# Patient Record
Sex: FEMALE | Race: WHITE | NOT HISPANIC OR LATINO | Employment: OTHER | URBAN - METROPOLITAN AREA
[De-identification: names, ages, dates, MRNs, and addresses within clinical notes are randomized per-mention and may not be internally consistent; named-entity substitution may affect disease eponyms.]

---

## 2017-01-04 ENCOUNTER — ALLSCRIPTS OFFICE VISIT (OUTPATIENT)
Dept: OTHER | Facility: OTHER | Age: 72
End: 2017-01-04

## 2017-01-11 DIAGNOSIS — I48.91 ATRIAL FIBRILLATION (HCC): ICD-10-CM

## 2017-01-25 ENCOUNTER — GENERIC CONVERSION - ENCOUNTER (OUTPATIENT)
Dept: OTHER | Facility: OTHER | Age: 72
End: 2017-01-25

## 2017-01-25 ENCOUNTER — APPOINTMENT (OUTPATIENT)
Dept: LAB | Facility: CLINIC | Age: 72
End: 2017-01-25
Payer: COMMERCIAL

## 2017-01-25 DIAGNOSIS — I48.91 ATRIAL FIBRILLATION (HCC): ICD-10-CM

## 2017-01-25 LAB
INR PPP: 2.77 (ref 0.86–1.16)
PROTHROMBIN TIME: 28.8 SECONDS (ref 12–14.3)

## 2017-01-25 PROCEDURE — 85610 PROTHROMBIN TIME: CPT

## 2017-01-25 PROCEDURE — 36415 COLL VENOUS BLD VENIPUNCTURE: CPT

## 2017-01-26 ENCOUNTER — GENERIC CONVERSION - ENCOUNTER (OUTPATIENT)
Dept: OTHER | Facility: OTHER | Age: 72
End: 2017-01-26

## 2017-02-03 ENCOUNTER — GENERIC CONVERSION - ENCOUNTER (OUTPATIENT)
Dept: OTHER | Facility: OTHER | Age: 72
End: 2017-02-03

## 2017-02-22 ENCOUNTER — APPOINTMENT (OUTPATIENT)
Dept: LAB | Facility: CLINIC | Age: 72
End: 2017-02-22
Payer: COMMERCIAL

## 2017-02-22 ENCOUNTER — GENERIC CONVERSION - ENCOUNTER (OUTPATIENT)
Dept: OTHER | Facility: OTHER | Age: 72
End: 2017-02-22

## 2017-02-22 DIAGNOSIS — I48.91 ATRIAL FIBRILLATION (HCC): ICD-10-CM

## 2017-02-22 DIAGNOSIS — Z12.31 ENCOUNTER FOR SCREENING MAMMOGRAM FOR MALIGNANT NEOPLASM OF BREAST: ICD-10-CM

## 2017-02-22 LAB
INR PPP: 2.31 (ref 0.86–1.16)
PROTHROMBIN TIME: 25.1 SECONDS (ref 12–14.3)

## 2017-02-22 PROCEDURE — 36415 COLL VENOUS BLD VENIPUNCTURE: CPT

## 2017-02-22 PROCEDURE — 85610 PROTHROMBIN TIME: CPT

## 2017-03-21 ENCOUNTER — GENERIC CONVERSION - ENCOUNTER (OUTPATIENT)
Dept: OTHER | Facility: OTHER | Age: 72
End: 2017-03-21

## 2017-03-21 ENCOUNTER — APPOINTMENT (OUTPATIENT)
Dept: LAB | Facility: CLINIC | Age: 72
End: 2017-03-21
Payer: COMMERCIAL

## 2017-03-21 DIAGNOSIS — I48.91 ATRIAL FIBRILLATION (HCC): ICD-10-CM

## 2017-03-21 LAB
INR PPP: 2.82 (ref 0.86–1.16)
PROTHROMBIN TIME: 29.2 SECONDS (ref 12–14.3)

## 2017-03-21 PROCEDURE — 85610 PROTHROMBIN TIME: CPT

## 2017-03-21 PROCEDURE — 36415 COLL VENOUS BLD VENIPUNCTURE: CPT

## 2017-04-05 DIAGNOSIS — I48.91 ATRIAL FIBRILLATION (HCC): ICD-10-CM

## 2017-04-11 ENCOUNTER — GENERIC CONVERSION - ENCOUNTER (OUTPATIENT)
Dept: OTHER | Facility: OTHER | Age: 72
End: 2017-04-11

## 2017-04-11 ENCOUNTER — TRANSCRIBE ORDERS (OUTPATIENT)
Dept: LAB | Facility: CLINIC | Age: 72
End: 2017-04-11

## 2017-04-11 ENCOUNTER — APPOINTMENT (OUTPATIENT)
Dept: LAB | Facility: CLINIC | Age: 72
End: 2017-04-11
Payer: COMMERCIAL

## 2017-04-11 DIAGNOSIS — I48.91 ATRIAL FIBRILLATION (HCC): ICD-10-CM

## 2017-04-11 LAB
INR PPP: 2.9 (ref 0.86–1.16)
PROTHROMBIN TIME: 29.8 SECONDS (ref 12–14.3)

## 2017-04-11 PROCEDURE — 85610 PROTHROMBIN TIME: CPT

## 2017-04-11 PROCEDURE — 36415 COLL VENOUS BLD VENIPUNCTURE: CPT

## 2017-05-09 ENCOUNTER — APPOINTMENT (OUTPATIENT)
Dept: LAB | Facility: CLINIC | Age: 72
End: 2017-05-09
Payer: COMMERCIAL

## 2017-05-09 ENCOUNTER — GENERIC CONVERSION - ENCOUNTER (OUTPATIENT)
Dept: OTHER | Facility: OTHER | Age: 72
End: 2017-05-09

## 2017-05-09 DIAGNOSIS — I48.91 ATRIAL FIBRILLATION (HCC): ICD-10-CM

## 2017-05-09 LAB
INR PPP: 1.89 (ref 0.86–1.16)
PROTHROMBIN TIME: 21.9 SECONDS (ref 12.1–14.4)

## 2017-05-09 PROCEDURE — 85610 PROTHROMBIN TIME: CPT

## 2017-05-09 PROCEDURE — 36415 COLL VENOUS BLD VENIPUNCTURE: CPT

## 2017-05-10 ENCOUNTER — GENERIC CONVERSION - ENCOUNTER (OUTPATIENT)
Dept: OTHER | Facility: OTHER | Age: 72
End: 2017-05-10

## 2017-05-16 ENCOUNTER — GENERIC CONVERSION - ENCOUNTER (OUTPATIENT)
Dept: OTHER | Facility: OTHER | Age: 72
End: 2017-05-16

## 2017-05-16 ENCOUNTER — APPOINTMENT (OUTPATIENT)
Dept: LAB | Facility: CLINIC | Age: 72
End: 2017-05-16
Payer: COMMERCIAL

## 2017-05-16 ENCOUNTER — TRANSCRIBE ORDERS (OUTPATIENT)
Dept: LAB | Facility: CLINIC | Age: 72
End: 2017-05-16

## 2017-05-16 DIAGNOSIS — I48.91 ATRIAL FIBRILLATION, UNSPECIFIED TYPE (HCC): Primary | ICD-10-CM

## 2017-05-16 DIAGNOSIS — I48.91 ATRIAL FIBRILLATION, UNSPECIFIED TYPE (HCC): ICD-10-CM

## 2017-05-16 LAB
INR PPP: 2.8 (ref 0.86–1.16)
PROTHROMBIN TIME: 29.9 SECONDS (ref 12.1–14.4)

## 2017-05-16 PROCEDURE — 85610 PROTHROMBIN TIME: CPT

## 2017-05-16 PROCEDURE — 36415 COLL VENOUS BLD VENIPUNCTURE: CPT

## 2017-05-17 ENCOUNTER — GENERIC CONVERSION - ENCOUNTER (OUTPATIENT)
Dept: OTHER | Facility: OTHER | Age: 72
End: 2017-05-17

## 2017-06-12 ENCOUNTER — APPOINTMENT (OUTPATIENT)
Dept: LAB | Facility: CLINIC | Age: 72
End: 2017-06-12
Payer: COMMERCIAL

## 2017-06-12 ENCOUNTER — GENERIC CONVERSION - ENCOUNTER (OUTPATIENT)
Dept: OTHER | Facility: OTHER | Age: 72
End: 2017-06-12

## 2017-06-12 DIAGNOSIS — I10 ESSENTIAL (PRIMARY) HYPERTENSION: ICD-10-CM

## 2017-06-12 DIAGNOSIS — I73.00 RAYNAUD'S SYNDROME WITHOUT GANGRENE: ICD-10-CM

## 2017-06-12 DIAGNOSIS — E78.5 HYPERLIPIDEMIA: ICD-10-CM

## 2017-06-12 DIAGNOSIS — Z11.59 ENCOUNTER FOR SCREENING FOR OTHER VIRAL DISEASES: ICD-10-CM

## 2017-06-12 DIAGNOSIS — I48.91 ATRIAL FIBRILLATION (HCC): ICD-10-CM

## 2017-06-12 LAB
ALBUMIN SERPL BCP-MCNC: 3.8 G/DL (ref 3.5–5)
ALP SERPL-CCNC: 59 U/L (ref 46–116)
ALT SERPL W P-5'-P-CCNC: 27 U/L (ref 12–78)
ANION GAP SERPL CALCULATED.3IONS-SCNC: 5 MMOL/L (ref 4–13)
AST SERPL W P-5'-P-CCNC: 24 U/L (ref 5–45)
BASOPHILS # BLD AUTO: 0.04 THOUSANDS/ΜL (ref 0–0.1)
BASOPHILS NFR BLD AUTO: 1 % (ref 0–1)
BILIRUB SERPL-MCNC: 0.6 MG/DL (ref 0.2–1)
BUN SERPL-MCNC: 21 MG/DL (ref 5–25)
CALCIUM SERPL-MCNC: 8.6 MG/DL (ref 8.3–10.1)
CHLORIDE SERPL-SCNC: 107 MMOL/L (ref 100–108)
CHOLEST SERPL-MCNC: 147 MG/DL (ref 50–200)
CO2 SERPL-SCNC: 30 MMOL/L (ref 21–32)
CREAT SERPL-MCNC: 0.84 MG/DL (ref 0.6–1.3)
EOSINOPHIL # BLD AUTO: 0.34 THOUSAND/ΜL (ref 0–0.61)
EOSINOPHIL NFR BLD AUTO: 6 % (ref 0–6)
ERYTHROCYTE [DISTWIDTH] IN BLOOD BY AUTOMATED COUNT: 12.8 % (ref 11.6–15.1)
GFR SERPL CREATININE-BSD FRML MDRD: >60 ML/MIN/1.73SQ M
GLUCOSE P FAST SERPL-MCNC: 89 MG/DL (ref 65–99)
HCT VFR BLD AUTO: 39.4 % (ref 34.8–46.1)
HDLC SERPL-MCNC: 61 MG/DL (ref 40–60)
HGB BLD-MCNC: 12.8 G/DL (ref 11.5–15.4)
INR PPP: 2.83 (ref 0.86–1.16)
LDLC SERPL CALC-MCNC: 66 MG/DL (ref 0–100)
LYMPHOCYTES # BLD AUTO: 1.45 THOUSANDS/ΜL (ref 0.6–4.47)
LYMPHOCYTES NFR BLD AUTO: 25 % (ref 14–44)
MCH RBC QN AUTO: 31.4 PG (ref 26.8–34.3)
MCHC RBC AUTO-ENTMCNC: 32.5 G/DL (ref 31.4–37.4)
MCV RBC AUTO: 97 FL (ref 82–98)
MONOCYTES # BLD AUTO: 0.66 THOUSAND/ΜL (ref 0.17–1.22)
MONOCYTES NFR BLD AUTO: 11 % (ref 4–12)
NEUTROPHILS # BLD AUTO: 3.41 THOUSANDS/ΜL (ref 1.85–7.62)
NEUTS SEG NFR BLD AUTO: 57 % (ref 43–75)
NRBC BLD AUTO-RTO: 0 /100 WBCS
PLATELET # BLD AUTO: 219 THOUSANDS/UL (ref 149–390)
PMV BLD AUTO: 9.8 FL (ref 8.9–12.7)
POTASSIUM SERPL-SCNC: 4.2 MMOL/L (ref 3.5–5.3)
PROT SERPL-MCNC: 7.4 G/DL (ref 6.4–8.2)
PROTHROMBIN TIME: 30.1 SECONDS (ref 12.1–14.4)
RBC # BLD AUTO: 4.08 MILLION/UL (ref 3.81–5.12)
SODIUM SERPL-SCNC: 142 MMOL/L (ref 136–145)
TRIGL SERPL-MCNC: 100 MG/DL
TSH SERPL DL<=0.05 MIU/L-ACNC: 3.44 UIU/ML (ref 0.36–3.74)
WBC # BLD AUTO: 5.91 THOUSAND/UL (ref 4.31–10.16)

## 2017-06-12 PROCEDURE — 36415 COLL VENOUS BLD VENIPUNCTURE: CPT

## 2017-06-12 PROCEDURE — 85610 PROTHROMBIN TIME: CPT

## 2017-06-12 PROCEDURE — 84443 ASSAY THYROID STIM HORMONE: CPT

## 2017-06-12 PROCEDURE — 80061 LIPID PANEL: CPT

## 2017-06-12 PROCEDURE — 85025 COMPLETE CBC W/AUTO DIFF WBC: CPT

## 2017-06-12 PROCEDURE — 80053 COMPREHEN METABOLIC PANEL: CPT

## 2017-06-12 PROCEDURE — 86803 HEPATITIS C AB TEST: CPT

## 2017-06-13 ENCOUNTER — GENERIC CONVERSION - ENCOUNTER (OUTPATIENT)
Dept: OTHER | Facility: OTHER | Age: 72
End: 2017-06-13

## 2017-06-13 LAB — HCV AB SER QL: NORMAL

## 2017-06-23 ENCOUNTER — TRANSCRIBE ORDERS (OUTPATIENT)
Dept: ADMINISTRATIVE | Facility: HOSPITAL | Age: 72
End: 2017-06-23

## 2017-06-23 ENCOUNTER — ALLSCRIPTS OFFICE VISIT (OUTPATIENT)
Dept: OTHER | Facility: OTHER | Age: 72
End: 2017-06-23

## 2017-06-23 DIAGNOSIS — R09.89 OTHER SYMPTOMS INVOLVING CARDIOVASCULAR SYSTEM: Primary | ICD-10-CM

## 2017-06-27 ENCOUNTER — HOSPITAL ENCOUNTER (OUTPATIENT)
Dept: ULTRASOUND IMAGING | Facility: CLINIC | Age: 72
Discharge: HOME | End: 2017-06-27
Payer: COMMERCIAL

## 2017-06-27 ENCOUNTER — GENERIC CONVERSION - ENCOUNTER (OUTPATIENT)
Dept: OTHER | Facility: OTHER | Age: 72
End: 2017-06-27

## 2017-06-27 DIAGNOSIS — R09.89 OTHER SYMPTOMS INVOLVING CARDIOVASCULAR SYSTEM: ICD-10-CM

## 2017-06-27 PROCEDURE — 93880 EXTRACRANIAL BILAT STUDY: CPT

## 2017-07-10 ENCOUNTER — GENERIC CONVERSION - ENCOUNTER (OUTPATIENT)
Dept: OTHER | Facility: OTHER | Age: 72
End: 2017-07-10

## 2017-07-10 ENCOUNTER — APPOINTMENT (OUTPATIENT)
Dept: LAB | Facility: CLINIC | Age: 72
End: 2017-07-10
Payer: COMMERCIAL

## 2017-07-10 DIAGNOSIS — R09.89 OTHER SYMPTOMS INVOLVING CARDIOVASCULAR SYSTEM: ICD-10-CM

## 2017-07-10 LAB
INR PPP: 2.34 (ref 0.86–1.16)
PROTHROMBIN TIME: 25.9 SECONDS (ref 12.1–14.4)

## 2017-07-10 PROCEDURE — 85610 PROTHROMBIN TIME: CPT

## 2017-07-10 PROCEDURE — 36415 COLL VENOUS BLD VENIPUNCTURE: CPT

## 2017-08-03 ENCOUNTER — ALLSCRIPTS OFFICE VISIT (OUTPATIENT)
Dept: OTHER | Facility: OTHER | Age: 72
End: 2017-08-03

## 2017-08-03 ENCOUNTER — GENERIC CONVERSION - ENCOUNTER (OUTPATIENT)
Dept: OTHER | Facility: OTHER | Age: 72
End: 2017-08-03

## 2017-08-03 ENCOUNTER — APPOINTMENT (OUTPATIENT)
Dept: LAB | Facility: CLINIC | Age: 72
End: 2017-08-03
Payer: COMMERCIAL

## 2017-08-03 ENCOUNTER — HOSPITAL ENCOUNTER (OUTPATIENT)
Dept: CT IMAGING | Facility: HOSPITAL | Age: 72
Discharge: HOME | End: 2017-08-03
Payer: COMMERCIAL

## 2017-08-03 ENCOUNTER — TRANSCRIBE ORDERS (OUTPATIENT)
Dept: ADMINISTRATIVE | Facility: HOSPITAL | Age: 72
End: 2017-08-03

## 2017-08-03 DIAGNOSIS — G45.3 AMAUROSIS FUGAX: ICD-10-CM

## 2017-08-03 DIAGNOSIS — I48.91 ATRIAL FIBRILLATION (HCC): ICD-10-CM

## 2017-08-03 DIAGNOSIS — R42 DIZZINESS: ICD-10-CM

## 2017-08-03 DIAGNOSIS — H53.129: ICD-10-CM

## 2017-08-03 DIAGNOSIS — I48.91 ATRIAL FIBRILLATION, UNSPECIFIED TYPE (HCC): ICD-10-CM

## 2017-08-03 DIAGNOSIS — I05.9 RHEUMATIC MITRAL VALVE DISEASE: ICD-10-CM

## 2017-08-03 DIAGNOSIS — H53.129: Primary | ICD-10-CM

## 2017-08-03 LAB
INR PPP: 2.1 (ref 0.86–1.16)
PROTHROMBIN TIME: 23.8 SECONDS (ref 12.1–14.4)

## 2017-08-03 PROCEDURE — 70498 CT ANGIOGRAPHY NECK: CPT

## 2017-08-03 PROCEDURE — 70496 CT ANGIOGRAPHY HEAD: CPT

## 2017-08-03 PROCEDURE — 36415 COLL VENOUS BLD VENIPUNCTURE: CPT

## 2017-08-03 PROCEDURE — 85610 PROTHROMBIN TIME: CPT

## 2017-08-03 RX ADMIN — IOHEXOL 85 ML: 350 INJECTION, SOLUTION INTRAVENOUS at 11:17

## 2017-08-07 ENCOUNTER — ALLSCRIPTS OFFICE VISIT (OUTPATIENT)
Dept: OTHER | Facility: OTHER | Age: 72
End: 2017-08-07

## 2017-08-08 ENCOUNTER — HOSPITAL ENCOUNTER (OUTPATIENT)
Dept: NON INVASIVE DIAGNOSTICS | Facility: CLINIC | Age: 72
Discharge: HOME | End: 2017-08-08
Payer: COMMERCIAL

## 2017-08-08 DIAGNOSIS — I48.91 ATRIAL FIBRILLATION (HCC): ICD-10-CM

## 2017-08-08 PROCEDURE — 93226 XTRNL ECG REC<48 HR SCAN A/R: CPT

## 2017-08-08 PROCEDURE — 93225 XTRNL ECG REC<48 HRS REC: CPT

## 2017-08-10 ENCOUNTER — TRANSCRIBE ORDERS (OUTPATIENT)
Dept: LAB | Facility: CLINIC | Age: 72
End: 2017-08-10

## 2017-08-10 ENCOUNTER — APPOINTMENT (OUTPATIENT)
Dept: LAB | Facility: CLINIC | Age: 72
End: 2017-08-10
Payer: COMMERCIAL

## 2017-08-10 ENCOUNTER — GENERIC CONVERSION - ENCOUNTER (OUTPATIENT)
Dept: OTHER | Facility: OTHER | Age: 72
End: 2017-08-10

## 2017-08-10 DIAGNOSIS — I48.91 ATRIAL FIBRILLATION, UNSPECIFIED TYPE (HCC): Primary | ICD-10-CM

## 2017-08-10 DIAGNOSIS — I48.91 ATRIAL FIBRILLATION, UNSPECIFIED TYPE (HCC): ICD-10-CM

## 2017-08-10 LAB
INR PPP: 4.91 (ref 0.86–1.16)
PROTHROMBIN TIME: 46.7 SECONDS (ref 12.1–14.4)

## 2017-08-10 PROCEDURE — 36415 COLL VENOUS BLD VENIPUNCTURE: CPT

## 2017-08-10 PROCEDURE — 85610 PROTHROMBIN TIME: CPT

## 2017-08-11 ENCOUNTER — HOSPITAL ENCOUNTER (OUTPATIENT)
Dept: MRI IMAGING | Facility: HOSPITAL | Age: 72
Discharge: HOME | End: 2017-08-11
Payer: COMMERCIAL

## 2017-08-11 ENCOUNTER — GENERIC CONVERSION - ENCOUNTER (OUTPATIENT)
Dept: OTHER | Facility: OTHER | Age: 72
End: 2017-08-11

## 2017-08-11 DIAGNOSIS — G45.3 AMAUROSIS FUGAX: ICD-10-CM

## 2017-08-11 PROCEDURE — 70551 MRI BRAIN STEM W/O DYE: CPT

## 2017-08-13 ENCOUNTER — GENERIC CONVERSION - ENCOUNTER (OUTPATIENT)
Dept: OTHER | Facility: OTHER | Age: 72
End: 2017-08-13

## 2017-08-17 ENCOUNTER — APPOINTMENT (OUTPATIENT)
Dept: LAB | Facility: CLINIC | Age: 72
End: 2017-08-17
Payer: COMMERCIAL

## 2017-08-17 ENCOUNTER — GENERIC CONVERSION - ENCOUNTER (OUTPATIENT)
Dept: OTHER | Facility: OTHER | Age: 72
End: 2017-08-17

## 2017-08-17 ENCOUNTER — TRANSCRIBE ORDERS (OUTPATIENT)
Dept: LAB | Facility: CLINIC | Age: 72
End: 2017-08-17

## 2017-08-17 DIAGNOSIS — I48.91 ATRIAL FIBRILLATION, UNSPECIFIED TYPE (HCC): Primary | ICD-10-CM

## 2017-08-17 DIAGNOSIS — I48.91 ATRIAL FIBRILLATION, UNSPECIFIED TYPE (HCC): ICD-10-CM

## 2017-08-17 LAB
INR PPP: 2 (ref 0.86–1.16)
PROTHROMBIN TIME: 22.9 SECONDS (ref 12.1–14.4)

## 2017-08-17 PROCEDURE — 36415 COLL VENOUS BLD VENIPUNCTURE: CPT

## 2017-08-17 PROCEDURE — 85610 PROTHROMBIN TIME: CPT

## 2017-09-07 ENCOUNTER — GENERIC CONVERSION - ENCOUNTER (OUTPATIENT)
Dept: OTHER | Facility: OTHER | Age: 72
End: 2017-09-07

## 2017-09-07 ENCOUNTER — APPOINTMENT (OUTPATIENT)
Dept: LAB | Facility: CLINIC | Age: 72
End: 2017-09-07
Payer: COMMERCIAL

## 2017-09-07 DIAGNOSIS — I48.91 ATRIAL FIBRILLATION (HCC): ICD-10-CM

## 2017-09-07 DIAGNOSIS — R35.0 FREQUENCY OF MICTURITION: ICD-10-CM

## 2017-09-07 DIAGNOSIS — I48.91 ATRIAL FIBRILLATION, UNSPECIFIED TYPE (HCC): ICD-10-CM

## 2017-09-07 LAB
INR PPP: 3.39 (ref 0.86–1.16)
PROTHROMBIN TIME: 34.8 SECONDS (ref 12.1–14.4)

## 2017-09-07 PROCEDURE — 36415 COLL VENOUS BLD VENIPUNCTURE: CPT

## 2017-09-07 PROCEDURE — 85610 PROTHROMBIN TIME: CPT

## 2017-09-08 ENCOUNTER — GENERIC CONVERSION - ENCOUNTER (OUTPATIENT)
Dept: OTHER | Facility: OTHER | Age: 72
End: 2017-09-08

## 2017-09-21 ENCOUNTER — ALLSCRIPTS OFFICE VISIT (OUTPATIENT)
Dept: OTHER | Facility: OTHER | Age: 72
End: 2017-09-21

## 2017-10-02 ENCOUNTER — GENERIC CONVERSION - ENCOUNTER (OUTPATIENT)
Dept: OTHER | Facility: OTHER | Age: 72
End: 2017-10-02

## 2017-10-02 ENCOUNTER — APPOINTMENT (OUTPATIENT)
Dept: LAB | Facility: CLINIC | Age: 72
End: 2017-10-02
Payer: COMMERCIAL

## 2017-10-02 DIAGNOSIS — R35.0 FREQUENCY OF MICTURITION: ICD-10-CM

## 2017-10-02 DIAGNOSIS — I48.91 ATRIAL FIBRILLATION (HCC): ICD-10-CM

## 2017-10-02 LAB
BACTERIA UR QL AUTO: ABNORMAL /HPF
BILIRUB UR QL STRIP: NEGATIVE
CLARITY UR: CLEAR
COLOR UR: YELLOW
GLUCOSE UR STRIP-MCNC: NEGATIVE MG/DL
HGB UR QL STRIP.AUTO: ABNORMAL
HYALINE CASTS #/AREA URNS LPF: ABNORMAL /LPF
INR PPP: 2.34 (ref 0.86–1.16)
KETONES UR STRIP-MCNC: NEGATIVE MG/DL
LEUKOCYTE ESTERASE UR QL STRIP: NEGATIVE
NITRITE UR QL STRIP: NEGATIVE
NON-SQ EPI CELLS URNS QL MICRO: ABNORMAL /HPF
PH UR STRIP.AUTO: 6.5 [PH] (ref 4.5–8)
PROT UR STRIP-MCNC: ABNORMAL MG/DL
PROTHROMBIN TIME: 25.9 SECONDS (ref 12.1–14.4)
RBC #/AREA URNS AUTO: ABNORMAL /HPF
SP GR UR STRIP.AUTO: 1.01 (ref 1–1.03)
UROBILINOGEN UR QL STRIP.AUTO: 0.2 E.U./DL
WBC #/AREA URNS AUTO: ABNORMAL /HPF

## 2017-10-02 PROCEDURE — 81001 URINALYSIS AUTO W/SCOPE: CPT

## 2017-10-02 PROCEDURE — 36415 COLL VENOUS BLD VENIPUNCTURE: CPT

## 2017-10-02 PROCEDURE — 85610 PROTHROMBIN TIME: CPT

## 2017-10-02 PROCEDURE — 87086 URINE CULTURE/COLONY COUNT: CPT

## 2017-10-03 LAB — BACTERIA UR CULT: NORMAL

## 2017-10-09 ENCOUNTER — GENERIC CONVERSION - ENCOUNTER (OUTPATIENT)
Dept: OTHER | Facility: OTHER | Age: 72
End: 2017-10-09

## 2017-10-09 DIAGNOSIS — I48.91 ATRIAL FIBRILLATION (HCC): ICD-10-CM

## 2017-10-09 DIAGNOSIS — Z12.31 ENCOUNTER FOR SCREENING MAMMOGRAM FOR MALIGNANT NEOPLASM OF BREAST: ICD-10-CM

## 2017-10-09 DIAGNOSIS — R92.2 INCONCLUSIVE MAMMOGRAM: ICD-10-CM

## 2017-10-09 DIAGNOSIS — N64.4 MASTODYNIA: ICD-10-CM

## 2017-10-09 PROCEDURE — G0145 SCR C/V CYTO,THINLAYER,RESCR: HCPCS | Performed by: NURSE PRACTITIONER

## 2017-10-10 ENCOUNTER — LAB REQUISITION (OUTPATIENT)
Dept: LAB | Facility: HOSPITAL | Age: 72
End: 2017-10-10
Payer: COMMERCIAL

## 2017-10-10 DIAGNOSIS — Z01.419 ENCOUNTER FOR GYNECOLOGICAL EXAMINATION WITHOUT ABNORMAL FINDING: ICD-10-CM

## 2017-10-11 ENCOUNTER — GENERIC CONVERSION - ENCOUNTER (OUTPATIENT)
Dept: OTHER | Facility: OTHER | Age: 72
End: 2017-10-11

## 2017-10-17 ENCOUNTER — GENERIC CONVERSION - ENCOUNTER (OUTPATIENT)
Dept: OTHER | Facility: OTHER | Age: 72
End: 2017-10-17

## 2017-10-18 ENCOUNTER — TRANSCRIBE ORDERS (OUTPATIENT)
Dept: LAB | Facility: CLINIC | Age: 72
End: 2017-10-18

## 2017-10-18 ENCOUNTER — GENERIC CONVERSION - ENCOUNTER (OUTPATIENT)
Dept: OTHER | Facility: OTHER | Age: 72
End: 2017-10-18

## 2017-10-18 ENCOUNTER — APPOINTMENT (OUTPATIENT)
Dept: LAB | Facility: CLINIC | Age: 72
End: 2017-10-18
Payer: COMMERCIAL

## 2017-10-18 DIAGNOSIS — I48.91 ATRIAL FIBRILLATION (HCC): ICD-10-CM

## 2017-10-18 LAB
INR PPP: 2.98 (ref 0.86–1.16)
PROTHROMBIN TIME: 31.4 SECONDS (ref 12.1–14.4)

## 2017-10-18 PROCEDURE — 36415 COLL VENOUS BLD VENIPUNCTURE: CPT

## 2017-10-18 PROCEDURE — 85610 PROTHROMBIN TIME: CPT

## 2017-10-23 ENCOUNTER — GENERIC CONVERSION - ENCOUNTER (OUTPATIENT)
Dept: OTHER | Facility: OTHER | Age: 72
End: 2017-10-23

## 2017-10-26 LAB
LAB AP GYN PRIMARY INTERPRETATION: NORMAL
Lab: NORMAL

## 2017-10-27 ENCOUNTER — GENERIC CONVERSION - ENCOUNTER (OUTPATIENT)
Dept: OTHER | Facility: OTHER | Age: 72
End: 2017-10-27

## 2017-10-31 ENCOUNTER — GENERIC CONVERSION - ENCOUNTER (OUTPATIENT)
Dept: OTHER | Facility: OTHER | Age: 72
End: 2017-10-31

## 2017-11-10 ENCOUNTER — GENERIC CONVERSION - ENCOUNTER (OUTPATIENT)
Dept: INTERNAL MEDICINE CLINIC | Facility: CLINIC | Age: 72
End: 2017-11-10

## 2017-11-15 ENCOUNTER — GENERIC CONVERSION - ENCOUNTER (OUTPATIENT)
Dept: OTHER | Facility: OTHER | Age: 72
End: 2017-11-15

## 2017-11-15 ENCOUNTER — APPOINTMENT (OUTPATIENT)
Dept: LAB | Facility: CLINIC | Age: 72
End: 2017-11-15
Payer: COMMERCIAL

## 2017-11-15 DIAGNOSIS — I48.91 ATRIAL FIBRILLATION (HCC): ICD-10-CM

## 2017-11-15 LAB
INR PPP: 3 (ref 0.86–1.16)
PROTHROMBIN TIME: 31.6 SECONDS (ref 12.1–14.4)

## 2017-11-15 PROCEDURE — 85610 PROTHROMBIN TIME: CPT

## 2017-11-15 PROCEDURE — 36415 COLL VENOUS BLD VENIPUNCTURE: CPT

## 2017-11-16 DIAGNOSIS — I48.91 ATRIAL FIBRILLATION (HCC): ICD-10-CM

## 2017-11-29 ENCOUNTER — GENERIC CONVERSION - ENCOUNTER (OUTPATIENT)
Dept: OTHER | Facility: OTHER | Age: 72
End: 2017-11-29

## 2017-11-29 ENCOUNTER — LAB (OUTPATIENT)
Dept: LAB | Facility: CLINIC | Age: 72
End: 2017-11-29
Payer: COMMERCIAL

## 2017-11-29 DIAGNOSIS — I48.91 ATRIAL FIBRILLATION (HCC): ICD-10-CM

## 2017-11-29 LAB
INR PPP: 3.08 (ref 0.86–1.16)
PROTHROMBIN TIME: 32.2 SECONDS (ref 12.1–14.4)

## 2017-11-29 PROCEDURE — 85610 PROTHROMBIN TIME: CPT

## 2017-11-29 PROCEDURE — 36415 COLL VENOUS BLD VENIPUNCTURE: CPT

## 2017-12-04 ENCOUNTER — GENERIC CONVERSION - ENCOUNTER (OUTPATIENT)
Dept: INTERNAL MEDICINE CLINIC | Facility: CLINIC | Age: 72
End: 2017-12-04

## 2017-12-08 ENCOUNTER — TRANSCRIBE ORDERS (OUTPATIENT)
Dept: ADMINISTRATIVE | Facility: HOSPITAL | Age: 72
End: 2017-12-08

## 2017-12-08 DIAGNOSIS — M25.511 RIGHT SHOULDER PAIN, UNSPECIFIED CHRONICITY: Primary | ICD-10-CM

## 2017-12-16 ENCOUNTER — GENERIC CONVERSION - ENCOUNTER (OUTPATIENT)
Dept: OTHER | Facility: OTHER | Age: 72
End: 2017-12-16

## 2017-12-16 ENCOUNTER — HOSPITAL ENCOUNTER (OUTPATIENT)
Dept: MRI IMAGING | Facility: HOSPITAL | Age: 72
Discharge: HOME | End: 2017-12-16
Attending: SPECIALIST
Payer: COMMERCIAL

## 2017-12-16 DIAGNOSIS — M25.511 RIGHT SHOULDER PAIN, UNSPECIFIED CHRONICITY: ICD-10-CM

## 2017-12-16 PROCEDURE — 73221 MRI JOINT UPR EXTREM W/O DYE: CPT

## 2017-12-22 ENCOUNTER — GENERIC CONVERSION - ENCOUNTER (OUTPATIENT)
Dept: CARDIOLOGY CLINIC | Facility: CLINIC | Age: 72
End: 2017-12-22

## 2017-12-22 ENCOUNTER — GENERIC CONVERSION - ENCOUNTER (OUTPATIENT)
Dept: INTERNAL MEDICINE CLINIC | Facility: CLINIC | Age: 72
End: 2017-12-22

## 2017-12-27 ENCOUNTER — APPOINTMENT (OUTPATIENT)
Dept: LAB | Facility: CLINIC | Age: 72
End: 2017-12-27
Payer: COMMERCIAL

## 2017-12-27 DIAGNOSIS — I48.91 ATRIAL FIBRILLATION (HCC): ICD-10-CM

## 2017-12-27 LAB
INR PPP: 2.87 (ref 0.86–1.16)
PROTHROMBIN TIME: 30.5 SECONDS (ref 12.1–14.4)

## 2017-12-27 PROCEDURE — 85610 PROTHROMBIN TIME: CPT

## 2017-12-27 PROCEDURE — 36415 COLL VENOUS BLD VENIPUNCTURE: CPT

## 2017-12-28 DIAGNOSIS — I48.91 ATRIAL FIBRILLATION (HCC): ICD-10-CM

## 2018-01-02 DIAGNOSIS — I48.91 ATRIAL FIBRILLATION (HCC): ICD-10-CM

## 2018-01-02 DIAGNOSIS — I10 ESSENTIAL (PRIMARY) HYPERTENSION: ICD-10-CM

## 2018-01-02 DIAGNOSIS — E78.5 HYPERLIPIDEMIA: ICD-10-CM

## 2018-01-09 NOTE — RESULT NOTES
Verified Results  (1) PT WITH INR [id number] 08:21AM Thor Mills Order Number: [id number]     Test Name Result Flag Reference   INR 2 36 H 0 86-1 16   PT 25 5 seconds H 12 0-14 3

## 2018-01-09 NOTE — RESULT NOTES
Verified Results  (1) PT WITH INR [id number] 10:00AM Minda Montiel     Test Name Result Flag Reference   INR 2 00 H 0 86-1 16   PT 22 9 seconds H 12 1-14 4

## 2018-01-09 NOTE — PROGRESS NOTES
REPORT NAME: Progress Notes Report  VISIT DATE: 6/13/2017  VISIT TIME: 8:49 AM EDT  PATIENT NAME: Manisha Sweeney   MEDICAL RECORD NUMBER: [id number]  YOB: 1945  AGE: 73  REFERRING PHYSICIAN: Minda Montiel  SUPERVISING CLINICIAN: Minda Montiel  HEALTH CARE PROVIDER: Iron Montanez  PATIENT HOME ADDRESS: [address] PHONE: [phone]  SOCIAL SECURITY NUMBER:   DIAGNOSIS 1: Mitral Valve Replacement / 424 0  DIAGNOSIS 2: Long term (current) use of anticoagulants / Z79 01  INR RANGE: 2 5 - 3 5  INR GOAL: 3  TREATMENT START DATE:   TREATMENT END DATE:   NEXT VISIT:     VISIT RESULTS  ENCOUNTER NUMBER:   TEST LOCATION: [facility] [place]  TEST TYPE: Outside Lab (Venipuncture)  VISIT TYPE:   CURRENT INR: 2 83 PROTIME:   SPECIMEN COL AND RPT DATE: 6/13/2017 8:49 AM  EDT  VITAL SIGNS  PULSE:  BP: / WEIGHT:  HEIGHT:  TEMP:   CURRENT ANTICOAGULANT DOSING SCHEDULE  DOSE SIZE: 5mg    ANTICOAGULANT TYPE: COUMADIN  DOSING REGIMEN  Sun       Mon       Tues      Wed       Thurs     Fri       Sat  Total/Wk  4         2         2         4         2         2         4         20  PATIENT MEDICATION INSTRUCTION: Yes  PATIENT NUTRITIONAL COUNSELING: No  PATIENT BRUISING INSTRUCTION: No  LAST EDUCATION DATE:   PREVIOUS VISIT INFORMATION  VISITDATE  INRGoal INR   Sun    Mon    Tues   Wed    Thurs  Fri    Sat  Total/wk  6/13/2017   3       2 83  4      2      2      4      2      2      4  20  5/17/2017   3       2 8   4      2      2      4      2      2      4  20  5/10/2017   3       1 89  4      2      2      4      2      2      4  20  4/11/2017   3       2 9   2      2      4      2      2      4      2  18  ADDITIONAL PREVIOUS VISIT INFORMATION  VISITDATE   PRIMARY RX               DOSE      CrCl  6/13/2017   COUMADIN                 5mg  5/17/2017   COUMADIN                 5mg  5/10/2017   COUMADIN                 5mg  4/11/2017   COUMADIN 5mg  OTHER CURRENT MEDICATIONS:  COUMADIN  PROGRESS NOTES: Same dose rechk 2 weeks  PATIENT INSTRUCTIONS: LMOM  TEST LOCATION: [facility] , ,   INBOUND LAB DATA:  Lab       Lab Value Col Date                 Rpt Date                 Lab  Reference Range  Electronically signed by: Gavino Ortiz on 6/13/2017 8:49 AM EDT

## 2018-01-09 NOTE — RESULT NOTES
Verified Results  (1) THIN PREP PAP WITH IMAGING [id number] 08:45AM Pamella Donald     Test Name Result Flag Reference   LAB AP CASE REPORT (Report)     Gynecologic Cytology Report            Case: [id number]                  Authorizing Provider: HANNY Cintron    Collected:      10/09/2017           First Screen:     DASHA Manley Received:      10/10/2017 1429        Rescreen:       DASHA Mccall                              Specimen:  LIQUID-BASED PAP, SCREENING, Cervix   LAB AP GYN PRIMARY INTERPRETATION      Negative for intraepithelial lesion or malignancy  Electronically signed by DASHA Mccall on 10/26/2017 at 8:45 AM   LAB AP GYN SPECIMEN ADEQUACY      Satisfactory for evaluation  (See note)   LAB AP GYN NOTE      No endocervical cells identified  It is difficult to differentiate between   squamous metaplastic cells and parabasal cells in atrophic specimens due   to numerous causes including menopause, postpartum changes and   progestational agents  LAB AP GYN ADDITIONAL INFORMATION (Report)     [employer]'s FDA approved ,  and ThinPrep Imaging System are   utilized with strict adherence to the 's instruction manual to   prepare gynecologic and non-gynecologic cytology specimens for the   production of ThinPrep slides as well as for gynecologic ThinPrep imaging  These processes have been validated by our laboratory and/or by the     The Pap test is not a diagnostic procedure and should not be used as the   sole means to detect cervical cancer  It is only a screening procedure to   aid in the detection of cervical cancer and its precursors  Both   false-negative and false-positive results have been experienced  Your   patient's test result should be interpreted in this context together with   the history and clinical findings

## 2018-01-09 NOTE — RESULT NOTES
Verified Results  (1) CBC/ PLT (NO DIFF) [id number] 07:50AM Javan Martinez Order Number: [id number]     Test Name Result Flag Reference   HEMATOCRIT 37 3 %  34 8-46 1   HEMOGLOBIN 12 1 g/dL  11 5-15 4   MCHC 32 4 g/dL  31 4-37 4   MCH 30 6 pg  26 8-34 3   MCV 94 fL  82-98   PLATELET COUNT 273 Thousands/uL  149-390   RBC COUNT 3 96 Million/uL  3 81-5 12   RDW 13 7 %  11 6-15 1   WBC COUNT 4 60 Thousand/uL  4 31-10 16   MPV 10 1 fL  8 9-12 7     (1) COMPREHENSIVE METABOLIC PANEL [id number] 15:65QK Javan Martinez Order Number: [id number]     Test Name Result Flag Reference   GLUCOSE,RANDM 93 mg/dL     If the patient is fasting, the ADA then defines impaired fasting glucose as > 100 mg/dL and diabetes as > or equal to 123 mg/dL  SODIUM 143 mmol/L  136-145   POTASSIUM 4 1 mmol/L  3 5-5 3   CHLORIDE 107 mmol/L  100-108   CARBON DIOXIDE 29 mmol/L  21-32   ANION GAP (CALC) 7 mmol/L  4-13   BLOOD UREA NITROGEN 13 mg/dL  5-25   CREATININE 0 81 mg/dL  0 60-1 30   Standardized to IDMS reference method   CALCIUM 8 9 mg/dL  8 3-10 1   BILI, TOTAL 0 48 mg/dL  0 20-1 00   ALK PHOSPHATAS 61 U/L     ALT (SGPT) 28 U/L  12-78   AST(SGOT) 24 U/L  5-45   ALBUMIN 3 6 g/dL  3 5-5 0   TOTAL PROTEIN 7 3 g/dL  6 4-8 2   eGFR Non-African American      >60 0 ml/min/1 73sq m   [facility] Education Program recommendations are as follows:  GFR calculation is accurate only with a steady state creatinine  Chronic Kidney disease less than 60 ml/min/1 73 sq  meters  Kidney failure less than 15 ml/min/1 73 sq  meters  (1) LIPID PANEL, FASTING [id number] 07:50AM Javan Cansecoedison Order Number: [id number]     Test Name Result Flag Reference   CHOLESTEROL 142 mg/dL     HDL,DIRECT 51 mg/dL  40-60   Specimen collection should occur prior to Metamizole administration due to the potential for falsely depressed results     LDL CHOLESTEROL CALCULATED 62 mg/dL  0-100   Triglyceride: Normal              <150 mg/dl       Borderline High    150-199 mg/dl       High               200-499 mg/dl       Very High          >499 mg/dl  Cholesterol:         Desirable        <200 mg/dl      Borderline High  200-239 mg/dl      High             >239 mg/dl  HDL Cholesterol:        High    >59 mg/dL      Low     <41 mg/dL  LDL CALCULATED:    This screening LDL is a calculated result  It does not have the accuracy of the Direct Measured LDL in the monitoring of patients with hyperlipidemia and/or statin therapy  Direct Measure LDL (ABU936) must be ordered separately in these patients  TRIGLYCERIDES 144 mg/dL  <=150   Specimen collection should occur prior to N-Acetylcysteine or Metamizole administration due to the potential for falsely depressed results  (1) TSH [id number] 07:50AM Rachel Gomez    Order Number: [id number]     Test Name Result Flag Reference   TSH 0 429 uIU/mL  0 358-3 740   Patients undergoing fluorescein dye angiography may retain small amounts of fluorescein in the body for 48-72 hours post procedure  Samples containing fluorescein can produce falsely depressed TSH values  If the patient had this procedure,a specimen should be resubmitted post fluorescein clearance            The recommended reference ranges for TSH during pregnancy are as follows:  First trimester 0 1 to 2 5 uIU/mL  Second trimester  0 2 to 3 0 uIU/mL  Third trimester 0 3 to 3 0 uIU/m     (1) PT WITH INR [id number] 07:50AM Ela Montiel Order Number: [id number]     Test Name Result Flag Reference   INR 2 61 H 0 86-1 16   PT 27 5 seconds H 12 0-14 3

## 2018-01-10 NOTE — PROGRESS NOTES
REPORT NAME: Progress Notes Report  VISIT DATE: 10/2/2017  VISIT TIME: 4:29 PM EDT  PATIENT NAME: Daniel Garcia   MEDICAL RECORD NUMBER: [id number]  YOB: 1945  AGE: 73  REFERRING  PHYSICIAN: Minda Montiel  SUPERVISING CLINICIAN: Minda Montiel  HEALTH CARE PROVIDER: Surendra Epperson  PATIENT HOME ADDRESS: [address], [address] [place] [address] PHONE: [phone]  SOCIAL SECURITY NUMBER:    DIAGNOSIS 1: Mitral Valve Replacement / 424 0  DIAGNOSIS 2: Long term (current) use of anticoagulants / Z79 01  INR RANGE: 2 5 - 3 5  INR GOAL: 3  TREATMENT START DATE:   TREATMENT END DATE:   NEXT VISIT:     VISIT  RESULTS  ENCOUNTER NUMBER:   TEST LOCATION: [facility]  TEST TYPE: Outside Lab (Venipuncture)  VISIT TYPE:   CURRENT INR: 2 34 PROTIME:   SPECIMEN COL AND RPT DATE: 10/2/2017 4:29 PM  EDT  VITAL SIGNS  PULSE:  BP: /  WEIGHT:  HEIGHT:  TEMP:   CURRENT ANTICOAGULANT DOSING SCHEDULE  DOSE SIZE: 5mg    ANTICOAGULANT TYPE: COUMADIN  DOSING REGIMEN  Sun       Mon       Tues      Wed       Thurs     Fri       Sat  Total/Wk  4         2         2          4         2         2         4         20  PATIENT MEDICATION INSTRUCTION: Yes  PATIENT NUTRITIONAL COUNSELING: No  PATIENT BRUISING INSTRUCTION: No  LAST EDUCATION DATE:   PREVIOUS VISIT INFORMATION  VISITDATE  INRGoal INR   Sun     Mon    Tues   Wed    Thurs  Fri    Sat  Total/wk  10/2/2017   3       2 34  4      2      2      4      2      2      4  20  9/8/2017    3       3 39  4      2      2      4      2      2      4  20  8/17/2017   3       2     4       2      2      4      2      2      4  20  8/11/2017   3       4 91  4      2      2      4      2      2      4  20  ADDITIONAL PREVIOUS VISIT INFORMATION  VISITDATE   PRIMARY RX               DOSE      CrCl  10/2/2017   COUMADIN                  5mg  9/8/2017    COUMADIN                 5mg  8/17/2017   COUMADIN                 5mg  8/11/2017 COUMADIN                 5mg  OTHER CURRENT MEDICATIONS:  COUMADIN  PROGRESS NOTES: Same dose rechk 2 weeks  PATIENT  INSTRUCTIONS: Spoke with pt  TEST LOCATION: [facility] [place], , ,   INBOUND LAB DATA:  Lab       Lab Value Col Date                 Rpt Date                 Lab  Reference Range  Electronically signed by: Gulshan Thomas on 10/2/2017 4:29  PM EDT              Electronically signed by:Minda INIGUEZ    Oct  2 2017  8:18PM EST

## 2018-01-10 NOTE — RESULT NOTES
Verified Results  (1) PT WITH INR [id number] 08:15AM Nicki Reynolds Order Number: [id number]     Test Name Result Flag Reference   INR 2 83 H 0 86-1 16   PT 30 1 seconds H 12 1-14 4

## 2018-01-10 NOTE — PROGRESS NOTES
REPORT NAME: Progress Notes Report  VISIT DATE: 10/18/2017  VISIT TIME: 4:20 PM EDT  PATIENT NAME: Anca Greco   MEDICAL RECORD NUMBER: [id number]  YOB: 1945  AGE: 73  REFERRING PHYSICIAN: Minda Montiel  SUPERVISING CLINICIAN: Meghana Katz CARE PROVIDER: Gloria Hilario  PATIENT HOME ADDRESS: [address] PHONE: [phone]  SOCIAL SECURITY NUMBER:   DIAGNOSIS 1: Mitral Valve Replacement / 424 0  DIAGNOSIS 2: Long term (current) use of anticoagulants / Z79 01  INR RANGE: 2 5 - 3 5  INR GOAL: 3  TREATMENT START DATE:   TREATMENT END DATE:   NEXT VISIT:     VISIT RESULTS  ENCOUNTER NUMBER:   TEST LOCATION: [address] [place] [address]  TEST TYPE: Outside Lab (Venipuncture)  VISIT TYPE:   CURRENT INR: 2 98 PROTIME:   SPECIMEN COL AND RPT DATE: 10/18/2017 4:20 PM  EDT  VITAL SIGNS  PULSE:  BP: / WEIGHT:  HEIGHT:  TEMP:   CURRENT ANTICOAGULANT DOSING SCHEDULE  DOSE SIZE: 5mg    ANTICOAGULANT TYPE: COUMADIN  DOSING REGIMEN  Sun       Mon       Tues      Wed       Thurs     Fri       Sat  Total/Wk  4         2         2         4         2         2         4         20  PATIENT MEDICATION INSTRUCTION: Yes  PATIENT NUTRITIONAL COUNSELING: No  PATIENT BRUISING INSTRUCTION: No  LAST EDUCATION DATE:   PREVIOUS VISIT INFORMATION  VISITDATE  INRGoal INR   Sun    Mon    Tues   Wed    Thurs  Fri    Sat  Total/wk  10/18/2017  3       2 98  4      2      2      4      2      2      4  20  10/2/2017   3       2 34  4      2      2      4      2      2      4  20  9/8/2017    3       3 39  4      2      2      4      2      2      4  20  8/17/2017   3       2     4      2      2      4      2      2      4  20  ADDITIONAL PREVIOUS VISIT INFORMATION  VISITDATE   PRIMARY RX               DOSE      CrCl  10/18/2017  COUMADIN                 5mg  10/2/2017   COUMADIN                 5mg  9/8/2017    COUMADIN                 5mg  8/17/2017   COUMADIN 5mg  OTHER CURRENT MEDICATIONS:  COUMADIN  PROGRESS NOTES: Same dose rechk 2 weeks  PATIENT INSTRUCTIONS: Spoke with pt  TEST LOCATION: [facility] , ,   INBOUND LAB DATA:  Lab       Lab Value Col Date                 Rpt Date                 Lab  Reference Range  Electronically signed by: Stephanie Levine on 10/18/2017 4:20 PM EDT

## 2018-01-10 NOTE — RESULT NOTES
Verified Results  (1) PT WITH INR [id number] 09:50AM Guy Boswell Order Number: [id number]     Test Name Result Flag Reference   INR 2 77 H 0 86-1 16   PT 28 8 seconds H 12 0-14 3

## 2018-01-10 NOTE — RESULT NOTES
Plan  Atrial fibrillation    · (1) PT WITH INR; Status: In Progress - Order Generated;  Requested for:Recurring  Schedule: 9/7/2017; 9/21/2017; 10/5/2017; 10/19/2017; 11/2/2017; 11/16/2017;  11/30/2017; 1 ;

## 2018-01-10 NOTE — PROGRESS NOTES
REPORT NAME: Progress Notes Report  VISIT DATE: 7/10/2017  VISIT TIME: 4:14 PM EDT  PATIENT NAME: Sue Boyer   MEDICAL RECORD NUMBER: [id number]  YOB: 1945  AGE: 73  REFERRING  PHYSICIAN: Minda Montiel  SUPERVISING CLINICIAN: Minda Montiel  HEALTH CARE PROVIDER: Kolton Martinez  PATIENT HOME ADDRESS: [address] PHONE: [phone]  SOCIAL SECURITY NUMBER:    DIAGNOSIS 1: Mitral Valve Replacement / 424 0  DIAGNOSIS 2: Long term (current) use of anticoagulants / Z79 01  INR RANGE: 2 5 - 3 5  INR GOAL: 3  TREATMENT START DATE:   TREATMENT END DATE:   NEXT VISIT:     VISIT  RESULTS  ENCOUNTER NUMBER:   TEST LOCATION: [facility]  TEST TYPE: Outside Lab (Venipuncture)  VISIT TYPE:   CURRENT INR: 2 34 PROTIME:   SPECIMEN COL AND RPT DATE: 7/10/2017 4:14 PM  EDT  VITAL SIGNS  PULSE:  BP: /  WEIGHT:  HEIGHT:  TEMP:   CURRENT ANTICOAGULANT DOSING SCHEDULE  DOSE SIZE: 5mg    ANTICOAGULANT TYPE: COUMADIN  DOSING REGIMEN  Sun       Mon       Tues      Wed       Thurs     Fri       Sat  Total/Wk  4         2         2          4         2         2         4         20  PATIENT MEDICATION INSTRUCTION: Yes  PATIENT NUTRITIONAL COUNSELING: No  PATIENT BRUISING INSTRUCTION: No  LAST EDUCATION DATE:   PREVIOUS VISIT INFORMATION  VISITDATE  INRGoal INR   Sun     Mon    Tues   Wed    Thurs  Fri    Sat  Total/wk  7/10/2017   3       2 34  4      2      2      4      2      2      4  20  6/13/2017   3       2 83  4      2      2      4      2      2      4  20  5/17/2017   3       2 8   4       2      2      4      2      2      4  20  5/10/2017   3       1 89  4      2      2      4      2      2      4  20  ADDITIONAL PREVIOUS VISIT INFORMATION  VISITDATE   PRIMARY RX               DOSE      CrCl  7/10/2017   COUMADIN                  5mg  6/13/2017   COUMADIN                 5mg  5/17/2017   COUMADIN                 5mg  5/10/2017 COUMADIN                 5mg  OTHER CURRENT MEDICATIONS:  COUMADIN  PROGRESS NOTES: Same dose rechk 3 weeks  PATIENT  INSTRUCTIONS: Spoke with pt  TEST LOCATION: [facility] , ,   INBOUND LAB DATA:  Lab       Lab Value Col Date                 Rpt Date                 Lab  Reference Range  Electronically signed by: Gavino Ortiz on 7/10/2017 4:14  PM EDT              Electronically signed by:Minda INIGUEZ    Jul 10 2017  8:05PM EST

## 2018-01-10 NOTE — RESULT NOTES
Verified Results  (1) PT WITH INR [id number] 08:04AM Nicki Apa Order Number: [id number]     Test Name Result Flag Reference   INR 2 10 H 0 86-1 16   PT 23 8 seconds H 12 1-14 4

## 2018-01-10 NOTE — PROGRESS NOTES
REPORT NAME: Progress Notes Report  VISIT DATE: 5/10/2017  VISIT TIME: 9:30 AM EDT  PATIENT NAME: Maurice Phelps   MEDICAL RECORD NUMBER: [id number]  YOB: 1945  AGE: 73  REFERRING  PHYSICIAN: Minda Montiel  SUPERVISING CLINICIAN: Minda Montiel  HEALTH CARE PROVIDER: Bridget Blair  PATIENT HOME ADDRESS: [address] PHONE: [phone]  SOCIAL SECURITY NUMBER:    DIAGNOSIS 1: Mitral Valve Replacement / 424 0  DIAGNOSIS 2: Long term (current) use of anticoagulants / Z79 01  INR RANGE: 2 5 - 3 5  INR GOAL: 3  TREATMENT START DATE:   TREATMENT END DATE:   NEXT VISIT:     VISIT  RESULTS  ENCOUNTER NUMBER:   TEST LOCATION: [facility]  TEST TYPE: Outside Lab (Venipuncture)  VISIT TYPE:   CURRENT INR: 1 89 PROTIME:   SPECIMEN COL AND RPT DATE: 5/10/2017 9:30 AM  EDT  VITAL SIGNS  PULSE:  BP: /  WEIGHT:  HEIGHT:  TEMP:   CURRENT ANTICOAGULANT DOSING SCHEDULE  DOSE SIZE: 5mg    ANTICOAGULANT TYPE: COUMADIN  DOSING REGIMEN  Sun       Mon       Tues      Wed       Thurs     Fri       Sat  Total/Wk  4         2         2          4         2         2         4         20  PATIENT MEDICATION INSTRUCTION: Yes  PATIENT NUTRITIONAL COUNSELING: No  PATIENT BRUISING INSTRUCTION: No  LAST EDUCATION DATE:   PREVIOUS VISIT INFORMATION  VISITDATE  INRGoal INR   Sun     Mon Tues Wed Thurs Fri    Sat  Total/wk  5/10/2017   3       1 89  4      2      2      4      2      2      4  20  4/11/2017   3       2 9   2      2      4      2      2      4      2  18  3/21/2017   3       2 82  2       2      4      2      2      4      2  18  2/22/2017   3       2 31  2      2      4      2      2      4      2  18  ADDITIONAL PREVIOUS VISIT INFORMATION  VISITDATE   PRIMARY RX               DOSE      CrCl  5/10/2017   COUMADIN                  5mg  4/11/2017   COUMADIN                 5mg  3/21/2017   COUMADIN                 5mg  2/22/2017 COUMADIN                 5mg  OTHER CURRENT MEDICATIONS:  COUMADIN  PROGRESS NOTES: Take 6 mg today then take 4/2/2 rechk  1 week  PATIENT INSTRUCTIONS: Spoke with pt  TEST LOCATION: [facility] ,   INBOUND LAB DATA:  Lab       Lab Value Col Date                 Rpt Date                 Lab  Reference Range  Electronically signed by: Gloria Hilario  on 5/10/2017 9:30 AM EDT              Electronically signed by:Minda INIGUEZ    May 10 2017 11:44AM EST

## 2018-01-11 NOTE — RESULT NOTES
Verified Results  (1) PT WITH INR [id number] 11:31AM Yari Car Order Number: [id number]     Test Name Result Flag Reference   INR 2 60 H 0 86-1 16   PT 27 4 seconds H 12 0-14 3

## 2018-01-11 NOTE — RESULT NOTES
Verified Results  (1) PT WITH INR [id number] 09:11AM Reginold Cart Order Number: [id number]     Test Name Result Flag Reference   INR 1 89 H 0 86-1 16   PT 21 9 seconds H 12 1-14 4   INR 1 89 H 0 86-1 16   PT 21 9 seconds H 12 1-14 4

## 2018-01-11 NOTE — PROGRESS NOTES
REPORT NAME: Patient Visit Summary Report   VISIT DATE: 1/26/2017  VISIT TIME: 4:22 PM EST  PATIENT NAME: Tanmay Tijerina   MEDICAL RECORD NUMBER: [id number]  SOCIAL SECURITY NUMBER:   YOB: 1945  AGE: 73  REFERRING PHYSICIAN: Minda Montiel  SUPERVISING CLINICIAN: Minda Montiel  HEALTH CARE PROFESSIONAL: Shruti Moody  PATIENT HOME ADDRESS: [place] [address]  PATIENT HOME PHONE: [phone]  DIAGNOSIS 1: Mitral Valve Replacement / 424 0  DIAGNOSIS 2: Long term (current) use of anticoagulants / Z79 01  DIAGNOSIS 3:   DIAGNOSIS 4:   INR RANGE: 2 5 - 3 5  INR GOAL: 3  TREATMENT START DATE:   TREATMENT END DATE:   NEXT VISIT:       VISIT RESULTS   ENCOUNTER NUMBER:   TEST LOCATION: [facility]  TEST TYPE: Outside Lab (Venipuncture)  VISIT TYPE:   CURRENT INR: 2 77 PROTIME:   SPECIMEN COL AND RPT DATE: 1/26/2017 4:22 PM  EST    VITAL SIGNS  PULSE:  B/P:  WEIGHT:  HEIGHT:  TEMP:     CURRENT ANTICOAGULANT DOSING SCHEDULE  DOSE SIZE: 5mg    ANTICOAGULANT TYPE: COUMADIN  DOSING REGIMEN  Sun       Mon Tues Wed Thurs Fri       Sat  Total/Wk  2         2         4         2         2         4         2         18    PATIENT MEDICATION INSTRUCTION: Yes  PATIENT NUTRITIONAL COUNSELING: No  PATIENT BRUISING INSTRUCTION: No      LAST EDUCATION DATE:       PREVIOUS VISIT INFORMATION  VISITDATE   INR Goal  INR   Sun     Mon Tues Wed Thurs   Fri  Sat     Total/wk  1/26/2017   3         2 77  2       2       4       2       2       4  2       18  12/28/2016  3         2 36  2       2       4       2       2       4  2       18  11/29/2016  3         2 61  2       2       4       2       2       4  2       18  10/25/2016  3         2 6   2       2       4       2       2       4  2       18    ADDITIONAL PREVIOUS VISIT INFORMATION  VISITDATE   PRIMARY RX               DOSE      CrCl  1/26/2017   COUMADIN                 5mg 12/28/2016  COUMADIN                 5mg                 11/29/2016  COUMADIN                 5mg                 10/25/2016  COUMADIN                 5mg                     OTHER CURRENT MEDICATIONS: COUMADIN      PROGRESS NOTES: Same dose rechk 3-4 weeeks    PATIENT INSTRUCTIONS: Spoke with pt    TEST LOCATION: [facility]    Electronically signed by: Veronika Esparza on 1/26/2017 at 4:22 PM EST

## 2018-01-11 NOTE — RESULT NOTES
Verified Results  (1) PT WITH INR 10Aug2017 09:52AM Minda Montiel     Test Name Result Flag Reference   INR 4 91 H 0 86-1 16   PT 46 7 seconds H 12 1-14 4

## 2018-01-11 NOTE — PROGRESS NOTES
REPORT NAME: Progress Notes Report  VISIT DATE: 4/11/2017  VISIT TIME: 4:33 PM EDT  PATIENT NAME: Sonali De   MEDICAL RECORD NUMBER: [id number]  YOB: 1945  AGE: 73  REFERRING PHYSICIAN: Minda Montiel  SUPERVISING CLINICIAN: Minda Montiel  HEALTH CARE PROVIDER: Jayne Morton  PATIENT HOME ADDRESS: [address] PHONE: [phone]  SOCIAL SECURITY NUMBER:   DIAGNOSIS 1: Mitral Valve Replacement / 424 0  DIAGNOSIS 2: Long term (current) use of anticoagulants / Z79 01  INR RANGE: 2 5 - 3 5  INR GOAL: 3  TREATMENT START DATE:   TREATMENT END DATE:   NEXT VISIT:     VISIT RESULTS  ENCOUNTER NUMBER:   TEST LOCATION: [facility]  TEST TYPE: Outside Lab (Venipuncture)  VISIT TYPE:   CURRENT INR: 2 9 PROTIME:   SPECIMEN COL AND RPT DATE: 4/11/2017 4:33 PM  EDT  VITAL SIGNS  PULSE:  BP: / WEIGHT:  HEIGHT:  TEMP:   CURRENT ANTICOAGULANT DOSING SCHEDULE  DOSE SIZE: 5mg    ANTICOAGULANT TYPE: COUMADIN  DOSING REGIMEN  Sun       Mon       Tues      Wed       Thurs     Fri       Sat  Total/Wk  2         2         4         2         2         4         2         18  PATIENT MEDICATION INSTRUCTION: Yes  PATIENT NUTRITIONAL COUNSELING: No  PATIENT BRUISING INSTRUCTION: No  LAST EDUCATION DATE:   PREVIOUS VISIT INFORMATION  VISITDATE  INRGoal INR   Sun    Mon Tues Wed Thurs Fri    Sat  Total/wk  4/11/2017   3       2 9   2      2      4      2      2      4      2  18  3/21/2017   3       2 82  2      2      4      2      2      4      2  18  2/22/2017   3       2 31  2      2      4      2      2      4      2  18  1/26/2017   3       2 77  2      2      4      2      2      4      2  18  ADDITIONAL PREVIOUS VISIT INFORMATION  VISITDATE   PRIMARY RX               DOSE      CrCl  4/11/2017   COUMADIN                 5mg  3/21/2017   COUMADIN                 5mg  2/22/2017   COUMADIN                 5mg  1/26/2017   COUMADIN 5mg  OTHER CURRENT MEDICATIONS:  COUMADIN  PROGRESS NOTES: Same dose rechk 3 weeks  PATIENT INSTRUCTIONS: Spoke with pt  TEST LOCATION: [facility], , ,   INBOUND LAB DATA:  Lab       Lab Value Col Date                 Rpt Date                 Lab  Reference Range  Electronically signed by: Gloria Hilario on 4/11/2017 4:33 PM EDT

## 2018-01-11 NOTE — MISCELLANEOUS
Message   Recorded as Task   Date: 10/31/2017 09:55 AM, Created By: Pau Maciel   Task Name: Call Back   Assigned To: Carolyn Cain   Regarding Patient: Madan Montemayor, Status: In Progress   Comment:    Pau Maciel - 31 Oct 2017 9:55 AM     TASK CREATED  Caller: Self; Results Inquiry; [phone] (Home)  pt called for pap results - seen melissa on 10/9  please advise  Bhavna 186 Oct 2017 11:04 AM     TASK IN PROGRESS   MainFrida ratliff - 31 Oct 2017 11:07 AM     TASK EDITED  Per HIPPA form - lm of pap results  If any questions to call back  Active Problems    1  Abdominal pain (789 00) (R10 9)   2  Adequate anticoagulation on anticoagulant therapy (V58 61) (Z79 01)   3  Amaurosis fugax, both eyes (362 34) (G45 3)   4  Asymptomatic postmenopausal status (V49 81) (Z78 0)   5  Ataxia (781 3) (R27 0)   6  Atrial fibrillation (427 31) (I48 91)   7  Benign hypertension (401 1) (I10)   8  Bone/cartilage disorder (733 90) (M89 9,M94 9)   9  Breast pain (611 71) (N64 4)   10  Bronchitis (490) (J40)   11  Carotid artery stenosis (433 10) (I65 29)   12  Carotid bruit (785 9) (R09 89)   13  Dense breasts (793 82) (R92 2)   14  Encounter for monitoring digoxin therapy (V58 83,V58 69) (Z51 81,Z79 899)   15  Encounter for screening mammogram for malignant neoplasm of breast (V76 12)    (Z12 31)   16  Headache (784 0) (R51)   17  Heart Valve Replacement   18  Hyperlipidemia (272 4) (E78 5)   19  Hypertension (401 9) (I10)   20  Labyrinthitis (386 30) (H83 09)   21  Lumbago (724 2) (M54 5)   22  Mitral valvular disorder (394 9) (I05 9)   23  Osteoporosis (733 00) (M81 0)   24  Peripheral neuropathy (356 9) (G62 9)   25  Pharyngitis (462) (J02 9)   26  Psoriasis (696 1) (L40 9)   27  Pulmonary nodule seen on imaging study (793 11) (R91 1)   28  Raynaud's phenomenon (443 0) (I73 00)   29  Sacroiliitis (720 2) (M46 1)   30  Seborrheic dermatitis (690 10) (L21 9)   31   Thyroid disorder (246 9) (E07 9)   32  Tremor (781 0) (R25 1)   33  Urinary frequency (788 41) (R35 0)   34  Visual changes (368 9) (H53 9)    Current Meds   1  Aspirin 81 MG Oral Tablet Chewable; daily; Therapy: 22Pgn7979 to (Last Rx:57Jwn5909) Ordered   2  Butalbital-APAP-Caffeine -40 MG Oral Tablet; TAKE 1 TABLET BY MOUTH EVERY   4 TO 6 HOURS AS NEEDED FOR HEADACHE; Therapy: 24FNI0396 to (Pato Guerrero)  Requested for: 28Apr2017; Last   Rx:28Apr2017 Ordered   3  Caltrate 600 TABS; Therapy: (Recorded:84Xcn5146) to Recorded   4  Clobetasol Propionate 0 05 % External Foam; APPLY SPARINGLY TO AFFECTED   AREA(S) TWICE DAILY  Requested for: 54AVU8461; Last Rx:79Vxn4185 Ordered   5  Coumadin 1 MG Oral Tablet (Warfarin Sodium); take 2 tablets daily; Therapy: 81PBG5455 to (Evaluate:30Jun2018)  Requested for: 92BSE1503; Last   Rx:32Kqv8775 Ordered   6  Coumadin 3 MG Oral Tablet (Warfarin Sodium); Take 1-2 tablets daily as directed by   physician; Therapy: 94SRB8209 to (Saida Jamison)  Requested for: 92Xev7011; Last   Rx:40Iyj2432 Ordered   7  Digoxin 125 MCG Oral Tablet; take 1 tablet every day; Therapy: 22Sdg5284 to (Evaluate:14Oct2018)  Requested for: 19Oct2017; Last   Rx:91Gao4660 Ordered   8  Flaxseed Oil 1200 MG Oral Capsule; Therapy: (Recorded:93Nhu1974) to Recorded   9  Gabapentin 100 MG Oral Capsule; TAKE ONE CAPSULE BY MOUTH EVERY DAY; Therapy: 21YEE5846 to ([phone])  Requested for: 27Apr2017; Last   Rx:27Apr2017 Ordered   10  Gabapentin 300 MG Oral Capsule; TAKE 1 TABLET BY MOUTH EVERY DAY AT    BEDTIME; Therapy: 31KAE1623 to (Jose Antonio Agudelo)  Requested for: 38Kiz1386; Last    Rx:41Ous7416 Ordered   11  Glycopyrrolate 2 MG Oral Tablet; Take 1 tablet 2 times daily as needed; Therapy: 92VXX8635 to (Last HT:34EGX7288)  Requested for: 05GUG1314 Ordered   12  LORazepam 1 MG Oral Tablet (Ativan); 1 PO BID PRN; Last Rx:61Aha8663 Ordered   13   Losartan Potassium 50 MG Oral Tablet; take 11/2 tablet by mouth every evening; Therapy: 43AFY7452 to (Evaluate:30Jun2018)  Requested for: 81CHM7182; Last    Rx:03Oct2017 Ordered   15  Propranolol HCl - 20 MG Oral Tablet; TAKE 1 TABLET TWICE DAILY; Therapy: 11Pec3785 to (Evaluate:14Dkb7651)  Requested for: 55Dhb2500; Last    Rx:40Faw6668 Ordered   15  Simvastatin 10 MG Oral Tablet; take 1 tablet every day; Therapy: 63NYL4939 to (Evaluate:03Jun2018)  Requested for: 52JUV8422; Last    Rx:08Jun2017 Ordered    Allergies    1  Bactrim TABS   2  Bentyl TABS   3  Codeine Derivatives   4  Dilaudid SUPP   5  Effexor TABS   6  fentanyl   7  Levsin TABS   8   Ultracet TABS   9  Versed    Signatures   Electronically signed by : Freddy Maciel, ; Oct 31 2017 11:07AM EST                       (Author)

## 2018-01-11 NOTE — PROGRESS NOTES
REPORT NAME: Progress Notes Report  VISIT DATE: 3/21/2017  VISIT TIME: 4:06 PM EDT  PATIENT NAME: Ling Sow   MEDICAL RECORD NUMBER: [id number]  YOB: 1945  AGE: 73  REFERRING PHYSICIAN: Minda Mnotiel  SUPERVISING CLINICIAN: Minda Montiel  HEALTH CARE PROVIDER: Bob Barajas  PATIENT HOME ADDRESS: [place] [address] PHONE: [phone]  SOCIAL SECURITY NUMBER:   DIAGNOSIS 1: Mitral Valve Replacement / 424 0  DIAGNOSIS 2: Long term (current) use of anticoagulants / Z79 01  INR RANGE: 2 5 - 3 5  INR GOAL: 3  TREATMENT START DATE:   TREATMENT END DATE:   NEXT VISIT:     VISIT RESULTS  ENCOUNTER NUMBER:   TEST LOCATION: [address] [place] [address]  TEST TYPE: Outside Lab (Venipuncture)  VISIT TYPE:   CURRENT INR: 2 82 PROTIME:   SPECIMEN COL AND RPT DATE: 3/21/2017 4:06 PM  EDT  VITAL SIGNS  PULSE:  BP: / WEIGHT:  HEIGHT:  TEMP:   CURRENT ANTICOAGULANT DOSING SCHEDULE  DOSE SIZE: 5mg    ANTICOAGULANT TYPE: COUMADIN  DOSING REGIMEN  Sun       Mon       Tues      Wed       Thurs     Fri       Sat  Total/Wk  2         2         4         2         2         4         2         18  PATIENT MEDICATION INSTRUCTION: Yes  PATIENT NUTRITIONAL COUNSELING: No  PATIENT BRUISING INSTRUCTION: No  LAST EDUCATION DATE:   PREVIOUS VISIT INFORMATION  VISITDATE  INRGoal INR   Sun    Mon    Tues   Wed    Thurs  Fri    Sat  Total/wk  3/21/2017   3       2 82  2      2      4      2      2      4      2  18  2/22/2017   3       2 31  2      2      4      2      2      4      2  18  1/26/2017   3       2 77  2      2      4      2      2      4      2  18  12/28/2016  3       2 36  2      2      4      2      2      4      2  18  ADDITIONAL PREVIOUS VISIT INFORMATION  VISITDATE   PRIMARY RX               DOSE      CrCl  3/21/2017   COUMADIN                 5mg  2/22/2017   COUMADIN                 5mg  1/26/2017   COUMADIN                 5mg  12/28/2016  COUMADIN 5mg  OTHER CURRENT MEDICATIONS:  COUMADIN  PROGRESS NOTES: Same dose rechk 2 weeks  PATIENT INSTRUCTIONS: LMOM  TEST LOCATION: [facility] ,   INBOUND LAB DATA:  Lab       Lab Value Col Date                 Rpt Date                 Lab  Reference Range  Electronically signed by: Kevon Kelly on 3/21/2017 4:05 PM EDT

## 2018-01-11 NOTE — RESULT NOTES
Verified Results  (1) PT WITH INR 92BUA1044 09:20AM Saira Gibson Order Number: [id number]     Test Name Result Flag Reference   INR 3 00 H 0 86-1 16   PT 31 6 seconds H 12 1-14 4

## 2018-01-12 VITALS
DIASTOLIC BLOOD PRESSURE: 72 MMHG | HEART RATE: 79 BPM | SYSTOLIC BLOOD PRESSURE: 124 MMHG | OXYGEN SATURATION: 98 % | WEIGHT: 160 LBS | HEIGHT: 69 IN | BODY MASS INDEX: 23.7 KG/M2

## 2018-01-13 VITALS
SYSTOLIC BLOOD PRESSURE: 150 MMHG | BODY MASS INDEX: 23.33 KG/M2 | DIASTOLIC BLOOD PRESSURE: 80 MMHG | WEIGHT: 158 LBS | RESPIRATION RATE: 16 BRPM | HEART RATE: 54 BPM

## 2018-01-13 VITALS
OXYGEN SATURATION: 95 % | WEIGHT: 158.25 LBS | BODY MASS INDEX: 23.44 KG/M2 | HEIGHT: 69 IN | HEART RATE: 47 BPM | DIASTOLIC BLOOD PRESSURE: 72 MMHG | SYSTOLIC BLOOD PRESSURE: 158 MMHG

## 2018-01-13 VITALS
SYSTOLIC BLOOD PRESSURE: 108 MMHG | DIASTOLIC BLOOD PRESSURE: 68 MMHG | HEART RATE: 45 BPM | OXYGEN SATURATION: 94 % | HEIGHT: 69 IN | WEIGHT: 160.38 LBS | BODY MASS INDEX: 23.76 KG/M2

## 2018-01-13 NOTE — RESULT NOTES
Verified Results  (1) PT WITH INR 69OBJ8862 09:25AM Sunni Montiel [facility] Order Number: [id number]     Test Name Result Flag Reference   INR 3 08 H 0 86-1 16   PT 32 2 seconds H 12 1-14 4

## 2018-01-14 VITALS
HEART RATE: 52 BPM | DIASTOLIC BLOOD PRESSURE: 82 MMHG | SYSTOLIC BLOOD PRESSURE: 122 MMHG | OXYGEN SATURATION: 96 % | HEIGHT: 69 IN | WEIGHT: 160.38 LBS | BODY MASS INDEX: 23.76 KG/M2

## 2018-01-14 NOTE — PROGRESS NOTES
REPORT NAME: Progress Notes Report  VISIT DATE: 5/17/2017  VISIT TIME: 11:21 AM EDT  PATIENT NAME: Toni Montalvo   MEDICAL RECORD NUMBER: [id number]  YOB: 1945  AGE: 73  REFERRING PHYSICIAN: Minda Montiel  SUPERVISING CLINICIAN: Minda Montiel  HEALTH CARE PROVIDER: Stephanie Levine  PATIENT HOME ADDRESS: [address] PHONE: [phone]  SOCIAL SECURITY NUMBER:   DIAGNOSIS 1: Mitral Valve Replacement / 424 0  DIAGNOSIS 2: Long term (current) use of anticoagulants / Z79 01  INR RANGE: 2 5 - 3 5  INR GOAL: 3  TREATMENT START DATE:   TREATMENT END DATE:   NEXT VISIT:     VISIT RESULTS  ENCOUNTER NUMBER:   TEST LOCATION: [address] [place] [address]  TEST TYPE: Outside Lab (Venipuncture)  VISIT TYPE:   CURRENT INR: 2 8 PROTIME:   SPECIMEN COL AND RPT DATE: 5/17/2017 11:21 AM  EDT  VITAL SIGNS  PULSE:  BP: / WEIGHT:  HEIGHT:  TEMP:   CURRENT ANTICOAGULANT DOSING SCHEDULE  DOSE SIZE: 5mg    ANTICOAGULANT TYPE: COUMADIN  DOSING REGIMEN  Sun       Mon       Tues      Wed       Thurs     Fri       Sat  Total/Wk  4         2         2         4         2         2         4         20  PATIENT MEDICATION INSTRUCTION: Yes  PATIENT NUTRITIONAL COUNSELING: No  PATIENT BRUISING INSTRUCTION: No  LAST EDUCATION DATE:   PREVIOUS VISIT INFORMATION  VISITDATE  INRGoal INR   Sun    Mon Tues Wed Thurs Fri    Sat  Total/wk  5/17/2017   3       2 8   4      2      2      4      2      2      4  20  5/10/2017   3       1 89  4      2      2      4      2      2      4  20  4/11/2017   3       2 9   2      2      4      2      2      4      2  18  3/21/2017   3       2 82  2      2      4      2      2      4      2  18  ADDITIONAL PREVIOUS VISIT INFORMATION  VISITDATE   PRIMARY RX               DOSE      CrCl  5/17/2017   COUMADIN                 5mg  5/10/2017   COUMADIN                 5mg  4/11/2017   COUMADIN                 5mg  3/21/2017   COUMADIN 5mg  OTHER CURRENT MEDICATIONS:  COUMADIN  PROGRESS NOTES: Same dose rechk 2 weeks  PATIENT INSTRUCTIONS: LMOM  TEST LOCATION: [facility] ,   INBOUND LAB DATA:  Lab       Lab Value Col Date                 Rpt Date                 Lab  Reference Range  Electronically signed by: Cara Jones on 5/17/2017 11:21 AM EDT

## 2018-01-14 NOTE — RESULT NOTES
Verified Results  (1) PT WITH INR [id number] 09:39AM [facility] Order Number: [id number]     Test Name Result Flag Reference   INR 1 94 H 0 86-1 16   PT 22 0 seconds H 12 0-14 3

## 2018-01-15 NOTE — RESULT NOTES
Verified Results  (1) PT WITH INR 02Oct2017 10:21AM Ashlee Bautista Order Number: [id number]     Test Name Result Flag Reference   INR 2 34 H 0 86-1 16   PT 25 9 seconds H 12 1-14 4

## 2018-01-15 NOTE — RESULT NOTES
Verified Results  (1) PT WITH INR [id number] 08:42AM Ashlee Bautista Order Number: [id number]     Test Name Result Flag Reference   INR 2 31 H 0 86-1 16   PT 25 1 seconds H 12 0-14 3

## 2018-01-15 NOTE — RESULT NOTES
Verified Results  * XR CHEST PA & LATERAL [id number] 01:50PM Kiana Mac Order Number: [id number]     Test Name Result Flag Reference   XR CHEST PA & LATERAL (Report)     CHEST     INDICATION: Cough, shortness of breath   COMPARISON: 3/27/2014 chest CT     VIEWS: Frontal and lateral projections; 2 images     FINDINGS:   Moderate cardiomegaly, slightly improved  Status post cardiac valve replacement surgery   The lungs are clear  No pleural effusions  Bony thorax unremarkable     Sternal wires       IMPRESSION:   Slightly improved moderate cardiomegaly     Status post cardiac valve replacement surgery     Clear lungs       Workstation performed: [id number]     Signed by:   Tanja Waters MD   11/2/16

## 2018-01-16 NOTE — PROGRESS NOTES
REPORT NAME: Patient Visit Summary Report   VISIT DATE: 12/28/2016  VISIT TIME: 2:11 PM EST  PATIENT NAME: Manisha Sweeney   MEDICAL RECORD NUMBER: [id number]  SOCIAL SECURITY NUMBER:    YOB: 1945  AGE: 73  REFERRING PHYSICIAN: Minda Montiel  SUPERVISING CLINICIAN: Minda Montiel  HEALTH CARE PROFESSIONAL: Iron Montanez  PATIENT HOME ADDRESS: [address] PHONE: [phone]  DIAGNOSIS 1: Mitral Valve Replacement / 424 0  DIAGNOSIS 2: Long term (current) use of anticoagulants / Z79 01  DIAGNOSIS 3:   DIAGNOSIS 4:   INR RANGE: 2 5 - 3 5  INR GOAL: 3  TREATMENT START DATE:    TREATMENT END DATE:   NEXT VISIT:       VISIT RESULTS   ENCOUNTER NUMBER:   TEST LOCATION: [facility]  TEST TYPE: Outside Lab (Venipuncture)  VISIT TYPE:   CURRENT INR: 2 36 PROTIME:   SPECIMEN COL AND RPT DATE: 12/28/2016  2:11 PM  EST    VITAL SIGNS  PULSE:  B/P:  WEIGHT:  HEIGHT:  TEMP:     CURRENT ANTICOAGULANT DOSING SCHEDULE  DOSE SIZE: 5mg    ANTICOAGULANT TYPE: COUMADIN  DOSING REGIMEN  Sun       Mon Tues Wed Thurs Fri        Sat  Total/Wk  2         2         4         2         2         4         2         18    PATIENT MEDICATION INSTRUCTION: Yes  PATIENT NUTRITIONAL COUNSELING: No  PATIENT BRUISING INSTRUCTION: No      LAST EDUCATION DATE:        PREVIOUS VISIT INFORMATION  VISITDATE   INR Goal  INR   Sun     Mon Tues Wed Thurs Fri  Sat     Total/wk  12/28/2016  3         2 36  2       2       4       2       2       4  2       18  11/29/2016  3         2 61  2        2       4       2       2       4  2       18  10/25/2016  3         2 6   2       2       4       2       2       4  2       18  10/5/2016   3         3 1   2       2       4       2       2       4  2       18    ADDITIONAL PREVIOUS  VISIT INFORMATION  VISITDATE   PRIMARY RX               DOSE      CrCl  12/28/2016  COUMADIN 5mg                 11/29/2016  COUMADIN                 5mg                 10/25/2016  COUMADIN                 5mg                  10/5/2016   COUMADIN                 5mg                     OTHER CURRENT MEDICATIONS: COUMADIN      PROGRESS NOTES: Same dose rechk 3 week    PATIENT INSTRUCTIONS: Spoke with pt    TEST LOCATION: [facility]     Electronically signed by: Shruti Moody on 12/28/2016 at 2:11 PM EST              Electronically signed by:Minda INIGUEZ    Jan 2 2017  1:38PM EST

## 2018-01-16 NOTE — RESULT NOTES
Message  Records review from [address] [place] [address] Dirve with me from 2012 through 2014  Paps normal [phone]  Mammograms normal 2012 through 2015

## 2018-01-16 NOTE — PROGRESS NOTES
REPORT NAME: Progress Notes Report  VISIT DATE: 8/11/2017  VISIT TIME: 12:53 PM EDT  PATIENT NAME: Maura Banks   MEDICAL RECORD NUMBER: [id number]  YOB: 1945  AGE: 73  REFERRING  PHYSICIAN: Minda Montiel  SUPERVISING CLINICIAN: Meghana Katz CARE PROVIDER: Junior Lizama  PATIENT HOME ADDRESS: [address] PHONE: [phone]  SOCIAL SECURITY NUMBER:    DIAGNOSIS 1: Mitral Valve Replacement / 424 0  DIAGNOSIS 2: Long term (current) use of anticoagulants / Z79 01  INR RANGE: 2 5 - 3 5  INR GOAL: 3  TREATMENT START DATE:   TREATMENT END DATE:   NEXT VISIT:     VISIT  RESULTS  ENCOUNTER NUMBER:   TEST LOCATION: [address] [place] [address]  TEST TYPE: Outside Lab (Venipuncture)  VISIT TYPE:   CURRENT INR: 4 91 PROTIME:   SPECIMEN COL AND RPT DATE: 8/11/2017 12:53 PM  EDT  VITAL SIGNS  PULSE:  BP: /  WEIGHT:  HEIGHT:  TEMP:   CURRENT ANTICOAGULANT DOSING SCHEDULE  DOSE SIZE: 5mg    ANTICOAGULANT TYPE: COUMADIN  DOSING REGIMEN  Sun       Mon       Tues      Wed       Thurs     Fri       Sat  Total/Wk  4         2         2          4         2         2         4         20  PATIENT MEDICATION INSTRUCTION: Yes  PATIENT NUTRITIONAL COUNSELING: No  PATIENT BRUISING INSTRUCTION: No  LAST EDUCATION DATE:   PREVIOUS VISIT INFORMATION  VISITDATE  INRGoal INR   Sun     Mon    Tues   Wed    Thurs  Fri    Sat  Total/wk  8/11/2017   3       4 91  4      2      2      4      2      2      4  20  7/10/2017   3       2 34  4      2      2      4      2      2      4  20  6/13/2017   3       2 83  4       2      2      4      2      2      4  20  5/17/2017   3       2 8   4      2      2      4      2      2      4  20  ADDITIONAL PREVIOUS VISIT INFORMATION  VISITDATE   PRIMARY RX               DOSE      CrCl  8/11/2017   COUMADIN                  5mg  7/10/2017   COUMADIN                 5mg  6/13/2017   COUMADIN                 5mg  5/17/2017 COUMADIN                 5mg  OTHER CURRENT MEDICATIONS:  COUMADIN  PROGRESS NOTES: Hold x 2 days then resume starting  with 2  rechk 1 week  PATIENT INSTRUCTIONS: LMOM  TEST LOCATION: [facility]   INBOUND LAB DATA:  Lab       Lab Value Col Date                 Rpt Date                 Lab  Reference Range  Electronically signed by: Sherry Sotelo on 8/11/2017 12:53 PM EDT              Electronically signed by:Minda INIGUEZ    Aug 12 2017  9:53AM EST

## 2018-01-16 NOTE — RESULT NOTES
Verified Results  (1) PT WITH INR [id number] 09:15AM Loren Montiel Order Number: [id number]     Test Name Result Flag Reference   INR 2 90 H 0 86-1 16   PT 29 8 seconds H 12 0-14 3

## 2018-01-16 NOTE — PROGRESS NOTES
REPORT NAME: Patient Visit Summary Report   VISIT DATE: 10/25/2016  VISIT TIME: 9:20 AM EDT  PATIENT NAME: Janusz Carpio   MEDICAL RECORD NUMBER: [id number]  SOCIAL SECURITY NUMBER:   YOB: 1945  AGE: 78  REFERRING PHYSICIAN: Minda Montiel  SUPERVISING CLINICIAN: Minda Montiel  HEALTH CARE PROFESSIONAL: Jhonny Rick  PATIENT HOME ADDRESS: [address] [place] PHONE: [phone]  DIAGNOSIS 1: Mitral Valve Replacement / 424 0  DIAGNOSIS 2: Long term (current) use of anticoagulants / Z79 01  DIAGNOSIS 3:   DIAGNOSIS 4:   INR RANGE: 2 5 - 3 5  INR GOAL: 3  TREATMENT START DATE:   TREATMENT END DATE:   NEXT VISIT:       VISIT RESULTS   ENCOUNTER NUMBER:   TEST LOCATION: [facility] [place]  TEST TYPE: Outside Lab (Venipuncture)  VISIT TYPE:   CURRENT INR: 2 6 PROTIME:   SPECIMEN COL AND RPT DATE: 10/25/2016 9:20 AM  EDT    VITAL SIGNS  PULSE:  B/P:  WEIGHT:  HEIGHT:  TEMP:     CURRENT ANTICOAGULANT DOSING SCHEDULE  DOSE SIZE: 5mg    ANTICOAGULANT TYPE: COUMADIN  DOSING REGIMEN  Sun       Mon       Tues      Wed       Thurs     Fri       Sat  Total/Wk  2         2         4         2         2         4         2         18    PATIENT MEDICATION INSTRUCTION: Yes  PATIENT NUTRITIONAL COUNSELING: No  PATIENT BRUISING INSTRUCTION: No      LAST EDUCATION DATE:       PREVIOUS VISIT INFORMATION  VISITDATE   INR Goal  INR   Sun     Mon Tues Wed Thurs Fri  Sat     Total/wk  10/25/2016  3         2 6   2       2       4       2       2       4  2       18  10/5/2016   3         3 1   2       2       4       2       2       4  2       18  9/19/2016   3         5 3   4       0       0       0       0       2  2       8  8/22/2016   3         4 3   4       4       2       4       4       2  4       24    ADDITIONAL PREVIOUS VISIT INFORMATION  VISITDATE   PRIMARY RX               DOSE      CrCl  10/25/2016  COUMADIN                 5mg 10/5/2016   COUMADIN                 5mg                 9/19/2016   COUMADIN                 5mg                 8/22/2016   COUMADIN                 5mg                     OTHER CURRENT MEDICATIONS: COUMADIN      PROGRESS NOTES: same dose rechk 2 weeks    PATIENT INSTRUCTIONS: spoke with pt    TEST LOCATION: [facility]    Electronically signed by: Daxa Dawkins on 10/25/2016 at 9:20 AM EDT

## 2018-01-16 NOTE — RESULT NOTES
Verified Results  (1) PT WITH INR [id number] 09:21AM Minda Montiel     Test Name Result Flag Reference   INR 2 80 H 0 86-1 16   PT 29 9 seconds H 12 1-14 4

## 2018-01-16 NOTE — MISCELLANEOUS
Message   Recorded as Task   Date: 10/17/2017 01:00 PM, Created By: Rosetta Almazan   Task Name: Medical Complaint Callback   Assigned To: Madison Honeycutt   Regarding Patient: Janusz Carpio, Status: Active   Comment:    Madison Honeycutt - 17 Oct 2017 1:00 PM     TASK CREATED  order placed in allscripts for lft diag lulú per [facility]        Active Problems    1  Abdominal pain (789 00) (R10 9)   2  Adequate anticoagulation on anticoagulant therapy (V58 61) (Z79 01)   3  Amaurosis fugax, both eyes (362 34) (G45 3)   4  Asymptomatic postmenopausal status (V49 81) (Z78 0)   5  Ataxia (781 3) (R27 0)   6  Atrial fibrillation (427 31) (I48 91)   7  Benign hypertension (401 1) (I10)   8  Bone/cartilage disorder (733 90) (M89 9,M94 9)   9  Breast pain (611 71) (N64 4)   10  Bronchitis (490) (J40)   11  Carotid artery stenosis (433 10) (I65 29)   12  Carotid bruit (785 9) (R09 89)   13  Dense breasts (793 82) (R92 2)   14  Encounter for monitoring digoxin therapy (V58 83,V58 69) (Z51 81,Z79 899)   15  Encounter for screening mammogram for malignant neoplasm of breast (V76 12)    (Z12 31)   16  Headache (784 0) (R51)   17  Heart Valve Replacement   18  Hyperlipidemia (272 4) (E78 5)   19  Hypertension (401 9) (I10)   20  Labyrinthitis (386 30) (H83 09)   21  Lumbago (724 2) (M54 5)   22  Mitral valvular disorder (394 9) (I05 9)   23  Osteoporosis (733 00) (M81 0)   24  Peripheral neuropathy (356 9) (G62 9)   25  Pharyngitis (462) (J02 9)   26  Psoriasis (696 1) (L40 9)   27  Pulmonary nodule seen on imaging study (793 11) (R91 1)   28  Raynaud's phenomenon (443 0) (I73 00)   29  Sacroiliitis (720 2) (M46 1)   30  Seborrheic dermatitis (690 10) (L21 9)   31  Thyroid disorder (246 9) (E07 9)   32  Tremor (781 0) (R25 1)   33  Urinary frequency (788 41) (R35 0)   34  Visual changes (368 9) (H53 9)    Current Meds   1  Aspirin 81 MG Oral Tablet Chewable; daily; Therapy: 45Kwe6189 to (Last Rx:29Swy7276) Ordered   2  Butalbital-APAP-Caffeine -40 MG Oral Tablet; TAKE 1 TABLET BY MOUTH EVERY   4 TO 6 HOURS AS NEEDED FOR HEADACHE; Therapy: 81RNQ9137 to (Mele Duenas)  Requested for: 28Apr2017; Last   Rx:66Gnu5667 Ordered   3  Caltrate 600 TABS; Therapy: (Recorded:68Lll1703) to Recorded   4  Clobetasol Propionate 0 05 % External Foam; APPLY SPARINGLY TO AFFECTED   AREA(S) TWICE DAILY  Requested for: 52WHS5832; Last Rx:46Yjr7836 Ordered   5  Coumadin 1 MG Oral Tablet (Warfarin Sodium); take 2 tablets daily; Therapy: 54LWZ1674 to (Evaluate:30Jun2018)  Requested for: 87OXQ0953; Last   Rx:17Nie8550 Ordered   6  Coumadin 3 MG Oral Tablet (Warfarin Sodium); Take 1-2 tablets daily as directed by   physician; Therapy: 17HTL1517 to (Juanita Aviles)  Requested for: 14Nvt7213; Last   Rx:83Hsl0131 Ordered   7  Digoxin 125 MCG Oral Tablet; take 1 tablet every day; Therapy: 82Gzh2638 to [phone])  Requested for: 90Qty1411; Last   Rx:70Wsm7311 Ordered   8  Flaxseed Oil 1200 MG Oral Capsule; Therapy: (Recorded:86Nor7594) to Recorded   9  Gabapentin 100 MG Oral Capsule; TAKE ONE CAPSULE BY MOUTH EVERY DAY; Therapy: 24QSB3780 to [phone])  Requested for: 83Zbr7665; Last   Rx:22Way8986 Ordered   10  Gabapentin 300 MG Oral Capsule; TAKE 1 TABLET BY MOUTH EVERY DAY AT    BEDTIME; Therapy: 36BBP0463 to (Meche Dobbs)  Requested for: 18Feo1009; Last    Rx:44Efr5007 Ordered   11  Glycopyrrolate 2 MG Oral Tablet; Take 1 tablet 2 times daily as needed; Therapy: 44QDQ1713 to (Last BR:05WMY4204)  Requested for: 09YKJ7043 Ordered   12  LORazepam 1 MG Oral Tablet (Ativan); 1 PO BID PRN; Last Rx:85Sjm9201 Ordered   13  Losartan Potassium 50 MG Oral Tablet; take 11/2 tablet by mouth every evening; Therapy: 35TSL2221 to (Evaluate:30Jun2018)  Requested for: 56NRF0757; Last    Rx:03Oct2017 Ordered   15  Propranolol HCl - 20 MG Oral Tablet; TAKE 1 TABLET TWICE DAILY;     Therapy: 21Apr2014 to (Evaluate:69Ybz2275)  Requested for: 75Blq5936; Last    Rx:17Cgj1675 Ordered   15  Simvastatin 10 MG Oral Tablet; take 1 tablet every day; Therapy: 00YIC9806 to (Evaluate:03Jun2018)  Requested for: 94QEC6081; Last    Rx:08Jun2017 Ordered    Allergies    1  Bactrim TABS   2  Bentyl TABS   3  Codeine Derivatives   4  Dilaudid SUPP   5  Effexor TABS   6  fentanyl   7  Levsin TABS   8  Ultracet TABS   9  Versed    Plan  Breast pain    · MAMMO DIAGNOSTIC LEFT W CAD; Status:Hold For - Scheduling,Retrospective By  Protocol Authorization; Requested RSA:[id number]; Health Maintenance    · * MAMMO SCREENING BILATERAL W CAD ; every 1 year;  Last 59AEM4694; Next  21TVW5089; Status:Active    Signatures   Electronically signed by : Marshia Barthel, ; Oct 17 2017  1:00PM EST                       (Author)

## 2018-01-17 NOTE — PROGRESS NOTES
REPORT NAME: Progress Notes Report  VISIT DATE: 9/8/2017  VISIT TIME: 3:20 PM EDT  PATIENT NAME: Cindy Eduardo   MEDICAL RECORD NUMBER: [id number]  YOB: 1945  AGE: 73  REFERRING PHYSICIAN: Minda Montiel  SUPERVISING CLINICIAN: Meghana Katz CARE PROVIDER: Audrey Chavez  PATIENT HOME ADDRESS: [address] PHONE: [phone]  SOCIAL SECURITY NUMBER:   DIAGNOSIS 1: Mitral Valve Replacement / 424 0  DIAGNOSIS 2: Long term (current) use of anticoagulants / Z79 01  INR RANGE: 2 5 - 3 5  INR GOAL: 3  TREATMENT START DATE:   TREATMENT END DATE:   NEXT VISIT:     VISIT RESULTS  ENCOUNTER NUMBER:   TEST LOCATION: [facility]  TEST TYPE: Outside Lab (Venipuncture)  VISIT TYPE:   CURRENT INR: 3 39 PROTIME:   SPECIMEN COL AND RPT DATE: 9/8/2017 3:20 PM  EDT  VITAL SIGNS  PULSE:  BP: / WEIGHT:  HEIGHT:  TEMP:   CURRENT ANTICOAGULANT DOSING SCHEDULE  DOSE SIZE: 5mg    ANTICOAGULANT TYPE: COUMADIN  DOSING REGIMEN  Sun       Mon       Tues      Wed       Thurs     Fri       Sat  Total/Wk  4         2         2         4         2         2         4         20  PATIENT MEDICATION INSTRUCTION: Yes  PATIENT NUTRITIONAL COUNSELING: No  PATIENT BRUISING INSTRUCTION: No  LAST EDUCATION DATE:   PREVIOUS VISIT INFORMATION  VISITDATE  INRGoal INR   Sun    Mon    Tues   Wed    Thurs  Fri    Sat  Total/wk  9/8/2017    3       3 39  4      2      2      4      2      2      4  20  8/17/2017   3       2     4      2      2      4      2      2      4  20  8/11/2017   3       4 91  4      2      2      4      2      2      4  20  7/10/2017   3       2 34  4      2      2      4      2      2      4  20  ADDITIONAL PREVIOUS VISIT INFORMATION  VISITDATE   PRIMARY RX               DOSE      CrCl  9/8/2017    COUMADIN                 5mg  8/17/2017   COUMADIN                 5mg  8/11/2017   COUMADIN                 5mg  7/10/2017   COUMADIN 5mg  OTHER CURRENT MEDICATIONS:  COUMADIN  PROGRESS NOTES: Same dose rechk 2 weeks  PATIENT INSTRUCTIONS: Spoke with pt  TEST LOCATION: [facility], , ,   INBOUND LAB DATA:  Lab       Lab Value Col Date                 Rpt Date                 Lab  Reference Range  Electronically signed by: Alcon Mccord on 9/8/2017 3:20 PM EDT

## 2018-01-17 NOTE — PROGRESS NOTES
REPORT NAME: Patient Visit Summary Report   VISIT DATE: 11/29/2016  VISIT TIME: 2:33 PM EST  PATIENT NAME: Ramonita Gerber   MEDICAL RECORD NUMBER: [id number]  SOCIAL SECURITY NUMBER:   YOB: 1945  AGE: 78  REFERRING PHYSICIAN: Minda Montiel  SUPERVISING CLINICIAN: Minda Montiel  HEALTH CARE PROFESSIONAL: Cara Jones  PATIENT HOME ADDRESS: [address] PHONE: [phone]  DIAGNOSIS 1: Mitral Valve Replacement / 424 0  DIAGNOSIS 2: Long term (current) use of anticoagulants / Z79 01  DIAGNOSIS 3:   DIAGNOSIS 4:   INR RANGE: 2 5 - 3 5  INR GOAL: 3  TREATMENT START DATE:   TREATMENT END DATE:   NEXT VISIT:       VISIT RESULTS   ENCOUNTER NUMBER:   [facility]  TEST TYPE: Outside Lab (Venipuncture)  VISIT TYPE:   CURRENT INR: 2 61 PROTIME:   SPECIMEN COL AND RPT DATE: 11/29/2016 2:33 PM  EST    VITAL SIGNS  PULSE:  B/P:  WEIGHT:  HEIGHT:  TEMP:     CURRENT ANTICOAGULANT DOSING SCHEDULE  DOSE SIZE: 5mg    ANTICOAGULANT TYPE: COUMADIN  DOSING REGIMEN  Sun       Mon       Tues      Wed       Thurs     Fri       Sat  Total/Wk  2         2         4         2         2         4         2         18    PATIENT MEDICATION INSTRUCTION: Yes  PATIENT NUTRITIONAL COUNSELING: No  PATIENT BRUISING INSTRUCTION: No      LAST EDUCATION DATE:       PREVIOUS VISIT INFORMATION  VISITDATE   INR Goal  INR   Sun     Mon Tues Wed Thurs Fri  Sat     Total/wk  11/29/2016  3         2 61  2       2       4       2       2       4  2       18  10/25/2016  3         2 6   2       2       4       2       2       4  2       18  10/5/2016   3         3 1   2       2       4       2       2       4  2       18  9/19/2016   3         5 3   4       0       0       0       0       2  2       8    ADDITIONAL PREVIOUS VISIT INFORMATION  VISITDATE   PRIMARY RX               DOSE      CrCl  11/29/2016  COUMADIN                 5mg 10/25/2016  COUMADIN                 5mg                 10/5/2016   COUMADIN                 5mg                 9/19/2016   COUMADIN                 5mg                     OTHER CURRENT MEDICATIONS: COUMADIN      PROGRESS NOTES: Same Dose rechk 3 weeks    PATIENT INSTRUCTIONS: Spoke with pt    TEST LOCATION: [facility]    Electronically signed by: Jonel Onofre on 11/29/2016 at 2:33 PM EST

## 2018-01-17 NOTE — RESULT NOTES
Verified Results  HOLTER MONITOR - 24 HOUR [id number] 09:46AM Michela Alison Order Number: [id number]    - Patient Instructions: To schedule this appointment, please contact Central Scheduling at [phone]  Test Name Result Flag Reference   HOLTER MONITOR - 24 HOUR (Report)     This Holter monitor and analysis was done for a period of approximately 24 hours  Rhythm is atrial fibrillation  Average heart rate 53 beats per minute  Minimum heart rate 32 beats per minute  Maximum heart rate 104 beats per minute  There were many PVCs ,ventricular couplets and episodes of ventricular bigeminy and trigeminy  No other ventricular events noted  There were periods of bradycardia noted mostly during night  Patient was asymptomatic  Patient did not report any cardiovascular symptoms during this recording       Johnnie Coats MD,FACC

## 2018-01-17 NOTE — PROGRESS NOTES
REPORT NAME: Patient Visit Summary Report   VISIT DATE: 2/22/2017  VISIT TIME: 2:22 PM EST  PATIENT NAME: Marleny Strickland   MEDICAL RECORD NUMBER: [id number]  SOCIAL SECURITY NUMBER:    YOB: 1945  AGE: 73  REFERRING PHYSICIAN: Minda Montiel  SUPERVISING CLINICIAN: Minda Montiel  HEALTH CARE PROFESSIONAL: Iron Prince  PATIENT HOME ADDRESS: [place] [address]  PATIENT  HOME PHONE: [phone]  DIAGNOSIS 1: Mitral Valve Replacement / 424 0  DIAGNOSIS 2: Long term (current) use of anticoagulants / Z79 01  DIAGNOSIS 3:   DIAGNOSIS 4:   INR RANGE: 2 5 - 3 5  INR GOAL: 3  TREATMENT START DATE:    TREATMENT END DATE:   NEXT VISIT:       VISIT RESULTS   ENCOUNTER NUMBER:   TEST LOCATION: [facility]  TEST TYPE: Outside Lab (Venipuncture)  VISIT TYPE:   CURRENT INR: 2 31 PROTIME:   SPECIMEN COL AND RPT DATE: 2/22/2017  2:22 PM  EST    VITAL SIGNS  PULSE:  B/P:  WEIGHT:  HEIGHT:  TEMP:     CURRENT ANTICOAGULANT DOSING SCHEDULE  DOSE SIZE: 5mg    ANTICOAGULANT TYPE: COUMADIN  DOSING REGIMEN  Sun       Mon Tues Wed Thurs Fri        Sat  Total/Wk  2         2         4         2         2         4         2         18    PATIENT MEDICATION INSTRUCTION: Yes  PATIENT NUTRITIONAL COUNSELING: No  PATIENT BRUISING INSTRUCTION: No      LAST EDUCATION DATE:        PREVIOUS VISIT INFORMATION  VISITDATE   INR Goal  INR   Sun     Mon Tues Wed Thurs   Fri  Sat     Total/wk  2/22/2017   3         2 31  2       2       4       2       2       4  2       18  1/26/2017   3         2 77  2        2       4       2       2       4  2       18  12/28/2016  3         2 36  2       2       4       2       2       4  2       18  11/29/2016  3         2 61  2       2       4       2       2       4  2       18    ADDITIONAL PREVIOUS  VISIT INFORMATION  VISITDATE   PRIMARY RX               DOSE      CrCl  2/22/2017   COUMADIN                 5mg 1/26/2017   COUMADIN                 5mg                 12/28/2016  COUMADIN                 5mg                  11/29/2016  COUMADIN                 5mg                     OTHER CURRENT MEDICATIONS: COUMADIN      PROGRESS NOTES: Same dose rechk 3 weeks    PATIENT INSTRUCTIONS: Spoke with pt    TEST LOCATION: [address] [place] [address]     Electronically signed by: Dorian Desai on 2/22/2017 at 2:22 PM EST              Electronically signed by:Minda INIGUEZ    Feb 22 2017  4:09PM EST

## 2018-01-17 NOTE — PROGRESS NOTES
REPORT NAME: Progress Notes Report  VISIT DATE: 8/17/2017  VISIT TIME: 4:32 PM EDT  PATIENT NAME: Elliott Schaeffer   MEDICAL RECORD NUMBER: [id number]  YOB: 1945  AGE: 73  REFERRING  PHYSICIAN: Minda Montiel  SUPERVISING CLINICIAN: Minda Montiel  HEALTH CARE PROVIDER: Teri Lake  PATIENT HOME ADDRESS: [address] PHONE: [phone]  SOCIAL SECURITY NUMBER:    DIAGNOSIS 1: Mitral Valve Replacement / 424 0  DIAGNOSIS 2: Long term (current) use of anticoagulants / Z79 01  INR RANGE: 2 5 - 3 5  INR GOAL: 3  TREATMENT START DATE:   TREATMENT END DATE:   NEXT VISIT:     VISIT  RESULTS  ENCOUNTER NUMBER:   TEST LOCATION: [address] [place] [address]  TEST TYPE: Outside Lab (Venipuncture)  VISIT TYPE:   CURRENT INR: 2 PROTIME:   SPECIMEN COL AND RPT DATE: 8/17/2017 4:32 PM EDT  VITAL SIGNS  PULSE:  BP: / WEIGHT:   HEIGHT:  TEMP:   CURRENT ANTICOAGULANT DOSING SCHEDULE  DOSE SIZE: 5mg    ANTICOAGULANT TYPE: COUMADIN  DOSING REGIMEN  Sun       Mon       Tues      Wed       Thurs     Fri       Sat  Total/Wk  4         2         2         4          2         2         4         20  PATIENT MEDICATION INSTRUCTION: Yes  PATIENT NUTRITIONAL COUNSELING: No  PATIENT BRUISING INSTRUCTION: No  LAST EDUCATION DATE:   PREVIOUS VISIT INFORMATION  VISITDATE  INRGoal INR   Sun    Mon     Tues   Wed    Thurs  Fri    Sat  Total/wk  8/17/2017   3       2     4      2      2      4      2      2      4  20  8/11/2017   3       4 91  4      2      2      4      2      2      4  20  7/10/2017   3       2 34  4      2       2      4      2      2      4  20  6/13/2017   3       2 83  4      2      2      4      2      2      4  20  ADDITIONAL PREVIOUS VISIT INFORMATION  VISITDATE   PRIMARY RX               DOSE      CrCl  8/17/2017   COUMADIN                  5mg  8/11/2017   COUMADIN                 5mg  7/10/2017   COUMADIN                 5mg  6/13/2017   COUMADIN 5mg  OTHER CURRENT MEDICATIONS:  COUMADIN  PROGRESS NOTES: Same dose rechk 2 weeks  PATIENT INSTRUCTIONS:  Spoke with pt  TEST LOCATION: [facility] ,   INBOUND LAB DATA:  Lab       Lab Value Col Date                 Rpt Date                 Lab  Reference Range  Electronically signed by: Drew Chew on 8/18/2017 4:32 PM EDT              Electronically signed by:Minda INIGUEZ    Aug 18 2017 10:05PM EST

## 2018-01-18 NOTE — RESULT NOTES
Verified Results  (1) PT WITH INR [id number] 10:10AM Briana Combe Order Number: [id number]     Test Name Result Flag Reference   INR 2 98 H 0 86-1 16   PT 31 4 seconds H 12 1-14 4

## 2018-01-18 NOTE — RESULT NOTES
Verified Results  (1) PT WITH INR [id number] 10:14AM Minda Montiel     Test Name Result Flag Reference   INR 2 34 H 0 86-1 16   PT 25 9 seconds H 12 1-14 4

## 2018-01-18 NOTE — RESULT NOTES
Verified Results  (1) PT WITH INR [id number] 08:53AM Amrik Montiel    Order Number: [id number]     Test Name Result Flag Reference   INR 2 82 H 0 86-1 16   PT 29 2 seconds H 12 0-14 3

## 2018-01-22 VITALS
HEIGHT: 69 IN | SYSTOLIC BLOOD PRESSURE: 130 MMHG | WEIGHT: 161 LBS | DIASTOLIC BLOOD PRESSURE: 70 MMHG | BODY MASS INDEX: 23.85 KG/M2

## 2018-01-24 ENCOUNTER — LAB (OUTPATIENT)
Dept: LAB | Facility: CLINIC | Age: 73
End: 2018-01-24
Payer: COMMERCIAL

## 2018-01-24 DIAGNOSIS — I48.91 ATRIAL FIBRILLATION (HCC): ICD-10-CM

## 2018-01-24 DIAGNOSIS — I10 ESSENTIAL (PRIMARY) HYPERTENSION: ICD-10-CM

## 2018-01-24 DIAGNOSIS — E78.5 HYPERLIPIDEMIA: ICD-10-CM

## 2018-01-24 LAB
ALBUMIN SERPL BCP-MCNC: 3.9 G/DL (ref 3.5–5)
ALP SERPL-CCNC: 60 U/L (ref 46–116)
ALT SERPL W P-5'-P-CCNC: 24 U/L (ref 12–78)
ANION GAP SERPL CALCULATED.3IONS-SCNC: 7 MMOL/L (ref 4–13)
AST SERPL W P-5'-P-CCNC: 21 U/L (ref 5–45)
BASOPHILS # BLD AUTO: 0.04 THOUSANDS/ΜL (ref 0–0.1)
BASOPHILS NFR BLD AUTO: 1 % (ref 0–1)
BILIRUB SERPL-MCNC: 0.43 MG/DL (ref 0.2–1)
BUN SERPL-MCNC: 17 MG/DL (ref 5–25)
CALCIUM SERPL-MCNC: 8.9 MG/DL (ref 8.3–10.1)
CHLORIDE SERPL-SCNC: 104 MMOL/L (ref 100–108)
CHOLEST SERPL-MCNC: 151 MG/DL (ref 50–200)
CO2 SERPL-SCNC: 30 MMOL/L (ref 21–32)
CREAT SERPL-MCNC: 0.81 MG/DL (ref 0.6–1.3)
EOSINOPHIL # BLD AUTO: 0.3 THOUSAND/ΜL (ref 0–0.61)
EOSINOPHIL NFR BLD AUTO: 5 % (ref 0–6)
ERYTHROCYTE [DISTWIDTH] IN BLOOD BY AUTOMATED COUNT: 12.9 % (ref 11.6–15.1)
GFR SERPL CREATININE-BSD FRML MDRD: 74 ML/MIN/1.73SQ M
GLUCOSE P FAST SERPL-MCNC: 84 MG/DL (ref 65–99)
HCT VFR BLD AUTO: 40.2 % (ref 34.8–46.1)
HDLC SERPL-MCNC: 64 MG/DL (ref 40–60)
HGB BLD-MCNC: 13 G/DL (ref 11.5–15.4)
INR PPP: 2.26 (ref 0.86–1.16)
LDLC SERPL CALC-MCNC: 61 MG/DL (ref 0–100)
LYMPHOCYTES # BLD AUTO: 1.44 THOUSANDS/ΜL (ref 0.6–4.47)
LYMPHOCYTES NFR BLD AUTO: 25 % (ref 14–44)
MCH RBC QN AUTO: 31.6 PG (ref 26.8–34.3)
MCHC RBC AUTO-ENTMCNC: 32.3 G/DL (ref 31.4–37.4)
MCV RBC AUTO: 98 FL (ref 82–98)
MONOCYTES # BLD AUTO: 0.56 THOUSAND/ΜL (ref 0.17–1.22)
MONOCYTES NFR BLD AUTO: 10 % (ref 4–12)
NEUTROPHILS # BLD AUTO: 3.4 THOUSANDS/ΜL (ref 1.85–7.62)
NEUTS SEG NFR BLD AUTO: 59 % (ref 43–75)
NRBC BLD AUTO-RTO: 0 /100 WBCS
PLATELET # BLD AUTO: 232 THOUSANDS/UL (ref 149–390)
PMV BLD AUTO: 9.9 FL (ref 8.9–12.7)
POTASSIUM SERPL-SCNC: 4.1 MMOL/L (ref 3.5–5.3)
PROT SERPL-MCNC: 7.7 G/DL (ref 6.4–8.2)
PROTHROMBIN TIME: 25.2 SECONDS (ref 12.1–14.4)
RBC # BLD AUTO: 4.12 MILLION/UL (ref 3.81–5.12)
SODIUM SERPL-SCNC: 141 MMOL/L (ref 136–145)
TRIGL SERPL-MCNC: 129 MG/DL
TSH SERPL DL<=0.05 MIU/L-ACNC: 3.82 UIU/ML (ref 0.36–3.74)
WBC # BLD AUTO: 5.75 THOUSAND/UL (ref 4.31–10.16)

## 2018-01-24 PROCEDURE — 85025 COMPLETE CBC W/AUTO DIFF WBC: CPT

## 2018-01-24 PROCEDURE — 85610 PROTHROMBIN TIME: CPT

## 2018-01-24 PROCEDURE — 84443 ASSAY THYROID STIM HORMONE: CPT

## 2018-01-24 PROCEDURE — 80053 COMPREHEN METABOLIC PANEL: CPT

## 2018-01-24 PROCEDURE — 80061 LIPID PANEL: CPT

## 2018-01-24 PROCEDURE — 36415 COLL VENOUS BLD VENIPUNCTURE: CPT

## 2018-01-29 ENCOUNTER — OFFICE VISIT (OUTPATIENT)
Dept: INTERNAL MEDICINE CLINIC | Facility: CLINIC | Age: 73
End: 2018-01-29
Payer: COMMERCIAL

## 2018-01-29 VITALS
WEIGHT: 159 LBS | HEART RATE: 62 BPM | SYSTOLIC BLOOD PRESSURE: 148 MMHG | HEIGHT: 70 IN | BODY MASS INDEX: 22.76 KG/M2 | DIASTOLIC BLOOD PRESSURE: 72 MMHG | RESPIRATION RATE: 14 BRPM

## 2018-01-29 DIAGNOSIS — G25.0 ESSENTIAL TREMOR: ICD-10-CM

## 2018-01-29 DIAGNOSIS — R00.1 BRADYCARDIA: ICD-10-CM

## 2018-01-29 DIAGNOSIS — I10 HYPERTENSION, UNSPECIFIED TYPE: ICD-10-CM

## 2018-01-29 DIAGNOSIS — I48.20 CHRONIC ATRIAL FIBRILLATION (HCC): Primary | ICD-10-CM

## 2018-01-29 PROCEDURE — 99214 OFFICE O/P EST MOD 30 MIN: CPT | Performed by: INTERNAL MEDICINE

## 2018-01-29 RX ORDER — CLOBETASOL PROPIONATE 0.5 MG/G
CREAM TOPICAL 2 TIMES DAILY
COMMUNITY
End: 2018-02-23 | Stop reason: SDUPTHER

## 2018-01-29 RX ORDER — PROPRANOLOL HYDROCHLORIDE 20 MG/1
1 TABLET ORAL 2 TIMES DAILY
COMMUNITY
Start: 2014-04-21 | End: 2018-02-23 | Stop reason: SDUPTHER

## 2018-01-29 RX ORDER — GABAPENTIN 300 MG/1
1 CAPSULE ORAL
COMMUNITY
Start: 2014-03-06 | End: 2018-07-09 | Stop reason: SDUPTHER

## 2018-01-29 RX ORDER — BUTALBITAL, ACETAMINOPHEN AND CAFFEINE 50; 325; 40 MG/1; MG/1; MG/1
TABLET ORAL
COMMUNITY
Start: 2014-10-09 | End: 2018-04-12 | Stop reason: SDUPTHER

## 2018-01-29 RX ORDER — LORAZEPAM 1 MG/1
TABLET ORAL 2 TIMES DAILY PRN
COMMUNITY
End: 2018-02-23

## 2018-01-29 RX ORDER — DIGOXIN 125 MCG
1 TABLET ORAL DAILY
COMMUNITY
Start: 2015-08-02 | End: 2018-01-29

## 2018-01-29 RX ORDER — WARFARIN SODIUM 1 MG/1
2 TABLET ORAL DAILY
COMMUNITY
Start: 2016-08-18 | End: 2018-12-20 | Stop reason: SDUPTHER

## 2018-01-29 RX ORDER — WARFARIN SODIUM 3 MG/1
TABLET ORAL
COMMUNITY
Start: 2017-08-05 | End: 2018-12-20 | Stop reason: SDUPTHER

## 2018-01-29 RX ORDER — LOSARTAN POTASSIUM 50 MG/1
TABLET ORAL
COMMUNITY
Start: 2014-03-10 | End: 2018-02-12 | Stop reason: SDUPTHER

## 2018-01-29 RX ORDER — AMLODIPINE BESYLATE 5 MG/1
1 TABLET ORAL
COMMUNITY
Start: 2016-05-11 | End: 2018-05-16

## 2018-01-29 RX ORDER — ASPIRIN 81 MG/1
TABLET, CHEWABLE ORAL DAILY
COMMUNITY
Start: 2017-08-03

## 2018-01-29 RX ORDER — GABAPENTIN 100 MG/1
1 CAPSULE ORAL DAILY
COMMUNITY
Start: 2015-11-03 | End: 2018-02-05 | Stop reason: SDUPTHER

## 2018-01-29 RX ORDER — SIMVASTATIN 10 MG
1 TABLET ORAL DAILY
COMMUNITY
Start: 2015-06-11 | End: 2018-06-10 | Stop reason: SDUPTHER

## 2018-01-29 NOTE — PROGRESS NOTES
Assessment/Plan:    No problem-specific Assessment & Plan notes found for this encounter  Diagnoses and all orders for this visit:    Chronic atrial fibrillation (HCC)    Hypertension, unspecified type    Bradycardia    Essential tremor    Other orders  -     amLODIPine (NORVASC) 5 mg tablet; Take 1 tablet by mouth  -     aspirin 81 mg chewable tablet; Chew daily  -     butalbital-acetaminophen-caffeine (FIORICET,ESGIC) -40 mg per tablet; Take by mouth  -     Calcium Carbonate (CALTRATE 600) 1500 (600 Ca) MG TABS; Take by mouth  -     clobetasol (TEMOVATE) 0 05 % cream; Apply topically 2 (two) times a day  -     warfarin (COUMADIN) 1 mg tablet; Take 2 tablets by mouth daily  -     warfarin (COUMADIN) 3 mg tablet; Take by mouth  -     Discontinue: digoxin (LANOXIN) 0 125 mg tablet; Take 1 tablet by mouth daily  -     Flaxseed, Linseed, (FLAXSEED OIL) 1200 MG CAPS; Take by mouth  -     gabapentin (NEURONTIN) 300 mg capsule; Take 1 tablet by mouth  -     gabapentin (NEURONTIN) 100 mg capsule; Take 1 capsule by mouth daily  -     losartan (COZAAR) 50 mg tablet; Take by mouth  -     LORazepam (ATIVAN) 1 mg tablet; Take by mouth 2 (two) times a day as needed  -     propranolol (INDERAL) 20 mg tablet; Take 1 tablet by mouth 2 (two) times a day 2 po qam, 1 po q pm   -     simvastatin (ZOCOR) 10 mg tablet; Take 1 tablet by mouth daily        Patient Instructions   Elevated blood pressure may be secondary to recent decrease in propranolol dose coupled with increase in sodium intake in the form of processed meats and canned soups  Patient advised to strictly monitor her sodium intake with a goal of less than 2000 mg per day  Case was discussed with Dr Elieser Mora  Regarding my recommendation to discontinue the digoxin in light of recent bradycardia and increase the propranolol all back to her previous dosing 40 mg in the morning and 20 mg in the evening    We can also consider changing to propranolol long-acting 60 or 80 mg based upon blood pressures  She is scheduled to follow-up with Abril Hardy next week and will monitor her home blood pressures with this regimen until that time  Subjective:      Patient ID: Kia Whitaker is a 81 y o  female  Concerned w/ high bp's over past 4 days  She wasn't taking bp's prior  No sx  No cp/sob/pnd/orthopnea/headache  No leg swelling  No change in wt, but less exercise over winter mos  No new stressors  Notes more canned soups over winter mos  Taking rx as directed, had decr propranolol over the summer d/t bradycardia  Still taking dig  [address]  in Aug             The following portions of the patient's history were reviewed and updated as appropriate: allergies, current medications, past family history, past medical history, past social history, past surgical history and problem list     Review of Systems   Constitutional: Negative for appetite change, chills, diaphoresis, fatigue, fever and unexpected weight change  HENT: Negative for congestion, hearing loss and rhinorrhea  Eyes: Negative for visual disturbance  Respiratory: Negative for cough, chest tightness, shortness of breath and wheezing  Cardiovascular: Negative for chest pain, palpitations and leg swelling  Gastrointestinal: Negative for abdominal pain and blood in stool  Endocrine: Negative for cold intolerance, heat intolerance, polydipsia and polyuria  Genitourinary: Negative for difficulty urinating, dysuria, frequency and urgency  Musculoskeletal: Negative for arthralgias and myalgias  Skin: Negative for rash  Neurological: Negative for dizziness, weakness, light-headedness and headaches  Hematological: Does not bruise/bleed easily  Psychiatric/Behavioral: Negative for dysphoric mood and sleep disturbance  Objective:     Physical Exam   Constitutional: She is oriented to person, place, and time  She appears well-developed and well-nourished     HENT:   Head: Normocephalic and atraumatic  Nose: Nose normal    Mouth/Throat: Oropharynx is clear and moist    Eyes: Conjunctivae and EOM are normal  Pupils are equal, round, and reactive to light  No scleral icterus  Neck: Normal range of motion  Neck supple  No JVD present  No tracheal deviation present  No thyromegaly present  Cardiovascular: Normal rate and intact distal pulses  Exam reveals no gallop and no friction rub  No murmur heard  Irregularly irregular w/ crisp prosthetic mitral valve   Pulmonary/Chest: Effort normal and breath sounds normal  No respiratory distress  She has no wheezes  She has no rales  Abdominal: Soft  Bowel sounds are normal    Musculoskeletal: She exhibits no edema or tenderness  Lymphadenopathy:     She has no cervical adenopathy  Neurological: She is alert and oriented to person, place, and time  No cranial nerve deficit  Skin: Skin is warm and dry  No rash noted  No erythema  Psychiatric: She has a normal mood and affect  Her behavior is normal  Judgment and thought content normal    Nursing note reviewed

## 2018-01-29 NOTE — PATIENT INSTRUCTIONS
Elevated blood pressure may be secondary to recent decrease in propranolol dose coupled with increase in sodium intake in the form of processed meats and canned soups  Patient advised to strictly monitor her sodium intake with a goal of less than 2000 mg per day  Case was discussed with Dr Leigh Barnes  Regarding my recommendation to discontinue the digoxin in light of recent bradycardia and increase the propranolol all back to her previous dosing 40 mg in the morning and 20 mg in the evening  We can also consider changing to propranolol long-acting 60 or 80 mg based upon blood pressures  She is scheduled to follow-up with Shabana Vega next week and will monitor her home blood pressures with this regimen until that time

## 2018-02-05 DIAGNOSIS — G62.9 NEUROPATHY: Primary | ICD-10-CM

## 2018-02-05 RX ORDER — GABAPENTIN 100 MG/1
100 CAPSULE ORAL DAILY
Qty: 90 CAPSULE | Refills: 0 | Status: SHIPPED | OUTPATIENT
Start: 2018-02-05 | End: 2018-07-31 | Stop reason: SDUPTHER

## 2018-02-12 ENCOUNTER — OFFICE VISIT (OUTPATIENT)
Dept: CARDIOLOGY CLINIC | Facility: CLINIC | Age: 73
End: 2018-02-12
Payer: COMMERCIAL

## 2018-02-12 ENCOUNTER — APPOINTMENT (OUTPATIENT)
Dept: LAB | Facility: CLINIC | Age: 73
End: 2018-02-12
Payer: COMMERCIAL

## 2018-02-12 VITALS
BODY MASS INDEX: 22.88 KG/M2 | WEIGHT: 159.8 LBS | OXYGEN SATURATION: 99 % | HEART RATE: 81 BPM | DIASTOLIC BLOOD PRESSURE: 70 MMHG | HEIGHT: 70 IN | SYSTOLIC BLOOD PRESSURE: 140 MMHG

## 2018-02-12 DIAGNOSIS — I05.9 MITRAL VALVE DISORDER: ICD-10-CM

## 2018-02-12 DIAGNOSIS — I48.20 CHRONIC ATRIAL FIBRILLATION (HCC): Primary | ICD-10-CM

## 2018-02-12 DIAGNOSIS — I10 ESSENTIAL HYPERTENSION: ICD-10-CM

## 2018-02-12 DIAGNOSIS — Z79.01 CHRONIC ANTICOAGULATION: ICD-10-CM

## 2018-02-12 DIAGNOSIS — E78.2 MIXED HYPERLIPIDEMIA: ICD-10-CM

## 2018-02-12 DIAGNOSIS — I48.91 ATRIAL FIBRILLATION (HCC): ICD-10-CM

## 2018-02-12 LAB
INR PPP: 1.74 (ref 0.86–1.16)
PROTHROMBIN TIME: 20.5 SECONDS (ref 12.1–14.4)

## 2018-02-12 PROCEDURE — 85610 PROTHROMBIN TIME: CPT

## 2018-02-12 PROCEDURE — 99214 OFFICE O/P EST MOD 30 MIN: CPT | Performed by: INTERNAL MEDICINE

## 2018-02-12 PROCEDURE — 36415 COLL VENOUS BLD VENIPUNCTURE: CPT

## 2018-02-12 RX ORDER — LOSARTAN POTASSIUM 100 MG/1
100 TABLET ORAL DAILY
Qty: 90 TABLET | Refills: 3 | Status: SHIPPED | OUTPATIENT
Start: 2018-02-12 | End: 2019-03-12 | Stop reason: SDUPTHER

## 2018-02-12 NOTE — PROGRESS NOTES
Cardiology Follow Up    Joel Song  1945  [id number]  [address] [place] [address]    1  Chronic atrial fibrillation ([address] )     2  Essential hypertension     3  Mixed hyperlipidemia       Chief Complaint   Patient presents with    Follow-up     BP concerns, elevated per Dr Deshpande       Interval History:  Patient presents for follow-up visit  Patient was recently evaluated by Dr Asim Pantoja  Blood pressure was elevated  Patient also had symptoms of tremors  In view of this Inderal was increased and digoxin was discontinued  Patient has heart rates in the 50 range presently  Patient denies any history of dizziness lightheadedness presyncope syncope  Patient blood pressures at time are still elevated  Patient does have an element of anxiety associated with elevated blood pressure  She states that she has been compliant with all her present medications  No bleeding issues      Patient Active Problem List   Diagnosis    Atrial fibrillation ([address] )    Hyperlipidemia    History of heart valve replacement    Hypertension    Bradycardia    Essential tremor    Chronic anticoagulation     Past Medical History:   Diagnosis Date    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    Atrial fibrillation (HCC)     Hashimoto's thyroiditis     Hyperlipidemia     Hypertension     IBS (irritable bowel syndrome)     Migraine     Mitral valve disorder     Non-toxic uninodular goiter      Social History     Social History    Marital status: Single     Spouse name: N/A    Number of children: N/A    Years of education: N/A     Occupational History    Retired      Social History Main Topics    Smoking status: Never Smoker    Smokeless tobacco: Never Used    Alcohol use Yes      Comment: social    Drug use: No    Sexual activity: Not on file     Other Topics Concern    Not on file     Social History Narrative    No narrative on file      Family History   Problem Relation Age of Onset    Hypertension Mother     Coronary artery disease Father     Migraines Father     Leukemia Brother     Migraines Brother     Hypertension Brother     Coronary artery disease Paternal Grandfather     Leukemia Maternal Aunt     Multiple myeloma Maternal Aunt     Thyroid disease Other      Past Surgical History:   Procedure Laterality Date    APPENDECTOMY      COLONOSCOPY  08/08/2011    VALVE REPLACEMENT  01/04/2017    mitral valve replacement, 6/5/14       Current Outpatient Prescriptions:     amLODIPine (NORVASC) 5 mg tablet, Take 1 tablet by mouth, Disp: , Rfl:     aspirin 81 mg chewable tablet, Chew daily, Disp: , Rfl:     butalbital-acetaminophen-caffeine (FIORICET,ESGIC) -40 mg per tablet, Take by mouth, Disp: , Rfl:     Calcium Carbonate (CALTRATE 600) 1500 (600 Ca) MG TABS, Take by mouth, Disp: , Rfl:     clobetasol (TEMOVATE) 0 05 % cream, Apply topically 2 (two) times a day, Disp: , Rfl:     Flaxseed, Linseed, (FLAXSEED OIL) 1200 MG CAPS, Take by mouth, Disp: , Rfl:     gabapentin (NEURONTIN) 100 mg capsule, Take 1 capsule (100 mg total) by mouth daily, Disp: 90 capsule, Rfl: 0    gabapentin (NEURONTIN) 300 mg capsule, Take 1 tablet by mouth, Disp: , Rfl:     LORazepam (ATIVAN) 1 mg tablet, Take by mouth 2 (two) times a day as needed, Disp: , Rfl:     losartan (COZAAR) 50 mg tablet, Take by mouth, Disp: , Rfl:     propranolol (INDERAL) 20 mg tablet, Take 1 tablet by mouth 2 (two) times a day 2 po qam, 1 po q pm , Disp: , Rfl:     simvastatin (ZOCOR) 10 mg tablet, Take 1 tablet by mouth daily, Disp: , Rfl:     warfarin (COUMADIN) 1 mg tablet, Take 2 tablets by mouth daily, Disp: , Rfl:     warfarin (COUMADIN) 3 mg tablet, Take by mouth, Disp: , Rfl:   Allergies   Allergen Reactions    Codeine Polt-Chlorphen Polt Er     Dicyclomine     Fentanyl     Hydromorphone     Hyoscyamine  Midazolam     Sulfamethoxazole-Trimethoprim     Tramadol-Acetaminophen     Venlafaxine        Labs:  Lab on 01/24/2018   Component Date Value    WBC 01/24/2018 5 75     RBC 01/24/2018 4 12     Hemoglobin 01/24/2018 13 0     Hematocrit 01/24/2018 40 2     MCV 01/24/2018 98     MCH 01/24/2018 31 6     MCHC 01/24/2018 32 3     RDW 01/24/2018 12 9     MPV 01/24/2018 9 9     Platelets 53/82/1466 232     nRBC 01/24/2018 0     Neutrophils Relative 01/24/2018 59     Lymphocytes Relative 01/24/2018 25     Monocytes Relative 01/24/2018 10     Eosinophils Relative 01/24/2018 5     Basophils Relative 01/24/2018 1     Neutrophils Absolute 01/24/2018 3 40     Lymphocytes Absolute 01/24/2018 1 44     Monocytes Absolute 01/24/2018 0 56     Eosinophils Absolute 01/24/2018 0 30     Basophils Absolute 01/24/2018 0 04     Sodium 01/24/2018 141     Potassium 01/24/2018 4 1     Chloride 01/24/2018 104     CO2 01/24/2018 30     Anion Gap 01/24/2018 7     BUN 01/24/2018 17     Creatinine 01/24/2018 0 81     Glucose, Fasting 01/24/2018 84     Calcium 01/24/2018 8 9     AST 01/24/2018 21     ALT 01/24/2018 24     Alkaline Phosphatase 01/24/2018 60     Total Protein 01/24/2018 7 7     Albumin 01/24/2018 3 9     Total Bilirubin 01/24/2018 0 43     eGFR 01/24/2018 74     Cholesterol 01/24/2018 151     Triglycerides 01/24/2018 129     HDL, Direct 01/24/2018 64*    LDL Calculated 01/24/2018 61     TSH 3RD GENERATON 01/24/2018 3 820*    Protime 01/24/2018 25 2*    INR 01/24/2018 2 26*   Appointment on 12/27/2017   Component Date Value    Protime 12/27/2017 30 5*    INR 12/27/2017 2 87*     Imaging: No results found      Review of Systems:  Review of Systems   REVIEW OF SYSTEMS:  Constitutional:  Denies fever or chills   Eyes:  Denies change in visual acuity   HENT:  Denies nasal congestion or sore throat   Respiratory:  Denies cough or shortness of breath   Cardiovascular:  Denies chest pain or edema   GI:  Denies abdominal pain, nausea, vomiting, bloody stools or diarrhea   :  Denies dysuria, frequency, difficulty in micturition and nocturia  Musculoskeletal:  Denies back pain or joint pain   Neurologic:  Denies focal weakness or sensory changes   Does have history of migraine  Endocrine:  Denies polyuria or polydipsia   Lymphatic:  Denies swollen glands   Psychiatric:   anxiety       /70   Pulse 81   Ht 5' 10" (1 778 m)   Wt 72 5 kg (159 lb 12 8 oz)     SpO2 99%   BMI 22 93 kg/m²     Physical Exam:  Physical Exam   PHYSICAL EXAM:  General:  Patient is not in acute distress   Head: Normocephalic, Atraumatic  HEENT:  Both pupils normal-size atraumatic, normocephalic, nonicteric  Neck:  JVP not raised  Trachea central  No carotid bruit  Respiratory:  normal breath sounds no crackles  no rhonchi  Cardiovascular:  Irregularly irregular heart sounds consistent with prosthetic mechanical valve  GI:  Abdomen soft nontender  No organomegaly  Lymphatic:  No cervical or inguinal lymphadenopathy  Neurologic:  Patient is awake alert, oriented   Grossly nonfocal    Discussion/Summary:  Patient with multiple medical problems who seems to be doing reasonably well from cardiac standpoint  Previous studies reviewed with patient  Medications reviewed and possible side effects discussed  concepts of cardiovascular disease , signs and symptoms of heart disease  Dietary and risk factor modification reinforced  All questions answered  Safety measures reviewed  Patient advised to report any problems prompting medical attention  Lengthy discussion with patient regarding the need to discontinue digoxin given tendency for bradycardia  If patient were to have significant issues with the symptomatic bradycardia in the future consideration for pacemaker will be discussed  Patient's blood pressures are still on the higher side  Increase losartan to 100 mg daily    Importance of salt restriction reinforced with the patient  Emotional support provided to the patient with history of anxiety  Previous cardiovascular studies reviewed  Patient will need a follow-up echocardiogram to reassess ejection fraction and prosthetic mechanical mitral valve  Patient had a lot of questions which were answered  Follow-up with primary care physician

## 2018-02-13 ENCOUNTER — ANTICOAG VISIT (OUTPATIENT)
Dept: CARDIOLOGY CLINIC | Facility: CLINIC | Age: 73
End: 2018-02-13

## 2018-02-13 DIAGNOSIS — Z95.4 PRESENCE OF OTHER HEART-VALVE REPLACEMENT: Primary | ICD-10-CM

## 2018-02-13 NOTE — PROGRESS NOTES
REPORT NAME: Progress Notes Report  VISIT DATE: 11/15/2017  VISIT TIME: 3:11 PM EST  PATIENT NAME: Lisa Almeida   MEDICAL RECORD NUMBER: [id number]  YOB: 1945  AGE: 73  REFERRING PHYSICIAN: Minda Montiel  SUPERVISING CLINICIAN: Minda Montiel  HEALTH CARE PROVIDER: Kristin Jewell  PATIENT HOME ADDRESS: [address], [facility] [address] PHONE: [phone]  SOCIAL SECURITY NUMBER:   DIAGNOSIS 1: Mitral Valve Replacement / 424 0  DIAGNOSIS 2: Long term (current) use of anticoagulants / Z79 01  INR RANGE: 2 5 - 3 5  INR GOAL: 3  TREATMENT START DATE:   TREATMENT END DATE:   NEXT VISIT:     VISIT RESULTS  ENCOUNTER NUMBER:   TEST LOCATION: [facility]  TEST TYPE: Outside Lab (Venipuncture)  VISIT TYPE:   CURRENT INR: 3 PROTIME:   SPECIMEN COL AND RPT DATE: 11/15/2017 3:11 PM EST  VITAL SIGNS  PULSE:  BP: / WEIGHT:  HEIGHT:  TEMP:   CURRENT ANTICOAGULANT DOSING SCHEDULE  DOSE SIZE: 5mg    ANTICOAGULANT TYPE: COUMADIN  DOSING REGIMEN  Sun       Mon       Tues      Wed       Thurs     Fri       Sat  Total/Wk  4         2         2         4         2         2         4         20  PATIENT MEDICATION INSTRUCTION: Yes  PATIENT NUTRITIONAL COUNSELING: No  PATIENT BRUISING INSTRUCTION: No  LAST EDUCATION DATE:   PREVIOUS VISIT INFORMATION  VISITDATE  INRGoal INR   Sun    Mon    Tues   Wed    Thurs  Fri    Sat  Total/wk  11/15/2017  3       3     4      2      2      4      2      2      4  20  10/18/2017  3       2 98  4      2      2      4      2      2      4  20  10/2/2017   3       2 34  4      2      2      4      2      2      4  20  9/8/2017    3       3 39  4      2      2      4      2      2      4  20  ADDITIONAL PREVIOUS VISIT INFORMATION  VISITDATE   PRIMARY RX               DOSE      CrCl  11/15/2017  COUMADIN                 5mg  10/18/2017  COUMADIN                 5mg  10/2/2017   COUMADIN                 5mg  9/8/2017    COUMADIN 5mg  OTHER CURRENT MEDICATIONS:  COUMADIN  PROGRESS NOTES: Same dose rechk 2 weeks  PATIENT INSTRUCTIONS: Spoke with pt  TEST LOCATION: [facility] [place], , ,   INBOUND LAB DATA:  Lab       Lab Value Col Date                 Rpt Date                 Lab  Reference Range  Electronically signed by: Diamante Gan on 11/15/2017 3:11 PM EST

## 2018-02-22 ENCOUNTER — HOSPITAL ENCOUNTER (OUTPATIENT)
Dept: NON INVASIVE DIAGNOSTICS | Facility: CLINIC | Age: 73
Discharge: HOME | End: 2018-02-22
Payer: COMMERCIAL

## 2018-02-22 DIAGNOSIS — I05.9 MITRAL VALVE DISORDER: ICD-10-CM

## 2018-02-22 PROCEDURE — 93306 TTE W/DOPPLER COMPLETE: CPT

## 2018-02-23 ENCOUNTER — OFFICE VISIT (OUTPATIENT)
Dept: INTERNAL MEDICINE CLINIC | Facility: CLINIC | Age: 73
End: 2018-02-23
Payer: COMMERCIAL

## 2018-02-23 VITALS
BODY MASS INDEX: 23.27 KG/M2 | DIASTOLIC BLOOD PRESSURE: 78 MMHG | HEART RATE: 72 BPM | WEIGHT: 162.2 LBS | SYSTOLIC BLOOD PRESSURE: 130 MMHG

## 2018-02-23 DIAGNOSIS — E03.8 SUBCLINICAL HYPOTHYROIDISM: ICD-10-CM

## 2018-02-23 DIAGNOSIS — I48.20 CHRONIC ATRIAL FIBRILLATION (HCC): Primary | ICD-10-CM

## 2018-02-23 DIAGNOSIS — Z23 NEED FOR PNEUMOCOCCAL VACCINE: ICD-10-CM

## 2018-02-23 DIAGNOSIS — I05.9 MITRAL VALVULAR DISORDER: ICD-10-CM

## 2018-02-23 DIAGNOSIS — R21 RASH: ICD-10-CM

## 2018-02-23 DIAGNOSIS — I10 BENIGN HYPERTENSION: ICD-10-CM

## 2018-02-23 DIAGNOSIS — E78.2 MIXED HYPERLIPIDEMIA: ICD-10-CM

## 2018-02-23 DIAGNOSIS — G25.0 ESSENTIAL TREMOR: ICD-10-CM

## 2018-02-23 PROBLEM — G45.3 AMAUROSIS FUGAX, BOTH EYES: Status: ACTIVE | Noted: 2017-08-03

## 2018-02-23 PROCEDURE — 99214 OFFICE O/P EST MOD 30 MIN: CPT | Performed by: NURSE PRACTITIONER

## 2018-02-23 PROCEDURE — G0009 ADMIN PNEUMOCOCCAL VACCINE: HCPCS | Performed by: NURSE PRACTITIONER

## 2018-02-23 PROCEDURE — 90670 PCV13 VACCINE IM: CPT | Performed by: NURSE PRACTITIONER

## 2018-02-23 PROCEDURE — 93306 TTE W/DOPPLER COMPLETE: CPT | Performed by: INTERNAL MEDICINE

## 2018-02-23 RX ORDER — PROPRANOLOL HYDROCHLORIDE 20 MG/1
20 TABLET ORAL 2 TIMES DAILY
Refills: 0
Start: 2018-02-23 | End: 2018-03-15 | Stop reason: SDUPTHER

## 2018-02-23 RX ORDER — CLOBETASOL PROPIONATE 0.5 MG/G
CREAM TOPICAL 2 TIMES DAILY
Qty: 30 G | Refills: 0
Start: 2018-02-23 | End: 2019-03-27 | Stop reason: ALTCHOICE

## 2018-02-23 NOTE — PROGRESS NOTES
Assessment/Plan:    Hypertension stable on current medication medication list has been updated and she will continue to monitor her sodium intake  Afib heart rate controlled on current medication and anticoagulated with Coumadin following with Cardiology     seasonal affective disorder stable     health maintenance Prevnar 13 to be given today    Hyperlipidemia- stable on current medication    Subclinical hypothyroidism currently asymptomatic continue to monitor no indication for treatment at this time       Diagnoses and all orders for this visit:    Chronic atrial fibrillation (HCC)    Benign hypertension  -     CBC and differential; Future  -     Comprehensive metabolic panel; Future    Mitral valvular disorder    Essential tremor  -     propranolol (INDERAL) 20 mg tablet; Take 1 tablet (20 mg total) by mouth 2 (two) times a day 2 po qam, 1 po q pm    Mixed hyperlipidemia  -     Lipid panel; Future    Need for pneumococcal vaccine  -     PNEUMOCOCCAL CONJUGATE VACCINE 13-VALENT GREATER THAN 6 MONTHS    Rash  -     clobetasol (TEMOVATE) 0 05 % cream; Apply topically 2 (two) times a day    Subclinical hypothyroidism  -     TSH, 3rd generation; Future    Other orders  -     Hm Pap Smear  -      Colonoscopy          Subjective:      Patient ID: Candida Proctor is a 81 y o  female  Was seen here several weeks ago for elevated blood pressure she had medication changed list was updated and her blood pressures are now running 120s over 80s  She does have a lot of stress living independently  She has not been getting any exercise she is trying to watch her sodium intake  She was seen by Neurology she can't recall what occurred at the visit I will try to obtain these results  She is taking 400 mg of Neurontin at bedtime Neurology did recommend that or question why she was taking a 100 any 300 mg capsule    I discussed with the patient that we can turn into 1 pill that she would like to try 300 mg only        The following portions of the patient's history were reviewed and updated as appropriate: allergies, current medications, past family history, past medical history, past social history, past surgical history and problem list     Review of Systems   Constitutional: Negative for appetite change, chills, diaphoresis, fatigue, fever and unexpected weight change  HENT: Negative for postnasal drip and sneezing  Eyes: Negative for visual disturbance  Respiratory: Negative for chest tightness and shortness of breath  Cardiovascular: Negative for chest pain, palpitations and leg swelling  Gastrointestinal: Negative for abdominal pain and blood in stool  Endocrine: Negative for cold intolerance, heat intolerance, polydipsia, polyphagia and polyuria  Genitourinary: Negative for difficulty urinating, dysuria, frequency and urgency  Musculoskeletal: Negative for arthralgias and myalgias  Skin: Negative for rash and wound  Neurological: Negative for dizziness, weakness, light-headedness and headaches  Hematological: Negative for adenopathy  Psychiatric/Behavioral: Negative for confusion, dysphoric mood and sleep disturbance  The patient is not nervous/anxious  Objective:      /78   Pulse 72   Wt 73 6 kg (162 lb 3 2 oz)   LMP  (LMP Unknown)   BMI 23 27 kg/m²          Physical Exam   Constitutional: She is oriented to person, place, and time  She appears well-developed and well-nourished  HENT:   Head: Normocephalic and atraumatic  Nose: Nose normal    Mouth/Throat: Oropharynx is clear and moist    Eyes: Conjunctivae and EOM are normal  Pupils are equal, round, and reactive to light  No scleral icterus  Neck: Normal range of motion  Neck supple  No JVD present  No tracheal deviation present  No thyromegaly present  Cardiovascular: Normal rate and intact distal pulses  Exam reveals no gallop and no friction rub  Murmur heard    Irregularly irregular w/ crisp prosthetic mitral valve Pulmonary/Chest: Effort normal and breath sounds normal  No respiratory distress  She has no wheezes  She has no rales  Abdominal: Soft  Bowel sounds are normal    Musculoskeletal: She exhibits no edema or tenderness  Lymphadenopathy:     She has no cervical adenopathy  Neurological: She is alert and oriented to person, place, and time  No cranial nerve deficit  Skin: Skin is warm and dry  No rash noted  No erythema  Psychiatric: She has a normal mood and affect  Her behavior is normal  Judgment and thought content normal    Nursing note reviewed

## 2018-02-23 NOTE — PATIENT INSTRUCTIONS
Hypertension stable on current medication medication list has been updated and she will continue to monitor her sodium intake       Afib heart rate controlled on current medication and anticoagulated with Coumadin following with Cardiology     seasonal affective disorder stable     health maintenance Prevnar 13 to be given today    Hyperlipidemia- stable on current medication    Subclinical hypothyroidism currently asymptomatic continue to monitor no indication for treatment at this time

## 2018-02-26 ENCOUNTER — TELEPHONE (OUTPATIENT)
Dept: CARDIOLOGY CLINIC | Facility: CLINIC | Age: 73
End: 2018-02-26

## 2018-02-26 NOTE — TELEPHONE ENCOUNTER
----- Message from Gladis Ponce MD sent at 2/24/2018  6:55 PM EST -----  Call echo shows normal LVEF  Prosthetic mechanical MV working well   Overall no major change from last year

## 2018-02-28 NOTE — RESULT NOTES
Verified Results  (1) PT WITH INR [id number] 09:43AM Pamella Hogan Order Number: [id number]     Test Name Result Flag Reference   INR 2 87 H 0 86-1 16   PT 30 5 seconds H 12 1-14 4

## 2018-03-07 NOTE — PROGRESS NOTES
History of Present Illness    Revaccination   Vaccine Information: Vaccine(s) Given (names): Clarnce Felty [id number]  Spoke with patient regarding vaccine out of temperature range  Action(s): Pt will be revaccinated  Appointment scheduled: [id number] 1053 sd  Other Information: patient has upcoming apt and will receive at that time [id number] sd  Revaccination Completed:   Active Problems    1  Adequate anticoagulation on anticoagulant therapy (V58 61) (Z79 01)   2  Ataxia (781 3) (R27 0)   3  Bone/cartilage disorder (733 90) (M89 9,M94 9)   4  Breast cyst (610 0) (N60 09)   5  Bronchitis (490) (J40)   6  Carotid artery stenosis (433 10) (I65 29)   7  Chest discomfort (786 59) (R07 89)   8  Chest pain (786 50) (R07 9)   9  Encounter for monitoring digoxin therapy (V58 83,V58 69) (Z51 81,Z79 899)   10  Flu vaccine need (V04 81) (Z23)   11  Headache (784 0) (R51)   12  Heart Valve Replacement   13  Labyrinthitis (386 30) (H83 09)   14  Lumbago (724 2) (M54 5)   15  Mitral valvular disorder (394 9) (I05 9)   16  Need for hepatitis C screening test (V73 89) (Z11 59)   17  Need for influenza vaccination (V04 81) (Z23)   18  Need for revaccination (V05 9) (Z23)   19  Need for tetanus booster (V03 7) (Z23)   20  Osteoporosis (733 00) (M81 0)   21  Peripheral neuropathy (356 9) (G62 9)   22  Pharyngitis (462) (J02 9)   23  Psoriasis (696 1) (L40 9)   24  Pulmonary nodule seen on imaging study (793 11) (R91 1)   25  Sacroiliitis (720 2) (M46 1)   26  Screening for osteoporosis (V82 81) (Z13 820)   27  Screening for skin condition (V82 0) (Z13 89)   28  Thyroid disorder (246 9) (E07 9)   29  Tremor (781 0) (R25 1)   30  Viral gastroenteritis (008 8) (A08 4)   31  Visit for screening mammogram (V76 12) (Z12 31)   32   Visual changes (368 9) (H53 9)    Immunizations  Influenza --- Cordelia Starkey: 04-Ryp-9472Plgf Keels: 55-Izp-8026Pebgkgo Glassin-Oct-2013; Trent Bing:  30-Sep-2014; Series5: 26-Oct-2015; Series6: 10-Nov-2016   PPSV --- Kajal Grow: 13-Apr-2015   Pneumo Other --- Series1: 29-Oct-2010   Tdap --- Series1: 26-Oct-2015   Zoster --- Series1: 09-Aug-2011     Current Meds   1  AmLODIPine Besylate 5 MG Oral Tablet; TAKE 1 TABLET EVERY MORNING   2  Amoxicillin 500 MG Oral Tablet; TAKE 4 TABLETS 1 HOUR BEFORE DENTAL   APPOINTMENT   3  Butalbital-APAP-Caffeine -40 MG Oral Tablet; TAKE 1 TABLET EVERY 4 TO 6   HOURS AS NEEDED FOR HEADACHE   4  Caltrate 600 TABS   5  Clobetasol Propionate 0 05 % External Foam; APPLY SPARINGLY TO AFFECTED   AREA(S) TWICE DAILY   6  Coumadin 1 MG Oral Tablet; TAKE 2 TABLETS DAILY as needed depending on INR   results   7  Digoxin 125 MCG Oral Tablet; take 1 tablet every day   8  Flaxseed Oil 1200 MG Oral Capsule   9  Gabapentin 100 MG Oral Capsule; TAKE ONE CAPSULE BY MOUTH EVERY DAY   10  Gabapentin 300 MG Oral Capsule; TAKE 1 TABLET BY MOUTH EVERY DAY AT BEDTIME   11  LORazepam 1 MG Oral Tablet; 1 PO BID PRN   12  Losartan Potassium 50 MG Oral Tablet; take 1 tablet by mouth every evening   13  Propranolol HCl - 20 MG Oral Tablet; TAKE 2 TABLET in am and 1 in pm   14  Simvastatin 10 MG Oral Tablet; take 1 tablet every day   15  Warfarin Sodium 2 MG Oral Tablet; TAKE 2 TABLETS DAILY    Allergies    1  Bentyl TABS   2  Codeine Derivatives   3  Effexor TABS   4  Levsin TABS   5  Ultracet TABS    Future Appointments    Date/Time Provider Specialty Site   01/09/2018 10:30 AM Maryana Doll, [facility]  Internal Medicine [facility]   08/07/2017 09:40 LUCINA Mccain   Cardiology  [address]     Signatures   Electronically signed by : LUCINA Mackenzie ; Jun 29 2017  7:40PM EST

## 2018-03-12 ENCOUNTER — APPOINTMENT (OUTPATIENT)
Dept: LAB | Facility: CLINIC | Age: 73
End: 2018-03-12

## 2018-03-12 DIAGNOSIS — I48.91 ATRIAL FIBRILLATION (HCC): ICD-10-CM

## 2018-03-12 LAB
INR PPP: 2.18 (ref 0.86–1.16)
PROTHROMBIN TIME: 24.5 SECONDS (ref 12.1–14.4)

## 2018-03-12 PROCEDURE — 85610 PROTHROMBIN TIME: CPT

## 2018-03-12 PROCEDURE — 36415 COLL VENOUS BLD VENIPUNCTURE: CPT

## 2018-03-13 ENCOUNTER — ANTICOAG VISIT (OUTPATIENT)
Dept: CARDIOLOGY CLINIC | Facility: CLINIC | Age: 73
End: 2018-03-13

## 2018-03-15 DIAGNOSIS — G25.0 ESSENTIAL TREMOR: ICD-10-CM

## 2018-03-15 RX ORDER — PROPRANOLOL HYDROCHLORIDE 20 MG/1
TABLET ORAL
Qty: 270 TABLET | Refills: 1 | Status: SHIPPED | OUTPATIENT
Start: 2018-03-15 | End: 2018-10-03 | Stop reason: SDUPTHER

## 2018-04-09 ENCOUNTER — TRANSCRIBE ORDERS (OUTPATIENT)
Dept: LAB | Facility: CLINIC | Age: 73
End: 2018-04-09

## 2018-04-09 ENCOUNTER — LAB (OUTPATIENT)
Dept: LAB | Facility: CLINIC | Age: 73
End: 2018-04-09
Payer: COMMERCIAL

## 2018-04-09 DIAGNOSIS — I48.91 ATRIAL FIBRILLATION (HCC): ICD-10-CM

## 2018-04-09 LAB
INR PPP: 2.45 (ref 0.86–1.16)
PROTHROMBIN TIME: 26.9 SECONDS (ref 12.1–14.4)

## 2018-04-09 PROCEDURE — 85610 PROTHROMBIN TIME: CPT

## 2018-04-09 PROCEDURE — 36415 COLL VENOUS BLD VENIPUNCTURE: CPT

## 2018-04-10 ENCOUNTER — ANTICOAG VISIT (OUTPATIENT)
Dept: CARDIOLOGY CLINIC | Facility: CLINIC | Age: 73
End: 2018-04-10

## 2018-04-12 DIAGNOSIS — R51.9 NONINTRACTABLE HEADACHE, UNSPECIFIED CHRONICITY PATTERN, UNSPECIFIED HEADACHE TYPE: Primary | ICD-10-CM

## 2018-04-13 RX ORDER — BUTALBITAL, ACETAMINOPHEN AND CAFFEINE 50; 325; 40 MG/1; MG/1; MG/1
TABLET ORAL
Qty: 30 TABLET | Refills: 2 | Status: SHIPPED | OUTPATIENT
Start: 2018-04-13 | End: 2019-01-31 | Stop reason: SDUPTHER

## 2018-04-25 ENCOUNTER — TELEPHONE (OUTPATIENT)
Dept: INTERNAL MEDICINE CLINIC | Facility: CLINIC | Age: 73
End: 2018-04-25

## 2018-04-25 NOTE — TELEPHONE ENCOUNTER
[facility] CALLED AND SAID THE BUTALBITAL-ACETAMINOPHEN-CAFFEINE -40MG NEEDS PRIOR AUTHORIZATION FROM INSURANCE COMPANY  PRIOR AUTH #  [id number]  [facility]  [phone]

## 2018-05-01 ENCOUNTER — TELEPHONE (OUTPATIENT)
Dept: CARDIOLOGY CLINIC | Facility: CLINIC | Age: 73
End: 2018-05-01

## 2018-05-01 DIAGNOSIS — I48.91 ATRIAL FIBRILLATION, UNSPECIFIED TYPE (HCC): Primary | ICD-10-CM

## 2018-05-01 RX ORDER — WARFARIN SODIUM 2 MG/1
TABLET ORAL
Qty: 90 TABLET | Refills: 3 | Status: SHIPPED | OUTPATIENT
Start: 2018-05-01 | End: 2018-05-02 | Stop reason: SDUPTHER

## 2018-05-02 ENCOUNTER — TELEPHONE (OUTPATIENT)
Dept: CARDIOLOGY CLINIC | Facility: CLINIC | Age: 73
End: 2018-05-02

## 2018-05-02 DIAGNOSIS — I48.91 ATRIAL FIBRILLATION, UNSPECIFIED TYPE (HCC): ICD-10-CM

## 2018-05-02 RX ORDER — WARFARIN SODIUM 2 MG/1
TABLET ORAL
Qty: 90 TABLET | Refills: 3 | Status: SHIPPED | OUTPATIENT
Start: 2018-05-02 | End: 2019-02-05 | Stop reason: SDUPTHER

## 2018-05-02 NOTE — TELEPHONE ENCOUNTER
PATIENT NEEDS THE SCRIPT TO SAY BRAND NECESSARY  PLEASE SEND REFILL TO [facility] ON FILE

## 2018-05-04 NOTE — TELEPHONE ENCOUNTER
Patient called and would like to know the status of her Coumadin refill  PT called pharmacy and they stated that they have not received her medication  PT also stated to make sure the medication stated brand name is neccessary  [facility] pharmacy on file is correct to send refill too

## 2018-05-04 NOTE — TELEPHONE ENCOUNTER
Pt walked in requesting only the coumadin 2mg brand name [facility]  I verbally called this into the pharm with pharmacist, this med was sent and confirmed on 5/1/18 through epic   unsure why they did not receive it-MS

## 2018-05-07 ENCOUNTER — APPOINTMENT (OUTPATIENT)
Dept: LAB | Facility: CLINIC | Age: 73
End: 2018-05-07
Payer: COMMERCIAL

## 2018-05-07 DIAGNOSIS — I48.91 ATRIAL FIBRILLATION (HCC): ICD-10-CM

## 2018-05-07 LAB
INR PPP: 3.28 (ref 0.86–1.16)
PROTHROMBIN TIME: 33.9 SECONDS (ref 12.1–14.4)

## 2018-05-07 PROCEDURE — 36415 COLL VENOUS BLD VENIPUNCTURE: CPT

## 2018-05-07 PROCEDURE — 85610 PROTHROMBIN TIME: CPT

## 2018-05-08 ENCOUNTER — ANTICOAG VISIT (OUTPATIENT)
Dept: CARDIOLOGY CLINIC | Facility: CLINIC | Age: 73
End: 2018-05-08

## 2018-05-08 NOTE — PROGRESS NOTES
Spoke with patient telling her to continue same dose and recheck INR in 2 weeks per Dr Gloria Tobias

## 2018-05-16 ENCOUNTER — OFFICE VISIT (OUTPATIENT)
Dept: CARDIOLOGY CLINIC | Facility: CLINIC | Age: 73
End: 2018-05-16
Payer: COMMERCIAL

## 2018-05-16 VITALS
OXYGEN SATURATION: 98 % | DIASTOLIC BLOOD PRESSURE: 82 MMHG | WEIGHT: 165.8 LBS | HEIGHT: 70 IN | BODY MASS INDEX: 23.74 KG/M2 | HEART RATE: 53 BPM | SYSTOLIC BLOOD PRESSURE: 134 MMHG

## 2018-05-16 DIAGNOSIS — Z79.01 CHRONIC ANTICOAGULATION: ICD-10-CM

## 2018-05-16 DIAGNOSIS — I05.9 MITRAL VALVULAR DISORDER: ICD-10-CM

## 2018-05-16 DIAGNOSIS — I10 BENIGN HYPERTENSION: ICD-10-CM

## 2018-05-16 DIAGNOSIS — I48.20 CHRONIC ATRIAL FIBRILLATION (HCC): Primary | ICD-10-CM

## 2018-05-16 PROCEDURE — 99214 OFFICE O/P EST MOD 30 MIN: CPT | Performed by: INTERNAL MEDICINE

## 2018-05-16 NOTE — PROGRESS NOTES
[facility]  [address] [facility]  [id number]  Cardiology Follow Up    Cookie Leon  1945  [id number]      1  Chronic atrial fibrillation ([facility] [address] )     2  Benign hypertension     3  Mitral valvular disorder     4  Chronic anticoagulation         Chief Complaint   Patient presents with    Follow-up       Interval History:  Patient presents for follow-up visit  Patient denies any history of chest pain  No shortness of breath out of the ordinary  No history of leg edema orthopnea PND  No history of lightheadedness or dizziness  Heart rates have improved after discontinuation of digoxin  She denies any bleeding issues  She is on anticoagulation for prosthetic mechanical mitral valve      Patient Active Problem List   Diagnosis    Atrial fibrillation ([facility] [address] )    Hyperlipidemia    History of heart valve replacement    Benign hypertension    Bradycardia    Essential tremor    Chronic anticoagulation    Amaurosis fugax, both eyes    Carotid artery stenosis    Mitral valvular disorder    Osteoporosis    Peripheral neuropathy    Tremor     Past Medical History:   Diagnosis Date    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    Atrial fibrillation (HCC)     Hashimoto's thyroiditis     Hyperlipidemia     Hypertension     IBS (irritable bowel syndrome)     Migraine     Mitral valve disorder     Non-toxic uninodular goiter      Social History     Social History    Marital status: Single     Spouse name: N/A    Number of children: N/A    Years of education: N/A     Occupational History    Retired      Social History Main Topics    Smoking status: Never Smoker    Smokeless tobacco: Never Used    Alcohol use Yes      Comment: social    Drug use: No    Sexual activity: Not on file     Other Topics Concern    Not on file     Social History Narrative    No narrative on file      Family History   Problem Relation Age of Onset    Hypertension Mother     Coronary artery disease Father     Migraines Father     Leukemia Brother     Migraines Brother     Hypertension Brother     Coronary artery disease Paternal Grandfather     Leukemia Maternal Aunt     Multiple myeloma Maternal Aunt     Thyroid disease Other      Past Surgical History:   Procedure Laterality Date    APPENDECTOMY      COLONOSCOPY  08/08/2011    VALVE REPLACEMENT  01/04/2017    mitral valve replacement, 6/5/14       Current Outpatient Prescriptions:     aspirin 81 mg chewable tablet, Chew daily, Disp: , Rfl:     butalbital-acetaminophen-caffeine (FIORICET,ESGIC) -40 mg per tablet, TAKE 1 TABLET EVERY 4 TO 6 HOURS AS NEEDED FOR HEADACHE, Disp: 30 tablet, Rfl: 2    Calcium Carbonate (CALTRATE 600) 1500 (600 Ca) MG TABS, Take by mouth, Disp: , Rfl:     clobetasol (TEMOVATE) 0 05 % cream, Apply topically 2 (two) times a day, Disp: 30 g, Rfl: 0    Flaxseed, Linseed, (FLAXSEED OIL) 1200 MG CAPS, Take by mouth, Disp: , Rfl:     gabapentin (NEURONTIN) 100 mg capsule, Take 1 capsule (100 mg total) by mouth daily, Disp: 90 capsule, Rfl: 0    gabapentin (NEURONTIN) 300 mg capsule, Take 1 tablet by mouth, Disp: , Rfl:     losartan (COZAAR) 100 MG tablet, Take 1 tablet (100 mg total) by mouth daily, Disp: 90 tablet, Rfl: 3    propranolol (INDERAL) 20 mg tablet, TAKE 2 TABLETS IN AM AND 1 TABLET IN EVENING, Disp: 270 tablet, Rfl: 1    simvastatin (ZOCOR) 10 mg tablet, Take 1 tablet by mouth daily, Disp: , Rfl:     warfarin (COUMADIN) 1 mg tablet, Take 2 tablets by mouth daily, Disp: , Rfl:     warfarin (COUMADIN) 2 mg tablet, Take 1 tablet daily as directed   Dispense BRAND ONLY, Disp: 90 tablet, Rfl: 3    warfarin (COUMADIN) 3 mg tablet, Take by mouth, Disp: , Rfl:   Allergies   Allergen Reactions    Codeine Polt-Chlorphen Polt Er     Dicyclomine     Fentanyl     Hydromorphone     Hyoscyamine     Midazolam     Sulfamethoxazole-Trimethoprim     Tramadol-Acetaminophen     Venlafaxine Labs:  Appointment on 05/07/2018   Component Date Value    Protime 05/07/2018 33 9*    INR 05/07/2018 3 28*   Lab on 04/09/2018   Component Date Value    Protime 04/09/2018 26 9*    INR 04/09/2018 2 45*     Imaging: No results found  Review of Systems:  Review of Systems   REVIEW OF SYSTEMS:  Constitutional:  Denies fever or chills   Eyes:  Denies change in visual acuity   HENT:  Denies nasal congestion or sore throat   Respiratory:  Denies cough or shortness of breath   Cardiovascular:  Denies chest pain or edema   GI:  Denies abdominal pain, nausea, vomiting, bloody stools or diarrhea   :  Denies dysuria, frequency, difficulty in micturition and nocturia  Musculoskeletal:  Denies back pain or joint pain   Neurologic:  Denies headache, focal weakness or sensory changes   Endocrine:  Denies polyuria or polydipsia   Lymphatic:  Denies swollen glands   Psychiatric:  Denies depression or anxiety     Physical Exam:    /82   Pulse (!) 53   Ht 5' 10" (1 778 m)   Wt 75 2 kg (165 lb 12 8 oz)   LMP  (LMP Unknown)   SpO2 98%   BMI 23 79 kg/m²     Physical Exam   PHYSICAL EXAM:  General:  Patient is not in acute distress   Head: Normocephalic, Atraumatic  HEENT:  Both pupils normal-size atraumatic, normocephalic, nonicteric  Neck:  JVP not raised  Trachea central  No carotid bruit  Respiratory:  normal breath sounds no crackles  no rhonchi  Cardiovascular:  Irregularly irregular heart sounds consistent with prosthetic mechanical valve  GI:  Abdomen soft nontender  No organomegaly  Lymphatic:  No cervical or inguinal lymphadenopathy  Neurologic:  Patient is awake alert, oriented   Grossly nonfocal    Discussion/Summary:  Patient with multiple medical problems who seems to be doing reasonably well from cardiac standpoint  Previous studies reviewed with patient  Medications reviewed and possible side effects discussed  concepts of cardiovascular disease , signs and symptoms of heart disease   Dietary and risk factor modification reinforced  All questions answered  Safety measures reviewed  Patient advised to report any problems prompting medical attention  Patient understands the risks and benefits of anticoagulation to prevent thrombotic risk from prosthetic mechanical valve  Target INR 2 5 to 3 5  Patient has been regular with PT INR measurements  Factors which affect the INR discussed again with patient  Results of previous echocardiogram reviewed  Patient has normally functioning prosthetic mechanical mitral valve  Antibiotic prophylaxis to continue  Patient had a few questions which were answered  Patient will follow up with primary care physician  Follow-up in a few months or earlier as needed

## 2018-06-04 ENCOUNTER — APPOINTMENT (OUTPATIENT)
Dept: LAB | Facility: CLINIC | Age: 73
End: 2018-06-04
Payer: COMMERCIAL

## 2018-06-04 ENCOUNTER — ANTICOAG VISIT (OUTPATIENT)
Dept: CARDIOLOGY CLINIC | Facility: CLINIC | Age: 73
End: 2018-06-04

## 2018-06-04 ENCOUNTER — TRANSCRIBE ORDERS (OUTPATIENT)
Dept: LAB | Facility: CLINIC | Age: 73
End: 2018-06-04

## 2018-06-04 DIAGNOSIS — I48.91 ATRIAL FIBRILLATION (HCC): ICD-10-CM

## 2018-06-04 DIAGNOSIS — I48.91 ATRIAL FIBRILLATION, UNSPECIFIED TYPE (HCC): Primary | ICD-10-CM

## 2018-06-04 LAB
INR PPP: 3.32 (ref 0.86–1.17)
PROTHROMBIN TIME: 33.7 SECONDS (ref 11.8–14.2)

## 2018-06-04 PROCEDURE — 36415 COLL VENOUS BLD VENIPUNCTURE: CPT

## 2018-06-04 PROCEDURE — 85610 PROTHROMBIN TIME: CPT

## 2018-06-10 DIAGNOSIS — E78.2 COMBINED HYPERLIPIDEMIA: Primary | ICD-10-CM

## 2018-06-10 RX ORDER — SIMVASTATIN 10 MG
TABLET ORAL
Qty: 90 TABLET | Refills: 3 | Status: SHIPPED | OUTPATIENT
Start: 2018-06-10 | End: 2019-06-05 | Stop reason: SDUPTHER

## 2018-06-25 ENCOUNTER — APPOINTMENT (OUTPATIENT)
Dept: LAB | Facility: CLINIC | Age: 73
End: 2018-06-25
Payer: COMMERCIAL

## 2018-06-25 ENCOUNTER — ANTICOAG VISIT (OUTPATIENT)
Dept: CARDIOLOGY CLINIC | Facility: CLINIC | Age: 73
End: 2018-06-25

## 2018-06-25 DIAGNOSIS — I48.91 ATRIAL FIBRILLATION, UNSPECIFIED TYPE (HCC): ICD-10-CM

## 2018-06-25 DIAGNOSIS — Z95.2 HISTORY OF HEART VALVE REPLACEMENT: ICD-10-CM

## 2018-06-25 LAB
INR PPP: 2.26 (ref 0.86–1.17)
PROTHROMBIN TIME: 25 SECONDS (ref 11.8–14.2)

## 2018-06-25 PROCEDURE — 85610 PROTHROMBIN TIME: CPT

## 2018-06-25 PROCEDURE — 36415 COLL VENOUS BLD VENIPUNCTURE: CPT

## 2018-07-09 DIAGNOSIS — E13.49 OTHER DIABETIC NEUROLOGICAL COMPLICATION ASSOCIATED WITH OTHER SPECIFIED DIABETES MELLITUS (HCC): Primary | ICD-10-CM

## 2018-07-09 RX ORDER — GABAPENTIN 300 MG/1
300 CAPSULE ORAL DAILY
Qty: 90 CAPSULE | Refills: 0 | Status: SHIPPED | OUTPATIENT
Start: 2018-07-09 | End: 2018-10-02 | Stop reason: SDUPTHER

## 2018-07-23 ENCOUNTER — APPOINTMENT (OUTPATIENT)
Dept: LAB | Facility: CLINIC | Age: 73
End: 2018-07-23
Payer: COMMERCIAL

## 2018-07-23 ENCOUNTER — ANTICOAG VISIT (OUTPATIENT)
Dept: CARDIOLOGY CLINIC | Facility: CLINIC | Age: 73
End: 2018-07-23

## 2018-07-23 DIAGNOSIS — Z95.2 HISTORY OF HEART VALVE REPLACEMENT: ICD-10-CM

## 2018-07-23 NOTE — PROGRESS NOTES
Per Dr Willena Mohs - Same dose, recheck in 2 weeks  Spoke with patient and verbally understood instructions

## 2018-07-31 DIAGNOSIS — G62.9 NEUROPATHY: ICD-10-CM

## 2018-07-31 RX ORDER — GABAPENTIN 100 MG/1
100 CAPSULE ORAL DAILY
Qty: 90 CAPSULE | Refills: 0 | Status: SHIPPED | OUTPATIENT
Start: 2018-07-31 | End: 2018-10-27 | Stop reason: SDUPTHER

## 2018-08-07 DIAGNOSIS — I10 HYPERTENSION, ESSENTIAL: Primary | ICD-10-CM

## 2018-08-07 RX ORDER — AMLODIPINE BESYLATE 5 MG/1
TABLET ORAL
Qty: 90 TABLET | Refills: 2 | Status: SHIPPED | OUTPATIENT
Start: 2018-08-07 | End: 2019-02-04 | Stop reason: SDUPTHER

## 2018-08-20 ENCOUNTER — ANTICOAG VISIT (OUTPATIENT)
Dept: CARDIOLOGY CLINIC | Facility: CLINIC | Age: 73
End: 2018-08-20

## 2018-08-20 ENCOUNTER — APPOINTMENT (OUTPATIENT)
Dept: LAB | Facility: CLINIC | Age: 73
End: 2018-08-20
Payer: COMMERCIAL

## 2018-08-20 DIAGNOSIS — E03.8 SUBCLINICAL HYPOTHYROIDISM: ICD-10-CM

## 2018-08-20 DIAGNOSIS — I10 BENIGN HYPERTENSION: ICD-10-CM

## 2018-08-20 DIAGNOSIS — E78.2 MIXED HYPERLIPIDEMIA: ICD-10-CM

## 2018-08-20 DIAGNOSIS — Z95.2 HISTORY OF HEART VALVE REPLACEMENT: ICD-10-CM

## 2018-08-20 LAB
ALBUMIN SERPL BCP-MCNC: 3.5 G/DL (ref 3.5–5)
ALP SERPL-CCNC: 57 U/L (ref 46–116)
ALT SERPL W P-5'-P-CCNC: 20 U/L (ref 12–78)
ANION GAP SERPL CALCULATED.3IONS-SCNC: 5 MMOL/L (ref 4–13)
AST SERPL W P-5'-P-CCNC: 23 U/L (ref 5–45)
BASOPHILS # BLD AUTO: 0.07 THOUSANDS/ΜL (ref 0–0.1)
BASOPHILS NFR BLD AUTO: 1 % (ref 0–1)
BILIRUB SERPL-MCNC: 0.66 MG/DL (ref 0.2–1)
BUN SERPL-MCNC: 22 MG/DL (ref 5–25)
CALCIUM SERPL-MCNC: 8.8 MG/DL (ref 8.3–10.1)
CHLORIDE SERPL-SCNC: 105 MMOL/L (ref 100–108)
CHOLEST SERPL-MCNC: 155 MG/DL (ref 50–200)
CO2 SERPL-SCNC: 28 MMOL/L (ref 21–32)
CREAT SERPL-MCNC: 0.89 MG/DL (ref 0.6–1.3)
EOSINOPHIL # BLD AUTO: 0.56 THOUSAND/ΜL (ref 0–0.61)
EOSINOPHIL NFR BLD AUTO: 10 % (ref 0–6)
ERYTHROCYTE [DISTWIDTH] IN BLOOD BY AUTOMATED COUNT: 12.4 % (ref 11.6–15.1)
GFR SERPL CREATININE-BSD FRML MDRD: 66 ML/MIN/1.73SQ M
GLUCOSE P FAST SERPL-MCNC: 90 MG/DL (ref 65–99)
HCT VFR BLD AUTO: 39.4 % (ref 34.8–46.1)
HDLC SERPL-MCNC: 59 MG/DL (ref 40–60)
HGB BLD-MCNC: 12.3 G/DL (ref 11.5–15.4)
IMM GRANULOCYTES # BLD AUTO: 0.01 THOUSAND/UL (ref 0–0.2)
IMM GRANULOCYTES NFR BLD AUTO: 0 % (ref 0–2)
LDLC SERPL CALC-MCNC: 77 MG/DL (ref 0–100)
LYMPHOCYTES # BLD AUTO: 1.58 THOUSANDS/ΜL (ref 0.6–4.47)
LYMPHOCYTES NFR BLD AUTO: 27 % (ref 14–44)
MCH RBC QN AUTO: 31 PG (ref 26.8–34.3)
MCHC RBC AUTO-ENTMCNC: 31.2 G/DL (ref 31.4–37.4)
MCV RBC AUTO: 99 FL (ref 82–98)
MONOCYTES # BLD AUTO: 0.69 THOUSAND/ΜL (ref 0.17–1.22)
MONOCYTES NFR BLD AUTO: 12 % (ref 4–12)
NEUTROPHILS # BLD AUTO: 2.92 THOUSANDS/ΜL (ref 1.85–7.62)
NEUTS SEG NFR BLD AUTO: 50 % (ref 43–75)
NONHDLC SERPL-MCNC: 96 MG/DL
NRBC BLD AUTO-RTO: 0 /100 WBCS
PLATELET # BLD AUTO: 216 THOUSANDS/UL (ref 149–390)
PMV BLD AUTO: 9.3 FL (ref 8.9–12.7)
POTASSIUM SERPL-SCNC: 4.5 MMOL/L (ref 3.5–5.3)
PROT SERPL-MCNC: 7.6 G/DL (ref 6.4–8.2)
RBC # BLD AUTO: 3.97 MILLION/UL (ref 3.81–5.12)
SODIUM SERPL-SCNC: 138 MMOL/L (ref 136–145)
TRIGL SERPL-MCNC: 94 MG/DL
TSH SERPL DL<=0.05 MIU/L-ACNC: 3.69 UIU/ML (ref 0.36–3.74)
WBC # BLD AUTO: 5.83 THOUSAND/UL (ref 4.31–10.16)

## 2018-08-20 PROCEDURE — 80053 COMPREHEN METABOLIC PANEL: CPT

## 2018-08-20 PROCEDURE — 84443 ASSAY THYROID STIM HORMONE: CPT

## 2018-08-20 PROCEDURE — 85025 COMPLETE CBC W/AUTO DIFF WBC: CPT

## 2018-08-20 PROCEDURE — 80061 LIPID PANEL: CPT

## 2018-08-28 RX ORDER — GLYCOPYRROLATE 2 MG/1
TABLET ORAL AS NEEDED
COMMUNITY
Start: 2014-07-31 | End: 2020-09-22

## 2018-08-30 ENCOUNTER — ANTICOAG VISIT (OUTPATIENT)
Dept: CARDIOLOGY CLINIC | Facility: CLINIC | Age: 73
End: 2018-08-30

## 2018-08-30 ENCOUNTER — OFFICE VISIT (OUTPATIENT)
Dept: INTERNAL MEDICINE CLINIC | Facility: CLINIC | Age: 73
End: 2018-08-30
Payer: COMMERCIAL

## 2018-08-30 ENCOUNTER — APPOINTMENT (OUTPATIENT)
Dept: LAB | Facility: CLINIC | Age: 73
End: 2018-08-30
Payer: COMMERCIAL

## 2018-08-30 VITALS
DIASTOLIC BLOOD PRESSURE: 78 MMHG | HEART RATE: 52 BPM | SYSTOLIC BLOOD PRESSURE: 118 MMHG | HEIGHT: 70 IN | OXYGEN SATURATION: 95 % | WEIGHT: 170.2 LBS | BODY MASS INDEX: 24.37 KG/M2

## 2018-08-30 DIAGNOSIS — Z95.2 HISTORY OF HEART VALVE REPLACEMENT: ICD-10-CM

## 2018-08-30 DIAGNOSIS — I48.20 CHRONIC ATRIAL FIBRILLATION (HCC): Primary | ICD-10-CM

## 2018-08-30 DIAGNOSIS — M85.80 OSTEOPENIA, UNSPECIFIED LOCATION: ICD-10-CM

## 2018-08-30 DIAGNOSIS — M81.0 OSTEOPOROSIS, UNSPECIFIED OSTEOPOROSIS TYPE, UNSPECIFIED PATHOLOGICAL FRACTURE PRESENCE: ICD-10-CM

## 2018-08-30 DIAGNOSIS — E78.2 MIXED HYPERLIPIDEMIA: ICD-10-CM

## 2018-08-30 DIAGNOSIS — I10 BENIGN HYPERTENSION: ICD-10-CM

## 2018-08-30 DIAGNOSIS — R00.1 BRADYCARDIA: ICD-10-CM

## 2018-08-30 DIAGNOSIS — I05.9 MITRAL VALVULAR DISORDER: ICD-10-CM

## 2018-08-30 DIAGNOSIS — E53.8 VITAMIN B 12 DEFICIENCY: ICD-10-CM

## 2018-08-30 PROCEDURE — 1101F PT FALLS ASSESS-DOCD LE1/YR: CPT | Performed by: NURSE PRACTITIONER

## 2018-08-30 PROCEDURE — 99214 OFFICE O/P EST MOD 30 MIN: CPT | Performed by: NURSE PRACTITIONER

## 2018-08-30 PROCEDURE — 3725F SCREEN DEPRESSION PERFORMED: CPT | Performed by: NURSE PRACTITIONER

## 2018-08-30 RX ORDER — LORAZEPAM 0.5 MG/1
TABLET ORAL AS NEEDED
COMMUNITY
End: 2018-10-18

## 2018-08-30 NOTE — PATIENT INSTRUCTIONS
Hypertension stable on current medication medication list has been updated and she will continue to monitor her sodium intake       Afib/valve replacement-  heart rate controlled on current medication and anticoagulated with Coumadin following with Cardiology     seasonal affective disorder stable     health maintenance due for dexa otherwise up to date    Hyperlipidemia- stable on current medication    Obesity- discussed the importance of 6 small meals a day continuing her aerobic and adding some strength training she can also incorporate Silver sneakers and swimming at the [facility]    Fatigue with elevation in MCV she may have early B12 deficiency will check levels in the meantime she is okay to start B12 1000 mcg sublingually daily I did discuss with Cardiology who agrees she is safe to take this medication

## 2018-08-30 NOTE — PROGRESS NOTES
Assessment/Plan:    Patient Instructions    Hypertension stable on current medication medication list has been updated and she will continue to monitor her sodium intake  Afib/valve replacement-  heart rate controlled on current medication and anticoagulated with Coumadin following with Cardiology     seasonal affective disorder stable     health maintenance due for dexa otherwise up to date    Hyperlipidemia- stable on current medication    Obesity- discussed the importance of 6 small meals a day continuing her aerobic and adding some strength training she can also incorporate Silver sneakers and swimming at the [facility]    Fatigue with elevation in MCV she may have early B12 deficiency will check levels in the meantime she is okay to start B12 1000 mcg sublingually daily I did discuss with Cardiology who agrees she is safe to take this medication         Diagnoses and all orders for this visit:    Chronic atrial fibrillation (HCC)    Benign hypertension  -     CBC and differential; Future    Osteoporosis, unspecified osteoporosis type, unspecified pathological fracture presence    Mixed hyperlipidemia  -     Comprehensive metabolic panel; Future  -     Lipid panel; Future  -     TSH, 3rd generation; Future    History of heart valve replacement    Bradycardia    Osteopenia, unspecified location  -     DXA bone density spine hip and pelvis; Future    Vitamin B 12 deficiency  -     Vitamin B12; Future  -     Methylmalonic acid, serum; Future    Other orders  -     glycopyrrolate (ROBINUL) 2 MG tablet; Take by mouth  -     LORazepam (ATIVAN) 0 5 mg tablet; Take by mouth as needed for anxiety         Subjective:      Patient ID: Michael Moscoso is a 81 y o  female    She does have a lot of stress living independently  She is walking on a routine visit,frustrated she can't lose wieght     Moods good   feels achy and stiff all over  Taking osteobyflex  Tired a lot  Taking all meds as prescribed  Migraines occasionally and takes fioricet          Current Outpatient Prescriptions:     amLODIPine (NORVASC) 5 mg tablet, TAKE 1 TABLET EVERY MORNING, Disp: 90 tablet, Rfl: 2    aspirin 81 mg chewable tablet, Chew daily, Disp: , Rfl:     butalbital-acetaminophen-caffeine (FIORICET,ESGIC) -40 mg per tablet, TAKE 1 TABLET EVERY 4 TO 6 HOURS AS NEEDED FOR HEADACHE, Disp: 30 tablet, Rfl: 2    Calcium Carbonate (CALTRATE 600) 1500 (600 Ca) MG TABS, Take by mouth, Disp: , Rfl:     clobetasol (TEMOVATE) 0 05 % cream, Apply topically 2 (two) times a day, Disp: 30 g, Rfl: 0    Flaxseed, Linseed, (FLAXSEED OIL) 1200 MG CAPS, Take by mouth, Disp: , Rfl:     gabapentin (NEURONTIN) 100 mg capsule, TAKE 1 CAPSULE (100 MG TOTAL) BY MOUTH DAILY, Disp: 90 capsule, Rfl: 0    gabapentin (NEURONTIN) 300 mg capsule, Take 1 capsule (300 mg total) by mouth daily, Disp: 90 capsule, Rfl: 0    glycopyrrolate (ROBINUL) 2 MG tablet, Take by mouth, Disp: , Rfl:     LORazepam (ATIVAN) 0 5 mg tablet, Take by mouth as needed for anxiety, Disp: , Rfl:     losartan (COZAAR) 100 MG tablet, Take 1 tablet (100 mg total) by mouth daily, Disp: 90 tablet, Rfl: 3    propranolol (INDERAL) 20 mg tablet, TAKE 2 TABLETS IN AM AND 1 TABLET IN EVENING, Disp: 270 tablet, Rfl: 1    simvastatin (ZOCOR) 10 mg tablet, TAKE 1 TABLET EVERY DAY, Disp: 90 tablet, Rfl: 3    warfarin (COUMADIN) 1 mg tablet, Take 2 tablets by mouth daily, Disp: , Rfl:     warfarin (COUMADIN) 2 mg tablet, Take 1 tablet daily as directed   Dispense BRAND ONLY, Disp: 90 tablet, Rfl: 3    warfarin (COUMADIN) 3 mg tablet, Take by mouth, Disp: , Rfl:     Recent Results (from the past 1008 hour(s))   Protime-INR    Collection Time: 07/23/18  9:28 AM   Result Value Ref Range    Protime 25 7 (H) 11 8 - 14 2 seconds    INR 2 34 (H) 0 86 - 1 17   Protime-INR    Collection Time: 08/20/18  7:54 AM   Result Value Ref Range    Protime 23 8 (H) 11 8 - 14 2 seconds    INR 2 12 (H) 0 86 - 1 17   CBC and differential    Collection Time: 08/20/18  7:54 AM   Result Value Ref Range    WBC 5 83 4 31 - 10 16 Thousand/uL    RBC 3 97 3 81 - 5 12 Million/uL    Hemoglobin 12 3 11 5 - 15 4 g/dL    Hematocrit 39 4 34 8 - 46 1 %    MCV 99 (H) 82 - 98 fL    MCH 31 0 26 8 - 34 3 pg    MCHC 31 2 (L) 31 4 - 37 4 g/dL    RDW 12 4 11 6 - 15 1 %    MPV 9 3 8 9 - 12 7 fL    Platelets 013 005 - 043 Thousands/uL    nRBC 0 /100 WBCs    Neutrophils Relative 50 43 - 75 %    Immat GRANS % 0 0 - 2 %    Lymphocytes Relative 27 14 - 44 %    Monocytes Relative 12 4 - 12 %    Eosinophils Relative 10 (H) 0 - 6 %    Basophils Relative 1 0 - 1 %    Neutrophils Absolute 2 92 1 85 - 7 62 Thousands/µL    Immature Grans Absolute 0 01 0 00 - 0 20 Thousand/uL    Lymphocytes Absolute 1 58 0 60 - 4 47 Thousands/µL    Monocytes Absolute 0 69 0 17 - 1 22 Thousand/µL    Eosinophils Absolute 0 56 0 00 - 0 61 Thousand/µL    Basophils Absolute 0 07 0 00 - 0 10 Thousands/µL   Comprehensive metabolic panel    Collection Time: 08/20/18  7:54 AM   Result Value Ref Range    Sodium 138 136 - 145 mmol/L    Potassium 4 5 3 5 - 5 3 mmol/L    Chloride 105 100 - 108 mmol/L    CO2 28 21 - 32 mmol/L    ANION GAP 5 4 - 13 mmol/L    BUN 22 5 - 25 mg/dL    Creatinine 0 89 0 60 - 1 30 mg/dL    Glucose, Fasting 90 65 - 99 mg/dL    Calcium 8 8 8 3 - 10 1 mg/dL    AST 23 5 - 45 U/L    ALT 20 12 - 78 U/L    Alkaline Phosphatase 57 46 - 116 U/L    Total Protein 7 6 6 4 - 8 2 g/dL    Albumin 3 5 3 5 - 5 0 g/dL    Total Bilirubin 0 66 0 20 - 1 00 mg/dL    eGFR 66 ml/min/1 73sq m   Lipid panel    Collection Time: 08/20/18  7:54 AM   Result Value Ref Range    Cholesterol 155 50 - 200 mg/dL    Triglycerides 94 <=150 mg/dL    HDL, Direct 59 40 - 60 mg/dL    LDL Calculated 77 0 - 100 mg/dL    Non-HDL-Chol (CHOL-HDL) 96 mg/dl   TSH, 3rd generation    Collection Time: 08/20/18  7:54 AM   Result Value Ref Range    TSH 3RD GENERATON 3 690 0 358 - 3 740 uIU/mL       The following portions of the patient's history were reviewed and updated as appropriate: allergies, current medications, past family history, past medical history, past social history, past surgical history and problem list      Review of Systems   Constitutional: Negative for appetite change, chills, diaphoresis, fatigue, fever and unexpected weight change  HENT: Negative for postnasal drip and sneezing  Eyes: Negative for visual disturbance  Respiratory: Negative for chest tightness and shortness of breath  Cardiovascular: Negative for chest pain, palpitations and leg swelling  Gastrointestinal: Negative for abdominal pain and blood in stool  Endocrine: Negative for cold intolerance, heat intolerance, polydipsia, polyphagia and polyuria  Genitourinary: Negative for difficulty urinating, dysuria, frequency and urgency  Musculoskeletal: Negative for arthralgias and myalgias  Generalized stiffness   Skin: Negative for rash and wound  Neurological: Negative for dizziness, weakness, light-headedness and headaches  Hematological: Negative for adenopathy  Psychiatric/Behavioral: Negative for confusion, dysphoric mood and sleep disturbance  The patient is not nervous/anxious  Objective:      /78 (BP Location: Left arm, Patient Position: Sitting, Cuff Size: Standard)   Pulse (!) 52   Ht 5' 10" (1 778 m) Comment: w/ shoe denied off  Wt 77 2 kg (170 lb 3 2 oz) Comment: w/ shoe denied off  LMP  (LMP Unknown)   SpO2 95%   BMI 24 42 kg/m²        Physical Exam   Constitutional: She is oriented to person, place, and time  She appears well-developed and well-nourished  HENT:   Head: Normocephalic and atraumatic  Nose: Nose normal    Mouth/Throat: Oropharynx is clear and moist    Eyes: Conjunctivae and EOM are normal  Pupils are equal, round, and reactive to light  No scleral icterus  Neck: Normal range of motion  Neck supple  No JVD present  No tracheal deviation present  No thyromegaly present  Cardiovascular: Normal rate and intact distal pulses  Exam reveals no gallop and no friction rub  Murmur heard  Irregularly irregular w/  prosthetic mitral valve   Pulmonary/Chest: Effort normal and breath sounds normal  No respiratory distress  She has no wheezes  She has no rales  Abdominal: Soft  Bowel sounds are normal    Musculoskeletal: She exhibits no edema or tenderness  Lymphadenopathy:     She has no cervical adenopathy  Neurological: She is alert and oriented to person, place, and time  No cranial nerve deficit  Skin: Skin is warm and dry  No rash noted  No erythema  Psychiatric: She has a normal mood and affect  Her behavior is normal  Judgment and thought content normal    Nursing note reviewed

## 2018-09-18 ENCOUNTER — OFFICE VISIT (OUTPATIENT)
Dept: CARDIOLOGY CLINIC | Facility: CLINIC | Age: 73
End: 2018-09-18
Payer: COMMERCIAL

## 2018-09-18 ENCOUNTER — LAB (OUTPATIENT)
Dept: LAB | Facility: CLINIC | Age: 73
End: 2018-09-18
Payer: COMMERCIAL

## 2018-09-18 ENCOUNTER — ANTICOAG VISIT (OUTPATIENT)
Dept: CARDIOLOGY CLINIC | Facility: CLINIC | Age: 73
End: 2018-09-18

## 2018-09-18 VITALS
DIASTOLIC BLOOD PRESSURE: 84 MMHG | OXYGEN SATURATION: 97 % | BODY MASS INDEX: 24.62 KG/M2 | SYSTOLIC BLOOD PRESSURE: 128 MMHG | HEART RATE: 78 BPM | HEIGHT: 70 IN | WEIGHT: 172 LBS

## 2018-09-18 DIAGNOSIS — Z95.2 HISTORY OF HEART VALVE REPLACEMENT: ICD-10-CM

## 2018-09-18 DIAGNOSIS — E53.8 VITAMIN B 12 DEFICIENCY: ICD-10-CM

## 2018-09-18 DIAGNOSIS — I48.20 CHRONIC ATRIAL FIBRILLATION (HCC): Primary | ICD-10-CM

## 2018-09-18 DIAGNOSIS — I10 BENIGN HYPERTENSION: ICD-10-CM

## 2018-09-18 DIAGNOSIS — I05.9 MITRAL VALVE DISORDER: ICD-10-CM

## 2018-09-18 DIAGNOSIS — Z79.01 CHRONIC ANTICOAGULATION: ICD-10-CM

## 2018-09-18 LAB — VIT B12 SERPL-MCNC: 292 PG/ML (ref 100–900)

## 2018-09-18 PROCEDURE — 99214 OFFICE O/P EST MOD 30 MIN: CPT | Performed by: INTERNAL MEDICINE

## 2018-09-18 PROCEDURE — 83918 ORGANIC ACIDS TOTAL QUANT: CPT

## 2018-09-18 PROCEDURE — 82607 VITAMIN B-12: CPT

## 2018-09-18 NOTE — PROGRESS NOTES
[facility] [place] [facility]  [address] [facility]  [address]  Cardiology Follow Up    Yasmeen Neri  1945  [id number]      1  Chronic atrial fibrillation ([facility] [address] )     2  Benign hypertension     3  Chronic anticoagulation     4  Mitral valve disorder         Chief Complaint   Patient presents with    Follow-up       Interval History:  Patient presents for follow-up visit  Patient denies any chest pain  Patient denies any shortness of breath out of the ordinary  She has gained a few lb and she is pretty upset about it  No history of leg edema orthopnea PND  No history of presyncope syncope  Recent thyroid blood work was unremarkable  She states that she has been compliant with all  present medications  No bleeding issues      Patient Active Problem List   Diagnosis    Atrial fibrillation ([facility] [address] )    Hyperlipidemia    History of heart valve replacement    Benign hypertension    Bradycardia    Essential tremor    Chronic anticoagulation    Amaurosis fugax, both eyes    Carotid artery stenosis    Mitral valve disorder    Osteoporosis    Peripheral neuropathy    Tremor     Past Medical History:   Diagnosis Date    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    Atrial fibrillation (HCC)     Hashimoto's thyroiditis     Hyperlipidemia     Hypertension     IBS (irritable bowel syndrome)     Migraine     Mitral valve disorder     Non-toxic uninodular goiter      Social History     Social History    Marital status: Single     Spouse name: N/A    Number of children: N/A    Years of education: N/A     Occupational History    Retired      Social History Main Topics    Smoking status: Never Smoker    Smokeless tobacco: Never Used    Alcohol use Yes      Comment: socially    Drug use: No    Sexual activity: No     Other Topics Concern    Not on file     Social History Narrative    No narrative on file      Family History   Problem Relation Age of Onset    Hypertension Mother  Coronary artery disease Father     Migraines Father     Leukemia Brother     Migraines Brother     Hypertension Brother     Coronary artery disease Paternal Grandfather     Leukemia Maternal Aunt     Multiple myeloma Maternal Aunt     Thyroid disease Other      Past Surgical History:   Procedure Laterality Date    APPENDECTOMY      COLONOSCOPY  08/08/2011    VALVE REPLACEMENT  01/04/2017    mitral valve replacement, 6/5/14       Current Outpatient Prescriptions:     amLODIPine (NORVASC) 5 mg tablet, TAKE 1 TABLET EVERY MORNING, Disp: 90 tablet, Rfl: 2    aspirin 81 mg chewable tablet, Chew daily, Disp: , Rfl:     butalbital-acetaminophen-caffeine (FIORICET,ESGIC) -40 mg per tablet, TAKE 1 TABLET EVERY 4 TO 6 HOURS AS NEEDED FOR HEADACHE, Disp: 30 tablet, Rfl: 2    Calcium Carbonate (CALTRATE 600) 1500 (600 Ca) MG TABS, Take by mouth, Disp: , Rfl:     clobetasol (TEMOVATE) 0 05 % cream, Apply topically 2 (two) times a day, Disp: 30 g, Rfl: 0    Flaxseed, Linseed, (FLAXSEED OIL) 1200 MG CAPS, Take by mouth, Disp: , Rfl:     gabapentin (NEURONTIN) 100 mg capsule, TAKE 1 CAPSULE (100 MG TOTAL) BY MOUTH DAILY, Disp: 90 capsule, Rfl: 0    gabapentin (NEURONTIN) 300 mg capsule, Take 1 capsule (300 mg total) by mouth daily, Disp: 90 capsule, Rfl: 0    glycopyrrolate (ROBINUL) 2 MG tablet, Take by mouth, Disp: , Rfl:     LORazepam (ATIVAN) 0 5 mg tablet, Take by mouth as needed for anxiety, Disp: , Rfl:     losartan (COZAAR) 100 MG tablet, Take 1 tablet (100 mg total) by mouth daily, Disp: 90 tablet, Rfl: 3    propranolol (INDERAL) 20 mg tablet, TAKE 2 TABLETS IN AM AND 1 TABLET IN EVENING, Disp: 270 tablet, Rfl: 1    simvastatin (ZOCOR) 10 mg tablet, TAKE 1 TABLET EVERY DAY, Disp: 90 tablet, Rfl: 3    warfarin (COUMADIN) 1 mg tablet, Take 2 tablets by mouth daily, Disp: , Rfl:     warfarin (COUMADIN) 2 mg tablet, Take 1 tablet daily as directed   Dispense BRAND ONLY, Disp: 90 tablet, Rfl: 3    warfarin (COUMADIN) 3 mg tablet, Take by mouth, Disp: , Rfl:   Allergies   Allergen Reactions    Enablex [Darifenacin] Other (See Comments)     Sweating, HA, urinary hesitancy, flushing of face    Fentanyl Nausea Only and Vomiting    Hyoscyamine Palpitations    Dicyclomine Other (See Comments)     Jittery, disorientation, light HAs    Hydromorphone Other (See Comments)     Per pt does not remember the type or severity level    Midazolam Other (See Comments)     Per pt does not remember the type or severity level    Sulfamethoxazole-Trimethoprim Nausea Only    Venlafaxine Other (See Comments)     Urinary urgency, polyuria    Codeine Polt-Chlorphen Polt Er Headache    Ultracet [Tramadol-Acetaminophen] Nausea Only and Vomiting       Labs:   Ancillary Orders on 09/07/2018   Component Date Value    Protime 09/18/2018 21 1*    INR 09/18/2018 1 81*   Ancillary Orders on 08/24/2018   Component Date Value    Protime 08/30/2018 28 2*    INR 08/30/2018 2 64*   Appointment on 08/20/2018   Component Date Value    WBC 08/20/2018 5 83     RBC 08/20/2018 3 97     Hemoglobin 08/20/2018 12 3     Hematocrit 08/20/2018 39 4     MCV 08/20/2018 99*    MCH 08/20/2018 31 0     MCHC 08/20/2018 31 2*    RDW 08/20/2018 12 4     MPV 08/20/2018 9 3     Platelets 25/04/8989 216     nRBC 08/20/2018 0     Neutrophils Relative 08/20/2018 50     Immat GRANS % 08/20/2018 0     Lymphocytes Relative 08/20/2018 27     Monocytes Relative 08/20/2018 12     Eosinophils Relative 08/20/2018 10*    Basophils Relative 08/20/2018 1     Neutrophils Absolute 08/20/2018 2 92     Immature Grans Absolute 08/20/2018 0 01     Lymphocytes Absolute 08/20/2018 1 58     Monocytes Absolute 08/20/2018 0 69     Eosinophils Absolute 08/20/2018 0 56     Basophils Absolute 08/20/2018 0 07     Sodium 08/20/2018 138     Potassium 08/20/2018 4 5     Chloride 08/20/2018 105     CO2 08/20/2018 28     ANION GAP 08/20/2018 5     BUN 08/20/2018 22     Creatinine 08/20/2018 0 89     Glucose, Fasting 08/20/2018 90     Calcium 08/20/2018 8 8     AST 08/20/2018 23     ALT 08/20/2018 20     Alkaline Phosphatase 08/20/2018 57     Total Protein 08/20/2018 7 6     Albumin 08/20/2018 3 5     Total Bilirubin 08/20/2018 0 66     eGFR 08/20/2018 66     Cholesterol 08/20/2018 155     Triglycerides 08/20/2018 94     HDL, Direct 08/20/2018 59     LDL Calculated 08/20/2018 77     Non-HDL-Chol (CHOL-HDL) 08/20/2018 96     TSH 3RD GENERATON 08/20/2018 3 690    Ancillary Orders on 08/10/2018   Component Date Value    Protime 08/20/2018 23 8*    INR 08/20/2018 2 12*     Imaging: No results found  Review of Systems:  Review of Systems   REVIEW OF SYSTEMS:  Constitutional:  Denies fever or chills   Eyes:  Denies change in visual acuity   HENT:  Denies nasal congestion or sore throat   Respiratory:  Denies cough or shortness of breath   Cardiovascular:  Denies chest pain or edema   GI:  Denies abdominal pain, nausea, vomiting, bloody stools or diarrhea   :  Denies dysuria, frequency, difficulty in micturition and nocturia  Musculoskeletal:  Denies back pain or joint pain   Neurologic:  Denies headache, focal weakness or sensory changes   Endocrine:  Denies polyuria or polydipsia   Lymphatic:  Denies swollen glands   Psychiatric:  Denies depression or anxiety     Physical Exam:    /84   Pulse 78   Ht 5' 10" (1 778 m)   Wt 78 kg (172 lb)   LMP  (LMP Unknown)   SpO2 97%   BMI 24 68 kg/m²     Physical Exam   PHYSICAL EXAM:  General:  Patient is not in acute distress   Head: Normocephalic, Atraumatic  HEENT:  Both pupils normal-size atraumatic, normocephalic, nonicteric  Neck:  JVP not raised  Trachea central  No carotid bruit  Respiratory:  normal breath sounds no crackles  no rhonchi  Cardiovascular:  Irregularly irregular  Heart sounds consistent with prosthetic mechanical valve  GI:  Abdomen soft nontender  No organomegaly  Lymphatic:  No cervical or inguinal lymphadenopathy  Neurologic:  Patient is awake alert, oriented   Grossly nonfocal    Discussion/Summary:  Patient with multiple medical problems who seems to be doing reasonably well from cardiac standpoint  Previous studies reviewed with patient  Medications reviewed and possible side effects discussed  concepts of cardiovascular disease , signs and symptoms of heart disease  Dietary and risk factor modification reinforced  All questions answered  Safety measures reviewed  Patient advised to report any problems prompting medical attention  Results of echocardiogram reviewed with the patient  Normal ejection fraction with normally functioning prosthetic mechanical mitral valve  Patient understands the risks and benefits of anticoagulation to prevent thrombotic risk from prosthetic mechanical valve  Antibiotic prophylaxis to continue  Patient had a lot of questions regarding her recent imaging studies and evaluation  Medications reviewed  Follow-up in 6 months or earlier as needed  Follow-up with primary care physician  Patient is agreeable with the plan of care  Time 25 minutes

## 2018-09-21 ENCOUNTER — TELEPHONE (OUTPATIENT)
Dept: INTERNAL MEDICINE CLINIC | Facility: CLINIC | Age: 73
End: 2018-09-21

## 2018-09-21 LAB
METHYLMALONATE SERPL-SCNC: 260 NMOL/L (ref 0–378)
SL AMB DISCLAIMER: NORMAL

## 2018-09-25 ENCOUNTER — TELEPHONE (OUTPATIENT)
Dept: INTERNAL MEDICINE CLINIC | Facility: CLINIC | Age: 73
End: 2018-09-25

## 2018-09-26 ENCOUNTER — CLINICAL SUPPORT (OUTPATIENT)
Dept: INTERNAL MEDICINE CLINIC | Facility: CLINIC | Age: 73
End: 2018-09-26
Payer: COMMERCIAL

## 2018-09-26 DIAGNOSIS — Z23 NEED FOR INFLUENZA VACCINATION: Primary | ICD-10-CM

## 2018-09-26 PROCEDURE — G0008 ADMIN INFLUENZA VIRUS VAC: HCPCS

## 2018-09-26 PROCEDURE — 90662 IIV NO PRSV INCREASED AG IM: CPT

## 2018-10-02 DIAGNOSIS — E13.49 OTHER DIABETIC NEUROLOGICAL COMPLICATION ASSOCIATED WITH OTHER SPECIFIED DIABETES MELLITUS (HCC): ICD-10-CM

## 2018-10-03 DIAGNOSIS — G25.0 ESSENTIAL TREMOR: ICD-10-CM

## 2018-10-04 RX ORDER — GABAPENTIN 300 MG/1
CAPSULE ORAL
Qty: 90 CAPSULE | Refills: 0 | Status: SHIPPED | OUTPATIENT
Start: 2018-10-04 | End: 2018-12-30 | Stop reason: SDUPTHER

## 2018-10-04 RX ORDER — PROPRANOLOL HYDROCHLORIDE 20 MG/1
TABLET ORAL
Qty: 270 TABLET | Refills: 1 | Status: SHIPPED | OUTPATIENT
Start: 2018-10-04 | End: 2019-03-31 | Stop reason: SDUPTHER

## 2018-10-05 ENCOUNTER — OFFICE VISIT (OUTPATIENT)
Dept: INTERNAL MEDICINE CLINIC | Facility: CLINIC | Age: 73
End: 2018-10-05
Payer: COMMERCIAL

## 2018-10-05 VITALS
HEART RATE: 56 BPM | HEIGHT: 70 IN | BODY MASS INDEX: 24.51 KG/M2 | WEIGHT: 171.2 LBS | DIASTOLIC BLOOD PRESSURE: 82 MMHG | SYSTOLIC BLOOD PRESSURE: 128 MMHG

## 2018-10-05 DIAGNOSIS — M54.2 CERVICALGIA OF OCCIPITO-ATLANTO-AXIAL REGION: Primary | ICD-10-CM

## 2018-10-05 PROCEDURE — 1036F TOBACCO NON-USER: CPT | Performed by: NURSE PRACTITIONER

## 2018-10-05 PROCEDURE — 99213 OFFICE O/P EST LOW 20 MIN: CPT | Performed by: NURSE PRACTITIONER

## 2018-10-05 PROCEDURE — 1160F RVW MEDS BY RX/DR IN RCRD: CPT | Performed by: NURSE PRACTITIONER

## 2018-10-05 PROCEDURE — 4040F PNEUMOC VAC/ADMIN/RCVD: CPT | Performed by: NURSE PRACTITIONER

## 2018-10-05 NOTE — PROGRESS NOTES
Assessment/Plan:    Patient Instructions   Posterior headache neck pain and shoulder pain likely musculoskeletal in nature  Discussed pathophysiology with patient recommend warm compresses and physical therapy she can take Tylenol as needed  She does have history of narrowing of the posterior vertebral arteries but she has no other neurologic deficits so unlikely this is related  I did discuss this with the patient as well  Diagnoses and all orders for this visit:    Cervicalgia of [id number] region  -     Ambulatory referral to Physical Therapy; Future         Subjective:      Patient ID: Pritesh Morales is a 81 y o  female    Pt has been having on and off shoulder, neck and posterior headaches, she notices it more when she is laying down     She can move her neck and reproduce the pain  No visual changes, no dizziness  Pain is mild and comes and goes  No ataxia  No other neurologic sx          Current Outpatient Prescriptions:     amLODIPine (NORVASC) 5 mg tablet, TAKE 1 TABLET EVERY MORNING, Disp: 90 tablet, Rfl: 2    aspirin 81 mg chewable tablet, Chew daily, Disp: , Rfl:     butalbital-acetaminophen-caffeine (FIORICET,ESGIC) -40 mg per tablet, TAKE 1 TABLET EVERY 4 TO 6 HOURS AS NEEDED FOR HEADACHE, Disp: 30 tablet, Rfl: 2    Calcium Carbonate (CALTRATE 600) 1500 (600 Ca) MG TABS, Take by mouth, Disp: , Rfl:     clobetasol (TEMOVATE) 0 05 % cream, Apply topically 2 (two) times a day, Disp: 30 g, Rfl: 0    Flaxseed, Linseed, (FLAXSEED OIL) 1200 MG CAPS, Take by mouth, Disp: , Rfl:     gabapentin (NEURONTIN) 100 mg capsule, TAKE 1 CAPSULE (100 MG TOTAL) BY MOUTH DAILY, Disp: 90 capsule, Rfl: 0    gabapentin (NEURONTIN) 300 mg capsule, TAKE 1 CAPSULE BY MOUTH EVERY DAY, Disp: 90 capsule, Rfl: 0    glycopyrrolate (ROBINUL) 2 MG tablet, Take by mouth, Disp: , Rfl:     LORazepam (ATIVAN) 0 5 mg tablet, Take by mouth as needed for anxiety, Disp: , Rfl:     losartan (COZAAR) 100 MG tablet, Take 1 tablet (100 mg total) by mouth daily, Disp: 90 tablet, Rfl: 3    propranolol (INDERAL) 20 mg tablet, TAKE 2 TABLETS IN AM AND 1 TABLET IN EVENING, Disp: 270 tablet, Rfl: 1    simvastatin (ZOCOR) 10 mg tablet, TAKE 1 TABLET EVERY DAY, Disp: 90 tablet, Rfl: 3    warfarin (COUMADIN) 1 mg tablet, Take 2 tablets by mouth daily, Disp: , Rfl:     warfarin (COUMADIN) 2 mg tablet, Take 1 tablet daily as directed  Dispense BRAND ONLY, Disp: 90 tablet, Rfl: 3    warfarin (COUMADIN) 3 mg tablet, Take by mouth, Disp: , Rfl:     Recent Results (from the past 1008 hour(s))   Protime-INR    Collection Time: 08/30/18  9:38 AM   Result Value Ref Range    Protime 28 2 (H) 11 8 - 14 2 seconds    INR 2 64 (H) 0 86 - 1 17   Protime-INR    Collection Time: 09/18/18 10:09 AM   Result Value Ref Range    Protime 21 1 (H) 11 8 - 14 2 seconds    INR 1 81 (H) 0 86 - 1 17   Vitamin B12    Collection Time: 09/18/18 10:09 AM   Result Value Ref Range    Vitamin B-12 292 100 - 900 pg/mL   Methylmalonic acid, serum    Collection Time: 09/18/18 10:09 AM   Result Value Ref Range    Methylmalonic Acid, S 260 0 - 378 nmol/L    Disclaimer: Comment        The following portions of the patient's history were reviewed and updated as appropriate: allergies, current medications, past family history, past medical history, past social history, past surgical history and problem list      Review of Systems   Constitutional: Negative for appetite change, chills, diaphoresis, fatigue, fever and unexpected weight change  HENT: Negative for postnasal drip and sneezing  Eyes: Negative for visual disturbance  Respiratory: Negative for chest tightness and shortness of breath  Cardiovascular: Negative for chest pain, palpitations and leg swelling  Gastrointestinal: Negative for abdominal pain and blood in stool  Endocrine: Negative for cold intolerance, heat intolerance, polydipsia, polyphagia and polyuria     Genitourinary: Negative for difficulty urinating, dysuria, frequency and urgency  Musculoskeletal: Positive for neck pain  Negative for arthralgias and myalgias  Skin: Negative for rash and wound  Neurological: Positive for headaches  Negative for dizziness, weakness and light-headedness  Hematological: Negative for adenopathy  Psychiatric/Behavioral: Negative for confusion, dysphoric mood and sleep disturbance  The patient is not nervous/anxious  Objective:      /82 (BP Location: Left arm, Patient Position: Sitting)   Pulse 56   Ht 5' 10" (1 778 m)   Wt 77 7 kg (171 lb 3 2 oz)   LMP  (LMP Unknown)   BMI 24 56 kg/m²        Physical Exam   Constitutional: She is oriented to person, place, and time  She appears well-developed and well-nourished  HENT:   Head: Normocephalic and atraumatic  Nose: Nose normal    Mouth/Throat: Oropharynx is clear and moist    Eyes: Pupils are equal, round, and reactive to light  Conjunctivae and EOM are normal  No scleral icterus  Neck: Normal range of motion  Neck supple  No JVD present  No tracheal deviation present  No thyromegaly present  Cardiovascular: Normal rate and intact distal pulses  Exam reveals no gallop and no friction rub  Murmur heard  Irregularly irregular w/  prosthetic mitral valve   Pulmonary/Chest: Effort normal and breath sounds normal  No respiratory distress  She has no wheezes  She has no rales  Abdominal: Soft  Bowel sounds are normal    Musculoskeletal: She exhibits tenderness  She exhibits no edema  Left trap is elevated w/ tenderness   Lymphadenopathy:     She has no cervical adenopathy  Neurological: She is alert and oriented to person, place, and time  No cranial nerve deficit  Skin: Skin is warm and dry  No rash noted  No erythema  Psychiatric: She has a normal mood and affect  Her behavior is normal  Judgment and thought content normal    Nursing note reviewed

## 2018-10-05 NOTE — PATIENT INSTRUCTIONS
Posterior headache neck pain and shoulder pain likely musculoskeletal in nature  Discussed pathophysiology with patient recommend warm compresses and physical therapy she can take Tylenol as needed  She does have history of narrowing of the posterior vertebral arteries but she has no other neurologic deficits so unlikely this is related  I did discuss this with the patient as well

## 2018-10-16 ENCOUNTER — TELEPHONE (OUTPATIENT)
Dept: INTERNAL MEDICINE CLINIC | Facility: CLINIC | Age: 73
End: 2018-10-16

## 2018-10-16 ENCOUNTER — APPOINTMENT (OUTPATIENT)
Dept: LAB | Facility: CLINIC | Age: 73
End: 2018-10-16
Payer: COMMERCIAL

## 2018-10-16 ENCOUNTER — ANTICOAG VISIT (OUTPATIENT)
Dept: CARDIOLOGY CLINIC | Facility: CLINIC | Age: 73
End: 2018-10-16

## 2018-10-16 DIAGNOSIS — F41.9 ANXIETY: Primary | ICD-10-CM

## 2018-10-16 DIAGNOSIS — Z95.2 HISTORY OF HEART VALVE REPLACEMENT: ICD-10-CM

## 2018-10-18 ENCOUNTER — TELEPHONE (OUTPATIENT)
Dept: INTERNAL MEDICINE CLINIC | Facility: CLINIC | Age: 73
End: 2018-10-18

## 2018-10-18 DIAGNOSIS — F41.9 ANXIETY: ICD-10-CM

## 2018-10-18 RX ORDER — LORAZEPAM 1 MG/1
TABLET ORAL
Qty: 30 TABLET | Refills: 2 | Status: SHIPPED | OUTPATIENT
Start: 2018-10-18 | End: 2020-09-22

## 2018-10-18 RX ORDER — LORAZEPAM 1 MG/1
TABLET ORAL
Qty: 30 TABLET | Refills: 0 | Status: CANCELLED | OUTPATIENT
Start: 2018-10-18

## 2018-10-27 DIAGNOSIS — G62.9 NEUROPATHY: ICD-10-CM

## 2018-10-29 RX ORDER — GABAPENTIN 100 MG/1
100 CAPSULE ORAL DAILY
Qty: 90 CAPSULE | Refills: 0 | Status: SHIPPED | OUTPATIENT
Start: 2018-10-29 | End: 2019-03-25 | Stop reason: SDUPTHER

## 2018-11-12 ENCOUNTER — ANTICOAG VISIT (OUTPATIENT)
Dept: CARDIOLOGY CLINIC | Facility: CLINIC | Age: 73
End: 2018-11-12

## 2018-11-12 ENCOUNTER — APPOINTMENT (OUTPATIENT)
Dept: LAB | Facility: CLINIC | Age: 73
End: 2018-11-12
Payer: COMMERCIAL

## 2018-11-12 DIAGNOSIS — Z95.2 HISTORY OF HEART VALVE REPLACEMENT: ICD-10-CM

## 2018-11-29 ENCOUNTER — ANTICOAG VISIT (OUTPATIENT)
Dept: CARDIOLOGY CLINIC | Facility: CLINIC | Age: 73
End: 2018-11-29

## 2018-11-29 ENCOUNTER — APPOINTMENT (OUTPATIENT)
Dept: LAB | Facility: CLINIC | Age: 73
End: 2018-11-29
Payer: COMMERCIAL

## 2018-11-29 DIAGNOSIS — Z95.2 HISTORY OF HEART VALVE REPLACEMENT: ICD-10-CM

## 2018-12-20 ENCOUNTER — TELEPHONE (OUTPATIENT)
Dept: CARDIOLOGY CLINIC | Facility: CLINIC | Age: 73
End: 2018-12-20

## 2018-12-20 DIAGNOSIS — I48.91 ATRIAL FIBRILLATION, UNSPECIFIED TYPE (HCC): ICD-10-CM

## 2018-12-20 DIAGNOSIS — I48.91 ATRIAL FIBRILLATION, UNSPECIFIED TYPE (HCC): Primary | ICD-10-CM

## 2018-12-20 RX ORDER — WARFARIN SODIUM 3 MG/1
TABLET ORAL
Qty: 90 TABLET | Refills: 3 | Status: SHIPPED | OUTPATIENT
Start: 2018-12-20 | End: 2019-03-07 | Stop reason: SDUPTHER

## 2018-12-20 RX ORDER — WARFARIN SODIUM 1 MG/1
1 TABLET ORAL DAILY
Qty: 90 TABLET | Refills: 3 | Status: SHIPPED | OUTPATIENT
Start: 2018-12-20 | End: 2019-03-07 | Stop reason: SDUPTHER

## 2018-12-20 RX ORDER — WARFARIN SODIUM 3 MG/1
TABLET ORAL
Qty: 90 TABLET | Refills: 3 | Status: SHIPPED | OUTPATIENT
Start: 2018-12-20 | End: 2018-12-20 | Stop reason: SDUPTHER

## 2018-12-20 RX ORDER — WARFARIN SODIUM 1 MG/1
1 TABLET ORAL DAILY
Qty: 90 TABLET | Refills: 3 | Status: SHIPPED | OUTPATIENT
Start: 2018-12-20 | End: 2018-12-20 | Stop reason: SDUPTHER

## 2018-12-20 NOTE — TELEPHONE ENCOUNTER
Pt called and said refills for Coumadin 1mg tabs & 3mg tabs need to be resent to the pharmacy for brand medication only

## 2018-12-20 NOTE — TELEPHONE ENCOUNTER
PT CALLED [facility] NEEDS THE PRESCRIPTION SENT OVER AGAIN STATING ITS FOR BRAND NAME ONLY   PT SAID [facility] DIDN'T RECEIVE ORDERS FOR BRAND NAMES ONLY   PT SAID SHE WILL NOT TAKE GENERIC PRESCRIPTIONS   PLEASE ADVISE , MASHA IN [place]

## 2018-12-27 ENCOUNTER — TELEPHONE (OUTPATIENT)
Dept: CARDIOLOGY CLINIC | Facility: CLINIC | Age: 73
End: 2018-12-27

## 2018-12-27 ENCOUNTER — APPOINTMENT (OUTPATIENT)
Dept: LAB | Facility: CLINIC | Age: 73
End: 2018-12-27
Payer: COMMERCIAL

## 2018-12-27 ENCOUNTER — ANTICOAG VISIT (OUTPATIENT)
Dept: CARDIOLOGY CLINIC | Facility: CLINIC | Age: 73
End: 2018-12-27

## 2018-12-27 DIAGNOSIS — Z95.2 HISTORY OF HEART VALVE REPLACEMENT: ICD-10-CM

## 2018-12-27 DIAGNOSIS — I48.20 CHRONIC ATRIAL FIBRILLATION (HCC): ICD-10-CM

## 2018-12-27 DIAGNOSIS — I48.91 ATRIAL FIBRILLATION, UNSPECIFIED TYPE (HCC): Primary | ICD-10-CM

## 2018-12-27 NOTE — TELEPHONE ENCOUNTER
PT JUST CAME FROM THE LAB TO GET HER PT INR TAKEN & WAS TOLD THAT SHE WILL NEED A NEW STANDING ORDER SCRIPT TO GET DRAWN FOR THE NEXT TIME; PT WOULD LIKE IT MAILED TO HER HOME

## 2018-12-30 DIAGNOSIS — E13.49 OTHER DIABETIC NEUROLOGICAL COMPLICATION ASSOCIATED WITH OTHER SPECIFIED DIABETES MELLITUS (HCC): ICD-10-CM

## 2018-12-31 RX ORDER — GABAPENTIN 300 MG/1
CAPSULE ORAL
Qty: 90 CAPSULE | Refills: 0 | Status: SHIPPED | OUTPATIENT
Start: 2018-12-31 | End: 2019-03-25 | Stop reason: SDUPTHER

## 2019-01-02 NOTE — TELEPHONE ENCOUNTER
S/w Mariya at [address] [place] [address], stated that the original orders were not Brand name but they received the Brand name orders for Coumadin 1mg on 12/26 and the 3mg tabs on 12/29  They are currently being filled

## 2019-01-02 NOTE — TELEPHONE ENCOUNTER
PT STOPPED IN & IS UPSET B/C THE PHARM NEVER GOT THE REQ FOR THE "BRAND NAME ONLY" COUMADIN & WOULD LIKE FOR US TO VERBALLY CALL THE PHARM & REQ PT'S MED B/C SHE NEEDS IT FOR TODAY

## 2019-01-16 ENCOUNTER — HOSPITAL ENCOUNTER (OUTPATIENT)
Dept: MAMMOGRAPHY | Facility: CLINIC | Age: 74
Discharge: HOME | End: 2019-01-16
Payer: COMMERCIAL

## 2019-01-16 DIAGNOSIS — M85.80 OSTEOPENIA, UNSPECIFIED LOCATION: ICD-10-CM

## 2019-01-16 PROCEDURE — 77080 DXA BONE DENSITY AXIAL: CPT

## 2019-01-17 ENCOUNTER — TELEPHONE (OUTPATIENT)
Dept: INTERNAL MEDICINE CLINIC | Facility: CLINIC | Age: 74
End: 2019-01-17

## 2019-01-17 NOTE — TELEPHONE ENCOUNTER
----- Message from HANNY Barnett sent at 1/17/2019  8:56 AM EST -----  No significant change in her bone density we will discuss in detail at her follow-up in March

## 2019-01-23 ENCOUNTER — APPOINTMENT (OUTPATIENT)
Dept: LAB | Facility: CLINIC | Age: 74
End: 2019-01-23
Payer: COMMERCIAL

## 2019-01-23 ENCOUNTER — ANTICOAG VISIT (OUTPATIENT)
Dept: CARDIOLOGY CLINIC | Facility: CLINIC | Age: 74
End: 2019-01-23

## 2019-01-23 DIAGNOSIS — Z95.2 HISTORY OF HEART VALVE REPLACEMENT: ICD-10-CM

## 2019-01-23 LAB
INR PPP: 2.35 (ref 0.86–1.17)
PROTHROMBIN TIME: 25.8 SECONDS (ref 11.8–14.2)

## 2019-01-23 PROCEDURE — 85610 PROTHROMBIN TIME: CPT

## 2019-01-23 PROCEDURE — 36415 COLL VENOUS BLD VENIPUNCTURE: CPT

## 2019-01-31 DIAGNOSIS — R51.9 NONINTRACTABLE HEADACHE, UNSPECIFIED CHRONICITY PATTERN, UNSPECIFIED HEADACHE TYPE: ICD-10-CM

## 2019-01-31 RX ORDER — BUTALBITAL, ACETAMINOPHEN AND CAFFEINE 50; 325; 40 MG/1; MG/1; MG/1
TABLET ORAL
Qty: 30 TABLET | Refills: 3 | Status: SHIPPED | OUTPATIENT
Start: 2019-01-31 | End: 2019-09-19 | Stop reason: SDUPTHER

## 2019-02-04 DIAGNOSIS — I10 HYPERTENSION, ESSENTIAL: ICD-10-CM

## 2019-02-04 RX ORDER — AMLODIPINE BESYLATE 5 MG/1
5 TABLET ORAL EVERY MORNING
Qty: 90 TABLET | Refills: 3 | Status: SHIPPED | OUTPATIENT
Start: 2019-02-04 | End: 2020-04-22

## 2019-02-05 DIAGNOSIS — I48.91 ATRIAL FIBRILLATION, UNSPECIFIED TYPE (HCC): ICD-10-CM

## 2019-02-05 RX ORDER — WARFARIN SODIUM 2 MG/1
TABLET ORAL
Qty: 90 TABLET | Refills: 3 | Status: SHIPPED | OUTPATIENT
Start: 2019-02-05 | End: 2019-03-07 | Stop reason: SDUPTHER

## 2019-02-05 NOTE — TELEPHONE ENCOUNTER
Pt needs refill for Coumadin 2mg & it has to state BRAND ONLY  Please send to [facility]

## 2019-02-19 ENCOUNTER — ANTICOAG VISIT (OUTPATIENT)
Dept: CARDIOLOGY CLINIC | Facility: CLINIC | Age: 74
End: 2019-02-19

## 2019-02-19 ENCOUNTER — APPOINTMENT (OUTPATIENT)
Dept: LAB | Facility: CLINIC | Age: 74
End: 2019-02-19
Payer: COMMERCIAL

## 2019-02-19 DIAGNOSIS — I48.91 ATRIAL FIBRILLATION, UNSPECIFIED TYPE (HCC): ICD-10-CM

## 2019-02-19 DIAGNOSIS — Z95.2 HISTORY OF HEART VALVE REPLACEMENT: ICD-10-CM

## 2019-02-27 ENCOUNTER — APPOINTMENT (OUTPATIENT)
Dept: LAB | Facility: CLINIC | Age: 74
End: 2019-02-27
Payer: COMMERCIAL

## 2019-02-27 DIAGNOSIS — I10 BENIGN HYPERTENSION: ICD-10-CM

## 2019-02-27 DIAGNOSIS — E78.2 MIXED HYPERLIPIDEMIA: ICD-10-CM

## 2019-02-27 LAB
ALBUMIN SERPL BCP-MCNC: 3.6 G/DL (ref 3.5–5)
ALP SERPL-CCNC: 68 U/L (ref 46–116)
ALT SERPL W P-5'-P-CCNC: 21 U/L (ref 12–78)
ANION GAP SERPL CALCULATED.3IONS-SCNC: 3 MMOL/L (ref 4–13)
AST SERPL W P-5'-P-CCNC: 22 U/L (ref 5–45)
BASOPHILS # BLD AUTO: 0.05 THOUSANDS/ΜL (ref 0–0.1)
BASOPHILS NFR BLD AUTO: 1 % (ref 0–1)
BILIRUB SERPL-MCNC: 0.48 MG/DL (ref 0.2–1)
BUN SERPL-MCNC: 19 MG/DL (ref 5–25)
CALCIUM SERPL-MCNC: 8.5 MG/DL (ref 8.3–10.1)
CHLORIDE SERPL-SCNC: 107 MMOL/L (ref 100–108)
CHOLEST SERPL-MCNC: 162 MG/DL (ref 50–200)
CO2 SERPL-SCNC: 29 MMOL/L (ref 21–32)
CREAT SERPL-MCNC: 0.83 MG/DL (ref 0.6–1.3)
EOSINOPHIL # BLD AUTO: 0.24 THOUSAND/ΜL (ref 0–0.61)
EOSINOPHIL NFR BLD AUTO: 5 % (ref 0–6)
ERYTHROCYTE [DISTWIDTH] IN BLOOD BY AUTOMATED COUNT: 12.6 % (ref 11.6–15.1)
GFR SERPL CREATININE-BSD FRML MDRD: 71 ML/MIN/1.73SQ M
GLUCOSE P FAST SERPL-MCNC: 94 MG/DL (ref 65–99)
HCT VFR BLD AUTO: 39.3 % (ref 34.8–46.1)
HDLC SERPL-MCNC: 66 MG/DL (ref 40–60)
HGB BLD-MCNC: 12.5 G/DL (ref 11.5–15.4)
IMM GRANULOCYTES # BLD AUTO: 0.01 THOUSAND/UL (ref 0–0.2)
IMM GRANULOCYTES NFR BLD AUTO: 0 % (ref 0–2)
LDLC SERPL CALC-MCNC: 78 MG/DL (ref 0–100)
LYMPHOCYTES # BLD AUTO: 1.4 THOUSANDS/ΜL (ref 0.6–4.47)
LYMPHOCYTES NFR BLD AUTO: 29 % (ref 14–44)
MCH RBC QN AUTO: 31.5 PG (ref 26.8–34.3)
MCHC RBC AUTO-ENTMCNC: 31.8 G/DL (ref 31.4–37.4)
MCV RBC AUTO: 99 FL (ref 82–98)
MONOCYTES # BLD AUTO: 0.54 THOUSAND/ΜL (ref 0.17–1.22)
MONOCYTES NFR BLD AUTO: 11 % (ref 4–12)
NEUTROPHILS # BLD AUTO: 2.65 THOUSANDS/ΜL (ref 1.85–7.62)
NEUTS SEG NFR BLD AUTO: 54 % (ref 43–75)
NONHDLC SERPL-MCNC: 96 MG/DL
NRBC BLD AUTO-RTO: 0 /100 WBCS
PLATELET # BLD AUTO: 214 THOUSANDS/UL (ref 149–390)
PMV BLD AUTO: 9.6 FL (ref 8.9–12.7)
POTASSIUM SERPL-SCNC: 4.3 MMOL/L (ref 3.5–5.3)
PROT SERPL-MCNC: 7.5 G/DL (ref 6.4–8.2)
RBC # BLD AUTO: 3.97 MILLION/UL (ref 3.81–5.12)
SODIUM SERPL-SCNC: 139 MMOL/L (ref 136–145)
TRIGL SERPL-MCNC: 89 MG/DL
TSH SERPL DL<=0.05 MIU/L-ACNC: 4.33 UIU/ML (ref 0.36–3.74)
WBC # BLD AUTO: 4.89 THOUSAND/UL (ref 4.31–10.16)

## 2019-02-27 PROCEDURE — 85025 COMPLETE CBC W/AUTO DIFF WBC: CPT

## 2019-02-27 PROCEDURE — 36415 COLL VENOUS BLD VENIPUNCTURE: CPT

## 2019-02-27 PROCEDURE — 84443 ASSAY THYROID STIM HORMONE: CPT

## 2019-02-27 PROCEDURE — 80061 LIPID PANEL: CPT

## 2019-02-27 PROCEDURE — 80053 COMPREHEN METABOLIC PANEL: CPT

## 2019-03-07 ENCOUNTER — OFFICE VISIT (OUTPATIENT)
Dept: INTERNAL MEDICINE CLINIC | Facility: CLINIC | Age: 74
End: 2019-03-07
Payer: COMMERCIAL

## 2019-03-07 ENCOUNTER — OFFICE VISIT (OUTPATIENT)
Dept: INTERNAL MEDICINE CLINIC | Facility: CLINIC | Age: 74
End: 2019-03-07

## 2019-03-07 VITALS
SYSTOLIC BLOOD PRESSURE: 122 MMHG | RESPIRATION RATE: 16 BRPM | HEIGHT: 69 IN | HEART RATE: 72 BPM | WEIGHT: 172 LBS | BODY MASS INDEX: 25.48 KG/M2 | DIASTOLIC BLOOD PRESSURE: 82 MMHG

## 2019-03-07 DIAGNOSIS — E53.8 VITAMIN B 12 DEFICIENCY: ICD-10-CM

## 2019-03-07 DIAGNOSIS — I10 BENIGN HYPERTENSION: ICD-10-CM

## 2019-03-07 DIAGNOSIS — G25.0 ESSENTIAL TREMOR: ICD-10-CM

## 2019-03-07 DIAGNOSIS — Z12.39 BREAST CANCER SCREENING: Primary | ICD-10-CM

## 2019-03-07 DIAGNOSIS — I05.9 MITRAL VALVE DISORDER: ICD-10-CM

## 2019-03-07 DIAGNOSIS — Z95.2 HISTORY OF HEART VALVE REPLACEMENT: ICD-10-CM

## 2019-03-07 DIAGNOSIS — G61.9 INFLAMMATORY NEUROPATHY (HCC): ICD-10-CM

## 2019-03-07 DIAGNOSIS — E03.8 SUBCLINICAL HYPOTHYROIDISM: ICD-10-CM

## 2019-03-07 DIAGNOSIS — I48.91 ATRIAL FIBRILLATION, UNSPECIFIED TYPE (HCC): ICD-10-CM

## 2019-03-07 DIAGNOSIS — E78.2 MIXED HYPERLIPIDEMIA: ICD-10-CM

## 2019-03-07 DIAGNOSIS — M81.0 OSTEOPOROSIS, UNSPECIFIED OSTEOPOROSIS TYPE, UNSPECIFIED PATHOLOGICAL FRACTURE PRESENCE: ICD-10-CM

## 2019-03-07 DIAGNOSIS — R21 RASH: ICD-10-CM

## 2019-03-07 DIAGNOSIS — R25.1 TREMOR: ICD-10-CM

## 2019-03-07 DIAGNOSIS — Z79.01 CHRONIC ANTICOAGULATION: ICD-10-CM

## 2019-03-07 DIAGNOSIS — I48.91 ATRIAL FIBRILLATION, UNSPECIFIED TYPE (HCC): Primary | ICD-10-CM

## 2019-03-07 PROCEDURE — G0439 PPPS, SUBSEQ VISIT: HCPCS | Performed by: NURSE PRACTITIONER

## 2019-03-07 PROCEDURE — 99214 OFFICE O/P EST MOD 30 MIN: CPT | Performed by: NURSE PRACTITIONER

## 2019-03-07 PROCEDURE — 1036F TOBACCO NON-USER: CPT | Performed by: NURSE PRACTITIONER

## 2019-03-07 PROCEDURE — 96372 THER/PROPH/DIAG INJ SC/IM: CPT | Performed by: NURSE PRACTITIONER

## 2019-03-07 PROCEDURE — 1170F FXNL STATUS ASSESSED: CPT | Performed by: NURSE PRACTITIONER

## 2019-03-07 PROCEDURE — 1125F AMNT PAIN NOTED PAIN PRSNT: CPT | Performed by: NURSE PRACTITIONER

## 2019-03-07 RX ORDER — CLOBETASOL PROPIONATE 0.5 MG/G
AEROSOL, FOAM TOPICAL 2 TIMES DAILY
Qty: 100 G | Refills: 2 | Status: SHIPPED | OUTPATIENT
Start: 2019-03-07 | End: 2020-11-12 | Stop reason: CLARIF

## 2019-03-07 RX ORDER — WARFARIN SODIUM 3 MG/1
TABLET ORAL
Qty: 90 TABLET | Refills: 3
Start: 2019-03-07 | End: 2019-07-29 | Stop reason: SDUPTHER

## 2019-03-07 RX ORDER — WARFARIN SODIUM 1 MG/1
TABLET ORAL
Qty: 90 TABLET | Refills: 3
Start: 2019-03-07 | End: 2019-07-08 | Stop reason: SDUPTHER

## 2019-03-07 RX ORDER — CYANOCOBALAMIN 1000 UG/ML
1000 INJECTION INTRAMUSCULAR; SUBCUTANEOUS
Status: SHIPPED | OUTPATIENT
Start: 2019-03-07

## 2019-03-07 RX ORDER — WARFARIN SODIUM 2 MG/1
TABLET ORAL
Qty: 90 TABLET | Refills: 3
Start: 2019-03-07 | End: 2019-07-29 | Stop reason: SDUPTHER

## 2019-03-07 RX ADMIN — CYANOCOBALAMIN 1000 MCG: 1000 INJECTION INTRAMUSCULAR; SUBCUTANEOUS at 10:35

## 2019-03-07 NOTE — PROGRESS NOTES
Assessment and Plan:    Problem List Items Addressed This Visit     None        Health Maintenance Due   Topic Date Due    Medicare Annual Wellness Visit (AWV)  1945    BMI: Followup Plan  12/05/1965    HEPATITIS B VACCINES (1 of 3 - Risk 3-dose series) 12/05/1966    MAMMOGRAM  04/17/2019         HPI:  Sandra Flores is a 72 y o  female here for her Subsequent Wellness Visit      Patient Active Problem List   Diagnosis    Atrial fibrillation ([address] )    Hyperlipidemia    History of heart valve replacement    Benign hypertension    Bradycardia    Essential tremor    Chronic anticoagulation    Amaurosis fugax, both eyes    Carotid artery stenosis    Mitral valve disorder    Osteoporosis    Peripheral neuropathy    Tremor     Past Medical History:   Diagnosis Date    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    Atrial fibrillation (HCC)     Hashimoto's thyroiditis     Hyperlipidemia     Hypertension     IBS (irritable bowel syndrome)     Migraine     Mitral valve disorder     Non-toxic uninodular goiter      Past Surgical History:   Procedure Laterality Date    APPENDECTOMY      COLONOSCOPY  08/08/2011    VALVE REPLACEMENT  01/04/2017    mitral valve replacement, 6/5/14     Family History   Problem Relation Age of Onset    Hypertension Mother     Coronary artery disease Father     Migraines Father     Leukemia Brother     Migraines Brother     Hypertension Brother     Coronary artery disease Paternal Grandfather     Leukemia Maternal Aunt     Multiple myeloma Maternal Aunt     Thyroid disease Other      Social History     Tobacco Use   Smoking Status Never Smoker   Smokeless Tobacco Never Used     Social History     Substance and Sexual Activity   Alcohol Use Yes    Frequency: Monthly or less    Drinks per session: 1 or 2    Comment: socially      Social History     Substance and Sexual Activity   Drug Use No       Current Outpatient Medications   Medication Sig Dispense Refill  amLODIPine (NORVASC) 5 mg tablet Take 1 tablet (5 mg total) by mouth every morning 90 tablet 3    aspirin 81 mg chewable tablet Chew daily      butalbital-acetaminophen-caffeine (FIORICET,ESGIC) -40 mg per tablet TAKE 1 TABLET BY MOUTH EVERY 4-6HRS AS NEEDED FOR HEADACHE 30 tablet 3    Calcium Carbonate (CALTRATE 600) 1500 (600 Ca) MG TABS Take by mouth      clobetasol (OLUX) 0 05 % topical foam Apply topically 2 (two) times a day 100 g 2    clobetasol (TEMOVATE) 0 05 % cream Apply topically 2 (two) times a day (Patient not taking: Reported on 3/7/2019) 30 g 0    Flaxseed, Linseed, (FLAXSEED OIL) 1200 MG CAPS Take by mouth      gabapentin (NEURONTIN) 100 mg capsule TAKE 1 CAPSULE (100 MG TOTAL) BY MOUTH DAILY (Patient not taking: Reported on 3/7/2019) 90 capsule 0    gabapentin (NEURONTIN) 300 mg capsule TAKE 1 CAPSULE BY MOUTH EVERY DAY 90 capsule 0    glycopyrrolate (ROBINUL) 2 MG tablet Take by mouth      LORazepam (ATIVAN) 1 mg tablet Take 1 po bid prn 30 tablet 2    losartan (COZAAR) 100 MG tablet Take 1 tablet (100 mg total) by mouth daily 90 tablet 3    propranolol (INDERAL) 20 mg tablet TAKE 2 TABLETS IN AM AND 1 TABLET IN EVENING 270 tablet 1    simvastatin (ZOCOR) 10 mg tablet TAKE 1 TABLET EVERY DAY 90 tablet 3    warfarin (COUMADIN) 1 mg tablet Take 1 daily as directed - BRAND NECESSARY!!!!!!! 90 tablet 3    warfarin (COUMADIN) 2 mg tablet Take 1 tablet daily as directed   Dispense BRAND ONLY 90 tablet 3    warfarin (COUMADIN) 3 mg tablet Take one tab daily as directed by doctor- BRAND NECESSARY 90 tablet 3     Current Facility-Administered Medications   Medication Dose Route Frequency Provider Last Rate Last Dose    cyanocobalamin injection 1,000 mcg  1,000 mcg Intramuscular Q30 Days HANNY Melendez   1,000 mcg at 03/07/19 1035     Allergies   Allergen Reactions    Enablex [Darifenacin] Other (See Comments)     Sweating, HA, urinary hesitancy, flushing of face    Fentanyl Nausea Only and Vomiting    Hyoscyamine Palpitations    Dicyclomine Other (See Comments)     Jittery, disorientation, light HAs    Hydromorphone Other (See Comments)     Per pt does not remember the type or severity level    Midazolam Other (See Comments)     Per pt does not remember the type or severity level    Sulfamethoxazole-Trimethoprim Nausea Only    Venlafaxine Other (See Comments)     Urinary urgency, polyuria    Codeine Polt-Chlorphen Polt Er Headache    Ultracet [Tramadol-Acetaminophen] Nausea Only and Vomiting     Immunization History   Administered Date(s) Administered    INFLUENZA 09/30/2014, 10/26/2015, 11/10/2016    Influenza Split High Dose Preservative Free IM 09/30/2014, 10/26/2015, 11/10/2016, 09/21/2017    Influenza TIV (IM) 10/15/2007, 10/03/2012, 10/28/2013    Influenza, high dose seasonal 0 5 mL 09/26/2018    Pneumococcal Conjugate 13-Valent 02/23/2018    Pneumococcal Polysaccharide PPV23 10/29/2010, 04/13/2015    Tdap 10/26/2015, 06/23/2017    Zoster 08/09/2011       Patient Care Team:  Kenya Flores MD as PCP - General (Internal Medicine)    Medicare Screening Tests and Risk Assessments: Fred Gaytan is here for her Subsequent Wellness visit  Health Risk Assessment:  Patient rates overall health as good  Patient feels that their physical health rating is Same  Eyesight was rated as Same  Hearing was rated as Same  Patient feels that their emotional and mental health rating is Same  Pain experienced by patient in the last 7 days has been Some  Patient's pain rating has been 4/10  Patient states that she has experienced no weight loss or gain in last 6 months  Emotional/Mental Health:  Patient has been feeling nervous/anxious  PHQ-9 Depression Screening:    Frequency of the following problems over the past two weeks:      1  Little interest or pleasure in doing things: 0 - not at all      2   Feeling down, depressed, or hopeless: 0 - not at all  PHQ-2 Score: 0          Broken Bones/Falls: Fall Risk Assessment:    In the past year, patient has experienced: No history of falling in past year          Bladder/Bowel:  Patient has not leaked urine accidently in the last six months  Patient reports no loss of bowel control  Immunizations:  Patient has had a flu vaccination within the last year  Patient has received a pneumonia shot  Patient has received a shingles shot  Patient has received tetanus/diphtheria shot  Date of tetanus/diphtheria shot: 6/23/2017    Home Safety:  Patient does not have trouble with stairs inside or outside of their home  Patient currently reports that there are no safety hazards present in home, working smoke alarms, working carbon monoxide detectors  Preventative Screenings:   Breast cancer screening performed, 4/17/2018  colon cancer screen completed, 10/22/2014  cholesterol screen completed, 2/27/2019  glaucoma eye exam completed,     Nutrition:  Current diet: Low Cholesterol and No Added Salt with servings of the following:    Medications:  Patient is currently taking over-the-counter supplements  List of OTC medications includes: Vitamins  Patient is able to manage medications  Lifestyle Choices:  Patient reports no tobacco use  Patient has not smoked or used tobacco in the past   Patient reports alcohol use  Alcohol use per week: 1  Patient drives a vehicle  Patient wears seat belt  Current level of exercise of physical activity described by patient as: Walking- 4-5 times a week for about 30 minutes  Activities of Daily Living:  Can get out of bed by his or her self, able to dress self, able to make own meals, able to do own shopping, able to bathe self, can do own laundry/housekeeping, can manage own money, pay bills and track expenses    Previous Hospitalizations:  No hospitalization or ED visit in past 12 months        Advanced Directives:  Patient has decided on a power of     Patient has spoken to designated power of   Patient has completed advanced directive  Additional Comments: Adriana Epps  #: [phone] Support: Adriana Epps  #: [phone]    Preventative Screening/Counseling:      Cardiovascular:      General: Screening Current          Diabetes:      General: Screening Current          Colorectal Cancer:      General: Screening Current          Breast Cancer:      General: Screening Current          Cervical Cancer:      General: Screening Not Indicated          Osteoporosis:      General: Screening Current          AAA:      General: Screening Not Indicated          Glaucoma:      General: Screening Current          HIV:      General: Screening Not Indicated          Hepatitis C:      General: Screening Current        Advanced Directives:   Patient has living will for healthcare, Information on ACP and/or AD not provided  No 5 wishes given  End of life assessment reviewed with patient

## 2019-03-07 NOTE — PROGRESS NOTES
Assessment/Plan:    There are no Patient Instructions on file for this visit  Diagnoses and all orders for this visit:    Breast cancer screening  -     Mammo screening bilateral w 3d & cad; Future    Atrial fibrillation, unspecified type ([facility] [address] )    Benign hypertension    Essential tremor    Inflammatory neuropathy (HCC)    Osteoporosis, unspecified osteoporosis type, unspecified pathological fracture presence    Mixed hyperlipidemia    History of heart valve replacement    Tremor    Other orders  -     clobetasol (OLUX) 0 05 % topical foam; Apply topically 2 (two) times a day         Subjective:      Patient ID: Dulce Maria Roque is a 72 y o  female    HPI      Current Outpatient Medications:     amLODIPine (NORVASC) 5 mg tablet, Take 1 tablet (5 mg total) by mouth every morning, Disp: 90 tablet, Rfl: 3    butalbital-acetaminophen-caffeine (FIORICET,ESGIC) -40 mg per tablet, TAKE 1 TABLET BY MOUTH EVERY 4-6HRS AS NEEDED FOR HEADACHE, Disp: 30 tablet, Rfl: 3    Calcium Carbonate (CALTRATE 600) 1500 (600 Ca) MG TABS, Take by mouth, Disp: , Rfl:     Flaxseed, Linseed, (FLAXSEED OIL) 1200 MG CAPS, Take by mouth, Disp: , Rfl:     gabapentin (NEURONTIN) 300 mg capsule, TAKE 1 CAPSULE BY MOUTH EVERY DAY, Disp: 90 capsule, Rfl: 0    glycopyrrolate (ROBINUL) 2 MG tablet, Take by mouth, Disp: , Rfl:     LORazepam (ATIVAN) 1 mg tablet, Take 1 po bid prn, Disp: 30 tablet, Rfl: 2    losartan (COZAAR) 100 MG tablet, Take 1 tablet (100 mg total) by mouth daily, Disp: 90 tablet, Rfl: 3    propranolol (INDERAL) 20 mg tablet, TAKE 2 TABLETS IN AM AND 1 TABLET IN EVENING, Disp: 270 tablet, Rfl: 1    simvastatin (ZOCOR) 10 mg tablet, TAKE 1 TABLET EVERY DAY, Disp: 90 tablet, Rfl: 3    warfarin (COUMADIN) 1 mg tablet, Take 1 tablet (1 mg total) by mouth daily, Disp: 90 tablet, Rfl: 3    warfarin (COUMADIN) 2 mg tablet, Take 1 tablet daily as directed   Dispense BRAND ONLY, Disp: 90 tablet, Rfl: 3    warfarin (COUMADIN) 3 mg tablet, Take one tab daily as directed by doctor, Disp: 90 tablet, Rfl: 3    aspirin 81 mg chewable tablet, Chew daily, Disp: , Rfl:     clobetasol (TEMOVATE) 0 05 % cream, Apply topically 2 (two) times a day (Patient not taking: Reported on 3/7/2019), Disp: 30 g, Rfl: 0    gabapentin (NEURONTIN) 100 mg capsule, TAKE 1 CAPSULE (100 MG TOTAL) BY MOUTH DAILY (Patient not taking: Reported on 3/7/2019), Disp: 90 capsule, Rfl: 0    Recent Results (from the past 1008 hour(s))   Protime-INR    Collection Time: 02/19/19  9:43 AM   Result Value Ref Range    Protime 26 4 (H) 11 8 - 14 2 seconds    INR 2 42 (H) 0 86 - 1 17   CBC and differential    Collection Time: 02/27/19  7:48 AM   Result Value Ref Range    WBC 4 89 4 31 - 10 16 Thousand/uL    RBC 3 97 3 81 - 5 12 Million/uL    Hemoglobin 12 5 11 5 - 15 4 g/dL    Hematocrit 39 3 34 8 - 46 1 %    MCV 99 (H) 82 - 98 fL    MCH 31 5 26 8 - 34 3 pg    MCHC 31 8 31 4 - 37 4 g/dL    RDW 12 6 11 6 - 15 1 %    MPV 9 6 8 9 - 12 7 fL    Platelets 562 713 - 605 Thousands/uL    nRBC 0 /100 WBCs    Neutrophils Relative 54 43 - 75 %    Immat GRANS % 0 0 - 2 %    Lymphocytes Relative 29 14 - 44 %    Monocytes Relative 11 4 - 12 %    Eosinophils Relative 5 0 - 6 %    Basophils Relative 1 0 - 1 %    Neutrophils Absolute 2 65 1 85 - 7 62 Thousands/µL    Immature Grans Absolute 0 01 0 00 - 0 20 Thousand/uL    Lymphocytes Absolute 1 40 0 60 - 4 47 Thousands/µL    Monocytes Absolute 0 54 0 17 - 1 22 Thousand/µL    Eosinophils Absolute 0 24 0 00 - 0 61 Thousand/µL    Basophils Absolute 0 05 0 00 - 0 10 Thousands/µL   Comprehensive metabolic panel    Collection Time: 02/27/19  7:48 AM   Result Value Ref Range    Sodium 139 136 - 145 mmol/L    Potassium 4 3 3 5 - 5 3 mmol/L    Chloride 107 100 - 108 mmol/L    CO2 29 21 - 32 mmol/L    ANION GAP 3 (L) 4 - 13 mmol/L    BUN 19 5 - 25 mg/dL    Creatinine 0 83 0 60 - 1 30 mg/dL    Glucose, Fasting 94 65 - 99 mg/dL    Calcium 8 5 8 3 - 10 1 mg/dL    AST 22 5 - 45 U/L    ALT 21 12 - 78 U/L    Alkaline Phosphatase 68 46 - 116 U/L    Total Protein 7 5 6 4 - 8 2 g/dL    Albumin 3 6 3 5 - 5 0 g/dL    Total Bilirubin 0 48 0 20 - 1 00 mg/dL    eGFR 71 ml/min/1 73sq m   Lipid panel    Collection Time: 02/27/19  7:48 AM   Result Value Ref Range    Cholesterol 162 50 - 200 mg/dL    Triglycerides 89 <=150 mg/dL    HDL, Direct 66 (H) 40 - 60 mg/dL    LDL Calculated 78 0 - 100 mg/dL    Non-HDL-Chol (CHOL-HDL) 96 mg/dl   TSH, 3rd generation    Collection Time: 02/27/19  7:48 AM   Result Value Ref Range    TSH 3RD GENERATON 4 330 (H) 0 358 - 3 740 uIU/mL       The following portions of the patient's history were reviewed and updated as appropriate: allergies, current medications, past family history, past medical history, past social history, past surgical history and problem list      Review of Systems      Objective:      /82 (BP Location: Left arm, Patient Position: Sitting, Cuff Size: Standard)   Pulse 72   Resp 16   Ht 5' 10" (1 778 m)   Wt 78 kg (172 lb)   LMP  (LMP Unknown)   BMI 24 68 kg/m²        Physical Exam

## 2019-03-07 NOTE — PATIENT INSTRUCTIONS
AFib heart rate controlled on current medication anticoagulated with Coumadin for following with Cardiology    Hypertension stable on current medication    Seasonal affective disorder improving as whether slowly improves    One episode of blood in stool taking Coumadin and aspirin she stopped aspirin back in October    I recommend she resume if symptoms return would check stool for occult blood    Health maintenance up-to-date did recommend new shingles vaccine that she gets from the pharmacy    Osteopenia continue calcium vitamin D weight-bearing exercise recheck 2 years    Subclinical hypothyroidism no indication for treatment continue to monitor    Hyperlipidemia LDL at goal on statin continue same    Headache takes Fioricet only as needed    Borderline B12 deficiency give B12 today recommend 1000 mcg daily under the tongue    Follow back 6 months sooner if needed

## 2019-03-12 DIAGNOSIS — I10 ESSENTIAL HYPERTENSION: ICD-10-CM

## 2019-03-12 RX ORDER — LOSARTAN POTASSIUM 100 MG/1
100 TABLET ORAL DAILY
Qty: 90 TABLET | Refills: 3 | Status: SHIPPED | OUTPATIENT
Start: 2019-03-12 | End: 2020-03-11 | Stop reason: SDUPTHER

## 2019-03-20 ENCOUNTER — ANTICOAG VISIT (OUTPATIENT)
Dept: CARDIOLOGY CLINIC | Facility: CLINIC | Age: 74
End: 2019-03-20

## 2019-03-20 ENCOUNTER — APPOINTMENT (OUTPATIENT)
Dept: LAB | Facility: CLINIC | Age: 74
End: 2019-03-20
Payer: COMMERCIAL

## 2019-03-20 ENCOUNTER — HOSPITAL ENCOUNTER (OUTPATIENT)
Dept: NON INVASIVE DIAGNOSTICS | Facility: CLINIC | Age: 74
Discharge: HOME | End: 2019-03-20
Payer: COMMERCIAL

## 2019-03-20 DIAGNOSIS — Z95.2 HISTORY OF HEART VALVE REPLACEMENT: ICD-10-CM

## 2019-03-20 DIAGNOSIS — I05.9 MITRAL VALVE DISORDER: ICD-10-CM

## 2019-03-20 DIAGNOSIS — I48.91 ATRIAL FIBRILLATION, UNSPECIFIED TYPE (HCC): ICD-10-CM

## 2019-03-20 PROCEDURE — 93306 TTE W/DOPPLER COMPLETE: CPT

## 2019-03-21 PROCEDURE — 93306 TTE W/DOPPLER COMPLETE: CPT | Performed by: INTERNAL MEDICINE

## 2019-03-25 DIAGNOSIS — E13.49 OTHER DIABETIC NEUROLOGICAL COMPLICATION ASSOCIATED WITH OTHER SPECIFIED DIABETES MELLITUS (HCC): ICD-10-CM

## 2019-03-25 DIAGNOSIS — G62.9 NEUROPATHY: ICD-10-CM

## 2019-03-26 RX ORDER — GABAPENTIN 100 MG/1
100 CAPSULE ORAL DAILY
Qty: 90 CAPSULE | Refills: 0 | Status: SHIPPED | OUTPATIENT
Start: 2019-03-26 | End: 2019-06-19 | Stop reason: SDUPTHER

## 2019-03-26 RX ORDER — GABAPENTIN 300 MG/1
CAPSULE ORAL
Qty: 90 CAPSULE | Refills: 0 | Status: SHIPPED | OUTPATIENT
Start: 2019-03-26 | End: 2019-06-22 | Stop reason: SDUPTHER

## 2019-03-27 ENCOUNTER — OFFICE VISIT (OUTPATIENT)
Dept: CARDIOLOGY CLINIC | Facility: CLINIC | Age: 74
End: 2019-03-27
Payer: COMMERCIAL

## 2019-03-27 VITALS
OXYGEN SATURATION: 98 % | WEIGHT: 170.4 LBS | BODY MASS INDEX: 25.24 KG/M2 | DIASTOLIC BLOOD PRESSURE: 78 MMHG | HEIGHT: 69 IN | SYSTOLIC BLOOD PRESSURE: 132 MMHG | HEART RATE: 52 BPM

## 2019-03-27 DIAGNOSIS — I05.9 MITRAL VALVE DISORDER: ICD-10-CM

## 2019-03-27 DIAGNOSIS — I48.20 CHRONIC ATRIAL FIBRILLATION (HCC): Primary | ICD-10-CM

## 2019-03-27 DIAGNOSIS — Z79.01 CHRONIC ANTICOAGULATION: ICD-10-CM

## 2019-03-27 DIAGNOSIS — I10 BENIGN HYPERTENSION: ICD-10-CM

## 2019-03-27 PROCEDURE — 99214 OFFICE O/P EST MOD 30 MIN: CPT | Performed by: INTERNAL MEDICINE

## 2019-03-27 NOTE — PROGRESS NOTES
[facility]  [address]  [address]  Cardiology Follow Up    She Corcoran  1945  [id number]      1  Chronic atrial fibrillation ([facility] [address] )     2  Benign hypertension     3  Mitral valve disorder     4  Chronic anticoagulation         Chief Complaint   Patient presents with    Follow-up    Shortness of Breath     With exertion while walking mostly       Interval History:  Patient presents for follow-up visit  Patient does have some shortness of breath with exertion which is stable  No history of chest pain  No history of leg edema more than usual   No history of orthopnea PND  No history of presyncope syncope  She denies any bleeding issues  She states that she has been compliant with all her present medications  Recently had blood work which showed low B12 levels and she was given B12 injection followed by sublingual B12 by her primary care physician      Patient Active Problem List   Diagnosis    Atrial fibrillation ([facility] [address] )    Hyperlipidemia    History of heart valve replacement    Benign hypertension    Bradycardia    Essential tremor    Chronic anticoagulation    Amaurosis fugax, both eyes    Carotid artery stenosis    Mitral valve disorder    Osteoporosis    Peripheral neuropathy    Tremor     Past Medical History:   Diagnosis Date    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    Atrial fibrillation (HCC)     Hashimoto's thyroiditis     Hyperlipidemia     Hypertension     IBS (irritable bowel syndrome)     Migraine     Mitral valve disorder     Non-toxic uninodular goiter      Social History     Socioeconomic History    Marital status: Single     Spouse name: Not on file    Number of children: Not on file    Years of education: Not on file    Highest education level: Not on file   Occupational History    Occupation: Retired   Social Needs    Financial resource strain: Not on file    Food insecurity:     Worry: Not on file Inability: Not on file    Transportation needs:     Medical: Not on file     Non-medical: Not on file   Tobacco Use    Smoking status: Never Smoker    Smokeless tobacco: Never Used   Substance and Sexual Activity    Alcohol use: Yes     Frequency: Monthly or less     Drinks per session: 1 or 2     Comment: socially    Drug use: No    Sexual activity: Never   Lifestyle    Physical activity:     Days per week: 4 days     Minutes per session: 30 min    Stress: Not on file   Relationships    Social connections:     Talks on phone: Not on file     Gets together: Not on file     Attends Sabianism service: Not on file     Active member of club or organization: Not on file     Attends meetings of clubs or organizations: Not on file     Relationship status: Not on file    Intimate partner violence:     Fear of current or ex partner: Not on file     Emotionally abused: Not on file     Physically abused: Not on file     Forced sexual activity: Not on file   Other Topics Concern    Not on file   Social History Narrative    Not on file      Family History   Problem Relation Age of Onset    Hypertension Mother     Coronary artery disease Father     Migraines Father     Leukemia Brother     Migraines Brother     Hypertension Brother     Coronary artery disease Paternal Grandfather     Leukemia Maternal Aunt     Multiple myeloma Maternal Aunt     Thyroid disease Other      Past Surgical History:   Procedure Laterality Date    APPENDECTOMY      COLONOSCOPY  08/08/2011    VALVE REPLACEMENT  01/04/2017    mitral valve replacement, 6/5/14       Current Outpatient Medications:     amLODIPine (NORVASC) 5 mg tablet, Take 1 tablet (5 mg total) by mouth every morning, Disp: 90 tablet, Rfl: 3    aspirin 81 mg chewable tablet, Chew daily, Disp: , Rfl:     butalbital-acetaminophen-caffeine (FIORICET,ESGIC) -40 mg per tablet, TAKE 1 TABLET BY MOUTH EVERY 4-6HRS AS NEEDED FOR HEADACHE, Disp: 30 tablet, Rfl: 3   Calcium Carbonate (CALTRATE 600) 1500 (600 Ca) MG TABS, Take by mouth, Disp: , Rfl:     clobetasol (OLUX) 0 05 % topical foam, Apply topically 2 (two) times a day, Disp: 100 g, Rfl: 2    Flaxseed, Linseed, (FLAXSEED OIL) 1200 MG CAPS, Take by mouth, Disp: , Rfl:     gabapentin (NEURONTIN) 100 mg capsule, TAKE 1 CAPSULE (100 MG TOTAL) BY MOUTH DAILY, Disp: 90 capsule, Rfl: 0    gabapentin (NEURONTIN) 300 mg capsule, TAKE 1 CAPSULE BY MOUTH EVERY DAY, Disp: 90 capsule, Rfl: 0    glycopyrrolate (ROBINUL) 2 MG tablet, Take by mouth, Disp: , Rfl:     LORazepam (ATIVAN) 1 mg tablet, Take 1 po bid prn, Disp: 30 tablet, Rfl: 2    losartan (COZAAR) 100 MG tablet, Take 1 tablet (100 mg total) by mouth daily, Disp: 90 tablet, Rfl: 3    propranolol (INDERAL) 20 mg tablet, TAKE 2 TABLETS IN AM AND 1 TABLET IN EVENING, Disp: 270 tablet, Rfl: 1    simvastatin (ZOCOR) 10 mg tablet, TAKE 1 TABLET EVERY DAY, Disp: 90 tablet, Rfl: 3    warfarin (COUMADIN) 1 mg tablet, Take 1 daily as directed - BRAND NECESSARY!!!!!!!, Disp: 90 tablet, Rfl: 3    warfarin (COUMADIN) 2 mg tablet, Take 1 tablet daily as directed   Dispense BRAND ONLY, Disp: 90 tablet, Rfl: 3    warfarin (COUMADIN) 3 mg tablet, Take one tab daily as directed by doctor- BRAND NECESSARY, Disp: 90 tablet, Rfl: 3    clobetasol (TEMOVATE) 0 05 % cream, Apply topically 2 (two) times a day (Patient not taking: Reported on 3/7/2019), Disp: 30 g, Rfl: 0    Current Facility-Administered Medications:     cyanocobalamin injection 1,000 mcg, 1,000 mcg, Intramuscular, Q30 Days, HANNY Arredondo, 1,000 mcg at 03/07/19 1035  Allergies   Allergen Reactions    Enablex [Darifenacin] Other (See Comments)     Sweating, HA, urinary hesitancy, flushing of face    Fentanyl Nausea Only and Vomiting    Hyoscyamine Palpitations    Dicyclomine Other (See Comments)     Jittery, disorientation, light HAs    Hydromorphone Other (See Comments)     Per pt does not remember the type or severity level    Midazolam Other (See Comments)     Per pt does not remember the type or severity level    Sulfamethoxazole-Trimethoprim Nausea Only    Venlafaxine Other (See Comments)     Urinary urgency, polyuria    Codeine Polt-Chlorphen Polt Er Headache    Ultracet [Tramadol-Acetaminophen] Nausea Only and Vomiting       Labs:   Ancillary Orders on 03/15/2019   Component Date Value    Protime 03/20/2019 31 3*    INR 03/20/2019 3 02*   Appointment on 02/27/2019   Component Date Value    WBC 02/27/2019 4 89     RBC 02/27/2019 3 97     Hemoglobin 02/27/2019 12 5     Hematocrit 02/27/2019 39 3     MCV 02/27/2019 99*    MCH 02/27/2019 31 5     MCHC 02/27/2019 31 8     RDW 02/27/2019 12 6     MPV 02/27/2019 9 6     Platelets 39/96/5708 214     nRBC 02/27/2019 0     Neutrophils Relative 02/27/2019 54     Immat GRANS % 02/27/2019 0     Lymphocytes Relative 02/27/2019 29     Monocytes Relative 02/27/2019 11     Eosinophils Relative 02/27/2019 5     Basophils Relative 02/27/2019 1     Neutrophils Absolute 02/27/2019 2 65     Immature Grans Absolute 02/27/2019 0 01     Lymphocytes Absolute 02/27/2019 1 40     Monocytes Absolute 02/27/2019 0 54     Eosinophils Absolute 02/27/2019 0 24     Basophils Absolute 02/27/2019 0 05     Sodium 02/27/2019 139     Potassium 02/27/2019 4 3     Chloride 02/27/2019 107     CO2 02/27/2019 29     ANION GAP 02/27/2019 3*    BUN 02/27/2019 19     Creatinine 02/27/2019 0 83     Glucose, Fasting 02/27/2019 94     Calcium 02/27/2019 8 5     AST 02/27/2019 22     ALT 02/27/2019 21     Alkaline Phosphatase 02/27/2019 68     Total Protein 02/27/2019 7 5     Albumin 02/27/2019 3 6     Total Bilirubin 02/27/2019 0 48     eGFR 02/27/2019 71     Cholesterol 02/27/2019 162     Triglycerides 02/27/2019 89     HDL, Direct 02/27/2019 66*    LDL Calculated 02/27/2019 78     Non-HDL-Chol (CHOL-HDL) 02/27/2019 96     TSH 3RD GENERATON 02/27/2019 4 330* Imaging: No results found  Review of Systems:  Review of Systems   REVIEW OF SYSTEMS:  Constitutional:  Denies fever or chills   Eyes:  Denies change in visual acuity   HENT:  Denies nasal congestion or sore throat   Respiratory:  Denies cough or shortness of breath   Cardiovascular:  Denies chest pain or edema   GI:  Denies abdominal pain, nausea, vomiting, bloody stools or diarrhea   :  Denies dysuria, frequency, difficulty in micturition and nocturia  Musculoskeletal:  Denies back pain or joint pain   Neurologic:  Denies headache, focal weakness or sensory changes   Endocrine:  Denies polyuria or polydipsia   Lymphatic:  Denies swollen glands   Psychiatric:  Denies depression or anxiety     Physical Exam:    /78   Pulse (!) 52   Ht 5' 9" (1 753 m)   Wt 77 3 kg (170 lb 6 4 oz)   LMP  (LMP Unknown)   SpO2 98%   BMI 25 16 kg/m²     Physical Exam   PHYSICAL EXAM:  General:  Patient is not in acute distress   Head: Normocephalic, Atraumatic  HEENT:  Both pupils normal-size atraumatic, normocephalic, nonicteric  Neck:  JVP not raised  Trachea central  No carotid bruit  Respiratory:  normal breath sounds no crackles  no rhonchi  Cardiovascular:  Irregularly regular with heart sounds consistent with prosthetic mechanical valve  GI:  Abdomen soft nontender  No organomegaly  Lymphatic:  No  lymphadenopathy  Extremities reveal trace to 1+ edema  Neurologic:  Patient is awake alert, oriented   Grossly nonfocal    Discussion/Summary:  Patient with multiple medical problems who seems to be doing reasonably well from cardiac standpoint  Previous studies reviewed with patient  Medications reviewed and possible side effects discussed  concepts of cardiovascular disease , signs and symptoms of heart disease  Dietary and risk factor modification reinforced  All questions answered  Safety measures reviewed  Patient advised to report any problems prompting medical attention    Results of recent echocardiogram which showed normal ejection fraction and normally functioning prosthetic mechanical valve reviewed with the patient  Patient also has history of moderate tricuspid regurgitation  Patient understands the risks and benefits of anticoagulation to prevent thrombotic risk from prosthetic mechanical mitral valve  Target INR 2 5 to 3 5  Antibiotic prophylaxis to continue  Continue rate control for atrial fibrillation  She has a tendency for bradycardia in general   Follow-up with primary care physician  Patient had a few questions which were answered  Follow-up in 6 months or earlier as needed

## 2019-03-31 DIAGNOSIS — G25.0 ESSENTIAL TREMOR: ICD-10-CM

## 2019-04-01 RX ORDER — PROPRANOLOL HYDROCHLORIDE 20 MG/1
TABLET ORAL
Qty: 270 TABLET | Refills: 1 | Status: SHIPPED | OUTPATIENT
Start: 2019-04-01 | End: 2019-09-25 | Stop reason: SDUPTHER

## 2019-04-16 ENCOUNTER — APPOINTMENT (OUTPATIENT)
Dept: LAB | Facility: CLINIC | Age: 74
End: 2019-04-16
Payer: COMMERCIAL

## 2019-04-16 ENCOUNTER — ANTICOAG VISIT (OUTPATIENT)
Dept: CARDIOLOGY CLINIC | Facility: CLINIC | Age: 74
End: 2019-04-16

## 2019-04-16 DIAGNOSIS — I48.91 ATRIAL FIBRILLATION, UNSPECIFIED TYPE (HCC): ICD-10-CM

## 2019-04-16 DIAGNOSIS — Z95.2 HISTORY OF HEART VALVE REPLACEMENT: ICD-10-CM

## 2019-04-25 ENCOUNTER — HOSPITAL ENCOUNTER (OUTPATIENT)
Dept: MAMMOGRAPHY | Facility: CLINIC | Age: 74
Discharge: HOME | End: 2019-04-25
Payer: COMMERCIAL

## 2019-04-25 VITALS — WEIGHT: 170 LBS | BODY MASS INDEX: 25.18 KG/M2 | HEIGHT: 69 IN

## 2019-04-25 DIAGNOSIS — Z12.39 BREAST CANCER SCREENING: ICD-10-CM

## 2019-04-25 PROCEDURE — 77067 SCR MAMMO BI INCL CAD: CPT

## 2019-04-25 PROCEDURE — 77063 BREAST TOMOSYNTHESIS BI: CPT

## 2019-05-01 ENCOUNTER — ANTICOAG VISIT (OUTPATIENT)
Dept: CARDIOLOGY CLINIC | Facility: CLINIC | Age: 74
End: 2019-05-01

## 2019-05-01 ENCOUNTER — APPOINTMENT (OUTPATIENT)
Dept: LAB | Facility: CLINIC | Age: 74
End: 2019-05-01
Payer: COMMERCIAL

## 2019-05-01 DIAGNOSIS — Z95.2 HISTORY OF HEART VALVE REPLACEMENT: ICD-10-CM

## 2019-05-14 ENCOUNTER — ANTICOAG VISIT (OUTPATIENT)
Dept: CARDIOLOGY CLINIC | Facility: CLINIC | Age: 74
End: 2019-05-14

## 2019-05-14 ENCOUNTER — APPOINTMENT (OUTPATIENT)
Dept: LAB | Facility: CLINIC | Age: 74
End: 2019-05-14
Payer: COMMERCIAL

## 2019-05-14 ENCOUNTER — OFFICE VISIT (OUTPATIENT)
Dept: INTERNAL MEDICINE CLINIC | Facility: CLINIC | Age: 74
End: 2019-05-14
Payer: COMMERCIAL

## 2019-05-14 VITALS
DIASTOLIC BLOOD PRESSURE: 80 MMHG | HEIGHT: 69 IN | SYSTOLIC BLOOD PRESSURE: 138 MMHG | WEIGHT: 172.2 LBS | HEART RATE: 68 BPM | BODY MASS INDEX: 25.51 KG/M2 | RESPIRATION RATE: 16 BRPM

## 2019-05-14 DIAGNOSIS — I48.91 ATRIAL FIBRILLATION, UNSPECIFIED TYPE (HCC): ICD-10-CM

## 2019-05-14 DIAGNOSIS — L40.9 PSORIASIS: Primary | ICD-10-CM

## 2019-05-14 DIAGNOSIS — I10 BENIGN HYPERTENSION: ICD-10-CM

## 2019-05-14 DIAGNOSIS — Z95.2 HISTORY OF HEART VALVE REPLACEMENT: ICD-10-CM

## 2019-05-14 PROCEDURE — 3075F SYST BP GE 130 - 139MM HG: CPT | Performed by: NURSE PRACTITIONER

## 2019-05-14 PROCEDURE — 99214 OFFICE O/P EST MOD 30 MIN: CPT | Performed by: NURSE PRACTITIONER

## 2019-05-14 PROCEDURE — 3008F BODY MASS INDEX DOCD: CPT | Performed by: NURSE PRACTITIONER

## 2019-05-14 PROCEDURE — 1160F RVW MEDS BY RX/DR IN RCRD: CPT | Performed by: NURSE PRACTITIONER

## 2019-05-14 PROCEDURE — 96372 THER/PROPH/DIAG INJ SC/IM: CPT | Performed by: NURSE PRACTITIONER

## 2019-05-14 PROCEDURE — 3079F DIAST BP 80-89 MM HG: CPT | Performed by: NURSE PRACTITIONER

## 2019-05-14 PROCEDURE — 1036F TOBACCO NON-USER: CPT | Performed by: NURSE PRACTITIONER

## 2019-05-14 RX ORDER — CEPHALEXIN 500 MG/1
500 CAPSULE ORAL 4 TIMES DAILY
Refills: 0 | COMMUNITY
Start: 2019-05-10 | End: 2019-09-13 | Stop reason: ALTCHOICE

## 2019-05-14 RX ORDER — CLOBETASOL PROPIONATE 0.5 MG/G
OINTMENT TOPICAL 2 TIMES DAILY
Qty: 30 G | Refills: 3 | Status: SHIPPED | OUTPATIENT
Start: 2019-05-14 | End: 2021-04-19 | Stop reason: SDUPTHER

## 2019-05-14 RX ORDER — CLOBETASOL PROPIONATE 0.5 MG/G
CREAM TOPICAL
Qty: 30 G | Refills: 3 | Status: SHIPPED | OUTPATIENT
Start: 2019-05-14 | End: 2021-10-21

## 2019-05-14 RX ADMIN — CYANOCOBALAMIN 1000 MCG: 1000 INJECTION INTRAMUSCULAR; SUBCUTANEOUS at 09:57

## 2019-06-05 DIAGNOSIS — E78.2 COMBINED HYPERLIPIDEMIA: ICD-10-CM

## 2019-06-05 RX ORDER — SIMVASTATIN 10 MG
10 TABLET ORAL DAILY
Qty: 90 TABLET | Refills: 3 | Status: SHIPPED | OUTPATIENT
Start: 2019-06-05 | End: 2020-05-26

## 2019-06-11 ENCOUNTER — ANTICOAG VISIT (OUTPATIENT)
Dept: CARDIOLOGY CLINIC | Facility: CLINIC | Age: 74
End: 2019-06-11

## 2019-06-11 ENCOUNTER — APPOINTMENT (OUTPATIENT)
Dept: LAB | Facility: CLINIC | Age: 74
End: 2019-06-11
Payer: COMMERCIAL

## 2019-06-11 DIAGNOSIS — Z95.2 HISTORY OF HEART VALVE REPLACEMENT: ICD-10-CM

## 2019-06-19 DIAGNOSIS — G62.9 NEUROPATHY: ICD-10-CM

## 2019-06-20 RX ORDER — GABAPENTIN 100 MG/1
100 CAPSULE ORAL DAILY
Qty: 90 CAPSULE | Refills: 0 | Status: SHIPPED | OUTPATIENT
Start: 2019-06-20 | End: 2019-09-13 | Stop reason: SDUPTHER

## 2019-06-22 DIAGNOSIS — E13.49 OTHER DIABETIC NEUROLOGICAL COMPLICATION ASSOCIATED WITH OTHER SPECIFIED DIABETES MELLITUS (HCC): ICD-10-CM

## 2019-06-23 RX ORDER — GABAPENTIN 300 MG/1
CAPSULE ORAL
Qty: 90 CAPSULE | Refills: 0 | Status: SHIPPED | OUTPATIENT
Start: 2019-06-23 | End: 2019-09-19 | Stop reason: SDUPTHER

## 2019-07-08 ENCOUNTER — APPOINTMENT (OUTPATIENT)
Dept: LAB | Facility: CLINIC | Age: 74
End: 2019-07-08
Payer: COMMERCIAL

## 2019-07-08 ENCOUNTER — ANTICOAG VISIT (OUTPATIENT)
Dept: CARDIOLOGY CLINIC | Facility: CLINIC | Age: 74
End: 2019-07-08

## 2019-07-08 DIAGNOSIS — I48.91 ATRIAL FIBRILLATION, UNSPECIFIED TYPE (HCC): ICD-10-CM

## 2019-07-08 DIAGNOSIS — Z95.2 HISTORY OF HEART VALVE REPLACEMENT: ICD-10-CM

## 2019-07-08 RX ORDER — WARFARIN SODIUM 1 MG/1
TABLET ORAL
Qty: 90 TABLET | Refills: 3 | Status: SHIPPED | OUTPATIENT
Start: 2019-07-08 | End: 2020-04-29

## 2019-07-29 DIAGNOSIS — I48.91 ATRIAL FIBRILLATION, UNSPECIFIED TYPE (HCC): ICD-10-CM

## 2019-07-29 RX ORDER — WARFARIN SODIUM 3 MG/1
TABLET ORAL
Qty: 90 TABLET | Refills: 3 | Status: SHIPPED | OUTPATIENT
Start: 2019-07-29 | End: 2020-08-17

## 2019-07-29 RX ORDER — WARFARIN SODIUM 2 MG/1
TABLET ORAL
Qty: 90 TABLET | Refills: 3 | Status: SHIPPED | OUTPATIENT
Start: 2019-07-29 | End: 2019-11-04 | Stop reason: SDUPTHER

## 2019-07-29 NOTE — TELEPHONE ENCOUNTER
PT NEEDS REFILLS ON COUMADIN 2MG AND COUMADIN 3MG (90 DAY SUPPLY ON BOTH AND ALSO BRAND NAME ONLY) SENT TO [facility] ON [address] IN [place] [address]

## 2019-08-05 ENCOUNTER — ANTICOAG VISIT (OUTPATIENT)
Dept: CARDIOLOGY CLINIC | Facility: CLINIC | Age: 74
End: 2019-08-05

## 2019-08-05 ENCOUNTER — APPOINTMENT (OUTPATIENT)
Dept: LAB | Facility: CLINIC | Age: 74
End: 2019-08-05
Payer: COMMERCIAL

## 2019-08-05 DIAGNOSIS — Z79.01 LONG TERM (CURRENT) USE OF ANTICOAGULANTS: ICD-10-CM

## 2019-08-05 DIAGNOSIS — Z95.2 HISTORY OF HEART VALVE REPLACEMENT: ICD-10-CM

## 2019-08-05 DIAGNOSIS — I48.91 ATRIAL FIBRILLATION, UNSPECIFIED TYPE (HCC): Primary | ICD-10-CM

## 2019-09-03 ENCOUNTER — ANTICOAG VISIT (OUTPATIENT)
Dept: CARDIOLOGY CLINIC | Facility: CLINIC | Age: 74
End: 2019-09-03

## 2019-09-03 ENCOUNTER — APPOINTMENT (OUTPATIENT)
Dept: LAB | Facility: CLINIC | Age: 74
End: 2019-09-03
Payer: COMMERCIAL

## 2019-09-03 DIAGNOSIS — Z95.2 HISTORY OF HEART VALVE REPLACEMENT: ICD-10-CM

## 2019-09-03 NOTE — PROGRESS NOTES
Pt was not taking as directed  She was taking 4/2/2 alt  Told her it should be 4/4/2 alt   She verbally understood and will retest again in 2 weeks

## 2019-09-04 ENCOUNTER — TRANSCRIBE ORDERS (OUTPATIENT)
Dept: LAB | Facility: CLINIC | Age: 74
End: 2019-09-04

## 2019-09-06 ENCOUNTER — APPOINTMENT (OUTPATIENT)
Dept: LAB | Facility: CLINIC | Age: 74
End: 2019-09-06
Payer: COMMERCIAL

## 2019-09-06 DIAGNOSIS — I10 BENIGN HYPERTENSION: ICD-10-CM

## 2019-09-06 DIAGNOSIS — E78.2 MIXED HYPERLIPIDEMIA: ICD-10-CM

## 2019-09-06 DIAGNOSIS — E53.8 VITAMIN B 12 DEFICIENCY: ICD-10-CM

## 2019-09-06 DIAGNOSIS — E03.8 SUBCLINICAL HYPOTHYROIDISM: ICD-10-CM

## 2019-09-06 LAB
ALBUMIN SERPL BCP-MCNC: 3.6 G/DL (ref 3.5–5)
ALP SERPL-CCNC: 59 U/L (ref 46–116)
ALT SERPL W P-5'-P-CCNC: 21 U/L (ref 12–78)
ANION GAP SERPL CALCULATED.3IONS-SCNC: 5 MMOL/L (ref 4–13)
AST SERPL W P-5'-P-CCNC: 22 U/L (ref 5–45)
BASOPHILS # BLD AUTO: 0.04 THOUSANDS/ΜL (ref 0–0.1)
BASOPHILS NFR BLD AUTO: 1 % (ref 0–1)
BILIRUB SERPL-MCNC: 0.47 MG/DL (ref 0.2–1)
BUN SERPL-MCNC: 20 MG/DL (ref 5–25)
CALCIUM SERPL-MCNC: 8.6 MG/DL (ref 8.3–10.1)
CHLORIDE SERPL-SCNC: 108 MMOL/L (ref 100–108)
CHOLEST SERPL-MCNC: 146 MG/DL (ref 50–200)
CO2 SERPL-SCNC: 27 MMOL/L (ref 21–32)
CREAT SERPL-MCNC: 0.86 MG/DL (ref 0.6–1.3)
EOSINOPHIL # BLD AUTO: 0.26 THOUSAND/ΜL (ref 0–0.61)
EOSINOPHIL NFR BLD AUTO: 6 % (ref 0–6)
ERYTHROCYTE [DISTWIDTH] IN BLOOD BY AUTOMATED COUNT: 12.7 % (ref 11.6–15.1)
GFR SERPL CREATININE-BSD FRML MDRD: 68 ML/MIN/1.73SQ M
GLUCOSE P FAST SERPL-MCNC: 88 MG/DL (ref 65–99)
HCT VFR BLD AUTO: 37.1 % (ref 34.8–46.1)
HDLC SERPL-MCNC: 60 MG/DL (ref 40–60)
HGB BLD-MCNC: 11.9 G/DL (ref 11.5–15.4)
IMM GRANULOCYTES # BLD AUTO: 0.02 THOUSAND/UL (ref 0–0.2)
IMM GRANULOCYTES NFR BLD AUTO: 1 % (ref 0–2)
LDLC SERPL CALC-MCNC: 70 MG/DL (ref 0–100)
LYMPHOCYTES # BLD AUTO: 1.24 THOUSANDS/ΜL (ref 0.6–4.47)
LYMPHOCYTES NFR BLD AUTO: 28 % (ref 14–44)
MCH RBC QN AUTO: 31.7 PG (ref 26.8–34.3)
MCHC RBC AUTO-ENTMCNC: 32.1 G/DL (ref 31.4–37.4)
MCV RBC AUTO: 99 FL (ref 82–98)
MONOCYTES # BLD AUTO: 0.43 THOUSAND/ΜL (ref 0.17–1.22)
MONOCYTES NFR BLD AUTO: 10 % (ref 4–12)
NEUTROPHILS # BLD AUTO: 2.44 THOUSANDS/ΜL (ref 1.85–7.62)
NEUTS SEG NFR BLD AUTO: 54 % (ref 43–75)
NONHDLC SERPL-MCNC: 86 MG/DL
NRBC BLD AUTO-RTO: 0 /100 WBCS
PLATELET # BLD AUTO: 185 THOUSANDS/UL (ref 149–390)
PMV BLD AUTO: 10.2 FL (ref 8.9–12.7)
POTASSIUM SERPL-SCNC: 4.1 MMOL/L (ref 3.5–5.3)
PROT SERPL-MCNC: 7.4 G/DL (ref 6.4–8.2)
RBC # BLD AUTO: 3.75 MILLION/UL (ref 3.81–5.12)
SODIUM SERPL-SCNC: 140 MMOL/L (ref 136–145)
T4 FREE SERPL-MCNC: 1.15 NG/DL (ref 0.76–1.46)
TRIGL SERPL-MCNC: 80 MG/DL
TSH SERPL DL<=0.05 MIU/L-ACNC: 4.09 UIU/ML (ref 0.36–3.74)
VIT B12 SERPL-MCNC: 1930 PG/ML (ref 100–900)
WBC # BLD AUTO: 4.43 THOUSAND/UL (ref 4.31–10.16)

## 2019-09-06 PROCEDURE — 82607 VITAMIN B-12: CPT

## 2019-09-06 PROCEDURE — 85025 COMPLETE CBC W/AUTO DIFF WBC: CPT

## 2019-09-06 PROCEDURE — 36415 COLL VENOUS BLD VENIPUNCTURE: CPT

## 2019-09-06 PROCEDURE — 80061 LIPID PANEL: CPT

## 2019-09-06 PROCEDURE — 84439 ASSAY OF FREE THYROXINE: CPT

## 2019-09-06 PROCEDURE — 80053 COMPREHEN METABOLIC PANEL: CPT

## 2019-09-06 PROCEDURE — 84443 ASSAY THYROID STIM HORMONE: CPT

## 2019-09-13 ENCOUNTER — OFFICE VISIT (OUTPATIENT)
Dept: INTERNAL MEDICINE CLINIC | Facility: CLINIC | Age: 74
End: 2019-09-13
Payer: COMMERCIAL

## 2019-09-13 ENCOUNTER — APPOINTMENT (OUTPATIENT)
Dept: LAB | Facility: CLINIC | Age: 74
End: 2019-09-13
Payer: COMMERCIAL

## 2019-09-13 ENCOUNTER — ANTICOAG VISIT (OUTPATIENT)
Dept: CARDIOLOGY CLINIC | Facility: CLINIC | Age: 74
End: 2019-09-13

## 2019-09-13 VITALS
WEIGHT: 172 LBS | SYSTOLIC BLOOD PRESSURE: 126 MMHG | RESPIRATION RATE: 14 BRPM | DIASTOLIC BLOOD PRESSURE: 84 MMHG | HEART RATE: 58 BPM | HEIGHT: 69 IN | BODY MASS INDEX: 25.48 KG/M2

## 2019-09-13 DIAGNOSIS — G25.0 ESSENTIAL TREMOR: ICD-10-CM

## 2019-09-13 DIAGNOSIS — Z12.39 SCREENING FOR BREAST CANCER: ICD-10-CM

## 2019-09-13 DIAGNOSIS — I48.19 PERSISTENT ATRIAL FIBRILLATION (HCC): ICD-10-CM

## 2019-09-13 DIAGNOSIS — E78.2 MIXED HYPERLIPIDEMIA: ICD-10-CM

## 2019-09-13 DIAGNOSIS — R05.9 COUGH: ICD-10-CM

## 2019-09-13 DIAGNOSIS — I10 BENIGN HYPERTENSION: ICD-10-CM

## 2019-09-13 DIAGNOSIS — E03.8 SUBCLINICAL HYPOTHYROIDISM: ICD-10-CM

## 2019-09-13 DIAGNOSIS — I05.9 MITRAL VALVE DISORDER: ICD-10-CM

## 2019-09-13 DIAGNOSIS — M81.0 OSTEOPOROSIS, UNSPECIFIED OSTEOPOROSIS TYPE, UNSPECIFIED PATHOLOGICAL FRACTURE PRESENCE: ICD-10-CM

## 2019-09-13 DIAGNOSIS — Z23 ENCOUNTER FOR IMMUNIZATION: Primary | ICD-10-CM

## 2019-09-13 DIAGNOSIS — E53.8 VITAMIN B 12 DEFICIENCY: ICD-10-CM

## 2019-09-13 DIAGNOSIS — G62.9 NEUROPATHY: ICD-10-CM

## 2019-09-13 DIAGNOSIS — Z95.2 HISTORY OF HEART VALVE REPLACEMENT: ICD-10-CM

## 2019-09-13 PROCEDURE — 3079F DIAST BP 80-89 MM HG: CPT | Performed by: NURSE PRACTITIONER

## 2019-09-13 PROCEDURE — 99214 OFFICE O/P EST MOD 30 MIN: CPT | Performed by: NURSE PRACTITIONER

## 2019-09-13 PROCEDURE — 3008F BODY MASS INDEX DOCD: CPT | Performed by: NURSE PRACTITIONER

## 2019-09-13 PROCEDURE — 90662 IIV NO PRSV INCREASED AG IM: CPT | Performed by: NURSE PRACTITIONER

## 2019-09-13 PROCEDURE — 3074F SYST BP LT 130 MM HG: CPT | Performed by: NURSE PRACTITIONER

## 2019-09-13 PROCEDURE — G0008 ADMIN INFLUENZA VIRUS VAC: HCPCS | Performed by: NURSE PRACTITIONER

## 2019-09-13 RX ORDER — GABAPENTIN 100 MG/1
100 CAPSULE ORAL DAILY
Qty: 90 CAPSULE | Refills: 0 | Status: SHIPPED | OUTPATIENT
Start: 2019-09-13 | End: 2019-12-13 | Stop reason: SDUPTHER

## 2019-09-13 NOTE — PROGRESS NOTES
INR 2 85    INR goal:  2 5-3 5     Warfarin maintenance plan:  4 mg, then 4 mg, then 2 mg repeating every 3 days     Indications   History of heart valve replacement     Spoke with patient will continue same dose and recheck in 2 weeks

## 2019-09-13 NOTE — PATIENT INSTRUCTIONS
Hypertension stable on current medications    Valve replacement following w/ cardiology on coumadin    SAD- monitor sx throught the winter months    Neuropathy- continue to monitor continue neurontin    Headaches stable w/ prn fioricet    Psoriasis- following with derm    Cough likely related to allergies but sx ongoing and not better w/ allergy medications chest cxr

## 2019-09-13 NOTE — PROGRESS NOTES
Assessment/Plan:    Patient Instructions   Hypertension stable on current medications    Valve replacement following w/ cardiology on coumadin    SAD- monitor sx throught the winter months    Neuropathy- continue to monitor continue neurontin    Headaches stable w/ prn fioricet    Psoriasis- following with derm    Cough likely related to allergies but sx ongoing and not better w/ allergy medications chest cxr         Diagnoses and all orders for this visit:    Encounter for immunization  -     FLUZONE HIGH-DOSE: influenza vaccine, high-dose, preservative-free 0 5 mL    Persistent atrial fibrillation (HCC)    Benign hypertension  -     CBC and differential; Future  -     Comprehensive metabolic panel; Future    Mitral valve disorder    Essential tremor    Osteoporosis, unspecified osteoporosis type, unspecified pathological fracture presence    Mixed hyperlipidemia  -     Lipid panel; Future    History of heart valve replacement    Cough  -     XR chest pa & lateral; Future    Subclinical hypothyroidism  -     TSH, 3rd generation with Free T4 reflex;  Future    Screening for breast cancer  -     Mammo screening bilateral w 3d & cad; Future    Vitamin B 12 deficiency  -     Vitamin B12; Future         Subjective:      Patient ID: Rafaela Martínez is a 72 y o  female    Active no formal exercise  Was doing physical therapy for her neck it felt well while she was doing the therapy then over the last several months symptoms are starting to come back of achiness and strain  No home blood pressures but tolerating medicine    Periodic headaches takes Fioricet  Gets depressed only during the winter months  Feeling tired taking b 12 under the tongue  Tolerating cholesterol medicine  Following with Cardiology for her Coumadin        Current Outpatient Medications:     amLODIPine (NORVASC) 5 mg tablet, Take 1 tablet (5 mg total) by mouth every morning, Disp: 90 tablet, Rfl: 3    aspirin 81 mg chewable tablet, Chew daily, Disp: , Rfl:     butalbital-acetaminophen-caffeine (FIORICET,ESGIC) -40 mg per tablet, TAKE 1 TABLET BY MOUTH EVERY 4-6HRS AS NEEDED FOR HEADACHE, Disp: 30 tablet, Rfl: 3    Calcium Carbonate (CALTRATE 600) 1500 (600 Ca) MG TABS, Take by mouth, Disp: , Rfl:     clobetasol (OLUX) 0 05 % topical foam, Apply topically 2 (two) times a day (Patient taking differently: Apply topically as needed ), Disp: 100 g, Rfl: 2    clobetasol (TEMOVATE) 0 05 % cream, Apply to elbows bid (Patient taking differently: as needed Apply to elbows bid), Disp: 30 g, Rfl: 3    clobetasol (TEMOVATE) 0 05 % ointment, Apply topically 2 (two) times a day Apply twice a day to foot x 7-10 (Patient taking differently: Apply topically as needed Apply twice a day to foot x 7-10), Disp: 30 g, Rfl: 3    Flaxseed, Linseed, (FLAXSEED OIL) 1200 MG CAPS, Take by mouth, Disp: , Rfl:     gabapentin (NEURONTIN) 100 mg capsule, TAKE 1 CAPSULE (100 MG TOTAL) BY MOUTH DAILY, Disp: 90 capsule, Rfl: 0    gabapentin (NEURONTIN) 300 mg capsule, TAKE 1 CAPSULE BY MOUTH EVERY DAY, Disp: 90 capsule, Rfl: 0    glycopyrrolate (ROBINUL) 2 MG tablet, Take by mouth as needed , Disp: , Rfl:     LORazepam (ATIVAN) 1 mg tablet, Take 1 po bid prn (Patient taking differently: as needed Take 1 po bid prn), Disp: 30 tablet, Rfl: 2    losartan (COZAAR) 100 MG tablet, Take 1 tablet (100 mg total) by mouth daily, Disp: 90 tablet, Rfl: 3    Misc Natural Products (OSTEO BI-FLEX TRIPLE STRENGTH) TABS, Take by mouth, Disp: , Rfl:     propranolol (INDERAL) 20 mg tablet, TAKE 2 TABLETS IN AM AND 1 TABLET IN EVENING, Disp: 270 tablet, Rfl: 1    simvastatin (ZOCOR) 10 mg tablet, Take 1 tablet (10 mg total) by mouth daily, Disp: 90 tablet, Rfl: 3    warfarin (COUMADIN) 1 mg tablet, Take 1 daily as directed - BRAND NECESSARY!!!!!!!, Disp: 90 tablet, Rfl: 3    warfarin (COUMADIN) 2 mg tablet, Take 1 tablet daily as directed   Dispense BRAND ONLY, Disp: 90 tablet, Rfl: 3   warfarin (COUMADIN) 3 mg tablet, Take one tab daily as directed by doctor- BRAND NECESSARY, Disp: 90 tablet, Rfl: 3    Current Facility-Administered Medications:     cyanocobalamin injection 1,000 mcg, 1,000 mcg, Intramuscular, Q30 Days, Cayden Malagon HANNY, 1,000 mcg at 05/14/19 0957    Recent Results (from the past 1008 hour(s))   Protime-INR    Collection Time: 08/05/19  9:37 AM   Result Value Ref Range    Protime 23 0 (H) 11 6 - 14 5 seconds    INR 2 09 (H) 0 84 - 1 19   Protime-INR    Collection Time: 09/03/19  9:09 AM   Result Value Ref Range    Protime 21 2 (H) 11 6 - 14 5 seconds    INR 1 89 (H) 0 84 - 1 19   CBC and differential    Collection Time: 09/06/19  8:19 AM   Result Value Ref Range    WBC 4 43 4 31 - 10 16 Thousand/uL    RBC 3 75 (L) 3 81 - 5 12 Million/uL    Hemoglobin 11 9 11 5 - 15 4 g/dL    Hematocrit 37 1 34 8 - 46 1 %    MCV 99 (H) 82 - 98 fL    MCH 31 7 26 8 - 34 3 pg    MCHC 32 1 31 4 - 37 4 g/dL    RDW 12 7 11 6 - 15 1 %    MPV 10 2 8 9 - 12 7 fL    Platelets 097 100 - 718 Thousands/uL    nRBC 0 /100 WBCs    Neutrophils Relative 54 43 - 75 %    Immat GRANS % 1 0 - 2 %    Lymphocytes Relative 28 14 - 44 %    Monocytes Relative 10 4 - 12 %    Eosinophils Relative 6 0 - 6 %    Basophils Relative 1 0 - 1 %    Neutrophils Absolute 2 44 1 85 - 7 62 Thousands/µL    Immature Grans Absolute 0 02 0 00 - 0 20 Thousand/uL    Lymphocytes Absolute 1 24 0 60 - 4 47 Thousands/µL    Monocytes Absolute 0 43 0 17 - 1 22 Thousand/µL    Eosinophils Absolute 0 26 0 00 - 0 61 Thousand/µL    Basophils Absolute 0 04 0 00 - 0 10 Thousands/µL   Comprehensive metabolic panel    Collection Time: 09/06/19  8:19 AM   Result Value Ref Range    Sodium 140 136 - 145 mmol/L    Potassium 4 1 3 5 - 5 3 mmol/L    Chloride 108 100 - 108 mmol/L    CO2 27 21 - 32 mmol/L    ANION GAP 5 4 - 13 mmol/L    BUN 20 5 - 25 mg/dL    Creatinine 0 86 0 60 - 1 30 mg/dL    Glucose, Fasting 88 65 - 99 mg/dL    Calcium 8 6 8 3 - 10 1 mg/dL AST 22 5 - 45 U/L    ALT 21 12 - 78 U/L    Alkaline Phosphatase 59 46 - 116 U/L    Total Protein 7 4 6 4 - 8 2 g/dL    Albumin 3 6 3 5 - 5 0 g/dL    Total Bilirubin 0 47 0 20 - 1 00 mg/dL    eGFR 68 ml/min/1 73sq m   Lipid panel    Collection Time: 09/06/19  8:19 AM   Result Value Ref Range    Cholesterol 146 50 - 200 mg/dL    Triglycerides 80 <=150 mg/dL    HDL, Direct 60 40 - 60 mg/dL    LDL Calculated 70 0 - 100 mg/dL    Non-HDL-Chol (CHOL-HDL) 86 mg/dl   TSH, 3rd generation with Free T4 reflex    Collection Time: 09/06/19  8:19 AM   Result Value Ref Range    TSH 3RD GENERATON 4 090 (H) 0 358 - 3 740 uIU/mL   Vitamin B12    Collection Time: 09/06/19  8:19 AM   Result Value Ref Range    Vitamin B-12 1,930 (H) 100 - 900 pg/mL   T4, free    Collection Time: 09/06/19  8:19 AM   Result Value Ref Range    Free T4 1 15 0 76 - 1 46 ng/dL       The following portions of the patient's history were reviewed and updated as appropriate: allergies, current medications, past family history, past medical history, past social history, past surgical history and problem list      Review of Systems   Constitutional: Negative for appetite change, chills, diaphoresis, fatigue, fever and unexpected weight change  HENT: Negative for postnasal drip and sneezing  Eyes: Negative for visual disturbance  Respiratory: Negative for chest tightness and shortness of breath  Cardiovascular: Negative for chest pain, palpitations and leg swelling  Gastrointestinal: Negative for abdominal pain and blood in stool  Endocrine: Negative for cold intolerance, heat intolerance, polydipsia, polyphagia and polyuria  Genitourinary: Negative for difficulty urinating, dysuria, frequency and urgency  Musculoskeletal: Negative for arthralgias and myalgias  Skin: Negative for rash and wound  Neurological: Negative for dizziness, weakness, light-headedness and headaches  Hematological: Negative for adenopathy     Psychiatric/Behavioral: Negative for confusion, dysphoric mood and sleep disturbance  The patient is not nervous/anxious  Objective:      /84 (BP Location: Left arm, Patient Position: Sitting, Cuff Size: Standard)   Pulse 58   Resp 14   Ht 5' 9" (1 753 m)   Wt 78 kg (172 lb)   LMP  (LMP Unknown)   BMI 25 40 kg/m²        Physical Exam   Constitutional: She is oriented to person, place, and time  She appears well-developed and well-nourished  HENT:   Head: Normocephalic and atraumatic  Nose: Nose normal    Mouth/Throat: Oropharynx is clear and moist    Eyes: Pupils are equal, round, and reactive to light  Conjunctivae and EOM are normal  No scleral icterus  Neck: Normal range of motion  Neck supple  No JVD present  No tracheal deviation present  No thyromegaly present  Cardiovascular: Normal rate and intact distal pulses  Exam reveals no gallop and no friction rub  Murmur heard  Irregularly irregular w/  prosthetic mitral valve   Pulmonary/Chest: Effort normal and breath sounds normal  No respiratory distress  She has no wheezes  She has no rales  Abdominal: Soft  Bowel sounds are normal    Musculoskeletal: She exhibits no edema or tenderness  Lymphadenopathy:     She has no cervical adenopathy  Neurological: She is alert and oriented to person, place, and time  No cranial nerve deficit  Skin: Skin is warm and dry  No rash noted  No erythema  Psychiatric: She has a normal mood and affect  Her behavior is normal  Judgment and thought content normal    Nursing note reviewed  BMI Counseling: Body mass index is 25 4 kg/m²  The BMI is above normal  Nutrition recommendations include encouraging healthy choices of fruits and vegetables, moderation in carbohydrate intake and increasing intake of lean protein  No pharmacotherapy was ordered

## 2019-09-16 ENCOUNTER — HOSPITAL ENCOUNTER (OUTPATIENT)
Dept: RADIOLOGY | Facility: HOSPITAL | Age: 74
Discharge: HOME | End: 2019-09-16
Payer: COMMERCIAL

## 2019-09-16 DIAGNOSIS — R05.9 COUGH: ICD-10-CM

## 2019-09-16 PROCEDURE — 71046 X-RAY EXAM CHEST 2 VIEWS: CPT

## 2019-09-19 DIAGNOSIS — R51.9 NONINTRACTABLE HEADACHE, UNSPECIFIED CHRONICITY PATTERN, UNSPECIFIED HEADACHE TYPE: ICD-10-CM

## 2019-09-19 DIAGNOSIS — E13.49 OTHER DIABETIC NEUROLOGICAL COMPLICATION ASSOCIATED WITH OTHER SPECIFIED DIABETES MELLITUS (HCC): ICD-10-CM

## 2019-09-19 RX ORDER — BUTALBITAL, ACETAMINOPHEN AND CAFFEINE 50; 325; 40 MG/1; MG/1; MG/1
TABLET ORAL
Qty: 30 TABLET | Refills: 2 | Status: SHIPPED | OUTPATIENT
Start: 2019-09-19 | End: 2020-07-20

## 2019-09-19 RX ORDER — GABAPENTIN 300 MG/1
CAPSULE ORAL
Qty: 90 CAPSULE | Refills: 0 | Status: SHIPPED | OUTPATIENT
Start: 2019-09-19 | End: 2019-12-14 | Stop reason: SDUPTHER

## 2019-09-20 ENCOUNTER — TELEPHONE (OUTPATIENT)
Dept: INTERNAL MEDICINE CLINIC | Facility: CLINIC | Age: 74
End: 2019-09-20

## 2019-09-23 ENCOUNTER — TELEPHONE (OUTPATIENT)
Dept: INTERNAL MEDICINE CLINIC | Facility: CLINIC | Age: 74
End: 2019-09-23

## 2019-09-23 NOTE — TELEPHONE ENCOUNTER
Spoke with patient she said she needs a prior auth through    [facility]  P: [phone]    For: Butalbital-acetaminophen-caffeine

## 2019-09-23 NOTE — TELEPHONE ENCOUNTER
PATIENT CALLED AND SAID THE INSURANCE NEEDS MORE INFORMATION ON THE FIORICET MEDICATION THAT THE DR ORDERED  PLEASE CALL PATIENT AND SHE CAN EXPLAIN FURTHER  HER # [phone]

## 2019-09-25 DIAGNOSIS — G25.0 ESSENTIAL TREMOR: ICD-10-CM

## 2019-09-25 RX ORDER — PROPRANOLOL HYDROCHLORIDE 20 MG/1
TABLET ORAL
Qty: 270 TABLET | Refills: 1 | Status: SHIPPED | OUTPATIENT
Start: 2019-09-25 | End: 2020-04-02

## 2019-09-26 ENCOUNTER — TELEPHONE (OUTPATIENT)
Dept: INTERNAL MEDICINE CLINIC | Facility: CLINIC | Age: 74
End: 2019-09-26

## 2019-10-01 DIAGNOSIS — R51.9 NONINTRACTABLE HEADACHE, UNSPECIFIED CHRONICITY PATTERN, UNSPECIFIED HEADACHE TYPE: ICD-10-CM

## 2019-10-01 RX ORDER — BUTALBITAL, ACETAMINOPHEN AND CAFFEINE 50; 325; 40 MG/1; MG/1; MG/1
TABLET ORAL
Qty: 30 TABLET | Refills: 2 | Status: SHIPPED | OUTPATIENT
Start: 2019-10-01 | End: 2019-11-26

## 2019-10-10 ENCOUNTER — ANTICOAG VISIT (OUTPATIENT)
Dept: CARDIOLOGY CLINIC | Facility: CLINIC | Age: 74
End: 2019-10-10

## 2019-10-10 ENCOUNTER — APPOINTMENT (OUTPATIENT)
Dept: LAB | Facility: CLINIC | Age: 74
End: 2019-10-10
Payer: COMMERCIAL

## 2019-10-10 DIAGNOSIS — Z95.2 HISTORY OF HEART VALVE REPLACEMENT: ICD-10-CM

## 2019-11-04 DIAGNOSIS — I48.91 ATRIAL FIBRILLATION, UNSPECIFIED TYPE (HCC): ICD-10-CM

## 2019-11-04 RX ORDER — WARFARIN SODIUM 2 MG/1
TABLET ORAL
Qty: 90 TABLET | Refills: 3 | Status: SHIPPED | OUTPATIENT
Start: 2019-11-04 | End: 2020-08-17

## 2019-11-07 ENCOUNTER — APPOINTMENT (OUTPATIENT)
Dept: LAB | Facility: CLINIC | Age: 74
End: 2019-11-07
Payer: COMMERCIAL

## 2019-11-07 ENCOUNTER — ANTICOAG VISIT (OUTPATIENT)
Dept: CARDIOLOGY CLINIC | Facility: CLINIC | Age: 74
End: 2019-11-07

## 2019-11-07 DIAGNOSIS — Z95.2 HISTORY OF HEART VALVE REPLACEMENT: ICD-10-CM

## 2019-11-26 ENCOUNTER — OFFICE VISIT (OUTPATIENT)
Dept: CARDIOLOGY CLINIC | Facility: CLINIC | Age: 74
End: 2019-11-26
Payer: COMMERCIAL

## 2019-11-26 VITALS
DIASTOLIC BLOOD PRESSURE: 74 MMHG | WEIGHT: 172 LBS | BODY MASS INDEX: 25.48 KG/M2 | HEART RATE: 57 BPM | OXYGEN SATURATION: 98 % | HEIGHT: 69 IN | SYSTOLIC BLOOD PRESSURE: 126 MMHG

## 2019-11-26 DIAGNOSIS — I48.19 PERSISTENT ATRIAL FIBRILLATION (HCC): Primary | ICD-10-CM

## 2019-11-26 DIAGNOSIS — I05.9 MITRAL VALVE DISORDER: ICD-10-CM

## 2019-11-26 DIAGNOSIS — Z79.01 LONG TERM (CURRENT) USE OF ANTICOAGULANTS: ICD-10-CM

## 2019-11-26 DIAGNOSIS — I10 ESSENTIAL HYPERTENSION: ICD-10-CM

## 2019-11-26 PROCEDURE — 99214 OFFICE O/P EST MOD 30 MIN: CPT | Performed by: INTERNAL MEDICINE

## 2019-11-26 NOTE — PROGRESS NOTES
[facility] [place]  [address] [facility] [id number]  Cardiology Follow Up    Efren Quiroz  1945  [id number]      1  Persistent atrial fibrillation     2  Long term (current) use of anticoagulants     3  Essential hypertension     4  Mitral valve disorder         Chief Complaint   Patient presents with    Follow-up    Atrial Fibrillation       Interval History:  Patient presents for follow-up visit  Patient denies any history of chest pain  No shortness of breath out of the ordinary  No history of leg edema orthopnea PND  Patient feels tired at times  Patient does have a tendency for bradycardia  Patient is on propranolol for history of tremors  Patient also has history of atrial fibrillation mechanical mitral valve replacement on anticoagulation  She states that she has been compliant with all her present medications  No bleeding issues      Patient Active Problem List   Diagnosis    Atrial fibrillation ([address] )    Hyperlipidemia    History of heart valve replacement    Benign hypertension    Bradycardia    Essential tremor    Chronic anticoagulation    Amaurosis fugax, both eyes    Carotid artery stenosis    Mitral valve disorder    Osteoporosis    Peripheral neuropathy    Tremor     Past Medical History:   Diagnosis Date    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    Atrial fibrillation (HCC)     Hashimoto's thyroiditis     Hyperlipidemia     Hypertension     IBS (irritable bowel syndrome)     Migraine     Mitral valve disorder     Non-toxic uninodular goiter      Social History     Socioeconomic History    Marital status: Single     Spouse name: Not on file    Number of children: Not on file    Years of education: Not on file    Highest education level: Not on file   Occupational History    Occupation: Retired   Social Needs    Financial resource strain: Not on file    Food insecurity:     Worry: Not on file     Inability: Not on file  Transportation needs:     Medical: Not on file     Non-medical: Not on file   Tobacco Use    Smoking status: Never Smoker    Smokeless tobacco: Never Used   Substance and Sexual Activity    Alcohol use: Yes     Frequency: Monthly or less     Drinks per session: 1 or 2     Binge frequency: Never     Comment: rarely    Drug use: No    Sexual activity: Never   Lifestyle    Physical activity:     Days per week: 4 days     Minutes per session: 30 min    Stress: Not at all   Relationships    Social connections:     Talks on phone: Not on file     Gets together: Not on file     Attends [employer] service: Not on file     Active member of club or organization: Not on file     Attends meetings of clubs or organizations: Not on file     Relationship status: Not on file    Intimate partner violence:     Fear of current or ex partner: Not on file     Emotionally abused: Not on file     Physically abused: Not on file     Forced sexual activity: Not on file   Other Topics Concern    Not on file   Social History Narrative    Not on file      Family History   Problem Relation Age of Onset    Hypertension Mother     Coronary artery disease Father     Migraines Father     Leukemia Brother     Migraines Brother     Hypertension Brother     Coronary artery disease Paternal Grandfather     Leukemia Maternal Aunt     Multiple myeloma Maternal Aunt     Thyroid disease Other      Past Surgical History:   Procedure Laterality Date    APPENDECTOMY      BREAST BIOPSY      BREAST CYST EXCISION Right 01/01/1977    COLONOSCOPY  08/08/2011    VALVE REPLACEMENT  01/04/2017    mitral valve replacement, 6/5/14       Current Outpatient Medications:     amLODIPine (NORVASC) 5 mg tablet, Take 1 tablet (5 mg total) by mouth every morning, Disp: 90 tablet, Rfl: 3    aspirin 81 mg chewable tablet, Chew daily, Disp: , Rfl:     butalbital-acetaminophen-caffeine (FIORICET,ESGIC) -40 mg per tablet, TAKE 1 TABLET BY MOUTH EVERY 4-6 HOURS AS NEEDED FOR HEADACHE, Disp: 30 tablet, Rfl: 2    Calcium Carbonate (CALTRATE 600) 1500 (600 Ca) MG TABS, Take by mouth, Disp: , Rfl:     clobetasol (OLUX) 0 05 % topical foam, Apply topically 2 (two) times a day (Patient taking differently: Apply topically as needed ), Disp: 100 g, Rfl: 2    clobetasol (TEMOVATE) 0 05 % cream, Apply to elbows bid (Patient taking differently: as needed Apply to elbows bid), Disp: 30 g, Rfl: 3    clobetasol (TEMOVATE) 0 05 % ointment, Apply topically 2 (two) times a day Apply twice a day to foot x 7-10 (Patient taking differently: Apply topically as needed Apply twice a day to foot x 7-10), Disp: 30 g, Rfl: 3    Flaxseed, Linseed, (FLAXSEED OIL) 1200 MG CAPS, Take by mouth, Disp: , Rfl:     gabapentin (NEURONTIN) 100 mg capsule, TAKE 1 CAPSULE (100 MG TOTAL) BY MOUTH DAILY, Disp: 90 capsule, Rfl: 0    gabapentin (NEURONTIN) 300 mg capsule, TAKE 1 CAPSULE BY MOUTH EVERY DAY, Disp: 90 capsule, Rfl: 0    glycopyrrolate (ROBINUL) 2 MG tablet, Take by mouth as needed , Disp: , Rfl:     LORazepam (ATIVAN) 1 mg tablet, Take 1 po bid prn (Patient taking differently: as needed Take 1 po bid prn), Disp: 30 tablet, Rfl: 2    losartan (COZAAR) 100 MG tablet, Take 1 tablet (100 mg total) by mouth daily, Disp: 90 tablet, Rfl: 3    Misc Natural Products (OSTEO BI-FLEX TRIPLE STRENGTH) TABS, Take by mouth, Disp: , Rfl:     propranolol (INDERAL) 20 mg tablet, TAKE 2 TABLETS IN AM AND 1 TABLET IN EVENING, Disp: 270 tablet, Rfl: 1    simvastatin (ZOCOR) 10 mg tablet, Take 1 tablet (10 mg total) by mouth daily, Disp: 90 tablet, Rfl: 3    warfarin (COUMADIN) 1 mg tablet, Take 1 daily as directed - BRAND NECESSARY!!!!!!!, Disp: 90 tablet, Rfl: 3    warfarin (COUMADIN) 2 mg tablet, Take 1 tablet daily as directed   Dispense BRAND ONLY, Disp: 90 tablet, Rfl: 3    warfarin (COUMADIN) 3 mg tablet, Take one tab daily as directed by doctor- BRAND NECESSARY, Disp: 90 tablet, Rfl: 3    Current Facility-Administered Medications:     cyanocobalamin injection 1,000 mcg, 1,000 mcg, Intramuscular, Q30 Days, HANNY Ortiz, 1,000 mcg at 05/14/19 3026  Allergies   Allergen Reactions    Enablex [Darifenacin] Other (See Comments)     Sweating, HA, urinary hesitancy, flushing of face    Fentanyl Nausea Only and Vomiting    Hyoscyamine Palpitations    Dicyclomine Other (See Comments)     Jittery, disorientation, light HAs    Hydromorphone Other (See Comments)     Per pt does not remember the type or severity level    Midazolam Other (See Comments)     Per pt does not remember the type or severity level    Sulfamethoxazole-Trimethoprim Nausea Only    Venlafaxine Other (See Comments)     Urinary urgency, polyuria    Codeine Polt-Chlorphen Polt Er Headache    Ultracet [Tramadol-Acetaminophen] Nausea Only and Vomiting       Labs: Ancillary Orders on 09/27/2019   Component Date Value    Protime 10/10/2019 32 1*    INR 10/10/2019 3 19*     Imaging: No results found  Review of Systems:  Review of Systems   REVIEW OF SYSTEMS:  Constitutional:  Denies fever or chills   Eyes:  Denies change in visual acuity   HENT:  Denies nasal congestion or sore throat   Respiratory:  Denies cough or shortness of breath   Cardiovascular:  Denies chest pain or edema   GI:  Denies abdominal pain, nausea, vomiting, bloody stools or diarrhea   :  Denies dysuria, frequency, difficulty in micturition and nocturia  Musculoskeletal:  Denies back pain or joint pain   Neurologic:  Denies headache, focal weakness or sensory changes   Endocrine:  Denies polyuria or polydipsia   Lymphatic:  Denies swollen glands   Psychiatric:  Denies depression or anxiety     Physical Exam:    /74   Pulse 57   Ht 5' 9" (1 753 m)   Wt 78 kg (172 lb)   LMP  (LMP Unknown)   SpO2 98%   BMI 25 40 kg/m²     Physical Exam   PHYSICAL EXAM:  General:  Patient is not in acute distress   Head: Normocephalic, Atraumatic    HEENT: Both pupils normal-size atraumatic, normocephalic, nonicteric  Neck:  JVP not raised  Trachea central  No carotid bruit  Respiratory:  normal breath sounds no crackles  no rhonchi  Cardiovascular:  Irregularly irregular with heart sounds consistent with prosthetic mechanical valve  GI:  Abdomen soft nontender  No organomegaly  Lymphatic:  No cervical or inguinal lymphadenopathy  Neurologic:  Patient is awake alert, oriented   Grossly nonfocal  Extremities no edema    Discussion/Summary:  Patient with multiple medical problems who seems to be doing reasonably well from cardiac standpoint  Previous studies reviewed with patient  Medications reviewed and possible side effects discussed  concepts of cardiovascular disease , signs and symptoms of heart disease  Dietary and risk factor modification reinforced  All questions answered  Safety measures reviewed  Patient advised to report any problems prompting medical attention  Antibiotic prophylaxis to continue  Patient understands the risks and benefits of anticoagulation to prevent thrombotic risk from prosthetic mechanical mitral valve  Patient does have a history of some tremors and is on propranolol  Patient also has history of atrial fibrillation with a tendency for bradycardia  Patient will be scheduled for a 24 hour Holter monitor to assess AFib heart rate response and make any adjustments to medications as necessary  Patient had a lot of questions which were answered  Patient report any bleeding issues  Medications reviewed  Follow-up with primary care physician  Followup in 6 months or earlier as needed  Patient will be scheduled for an echocardiogram to reassess prosthetic mechanical mitral valve prior to next visit

## 2019-12-05 ENCOUNTER — ANTICOAG VISIT (OUTPATIENT)
Dept: CARDIOLOGY CLINIC | Facility: CLINIC | Age: 74
End: 2019-12-05

## 2019-12-05 ENCOUNTER — APPOINTMENT (OUTPATIENT)
Dept: LAB | Facility: CLINIC | Age: 74
End: 2019-12-05
Payer: COMMERCIAL

## 2019-12-05 DIAGNOSIS — Z95.2 HISTORY OF HEART VALVE REPLACEMENT: ICD-10-CM

## 2019-12-10 ENCOUNTER — HOSPITAL ENCOUNTER (OUTPATIENT)
Dept: NON INVASIVE DIAGNOSTICS | Facility: CLINIC | Age: 74
Discharge: HOME | End: 2019-12-10
Payer: COMMERCIAL

## 2019-12-10 DIAGNOSIS — I48.19 PERSISTENT ATRIAL FIBRILLATION (HCC): ICD-10-CM

## 2019-12-10 PROCEDURE — 93225 XTRNL ECG REC<48 HRS REC: CPT

## 2019-12-10 PROCEDURE — 93226 XTRNL ECG REC<48 HR SCAN A/R: CPT

## 2019-12-11 PROCEDURE — 93227 XTRNL ECG REC<48 HR R&I: CPT | Performed by: INTERNAL MEDICINE

## 2019-12-12 ENCOUNTER — TELEPHONE (OUTPATIENT)
Dept: CARDIOLOGY CLINIC | Facility: CLINIC | Age: 74
End: 2019-12-12

## 2019-12-12 NOTE — TELEPHONE ENCOUNTER
LMOM per Dr Tari Macias was reviewed and shows AFIB with controlled ventricular response  Periods of low HR at night , which was to be expected   Pt is to continue present medications

## 2019-12-12 NOTE — TELEPHONE ENCOUNTER
----- Message from Manan Siegel MD sent at 12/12/2019  8:33 AM EST -----  Please call  Holter monitor shows atrial fibrillation with controlled ventricular response  Continue present medications  Patient has periods of low heart rate at night which is to be expected

## 2019-12-13 ENCOUNTER — OFFICE VISIT (OUTPATIENT)
Dept: INTERNAL MEDICINE CLINIC | Facility: CLINIC | Age: 74
End: 2019-12-13
Payer: COMMERCIAL

## 2019-12-13 VITALS
RESPIRATION RATE: 16 BRPM | WEIGHT: 174.4 LBS | DIASTOLIC BLOOD PRESSURE: 82 MMHG | HEART RATE: 60 BPM | BODY MASS INDEX: 25.83 KG/M2 | HEIGHT: 69 IN | SYSTOLIC BLOOD PRESSURE: 130 MMHG

## 2019-12-13 DIAGNOSIS — R25.1 TREMOR: ICD-10-CM

## 2019-12-13 DIAGNOSIS — G89.29 CHRONIC RIGHT-SIDED LOW BACK PAIN WITHOUT SCIATICA: ICD-10-CM

## 2019-12-13 DIAGNOSIS — M54.50 CHRONIC RIGHT-SIDED LOW BACK PAIN WITHOUT SCIATICA: ICD-10-CM

## 2019-12-13 DIAGNOSIS — R09.89 GLOBUS SYNDROME: ICD-10-CM

## 2019-12-13 DIAGNOSIS — I65.01 ASYMPTOMATIC STENOSIS OF RIGHT VERTEBRAL ARTERY: ICD-10-CM

## 2019-12-13 DIAGNOSIS — I48.0 PAROXYSMAL ATRIAL FIBRILLATION (HCC): Primary | ICD-10-CM

## 2019-12-13 DIAGNOSIS — M54.2 CERVICALGIA: ICD-10-CM

## 2019-12-13 DIAGNOSIS — G62.9 NEUROPATHY: ICD-10-CM

## 2019-12-13 DIAGNOSIS — R13.14 PHARYNGOESOPHAGEAL DYSPHAGIA: ICD-10-CM

## 2019-12-13 DIAGNOSIS — Z79.01 CHRONIC ANTICOAGULATION: ICD-10-CM

## 2019-12-13 PROCEDURE — 99214 OFFICE O/P EST MOD 30 MIN: CPT | Performed by: INTERNAL MEDICINE

## 2019-12-13 PROCEDURE — 3008F BODY MASS INDEX DOCD: CPT | Performed by: INTERNAL MEDICINE

## 2019-12-13 PROCEDURE — 1036F TOBACCO NON-USER: CPT | Performed by: INTERNAL MEDICINE

## 2019-12-13 PROCEDURE — 1160F RVW MEDS BY RX/DR IN RCRD: CPT | Performed by: INTERNAL MEDICINE

## 2019-12-13 RX ORDER — GABAPENTIN 100 MG/1
CAPSULE ORAL
Qty: 150 CAPSULE | Refills: 3 | Status: SHIPPED | OUTPATIENT
Start: 2019-12-13 | End: 2020-03-09

## 2019-12-13 NOTE — PATIENT INSTRUCTIONS
Dysphagia with history of anxiety and globus sensation coupled with chronic anticoagulation with prosthetic valve-will start with esophagram   Also patient has significant cervicalgia as below which may be contributing to some of these symptoms  Will follow-up after esophagram, consider speech therapy  Consider EGD  Cervicalgia-CT of neck reviewed from August 2017-there is abnormality within the mid right vertebral artery at approximately C3 with excellent filling proximal and distal to that area  This may be congenital   It appears to be asymptomatic and I do not believe that has any bearing on her pain or other symptomatology  There is notable spondylosis and degenerative disc disease  Patient has no symptoms of cervical radiculopathy and therefore no further imaging at this time  We discussed treatment options including pulse dose of steroids versus referral to pain management versus other  At this time we have opted to continue with physical therapy exercises, topical Sports rubs as well as moist heat  Increase Neurontin as below  Hold off on steroids at this time  Avoid referral to pain management based upon chronic Coumadin use  Right-sided low back pain-increase Neurontin as follows:  Continue 400 mg at night  Start 100 mg twice daily for 1 week in the morning and mid afternoon  Increased to 200 mg twice daily next week  Follow-up and review above studies and progress  Asymptomatic bradycardia with history of paroxysmal atrial fibrillation-Holter monitor reviewed  There is no mention of any sinus pauses  There is no indication to change propranolol dosing  I explained taking only 1 dose of short-acting propranolol on morning may allow for rebound tachycardia or hypertension at night which could lead to further problems, with heart rate in blood pressure

## 2019-12-13 NOTE — PROGRESS NOTES
Assessment/Plan:    Diagnoses and all orders for this visit:    Paroxysmal atrial fibrillation (HCC)    Chronic anticoagulation    Tremor    Asymptomatic stenosis of right vertebral artery    Chronic right-sided low back pain without sciatica    Cervicalgia    Neuropathy  -     gabapentin (NEURONTIN) 100 mg capsule; 2 po bid and 1 hs (w/ 300 mg tab hs)    Pharyngoesophageal dysphagia  -     FL barium swallow; Future    Globus syndrome  -     FL barium swallow; Future         Patient Instructions   Dysphagia with history of anxiety and globus sensation coupled with chronic anticoagulation with prosthetic valve-will start with esophagram   Also patient has significant cervicalgia as below which may be contributing to some of these symptoms  Will follow-up after esophagram, consider speech therapy  Consider EGD  Cervicalgia-CT of neck reviewed from August 2017-there is abnormality within the mid right vertebral artery at approximately C3 with excellent filling proximal and distal to that area  This may be congenital   It appears to be asymptomatic and I do not believe that has any bearing on her pain or other symptomatology  There is notable spondylosis and degenerative disc disease  Patient has no symptoms of cervical radiculopathy and therefore no further imaging at this time  We discussed treatment options including pulse dose of steroids versus referral to pain management versus other  At this time we have opted to continue with physical therapy exercises, topical Sports rubs as well as moist heat  Increase Neurontin as below  Hold off on steroids at this time  Avoid referral to pain management based upon chronic Coumadin use  Right-sided low back pain-increase Neurontin as follows:  Continue 400 mg at night  Start 100 mg twice daily for 1 week in the morning and mid afternoon  Increased to 200 mg twice daily next week  Follow-up and review above studies and progress      Asymptomatic bradycardia with history of paroxysmal atrial fibrillation-Holter monitor reviewed  There is no mention of any sinus pauses  There is no indication to change propranolol dosing  I explained taking only 1 dose of short-acting propranolol on morning may allow for rebound tachycardia or hypertension at night which could lead to further problems, with heart rate in blood pressure  Subjective:      Patient ID: Verito Jean is a 69 y o  female    Here w/ multiple acute concerns    Afib-has questions re meds, was told holter showed slow hr overnight, notes tremor controlled w/ propranolol 40 am and 20 pm, wonders if she should hold pm dose  No palpitations  Notes occasional dysphagia and feels like lump in throat  Dysphagia to chicken, but intermittent  Notes she is very anxious "anxiety is my middle name"  Notes pain in neck and headache  Notes h/o "narrow vertebral artery"  Now she feels like someone is grabbing back of neck and pushing forward  No dizziness or visual disturbance  No numbness or weakness in hands or arms  She does neck exercises daily taht she learned at PT  She also notes r sided low back pain that is localized, no sciatica  She takes neurontin 400 hs, but not during the day           Current Outpatient Medications:     amLODIPine (NORVASC) 5 mg tablet, Take 1 tablet (5 mg total) by mouth every morning, Disp: 90 tablet, Rfl: 3    aspirin 81 mg chewable tablet, Chew daily, Disp: , Rfl:     butalbital-acetaminophen-caffeine (FIORICET,ESGIC) -40 mg per tablet, TAKE 1 TABLET BY MOUTH EVERY 4-6 HOURS AS NEEDED FOR HEADACHE, Disp: 30 tablet, Rfl: 2    Calcium Carbonate (CALTRATE 600) 1500 (600 Ca) MG TABS, Take by mouth, Disp: , Rfl:     clobetasol (OLUX) 0 05 % topical foam, Apply topically 2 (two) times a day (Patient taking differently: Apply topically as needed ), Disp: 100 g, Rfl: 2    clobetasol (TEMOVATE) 0 05 % cream, Apply to elbows bid (Patient taking differently: as needed Apply to elbows bid), Disp: 30 g, Rfl: 3    clobetasol (TEMOVATE) 0 05 % ointment, Apply topically 2 (two) times a day Apply twice a day to foot x 7-10 (Patient taking differently: Apply topically as needed Apply twice a day to foot x 7-10), Disp: 30 g, Rfl: 3    Flaxseed, Linseed, (FLAXSEED OIL) 1200 MG CAPS, Take by mouth, Disp: , Rfl:     gabapentin (NEURONTIN) 100 mg capsule, 2 po bid and 1 hs (w/ 300 mg tab hs), Disp: 150 capsule, Rfl: 3    gabapentin (NEURONTIN) 300 mg capsule, TAKE 1 CAPSULE BY MOUTH EVERY DAY, Disp: 90 capsule, Rfl: 0    glycopyrrolate (ROBINUL) 2 MG tablet, Take by mouth as needed , Disp: , Rfl:     LORazepam (ATIVAN) 1 mg tablet, Take 1 po bid prn (Patient taking differently: as needed Take 1 po bid prn), Disp: 30 tablet, Rfl: 2    losartan (COZAAR) 100 MG tablet, Take 1 tablet (100 mg total) by mouth daily, Disp: 90 tablet, Rfl: 3    Misc Natural Products (OSTEO BI-FLEX TRIPLE STRENGTH) TABS, Take by mouth, Disp: , Rfl:     propranolol (INDERAL) 20 mg tablet, TAKE 2 TABLETS IN AM AND 1 TABLET IN EVENING, Disp: 270 tablet, Rfl: 1    simvastatin (ZOCOR) 10 mg tablet, Take 1 tablet (10 mg total) by mouth daily, Disp: 90 tablet, Rfl: 3    warfarin (COUMADIN) 1 mg tablet, Take 1 daily as directed - BRAND NECESSARY!!!!!!!, Disp: 90 tablet, Rfl: 3    warfarin (COUMADIN) 2 mg tablet, Take 1 tablet daily as directed   Dispense BRAND ONLY, Disp: 90 tablet, Rfl: 3    warfarin (COUMADIN) 3 mg tablet, Take one tab daily as directed by doctor- BRAND NECESSARY, Disp: 90 tablet, Rfl: 3    Current Facility-Administered Medications:     cyanocobalamin injection 1,000 mcg, 1,000 mcg, Intramuscular, Q30 Days, Luann Comfort, CRNP, 1,000 mcg at 05/14/19 0957    Recent Results (from the past 1008 hour(s))   Protime-INR    Collection Time: 11/07/19  9:16 AM   Result Value Ref Range    Protime 33 2 (H) 11 6 - 14 5 seconds    INR 3 32 (H) 0 84 - 1 19   Protime-INR    Collection Time: 12/05/19 10:18 AM   Result Value Ref Range    Protime 32 6 (H) 11 6 - 14 5 seconds    INR 3 25 (H) 0 84 - 1 19       The following portions of the patient's history were reviewed and updated as appropriate: allergies, current medications, past family history, past medical history, past social history, past surgical history and problem list      Review of Systems   Constitutional: Negative for appetite change, chills, diaphoresis, fatigue, fever and unexpected weight change  HENT: Negative for congestion, hearing loss and rhinorrhea  Eyes: Negative for visual disturbance  Respiratory: Negative for cough, chest tightness, shortness of breath and wheezing  Cardiovascular: Negative for chest pain, palpitations and leg swelling  Gastrointestinal: Negative for abdominal pain and blood in stool  Endocrine: Negative for cold intolerance, heat intolerance, polydipsia and polyuria  Genitourinary: Negative for difficulty urinating, dysuria, frequency and urgency  Musculoskeletal: Positive for arthralgias and neck pain  Negative for myalgias  Skin: Negative for rash  Neurological: Positive for tremors  Negative for dizziness, weakness, light-headedness and headaches  Hematological: Does not bruise/bleed easily  Psychiatric/Behavioral: Negative for dysphoric mood and sleep disturbance  Objective:      Vitals:    12/13/19 1457   BP: 130/82   Pulse: 60   Resp: 16          Physical Exam   Constitutional: She is oriented to person, place, and time  She appears well-developed and well-nourished  HENT:   Head: Normocephalic and atraumatic  Nose: Nose normal    Mouth/Throat: Oropharynx is clear and moist    Eyes: Pupils are equal, round, and reactive to light  Conjunctivae and EOM are normal  No scleral icterus  Neck: Normal range of motion  Neck supple  No JVD present  No tracheal deviation present  No thyromegaly present  Cardiovascular: Normal rate, regular rhythm and intact distal pulses   Exam reveals no gallop and no friction rub  No murmur heard  Crisp prosthetic valve sounds with 2/6 murmur, regular   Pulmonary/Chest: Effort normal and breath sounds normal  No respiratory distress  She has no wheezes  She has no rales  Musculoskeletal: She exhibits no edema or deformity  Mild crepitus in the neck with limited range of motion with both right and left lateral rotation, flexion and extension   Lymphadenopathy:     She has no cervical adenopathy  Neurological: She is alert and oriented to person, place, and time  No cranial nerve deficit  Skin: Skin is warm and dry  No rash noted  No erythema  No pallor  Psychiatric: She has a normal mood and affect   Her behavior is normal  Judgment and thought content normal

## 2019-12-14 DIAGNOSIS — E13.49 OTHER DIABETIC NEUROLOGICAL COMPLICATION ASSOCIATED WITH OTHER SPECIFIED DIABETES MELLITUS (HCC): ICD-10-CM

## 2019-12-14 RX ORDER — GABAPENTIN 300 MG/1
CAPSULE ORAL
Qty: 90 CAPSULE | Refills: 0 | Status: SHIPPED | OUTPATIENT
Start: 2019-12-14 | End: 2019-12-30 | Stop reason: CLARIF

## 2019-12-17 DIAGNOSIS — G62.9 NEUROPATHY: ICD-10-CM

## 2019-12-17 RX ORDER — GABAPENTIN 100 MG/1
100 CAPSULE ORAL DAILY
Qty: 90 CAPSULE | Refills: 0 | Status: SHIPPED | OUTPATIENT
Start: 2019-12-17 | End: 2019-12-30 | Stop reason: CLARIF

## 2019-12-20 ENCOUNTER — HOSPITAL ENCOUNTER (OUTPATIENT)
Dept: RADIOLOGY | Facility: HOSPITAL | Age: 74
Discharge: HOME | End: 2019-12-20
Attending: INTERNAL MEDICINE
Payer: COMMERCIAL

## 2019-12-20 DIAGNOSIS — R09.89 GLOBUS SYNDROME: ICD-10-CM

## 2019-12-20 DIAGNOSIS — R13.14 PHARYNGOESOPHAGEAL DYSPHAGIA: ICD-10-CM

## 2019-12-20 PROCEDURE — 74220 X-RAY XM ESOPHAGUS 1CNTRST: CPT

## 2019-12-30 ENCOUNTER — OFFICE VISIT (OUTPATIENT)
Dept: INTERNAL MEDICINE CLINIC | Facility: CLINIC | Age: 74
End: 2019-12-30
Payer: COMMERCIAL

## 2019-12-30 ENCOUNTER — APPOINTMENT (OUTPATIENT)
Dept: LAB | Facility: CLINIC | Age: 74
End: 2019-12-30
Payer: COMMERCIAL

## 2019-12-30 ENCOUNTER — ANTICOAG VISIT (OUTPATIENT)
Dept: CARDIOLOGY CLINIC | Facility: CLINIC | Age: 74
End: 2019-12-30

## 2019-12-30 VITALS
BODY MASS INDEX: 25.78 KG/M2 | DIASTOLIC BLOOD PRESSURE: 78 MMHG | WEIGHT: 174.6 LBS | HEART RATE: 60 BPM | SYSTOLIC BLOOD PRESSURE: 118 MMHG | OXYGEN SATURATION: 98 % | TEMPERATURE: 98.3 F

## 2019-12-30 DIAGNOSIS — M54.2 CERVICALGIA: ICD-10-CM

## 2019-12-30 DIAGNOSIS — G89.29 CHRONIC RIGHT-SIDED LOW BACK PAIN WITHOUT SCIATICA: Primary | ICD-10-CM

## 2019-12-30 DIAGNOSIS — R13.12 OROPHARYNGEAL DYSPHAGIA: ICD-10-CM

## 2019-12-30 DIAGNOSIS — R09.89 GLOBUS SYNDROME: ICD-10-CM

## 2019-12-30 DIAGNOSIS — I48.0 PAROXYSMAL ATRIAL FIBRILLATION (HCC): ICD-10-CM

## 2019-12-30 DIAGNOSIS — Z79.01 CHRONIC ANTICOAGULATION: ICD-10-CM

## 2019-12-30 DIAGNOSIS — Z95.2 HISTORY OF HEART VALVE REPLACEMENT: ICD-10-CM

## 2019-12-30 DIAGNOSIS — G62.9 NEUROPATHY: ICD-10-CM

## 2019-12-30 DIAGNOSIS — M54.50 CHRONIC RIGHT-SIDED LOW BACK PAIN WITHOUT SCIATICA: Primary | ICD-10-CM

## 2019-12-30 DIAGNOSIS — K05.10 GINGIVITIS: ICD-10-CM

## 2019-12-30 PROCEDURE — 99214 OFFICE O/P EST MOD 30 MIN: CPT | Performed by: INTERNAL MEDICINE

## 2019-12-30 NOTE — PATIENT INSTRUCTIONS
Esophagram reviewed, no obstructing lesions  Dilated right atrium causes smooth indentation on lower esophagus  Echocardiogram from March reviewed noting biatrial dilatation likely related to valvular heart disease and precipitating atrial fibrillation  Pathophysiology discussed  No further workup or treatment indicated at this time  Dysphagia with delayed passage of food bolus from mouth to esophagus-referral for barium swallow with speech    Lumbar radiculopathy-continue with Neurontin 200 mg twice daily and 400 HS  Consider weaning down by dropping 100 mg per week such as 200 mg in the morning, 100 mg in the afternoon and 400 at night and the following week dropping to 100 mg twice daily and 400 at night as long as pain stays alleviated  Gingivitis-symptoms have improved and exam is unremarkable, follow-up INR given recent antibiotics  Okay to discontinue antibiotics  See her dentist if symptoms recur  Routine follow-up after labs in March, sooner as needed  Speech therapy follow-up as indicated by study

## 2019-12-30 NOTE — PROGRESS NOTES
Assessment/Plan:    Diagnoses and all orders for this visit:    Chronic right-sided low back pain without sciatica    Neuropathy    Cervicalgia    Chronic anticoagulation    History of heart valve replacement    Paroxysmal atrial fibrillation (HCC)    Globus syndrome    Gingivitis         Patient Instructions   Esophagram reviewed, no obstructing lesions  Dilated right atrium causes smooth indentation on lower esophagus  Echocardiogram from March reviewed noting biatrial dilatation likely related to valvular heart disease and precipitating atrial fibrillation  Pathophysiology discussed  No further workup or treatment indicated at this time  Dysphagia with delayed passage of food bolus from mouth to esophagus-referral for barium swallow with speech    Lumbar radiculopathy-continue with Neurontin 200 mg twice daily and 400 HS  Consider weaning down by dropping 100 mg per week such as 200 mg in the morning, 100 mg in the afternoon and 400 at night and the following week dropping to 100 mg twice daily and 400 at night as long as pain stays alleviated  Gingivitis-symptoms have improved and exam is unremarkable, follow-up INR given recent antibiotics  Okay to discontinue antibiotics  See her dentist if symptoms recur  Routine follow-up after labs in March, sooner as needed  Speech therapy follow-up as indicated by study  Subjective:      Patient ID: Pradip Mojica is a 69 y o  female    F/u barium swallow  She continues to have mild sx of lump in throat  Occasionally feels like food is getting stuck  No melena  Back pain resolved w/ neurontin 200 mg bid + 400 hs  Anxiety-rarely needs lorazepam, can't recall last dose  Afib-concerned w/ enlarged RA  Notes sore gums x 1 wk, taking amox  Hasn't seen dentist   No f/c    Did INR this am              Current Outpatient Medications:     amLODIPine (NORVASC) 5 mg tablet, Take 1 tablet (5 mg total) by mouth every morning, Disp: 90 tablet, Rfl: 3    aspirin 81 mg chewable tablet, Chew daily, Disp: , Rfl:     butalbital-acetaminophen-caffeine (FIORICET,ESGIC) -40 mg per tablet, TAKE 1 TABLET BY MOUTH EVERY 4-6 HOURS AS NEEDED FOR HEADACHE, Disp: 30 tablet, Rfl: 2    Calcium Carbonate (CALTRATE 600) 1500 (600 Ca) MG TABS, Take by mouth daily , Disp: , Rfl:     clobetasol (OLUX) 0 05 % topical foam, Apply topically 2 (two) times a day (Patient taking differently: Apply topically as needed ), Disp: 100 g, Rfl: 2    clobetasol (TEMOVATE) 0 05 % cream, Apply to elbows bid (Patient taking differently: as needed Apply to elbows bid), Disp: 30 g, Rfl: 3    clobetasol (TEMOVATE) 0 05 % ointment, Apply topically 2 (two) times a day Apply twice a day to foot x 7-10 (Patient taking differently: Apply topically as needed Apply twice a day to foot x 7-10), Disp: 30 g, Rfl: 3    Flaxseed, Linseed, (FLAXSEED OIL) 1200 MG CAPS, Take by mouth daily , Disp: , Rfl:     gabapentin (NEURONTIN) 100 mg capsule, 2 po bid and 1 hs (w/ 300 mg tab hs), Disp: 150 capsule, Rfl: 3    glycopyrrolate (ROBINUL) 2 MG tablet, Take by mouth as needed , Disp: , Rfl:     LORazepam (ATIVAN) 1 mg tablet, Take 1 po bid prn (Patient taking differently: as needed Take 1 po bid prn), Disp: 30 tablet, Rfl: 2    losartan (COZAAR) 100 MG tablet, Take 1 tablet (100 mg total) by mouth daily, Disp: 90 tablet, Rfl: 3    Misc Natural Products (OSTEO BI-FLEX TRIPLE STRENGTH) TABS, Take by mouth daily , Disp: , Rfl:     propranolol (INDERAL) 20 mg tablet, TAKE 2 TABLETS IN AM AND 1 TABLET IN EVENING, Disp: 270 tablet, Rfl: 1    simvastatin (ZOCOR) 10 mg tablet, Take 1 tablet (10 mg total) by mouth daily, Disp: 90 tablet, Rfl: 3    warfarin (COUMADIN) 1 mg tablet, Take 1 daily as directed - BRAND NECESSARY!!!!!!!, Disp: 90 tablet, Rfl: 3    warfarin (COUMADIN) 2 mg tablet, Take 1 tablet daily as directed   Dispense BRAND ONLY, Disp: 90 tablet, Rfl: 3    warfarin (COUMADIN) 3 mg tablet, Take one tab daily as directed by doctor- BRAND NECESSARY, Disp: 90 tablet, Rfl: 3    Current Facility-Administered Medications:     cyanocobalamin injection 1,000 mcg, 1,000 mcg, Intramuscular, Q30 Days, HANNY Angulo, 1,000 mcg at 05/14/19 5031    Recent Results (from the past 1008 hour(s))   Protime-INR    Collection Time: 12/05/19 10:18 AM   Result Value Ref Range    Protime 32 6 (H) 11 6 - 14 5 seconds    INR 3 25 (H) 0 84 - 1 19       The following portions of the patient's history were reviewed and updated as appropriate: allergies, current medications, past family history, past medical history, past social history, past surgical history and problem list      Review of Systems   Constitutional: Negative for appetite change, chills, diaphoresis, fatigue, fever and unexpected weight change  HENT: Negative for congestion, hearing loss and rhinorrhea  Eyes: Negative for visual disturbance  Respiratory: Negative for cough, chest tightness, shortness of breath and wheezing  Cardiovascular: Negative for chest pain, palpitations and leg swelling  Gastrointestinal: Negative for abdominal pain and blood in stool  Endocrine: Negative for cold intolerance, heat intolerance, polydipsia and polyuria  Genitourinary: Negative for difficulty urinating, dysuria, frequency and urgency  Musculoskeletal: Negative for arthralgias and myalgias  Skin: Negative for rash  Neurological: Negative for dizziness, weakness, light-headedness and headaches  Hematological: Does not bruise/bleed easily  Psychiatric/Behavioral: Negative for dysphoric mood and sleep disturbance  Objective:      Vitals:    12/30/19 0947   BP: 118/78   Pulse: 60   Temp: 98 3 °F (36 8 °C)   SpO2: 98%          Physical Exam   Constitutional: She is oriented to person, place, and time  She appears well-developed and well-nourished  HENT:   Head: Normocephalic and atraumatic     Nose: Nose normal    Mouth/Throat: Oropharynx is clear and moist    Gums look fine   Eyes: Pupils are equal, round, and reactive to light  Conjunctivae and EOM are normal  No scleral icterus  Neck: Normal range of motion  Neck supple  No JVD present  No tracheal deviation present  No thyromegaly present  Cardiovascular: Normal rate, regular rhythm and intact distal pulses  Exam reveals no gallop and no friction rub  No murmur heard  Pulmonary/Chest: Effort normal and breath sounds normal  No respiratory distress  She has no wheezes  She has no rales  Musculoskeletal: She exhibits no edema or deformity  Lymphadenopathy:     She has no cervical adenopathy  Neurological: She is alert and oriented to person, place, and time  No cranial nerve deficit  Skin: Skin is warm and dry  No rash noted  No erythema  No pallor  Psychiatric: She has a normal mood and affect   Her behavior is normal  Judgment and thought content normal

## 2020-01-02 ENCOUNTER — HOSPITAL ENCOUNTER (OUTPATIENT)
Dept: RADIOLOGY | Facility: HOSPITAL | Age: 75
Discharge: HOME | End: 2020-01-02
Attending: INTERNAL MEDICINE
Payer: COMMERCIAL

## 2020-01-02 DIAGNOSIS — R13.12 OROPHARYNGEAL DYSPHAGIA: ICD-10-CM

## 2020-01-02 DIAGNOSIS — R09.89 GLOBUS SYNDROME: ICD-10-CM

## 2020-01-02 PROCEDURE — 74230 X-RAY XM SWLNG FUNCJ C+: CPT

## 2020-01-02 PROCEDURE — 92611 MOTION FLUOROSCOPY/SWALLOW: CPT

## 2020-01-02 NOTE — PROCEDURES
Speech-Language Pathology Video Barium Swallow Study        Patient Name: [facility]    BNLWO'E Date: 1/2/2020     Problem List  Patient Active Problem List   Diagnosis    Atrial fibrillation ([address] )    Hyperlipidemia    History of heart valve replacement    Benign hypertension    Bradycardia    Essential tremor    Chronic anticoagulation    Amaurosis fugax, both eyes    Carotid artery stenosis    Mitral valve disorder    Osteoporosis    Peripheral neuropathy    Tremor    Cervicalgia    Chronic right-sided low back pain without sciatica    Asymptomatic stenosis of right vertebral artery       Past Medical History  Past Medical History:   Diagnosis Date    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    Atrial fibrillation (HCC)     Hashimoto's thyroiditis     Hyperlipidemia     Hypertension     IBS (irritable bowel syndrome)     Migraine     Mitral valve disorder     Non-toxic uninodular goiter        Past Surgical History  Past Surgical History:   Procedure Laterality Date    APPENDECTOMY      BREAST BIOPSY      BREAST CYST EXCISION Right 01/01/1977    COLONOSCOPY  08/08/2011    VALVE REPLACEMENT  01/04/2017    mitral valve replacement, 6/5/14         General Information;  Pt is a 69 y o  female who was referred for VBS to further assess swallow function  She has c/o dysphagia c/b globus sensation with solids at times  She reports this happens randomly  She denies weight loss, PNA, regurgitation  She does admit to occasional reflux  She underwent barium esophagram with no significant findings         Speech/Swallow Mech:   Facial: symmetrical  Labial: WFL  Lingual: WFL  Velum: symmetrical  Mandible: adequate ROM  Dentition: adequate  Vocal quality:clear/adequate   Volitional Cough: strong/productive     Previous VBS: none reported or indicated in EPIC    Consistencies Assessed and Performance   Pt was seen in radiology for a Video Barium Swallow Study, seated in the upright position and viewed laterally with the following consistencies:      Administered: thin liquids, puree, soft solids and hard solids  Specific materials administered: Barium laden applesauce, nutrigrain bar, hard cookie, thin liquids, & a 13mm barium pill  Liquids were administered by cup and straw  Results are as follows:     **Images are available for review on PACS    Oral stage:  WNL  Lip closure: adequate  Mastication: adequate  Bolus formation: adequate  Bolus control: adequate  Transfer: adequate  Residue: none    Pharyngeal stage: WNL  Swallow promptness: adequate  Spill to valleculae: none  Spill to pyriforms: none  Epiglottic inversion: adequate  Laryngeal rise: adequate  Pharyngeal constriction: adequate  Vallecular retention: none  Pyriform retention: none  PPW coating: none  CP prominence: present  Retropulsion from prominence: none  Transient penetration: none  Epiglottic undercoat: none  Penetration: none  Aspiration: none  Strategies: n/a    Screening of Esophageal stage:  Consistent with findings on esophagram  No reflux or backflow present  Assessment Summary;  Pts oropharyngeal swallow function appears generally WFL at this time with the materials administered today  I suspect her globus sensation is 2' indentation of lower esophagus and possible reflux with dense foods over meal  Education on reflux precautions provided  No further follow up indicated      LUCINA Germain S , [address] [facility], [place], operated by [facility]  Speech Language Pathologist   Available via [address] [place] [address] #[id number]  [place] #[id number]

## 2020-01-27 ENCOUNTER — APPOINTMENT (OUTPATIENT)
Dept: LAB | Facility: CLINIC | Age: 75
End: 2020-01-27
Payer: COMMERCIAL

## 2020-02-10 ENCOUNTER — APPOINTMENT (OUTPATIENT)
Dept: LAB | Facility: CLINIC | Age: 75
End: 2020-02-10
Payer: COMMERCIAL

## 2020-02-10 ENCOUNTER — ANTICOAG VISIT (OUTPATIENT)
Dept: CARDIOLOGY CLINIC | Facility: CLINIC | Age: 75
End: 2020-02-10

## 2020-02-10 DIAGNOSIS — Z95.2 HISTORY OF HEART VALVE REPLACEMENT: ICD-10-CM

## 2020-02-24 ENCOUNTER — ANTICOAG VISIT (OUTPATIENT)
Dept: CARDIOLOGY CLINIC | Facility: CLINIC | Age: 75
End: 2020-02-24

## 2020-02-24 ENCOUNTER — APPOINTMENT (OUTPATIENT)
Dept: LAB | Facility: CLINIC | Age: 75
End: 2020-02-24
Payer: COMMERCIAL

## 2020-02-24 DIAGNOSIS — Z95.2 HISTORY OF HEART VALVE REPLACEMENT: ICD-10-CM

## 2020-03-09 ENCOUNTER — APPOINTMENT (OUTPATIENT)
Dept: LAB | Facility: CLINIC | Age: 75
End: 2020-03-09
Payer: COMMERCIAL

## 2020-03-09 ENCOUNTER — ANTICOAG VISIT (OUTPATIENT)
Dept: CARDIOLOGY CLINIC | Facility: CLINIC | Age: 75
End: 2020-03-09

## 2020-03-09 DIAGNOSIS — G62.9 NEUROPATHY: ICD-10-CM

## 2020-03-09 DIAGNOSIS — Z95.2 HISTORY OF HEART VALVE REPLACEMENT: ICD-10-CM

## 2020-03-09 RX ORDER — GABAPENTIN 100 MG/1
CAPSULE ORAL
Qty: 450 CAPSULE | Refills: 1 | Status: SHIPPED | OUTPATIENT
Start: 2020-03-09 | End: 2020-06-11

## 2020-03-11 DIAGNOSIS — I10 ESSENTIAL HYPERTENSION: ICD-10-CM

## 2020-03-11 PROCEDURE — 4010F ACE/ARB THERAPY RXD/TAKEN: CPT | Performed by: INTERNAL MEDICINE

## 2020-03-11 RX ORDER — LOSARTAN POTASSIUM 100 MG/1
100 TABLET ORAL DAILY
Qty: 90 TABLET | Refills: 1 | Status: SHIPPED | OUTPATIENT
Start: 2020-03-11 | End: 2020-09-03

## 2020-03-11 NOTE — TELEPHONE ENCOUNTER
[facility] pharmacy called requesting refills of her medication  Pharmacy states they fax couple of times to (DR Mame Xavier) but no response, pharmacy was aware that was the incorrect doctor  Pt is currently at the pharmacy and out of refills

## 2020-03-25 ENCOUNTER — ANTICOAG VISIT (OUTPATIENT)
Dept: CARDIOLOGY CLINIC | Facility: CLINIC | Age: 75
End: 2020-03-25

## 2020-03-25 ENCOUNTER — APPOINTMENT (OUTPATIENT)
Dept: LAB | Facility: CLINIC | Age: 75
End: 2020-03-25
Payer: COMMERCIAL

## 2020-03-25 DIAGNOSIS — Z95.2 HISTORY OF HEART VALVE REPLACEMENT: ICD-10-CM

## 2020-03-25 DIAGNOSIS — E03.8 SUBCLINICAL HYPOTHYROIDISM: ICD-10-CM

## 2020-03-25 DIAGNOSIS — E78.2 MIXED HYPERLIPIDEMIA: ICD-10-CM

## 2020-03-25 DIAGNOSIS — I10 BENIGN HYPERTENSION: ICD-10-CM

## 2020-03-25 DIAGNOSIS — E53.8 VITAMIN B 12 DEFICIENCY: ICD-10-CM

## 2020-03-25 LAB
ALBUMIN SERPL BCP-MCNC: 3.7 G/DL (ref 3.5–5)
ALP SERPL-CCNC: 70 U/L (ref 46–116)
ALT SERPL W P-5'-P-CCNC: 24 U/L (ref 12–78)
ANION GAP SERPL CALCULATED.3IONS-SCNC: 2 MMOL/L (ref 4–13)
AST SERPL W P-5'-P-CCNC: 23 U/L (ref 5–45)
BASOPHILS # BLD AUTO: 0.04 THOUSANDS/ΜL (ref 0–0.1)
BASOPHILS NFR BLD AUTO: 1 % (ref 0–1)
BILIRUB SERPL-MCNC: 0.45 MG/DL (ref 0.2–1)
BUN SERPL-MCNC: 16 MG/DL (ref 5–25)
CALCIUM SERPL-MCNC: 8.8 MG/DL (ref 8.3–10.1)
CHLORIDE SERPL-SCNC: 110 MMOL/L (ref 100–108)
CHOLEST SERPL-MCNC: 155 MG/DL (ref 50–200)
CO2 SERPL-SCNC: 29 MMOL/L (ref 21–32)
CREAT SERPL-MCNC: 0.87 MG/DL (ref 0.6–1.3)
EOSINOPHIL # BLD AUTO: 0.29 THOUSAND/ΜL (ref 0–0.61)
EOSINOPHIL NFR BLD AUTO: 6 % (ref 0–6)
ERYTHROCYTE [DISTWIDTH] IN BLOOD BY AUTOMATED COUNT: 12.8 % (ref 11.6–15.1)
GFR SERPL CREATININE-BSD FRML MDRD: 67 ML/MIN/1.73SQ M
GLUCOSE P FAST SERPL-MCNC: 90 MG/DL (ref 65–99)
HCT VFR BLD AUTO: 38.8 % (ref 34.8–46.1)
HDLC SERPL-MCNC: 65 MG/DL
HGB BLD-MCNC: 12.1 G/DL (ref 11.5–15.4)
IMM GRANULOCYTES # BLD AUTO: 0.02 THOUSAND/UL (ref 0–0.2)
IMM GRANULOCYTES NFR BLD AUTO: 0 % (ref 0–2)
LDLC SERPL CALC-MCNC: 63 MG/DL (ref 0–100)
LYMPHOCYTES # BLD AUTO: 1.23 THOUSANDS/ΜL (ref 0.6–4.47)
LYMPHOCYTES NFR BLD AUTO: 24 % (ref 14–44)
MCH RBC QN AUTO: 31.5 PG (ref 26.8–34.3)
MCHC RBC AUTO-ENTMCNC: 31.2 G/DL (ref 31.4–37.4)
MCV RBC AUTO: 101 FL (ref 82–98)
MONOCYTES # BLD AUTO: 0.49 THOUSAND/ΜL (ref 0.17–1.22)
MONOCYTES NFR BLD AUTO: 10 % (ref 4–12)
NEUTROPHILS # BLD AUTO: 3.09 THOUSANDS/ΜL (ref 1.85–7.62)
NEUTS SEG NFR BLD AUTO: 59 % (ref 43–75)
NONHDLC SERPL-MCNC: 90 MG/DL
NRBC BLD AUTO-RTO: 0 /100 WBCS
PLATELET # BLD AUTO: 226 THOUSANDS/UL (ref 149–390)
PMV BLD AUTO: 9.7 FL (ref 8.9–12.7)
POTASSIUM SERPL-SCNC: 4 MMOL/L (ref 3.5–5.3)
PROT SERPL-MCNC: 7.7 G/DL (ref 6.4–8.2)
RBC # BLD AUTO: 3.84 MILLION/UL (ref 3.81–5.12)
SODIUM SERPL-SCNC: 141 MMOL/L (ref 136–145)
T4 FREE SERPL-MCNC: 1.23 NG/DL (ref 0.76–1.46)
TRIGL SERPL-MCNC: 134 MG/DL
TSH SERPL DL<=0.05 MIU/L-ACNC: 4.1 UIU/ML (ref 0.36–3.74)
VIT B12 SERPL-MCNC: 1102 PG/ML (ref 100–900)
WBC # BLD AUTO: 5.16 THOUSAND/UL (ref 4.31–10.16)

## 2020-03-25 PROCEDURE — 80053 COMPREHEN METABOLIC PANEL: CPT

## 2020-03-25 PROCEDURE — 84443 ASSAY THYROID STIM HORMONE: CPT

## 2020-03-25 PROCEDURE — 85025 COMPLETE CBC W/AUTO DIFF WBC: CPT

## 2020-03-25 PROCEDURE — 84439 ASSAY OF FREE THYROXINE: CPT

## 2020-03-25 PROCEDURE — 82607 VITAMIN B-12: CPT

## 2020-03-25 PROCEDURE — 80061 LIPID PANEL: CPT

## 2020-03-26 ENCOUNTER — TELEPHONE (OUTPATIENT)
Dept: INTERNAL MEDICINE CLINIC | Facility: CLINIC | Age: 75
End: 2020-03-26

## 2020-03-26 ENCOUNTER — TELEMEDICINE (OUTPATIENT)
Dept: INTERNAL MEDICINE CLINIC | Facility: CLINIC | Age: 75
End: 2020-03-26
Payer: COMMERCIAL

## 2020-03-26 DIAGNOSIS — E03.8 SUBCLINICAL HYPOTHYROIDISM: ICD-10-CM

## 2020-03-26 DIAGNOSIS — G62.9 NEUROPATHY: ICD-10-CM

## 2020-03-26 DIAGNOSIS — E78.2 MIXED HYPERLIPIDEMIA: ICD-10-CM

## 2020-03-26 DIAGNOSIS — G25.0 ESSENTIAL TREMOR: ICD-10-CM

## 2020-03-26 DIAGNOSIS — I05.9 MITRAL VALVE DISORDER: ICD-10-CM

## 2020-03-26 DIAGNOSIS — I10 BENIGN HYPERTENSION: ICD-10-CM

## 2020-03-26 DIAGNOSIS — I48.91 ATRIAL FIBRILLATION, UNSPECIFIED TYPE (HCC): Primary | ICD-10-CM

## 2020-03-26 PROCEDURE — G2012 BRIEF CHECK IN BY MD/QHP: HCPCS | Performed by: NURSE PRACTITIONER

## 2020-03-26 RX ORDER — GABAPENTIN 300 MG/1
CAPSULE ORAL
Qty: 90 CAPSULE | Refills: 3 | Status: SHIPPED | OUTPATIENT
Start: 2020-03-26 | End: 2021-03-08

## 2020-03-26 NOTE — PROGRESS NOTES
Virtual Regular Visit    Problem List Items Addressed This Visit        Cardiovascular and Mediastinum    Atrial fibrillation ([address] ) - Primary    Benign hypertension    Mitral valve disorder       Nervous and Auditory    Essential tremor       Other    Hyperlipidemia          BMI Counseling: There is no height or weight on file to calculate BMI  The BMI is above normal  Nutrition recommendations include decreasing portion sizes and consuming healthier snacks  Exercise recommendations include exercising 3-5 times per week  No pharmacotherapy was ordered  Reason for visit is telephone visit    Encounter provider HANNY Espinal    Provider located at [address] [place] [address]      Recent Visits  No visits were found meeting these conditions  Showing recent visits within past 7 days and meeting all other requirements     Future Appointments  No visits were found meeting these conditions  Showing future appointments within next 150 days and meeting all other requirements        After connecting through [employer], the patient was identified by name and date of birth  Rob Elian was informed that this is a telemedicine visit and that the visit is being conducted through telephone which may not be secure and therefore, might not be HIPAA-compliant  My office door was closed  No one else was in the room  She acknowledged consent and understanding of privacy and security of the video platform  The patient has agreed to participate and understands they can discontinue the visit at any time  Teo Hardy is a 69 y o  female  Who requested a telephone visit she is doing well  No new physical concerns trying to stay active but isolate self she is going for walks had nose bleed 2x this week inr is stable   No home bps but taking meds  Headaches only occasionally and taking fioricet as needed  neurontin bid and then 400mg at bedtime and neuropathy is controlled well  Moods are good trying to distract self from virus talk        Past Medical History:   Diagnosis Date    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    Atrial fibrillation (HCC)     Hashimoto's thyroiditis     Hyperlipidemia     Hypertension     IBS (irritable bowel syndrome)     Migraine     Mitral valve disorder     Non-toxic uninodular goiter        Past Surgical History:   Procedure Laterality Date    APPENDECTOMY      BREAST BIOPSY      BREAST CYST EXCISION Right 01/01/1977    COLONOSCOPY  08/08/2011    VALVE REPLACEMENT  01/04/2017    mitral valve replacement, 6/5/14       Current Outpatient Medications   Medication Sig Dispense Refill    amLODIPine (NORVASC) 5 mg tablet Take 1 tablet (5 mg total) by mouth every morning 90 tablet 3    aspirin 81 mg chewable tablet Chew daily      butalbital-acetaminophen-caffeine (FIORICET,ESGIC) -40 mg per tablet TAKE 1 TABLET BY MOUTH EVERY 4-6 HOURS AS NEEDED FOR HEADACHE 30 tablet 2    Calcium Carbonate (CALTRATE 600) 1500 (600 Ca) MG TABS Take by mouth daily       clobetasol (OLUX) 0 05 % topical foam Apply topically 2 (two) times a day (Patient taking differently: Apply topically as needed ) 100 g 2    clobetasol (TEMOVATE) 0 05 % cream Apply to elbows bid (Patient taking differently: as needed Apply to elbows bid) 30 g 3    clobetasol (TEMOVATE) 0 05 % ointment Apply topically 2 (two) times a day Apply twice a day to foot x 7-10 (Patient taking differently: Apply topically as needed Apply twice a day to foot x 7-10) 30 g 3    Flaxseed, Linseed, (FLAXSEED OIL) 1200 MG CAPS Take by mouth daily       gabapentin (NEURONTIN) 100 mg capsule TAKE 2 CAPSULES BY MOUTH TWICE A DAY AND 1 CAPSULE AT BEDTIME WITH 300 MG  capsule 1    glycopyrrolate (ROBINUL) 2 MG tablet Take by mouth as needed       LORazepam (ATIVAN) 1 mg tablet Take 1 po bid prn (Patient taking differently: as needed Take 1 po bid prn) 30 tablet 2    losartan (COZAAR) 100 MG tablet Take 1 tablet (100 mg total) by mouth daily 90 tablet 1    Misc Natural Products (OSTEO BI-FLEX TRIPLE STRENGTH) TABS Take by mouth daily       propranolol (INDERAL) 20 mg tablet TAKE 2 TABLETS IN AM AND 1 TABLET IN EVENING 270 tablet 1    simvastatin (ZOCOR) 10 mg tablet Take 1 tablet (10 mg total) by mouth daily 90 tablet 3    warfarin (COUMADIN) 1 mg tablet Take 1 daily as directed - BRAND NECESSARY!!!!!!! 90 tablet 3    warfarin (COUMADIN) 2 mg tablet Take 1 tablet daily as directed  Dispense BRAND ONLY 90 tablet 3    warfarin (COUMADIN) 3 mg tablet Take one tab daily as directed by doctor- BRAND NECESSARY 90 tablet 3     Current Facility-Administered Medications   Medication Dose Route Frequency Provider Last Rate Last Dose    cyanocobalamin injection 1,000 mcg  1,000 mcg Intramuscular Q30 Days HANNY Roberson   1,000 mcg at 05/14/19 6619        Allergies   Allergen Reactions    Enablex [Darifenacin] Other (See Comments)     Sweating, HA, urinary hesitancy, flushing of face    Fentanyl Nausea Only and Vomiting    Hyoscyamine Palpitations    Dicyclomine Other (See Comments)     Jittery, disorientation, light HAs    Hydromorphone Other (See Comments)     Per pt does not remember the type or severity level    Midazolam Other (See Comments)     Per pt does not remember the type or severity level    Sulfamethoxazole-Trimethoprim Nausea Only    Venlafaxine Other (See Comments)     Urinary urgency, polyuria    Codeine Polt-Chlorphen Polt Er Headache    Ultracet [Tramadol-Acetaminophen] Nausea Only and Vomiting       Review of Systems   Constitutional: Negative for appetite change, chills, diaphoresis, fatigue, fever and unexpected weight change  HENT: Negative for postnasal drip and sneezing  Eyes: Negative for visual disturbance  Respiratory: Negative for chest tightness and shortness of breath      Cardiovascular: Negative for chest pain, palpitations and leg swelling  Gastrointestinal: Negative for abdominal pain and blood in stool  Endocrine: Negative for cold intolerance, heat intolerance, polydipsia, polyphagia and polyuria  Genitourinary: Negative for difficulty urinating, dysuria, frequency and urgency  Musculoskeletal: Negative for arthralgias and myalgias  Skin: Negative for rash and wound  Neurological: Negative for dizziness, weakness, light-headedness and headaches  Burning to feet and legs   Hematological: Negative for adenopathy  Psychiatric/Behavioral: Negative for confusion, dysphoric mood and sleep disturbance  The patient is not nervous/anxious  Assessment and plan:    afib- stable on coumadin and beta blocker following with cardiology    Neuropathy stable on neurontin refilled the 300mg tabs today    htn continue same medication    Subclinical hypothroidism no indication for medication continue to monitor    Tremor improved on propanolol    Follow up 6 months sooner if needed    Macrocytosis in the setting of b 12 def continue supplement if continues to increase complete lab work up    I spent 35 minutes with the patient during this visit  This visit would normally be a [address]

## 2020-04-01 ENCOUNTER — HOSPITAL ENCOUNTER (OUTPATIENT)
Dept: NON INVASIVE DIAGNOSTICS | Facility: CLINIC | Age: 75
Discharge: HOME | End: 2020-04-01
Payer: COMMERCIAL

## 2020-04-01 DIAGNOSIS — I48.19 PERSISTENT ATRIAL FIBRILLATION (HCC): ICD-10-CM

## 2020-04-01 DIAGNOSIS — I05.9 MITRAL VALVE DISORDER: ICD-10-CM

## 2020-04-01 PROCEDURE — 93306 TTE W/DOPPLER COMPLETE: CPT | Performed by: INTERNAL MEDICINE

## 2020-04-01 PROCEDURE — 93306 TTE W/DOPPLER COMPLETE: CPT

## 2020-04-02 DIAGNOSIS — G25.0 ESSENTIAL TREMOR: ICD-10-CM

## 2020-04-02 RX ORDER — PROPRANOLOL HYDROCHLORIDE 20 MG/1
TABLET ORAL
Qty: 270 TABLET | Refills: 1 | Status: SHIPPED | OUTPATIENT
Start: 2020-04-02 | End: 2020-06-30 | Stop reason: SDUPTHER

## 2020-04-18 ENCOUNTER — HOSPITAL ENCOUNTER (EMERGENCY)
Facility: HOSPITAL | Age: 75
Discharge: HOME | End: 2020-04-18
Attending: EMERGENCY MEDICINE | Admitting: EMERGENCY MEDICINE
Payer: COMMERCIAL

## 2020-04-18 VITALS
SYSTOLIC BLOOD PRESSURE: 175 MMHG | WEIGHT: 178.35 LBS | TEMPERATURE: 98.4 F | RESPIRATION RATE: 17 BRPM | OXYGEN SATURATION: 98 % | DIASTOLIC BLOOD PRESSURE: 90 MMHG | HEART RATE: 62 BPM | BODY MASS INDEX: 26.34 KG/M2

## 2020-04-18 DIAGNOSIS — R04.0 EPISTAXIS: Primary | ICD-10-CM

## 2020-04-18 LAB
APTT PPP: 43 SECONDS (ref 23–37)
BASOPHILS # BLD AUTO: 0.06 THOUSANDS/ΜL (ref 0–0.1)
BASOPHILS NFR BLD AUTO: 1 % (ref 0–1)
EOSINOPHIL # BLD AUTO: 0.27 THOUSAND/ΜL (ref 0–0.61)
EOSINOPHIL NFR BLD AUTO: 3 % (ref 0–6)
ERYTHROCYTE [DISTWIDTH] IN BLOOD BY AUTOMATED COUNT: 12.5 % (ref 11.6–15.1)
HCT VFR BLD AUTO: 41.9 % (ref 34.8–46.1)
HGB BLD-MCNC: 13.2 G/DL (ref 11.5–15.4)
IMM GRANULOCYTES # BLD AUTO: 0.03 THOUSAND/UL (ref 0–0.2)
IMM GRANULOCYTES NFR BLD AUTO: 0 % (ref 0–2)
INR PPP: 2.73 (ref 0.84–1.19)
LYMPHOCYTES # BLD AUTO: 1.32 THOUSANDS/ΜL (ref 0.6–4.47)
LYMPHOCYTES NFR BLD AUTO: 15 % (ref 14–44)
MCH RBC QN AUTO: 31.1 PG (ref 26.8–34.3)
MCHC RBC AUTO-ENTMCNC: 31.5 G/DL (ref 31.4–37.4)
MCV RBC AUTO: 99 FL (ref 82–98)
MONOCYTES # BLD AUTO: 0.63 THOUSAND/ΜL (ref 0.17–1.22)
MONOCYTES NFR BLD AUTO: 7 % (ref 4–12)
NEUTROPHILS # BLD AUTO: 6.82 THOUSANDS/ΜL (ref 1.85–7.62)
NEUTS SEG NFR BLD AUTO: 74 % (ref 43–75)
NRBC BLD AUTO-RTO: 0 /100 WBCS
PLATELET # BLD AUTO: 229 THOUSANDS/UL (ref 149–390)
PMV BLD AUTO: 9.5 FL (ref 8.9–12.7)
PROTHROMBIN TIME: 29.3 SECONDS (ref 11.6–14.5)
RBC # BLD AUTO: 4.24 MILLION/UL (ref 3.81–5.12)
WBC # BLD AUTO: 9.13 THOUSAND/UL (ref 4.31–10.16)

## 2020-04-18 PROCEDURE — 85025 COMPLETE CBC W/AUTO DIFF WBC: CPT | Performed by: EMERGENCY MEDICINE

## 2020-04-18 PROCEDURE — 85610 PROTHROMBIN TIME: CPT | Performed by: EMERGENCY MEDICINE

## 2020-04-18 PROCEDURE — 99283 EMERGENCY DEPT VISIT LOW MDM: CPT

## 2020-04-18 PROCEDURE — 99284 EMERGENCY DEPT VISIT MOD MDM: CPT | Performed by: EMERGENCY MEDICINE

## 2020-04-18 PROCEDURE — 36415 COLL VENOUS BLD VENIPUNCTURE: CPT | Performed by: EMERGENCY MEDICINE

## 2020-04-18 PROCEDURE — 30903 CONTROL OF NOSEBLEED: CPT | Performed by: EMERGENCY MEDICINE

## 2020-04-18 PROCEDURE — 85730 THROMBOPLASTIN TIME PARTIAL: CPT | Performed by: EMERGENCY MEDICINE

## 2020-04-22 DIAGNOSIS — I10 HYPERTENSION, ESSENTIAL: ICD-10-CM

## 2020-04-22 RX ORDER — AMLODIPINE BESYLATE 5 MG/1
TABLET ORAL
Qty: 90 TABLET | Refills: 3 | Status: SHIPPED | OUTPATIENT
Start: 2020-04-22 | End: 2021-01-17

## 2020-04-28 DIAGNOSIS — I48.91 ATRIAL FIBRILLATION, UNSPECIFIED TYPE (HCC): ICD-10-CM

## 2020-04-29 RX ORDER — WARFARIN SODIUM 1 MG
TABLET ORAL
Qty: 90 TABLET | Refills: 3 | Status: SHIPPED | OUTPATIENT
Start: 2020-04-29 | End: 2020-11-17 | Stop reason: RX

## 2020-05-11 ENCOUNTER — ANTICOAG VISIT (OUTPATIENT)
Dept: CARDIOLOGY CLINIC | Facility: CLINIC | Age: 75
End: 2020-05-11

## 2020-05-11 ENCOUNTER — APPOINTMENT (OUTPATIENT)
Dept: LAB | Facility: CLINIC | Age: 75
End: 2020-05-11
Payer: COMMERCIAL

## 2020-05-11 DIAGNOSIS — Z95.2 HISTORY OF HEART VALVE REPLACEMENT: ICD-10-CM

## 2020-05-26 DIAGNOSIS — E78.2 COMBINED HYPERLIPIDEMIA: ICD-10-CM

## 2020-05-26 RX ORDER — SIMVASTATIN 10 MG
TABLET ORAL
Qty: 90 TABLET | Refills: 3 | Status: SHIPPED | OUTPATIENT
Start: 2020-05-26 | End: 2021-04-12

## 2020-05-28 ENCOUNTER — HOSPITAL ENCOUNTER (OUTPATIENT)
Dept: MAMMOGRAPHY | Facility: CLINIC | Age: 75
Discharge: HOME | End: 2020-05-28
Payer: COMMERCIAL

## 2020-05-28 DIAGNOSIS — Z12.39 SCREENING FOR BREAST CANCER: ICD-10-CM

## 2020-05-28 PROCEDURE — 77063 BREAST TOMOSYNTHESIS BI: CPT

## 2020-05-28 PROCEDURE — 77067 SCR MAMMO BI INCL CAD: CPT

## 2020-06-04 ENCOUNTER — HOSPITAL ENCOUNTER (OUTPATIENT)
Dept: MAMMOGRAPHY | Facility: CLINIC | Age: 75
Discharge: HOME | End: 2020-06-04
Payer: COMMERCIAL

## 2020-06-04 ENCOUNTER — HOSPITAL ENCOUNTER (OUTPATIENT)
Dept: ULTRASOUND IMAGING | Facility: CLINIC | Age: 75
Discharge: HOME | End: 2020-06-04
Payer: COMMERCIAL

## 2020-06-04 ENCOUNTER — TRANSCRIBE ORDERS (OUTPATIENT)
Dept: MAMMOGRAPHY | Facility: CLINIC | Age: 75
End: 2020-06-04

## 2020-06-04 VITALS — WEIGHT: 175 LBS | BODY MASS INDEX: 25.92 KG/M2 | HEIGHT: 69 IN

## 2020-06-04 DIAGNOSIS — R92.8 ABNORMAL MAMMOGRAM: ICD-10-CM

## 2020-06-04 DIAGNOSIS — Z12.31 OTHER SCREENING MAMMOGRAM: Primary | ICD-10-CM

## 2020-06-04 PROCEDURE — 77065 DX MAMMO INCL CAD UNI: CPT

## 2020-06-04 PROCEDURE — 76642 ULTRASOUND BREAST LIMITED: CPT

## 2020-06-04 PROCEDURE — G0279 TOMOSYNTHESIS, MAMMO: HCPCS

## 2020-06-08 ENCOUNTER — TELEPHONE (OUTPATIENT)
Dept: CARDIOLOGY CLINIC | Facility: CLINIC | Age: 75
End: 2020-06-08

## 2020-06-08 ENCOUNTER — ANTICOAG VISIT (OUTPATIENT)
Dept: CARDIOLOGY CLINIC | Facility: CLINIC | Age: 75
End: 2020-06-08

## 2020-06-08 ENCOUNTER — APPOINTMENT (OUTPATIENT)
Dept: LAB | Facility: CLINIC | Age: 75
End: 2020-06-08
Payer: COMMERCIAL

## 2020-06-08 DIAGNOSIS — Z95.2 HISTORY OF HEART VALVE REPLACEMENT: ICD-10-CM

## 2020-06-08 NOTE — TELEPHONE ENCOUNTER
S/w pt  States she did not do anything different  No new meds or alcohol intake   Instructed to hold x 2 days then take 2/4/4 alt and retest again in 1 week

## 2020-06-10 DIAGNOSIS — G62.9 NEUROPATHY: ICD-10-CM

## 2020-06-11 RX ORDER — GABAPENTIN 100 MG/1
CAPSULE ORAL
Qty: 90 CAPSULE | Refills: 0 | Status: SHIPPED | OUTPATIENT
Start: 2020-06-11 | End: 2020-06-15 | Stop reason: SDUPTHER

## 2020-06-15 DIAGNOSIS — G62.9 NEUROPATHY: ICD-10-CM

## 2020-06-15 RX ORDER — GABAPENTIN 100 MG/1
CAPSULE ORAL
Qty: 90 CAPSULE | Refills: 0 | Status: SHIPPED | OUTPATIENT
Start: 2020-06-15 | End: 2020-06-17 | Stop reason: SDUPTHER

## 2020-06-17 ENCOUNTER — APPOINTMENT (OUTPATIENT)
Dept: LAB | Facility: CLINIC | Age: 75
End: 2020-06-17
Payer: COMMERCIAL

## 2020-06-17 ENCOUNTER — ANTICOAG VISIT (OUTPATIENT)
Dept: CARDIOLOGY CLINIC | Facility: CLINIC | Age: 75
End: 2020-06-17

## 2020-06-17 DIAGNOSIS — I48.91 ATRIAL FIBRILLATION, UNSPECIFIED TYPE (HCC): ICD-10-CM

## 2020-06-17 DIAGNOSIS — Z95.2 HISTORY OF HEART VALVE REPLACEMENT: ICD-10-CM

## 2020-06-17 DIAGNOSIS — Z79.01 LONG TERM (CURRENT) USE OF ANTICOAGULANTS: ICD-10-CM

## 2020-06-17 DIAGNOSIS — G62.9 NEUROPATHY: ICD-10-CM

## 2020-06-17 LAB
INR PPP: 3.26 (ref 0.84–1.19)
PROTHROMBIN TIME: 32.7 SECONDS (ref 11.6–14.5)

## 2020-06-17 PROCEDURE — 85610 PROTHROMBIN TIME: CPT

## 2020-06-17 PROCEDURE — 36415 COLL VENOUS BLD VENIPUNCTURE: CPT

## 2020-06-17 RX ORDER — GABAPENTIN 100 MG/1
100 CAPSULE ORAL 2 TIMES DAILY
Qty: 180 CAPSULE | Refills: 1 | Status: SHIPPED | OUTPATIENT
Start: 2020-06-17 | End: 2020-06-18 | Stop reason: SDUPTHER

## 2020-06-18 RX ORDER — GABAPENTIN 100 MG/1
100 CAPSULE ORAL 2 TIMES DAILY
Qty: 180 CAPSULE | Refills: 1 | Status: SHIPPED | OUTPATIENT
Start: 2020-06-18 | End: 2020-10-28 | Stop reason: SDUPTHER

## 2020-06-30 ENCOUNTER — OFFICE VISIT (OUTPATIENT)
Dept: CARDIOLOGY CLINIC | Facility: CLINIC | Age: 75
End: 2020-06-30
Payer: COMMERCIAL

## 2020-06-30 VITALS
BODY MASS INDEX: 25.77 KG/M2 | WEIGHT: 174 LBS | SYSTOLIC BLOOD PRESSURE: 124 MMHG | HEIGHT: 69 IN | TEMPERATURE: 98.7 F | DIASTOLIC BLOOD PRESSURE: 62 MMHG | OXYGEN SATURATION: 96 % | HEART RATE: 57 BPM

## 2020-06-30 DIAGNOSIS — Z79.01 CHRONIC ANTICOAGULATION: ICD-10-CM

## 2020-06-30 DIAGNOSIS — I10 ESSENTIAL HYPERTENSION: ICD-10-CM

## 2020-06-30 DIAGNOSIS — I48.20 CHRONIC ATRIAL FIBRILLATION (HCC): Primary | ICD-10-CM

## 2020-06-30 DIAGNOSIS — G25.0 ESSENTIAL TREMOR: ICD-10-CM

## 2020-06-30 DIAGNOSIS — I05.9 MITRAL VALVE DISORDER: ICD-10-CM

## 2020-06-30 PROCEDURE — 3078F DIAST BP <80 MM HG: CPT | Performed by: INTERNAL MEDICINE

## 2020-06-30 PROCEDURE — 3074F SYST BP LT 130 MM HG: CPT | Performed by: INTERNAL MEDICINE

## 2020-06-30 PROCEDURE — 1036F TOBACCO NON-USER: CPT | Performed by: INTERNAL MEDICINE

## 2020-06-30 PROCEDURE — 3008F BODY MASS INDEX DOCD: CPT | Performed by: INTERNAL MEDICINE

## 2020-06-30 PROCEDURE — 1160F RVW MEDS BY RX/DR IN RCRD: CPT | Performed by: INTERNAL MEDICINE

## 2020-06-30 PROCEDURE — 4040F PNEUMOC VAC/ADMIN/RCVD: CPT | Performed by: INTERNAL MEDICINE

## 2020-06-30 PROCEDURE — 99214 OFFICE O/P EST MOD 30 MIN: CPT | Performed by: INTERNAL MEDICINE

## 2020-06-30 RX ORDER — PROPRANOLOL HYDROCHLORIDE 20 MG/1
TABLET ORAL
Qty: 270 TABLET | Refills: 1
Start: 2020-06-30 | End: 2020-10-01

## 2020-07-09 ENCOUNTER — ANTICOAG VISIT (OUTPATIENT)
Dept: CARDIOLOGY CLINIC | Facility: CLINIC | Age: 75
End: 2020-07-09

## 2020-07-09 ENCOUNTER — APPOINTMENT (OUTPATIENT)
Dept: LAB | Facility: CLINIC | Age: 75
End: 2020-07-09
Payer: COMMERCIAL

## 2020-07-09 DIAGNOSIS — Z95.2 HISTORY OF HEART VALVE REPLACEMENT: ICD-10-CM

## 2020-07-09 DIAGNOSIS — I48.21 PERMANENT ATRIAL FIBRILLATION (HCC): ICD-10-CM

## 2020-07-09 NOTE — PROGRESS NOTES
Pt was taking as described  No new meds or alcohol intake   Instructed to hold tonight then take 4mg M/w/f, 2mg the rest and retest again in 1 week

## 2020-07-16 ENCOUNTER — ANTICOAG VISIT (OUTPATIENT)
Dept: CARDIOLOGY CLINIC | Facility: CLINIC | Age: 75
End: 2020-07-16

## 2020-07-16 ENCOUNTER — APPOINTMENT (OUTPATIENT)
Dept: LAB | Facility: CLINIC | Age: 75
End: 2020-07-16
Payer: COMMERCIAL

## 2020-07-16 DIAGNOSIS — I48.91 ATRIAL FIBRILLATION, UNSPECIFIED TYPE (HCC): ICD-10-CM

## 2020-07-16 DIAGNOSIS — Z79.01 LONG TERM (CURRENT) USE OF ANTICOAGULANTS: ICD-10-CM

## 2020-07-16 DIAGNOSIS — Z95.2 HISTORY OF HEART VALVE REPLACEMENT: ICD-10-CM

## 2020-07-16 LAB
INR PPP: 2.82 (ref 0.84–1.19)
PROTHROMBIN TIME: 29.5 SECONDS (ref 11.6–14.5)

## 2020-07-16 PROCEDURE — 36415 COLL VENOUS BLD VENIPUNCTURE: CPT

## 2020-07-16 PROCEDURE — 85610 PROTHROMBIN TIME: CPT

## 2020-07-19 DIAGNOSIS — R51.9 NONINTRACTABLE HEADACHE, UNSPECIFIED CHRONICITY PATTERN, UNSPECIFIED HEADACHE TYPE: ICD-10-CM

## 2020-07-20 DIAGNOSIS — R51.9 NONINTRACTABLE HEADACHE, UNSPECIFIED CHRONICITY PATTERN, UNSPECIFIED HEADACHE TYPE: ICD-10-CM

## 2020-07-20 RX ORDER — BUTALBITAL, ACETAMINOPHEN AND CAFFEINE 50; 325; 40 MG/1; MG/1; MG/1
TABLET ORAL
Qty: 30 TABLET | Refills: 2 | Status: SHIPPED | OUTPATIENT
Start: 2020-07-20 | End: 2020-07-20 | Stop reason: SDUPTHER

## 2020-07-20 NOTE — TELEPHONE ENCOUNTER
Refill on:  butalbital-acetaminophen-caffeine (FIORICET,ESGIC) -40 mg per     glycopyrrolate (ROBINUL) 2 MG tablet    [facility]   [address] [facility]

## 2020-07-21 RX ORDER — BUTALBITAL, ACETAMINOPHEN AND CAFFEINE 50; 325; 40 MG/1; MG/1; MG/1
1 TABLET ORAL EVERY 4 HOURS PRN
Qty: 30 TABLET | Refills: 2 | Status: SHIPPED | OUTPATIENT
Start: 2020-07-21 | End: 2021-08-18

## 2020-07-21 RX ORDER — GLYCOPYRROLATE 2 MG/1
TABLET ORAL AS NEEDED
OUTPATIENT
Start: 2020-07-21

## 2020-07-23 RX ORDER — GLYCOPYRROLATE 2 MG/1
TABLET ORAL AS NEEDED
OUTPATIENT
Start: 2020-07-23

## 2020-07-23 NOTE — TELEPHONE ENCOUNTER
[facility] calling in for a refill on;  glycopyrrolate (ROBINUL) 2 MG tablet    Patient states she does not use this that much but she is now out    [facility] [address] [facility], [place], [facility]

## 2020-07-23 NOTE — TELEPHONE ENCOUNTER
Pt  Stopped  By  To  Check  About  Her  rx glycopyrrolate 2 mg  Pt  Said  SHE  TAKES  IT  FOR  HER  IBS  IF  SHE  CAN'T  GET  THIS  RX   WOULD  DEGLER GIVE HER  SOMETHING  ELSE  FOR  HER  IBS   [facility]    ANY QUESTIONS   CALL  PT  [phone]

## 2020-07-23 NOTE — TELEPHONE ENCOUNTER
Spoke to pt and she stated she need the medication for IBS  If Claudette Cruz want to prescribe something else that is ok with her because this medication has not been working good for her anyway

## 2020-07-28 ENCOUNTER — TELEMEDICINE (OUTPATIENT)
Dept: INTERNAL MEDICINE CLINIC | Facility: CLINIC | Age: 75
End: 2020-07-28
Payer: COMMERCIAL

## 2020-07-28 DIAGNOSIS — R30.0 DYSURIA: ICD-10-CM

## 2020-07-28 DIAGNOSIS — K29.70 GASTRITIS WITHOUT BLEEDING, UNSPECIFIED CHRONICITY, UNSPECIFIED GASTRITIS TYPE: Primary | ICD-10-CM

## 2020-07-28 PROCEDURE — 99442 PR PHYS/QHP TELEPHONE EVALUATION 11-20 MIN: CPT | Performed by: NURSE PRACTITIONER

## 2020-07-28 RX ORDER — PANTOPRAZOLE SODIUM 40 MG/1
40 TABLET, DELAYED RELEASE ORAL DAILY
Qty: 30 TABLET | Refills: 2 | Status: SHIPPED | OUTPATIENT
Start: 2020-07-28 | End: 2020-10-29

## 2020-07-28 NOTE — PROGRESS NOTES
Virtual Brief Visit    Assessment/Plan:    Her symptoms are not suggestive of IBS but more of some type of gastritis or maybe even early 1st ulcer  I am not comfortable prescribing glycopyrrolate-  But discussed with the patient we can try Protonix  Risk benefit side effects medication discussed with the patient  If not improving her symptoms she should contact me  One day of dysuria check UA and culture  Problem List Items Addressed This Visit     None      Visit Diagnoses     Gastritis without bleeding, unspecified chronicity, unspecified gastritis type    -  Primary    Relevant Medications    pantoprazole (PROTONIX) 40 mg tablet    Dysuria        Relevant Orders    UA (URINE) with reflex to Scope    Urine culture                Reason for visit is   Chief Complaint   Patient presents with    Virtual Regular Visit     review medications    Virtual Brief Visit        Encounter provider HANNY Cornell    Provider located at [address] [place] [address]    Recent Visits  No visits were found meeting these conditions  Showing recent visits within past 7 days and meeting all other requirements     Today's Visits  Date Type Provider Dept   07/28/20 Telemedicine Ayaan Nicholson, [facility]  today's visits and meeting all other requirements     Future Appointments  No visits were found meeting these conditions  Showing future appointments within next 150 days and meeting all other requirements        After connecting through telephone, the patient was identified by name and date of birth  Rossi Saba was informed that this is a telemedicine visit and that the visit is being conducted through telephone  My office door was closed  No one else was in the room  She acknowledged consent and understanding of privacy and security of the platform   The patient has agreed to participate and understands she can discontinue the visit at any time  Patient is aware this is a billable service  Elizabeth Astorga is a 69 y o  female   Patient states for years she was diagnosed with IBS  She was given a medication called glycopyrrolate  She states she uses it sparingly and only several times a year  She was requesting this medication to be refilled, unfortunately ran reviewing the classifications medication I was not comfortable refilling it  Additionally we have never had a direct conversation about her IBS  She states she generally will just feel an abdominal fullness and bloating she does not get heartburn or indigestion she has no nausea no constipation or diarrhea  But she will take this medication her symptoms will improve  She states in the past I have given her another medication that started with a P she cannot recall what it is but she states it was taken off the market    In January she had a swallowing study that was essentially normal       Past Medical History:   Diagnosis Date    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    Atrial fibrillation (HCC)     Hashimoto's thyroiditis     Hyperlipidemia     Hypertension     IBS (irritable bowel syndrome)     Migraine     Mitral valve disorder     Nasal congestion     Non-toxic uninodular goiter     Nosebleed        Past Surgical History:   Procedure Laterality Date    APPENDECTOMY      BREAST CYST EXCISION Right 01/01/1977    COLONOSCOPY  08/08/2011    VALVE REPLACEMENT  01/04/2017    mitral valve replacement, 6/5/14       Current Outpatient Medications   Medication Sig Dispense Refill    amLODIPine (NORVASC) 5 mg tablet TAKE 1 TABLET BY MOUTH EVERY DAY IN THE MORNING 90 tablet 3    aspirin 81 mg chewable tablet Chew daily      butalbital-acetaminophen-caffeine (FIORICET,ESGIC) -40 mg per tablet Take 1 tablet by mouth every 4 (four) hours as needed for headaches 30 tablet 2    Calcium Carbonate (CALTRATE 600) 1500 (600 Ca) MG TABS Take by mouth daily       clobetasol (OLUX) 0 05 % topical foam Apply topically 2 (two) times a day 100 g 2    clobetasol (TEMOVATE) 0 05 % cream Apply to elbows bid (Patient taking differently: as needed Apply to elbows bid) 30 g 3    clobetasol (TEMOVATE) 0 05 % ointment Apply topically 2 (two) times a day Apply twice a day to foot x 7-10 (Patient taking differently: Apply topically as needed Apply twice a day to foot x 7-10) 30 g 3    COUMADIN 1 MG tablet TAKE 1 DAILY AS DIRECTED - BRAND NECESSARY!!!!!!! 90 tablet 3    Flaxseed, Linseed, (FLAXSEED OIL) 1200 MG CAPS Take by mouth daily       gabapentin (NEURONTIN) 100 mg capsule Take 1 capsule (100 mg total) by mouth 2 (two) times a day TAKE 1 CAPSULE (100 MG TOTAL) BY MOUTH  capsule 1    gabapentin (NEURONTIN) 300 mg capsule Take a 300mg + 100mg at bedtime 90 capsule 3    LORazepam (ATIVAN) 1 mg tablet Take 1 po bid prn (Patient taking differently: as needed Take 1 po bid prn) 30 tablet 2    losartan (COZAAR) 100 MG tablet Take 1 tablet (100 mg total) by mouth daily 90 tablet 1    Misc Natural Products (OSTEO BI-FLEX TRIPLE STRENGTH) TABS Take by mouth daily       propranolol (INDERAL) 20 mg tablet 1 tab twice a day 270 tablet 1    simvastatin (ZOCOR) 10 mg tablet TAKE 1 TABLET BY MOUTH EVERY DAY 90 tablet 3    warfarin (COUMADIN) 2 mg tablet Take 1 tablet daily as directed   Dispense BRAND ONLY 90 tablet 3    warfarin (COUMADIN) 3 mg tablet Take one tab daily as directed by doctor- BRAND NECESSARY 90 tablet 3    glycopyrrolate (ROBINUL) 2 MG tablet Take by mouth as needed       pantoprazole (PROTONIX) 40 mg tablet Take 1 tablet (40 mg total) by mouth daily 30 tablet 2     Current Facility-Administered Medications   Medication Dose Route Frequency Provider Last Rate Last Dose    cyanocobalamin injection 1,000 mcg  1,000 mcg Intramuscular Q30 Days HANNY [place]   1,000 mcg at 05/14/19 0957        Allergies   Allergen Reactions    Enablex [Darifenacin] Other (See Comments)     Sweating, HA, urinary hesitancy, flushing of face    Fentanyl Nausea Only and Vomiting    Hyoscyamine Palpitations    Dicyclomine Other (See Comments)     Jittery, disorientation, light HAs    Hydromorphone Other (See Comments)     Per pt does not remember the type or severity level    Midazolam Other (See Comments)     Per pt does not remember the type or severity level    Sulfamethoxazole-Trimethoprim Nausea Only    Venlafaxine Other (See Comments)     Urinary urgency, polyuria    Codeine Polt-Chlorphen Polt Er Headache    Ultracet [Tramadol-Acetaminophen] Nausea Only and Vomiting       Review of Systems   Constitutional: Negative for appetite change, chills, diaphoresis, fatigue, fever and unexpected weight change  HENT: Negative for postnasal drip and sneezing  Eyes: Negative for visual disturbance  Respiratory: Negative for chest tightness and shortness of breath  Cardiovascular: Negative for chest pain, palpitations and leg swelling  Gastrointestinal: Positive for abdominal distention and abdominal pain  Negative for blood in stool  Endocrine: Negative for cold intolerance, heat intolerance, polydipsia, polyphagia and polyuria  Genitourinary: Negative for difficulty urinating, dysuria, frequency and urgency  Musculoskeletal: Negative for arthralgias and myalgias  Skin: Negative for rash and wound  Neurological: Negative for dizziness, weakness, light-headedness and headaches  Hematological: Negative for adenopathy  Psychiatric/Behavioral: Negative for confusion, dysphoric mood and sleep disturbance  The patient is not nervous/anxious  There were no vitals filed for this visit  I spent 15 minutes directly with the patient during this visit  This visit would be a [address] [place] [address] Avenue acknowledges that she has consented to an online visit or consultation   She understands that the online visit is based solely on information provided by her, and that, in the absence of a face-to-face physical evaluation by the physician, the diagnosis she receives is both limited and provisional in terms of accuracy and completeness  This is not intended to replace a full medical face-to-face evaluation by the physician  Jimy Bales understands and accepts these terms

## 2020-07-31 ENCOUNTER — TELEPHONE (OUTPATIENT)
Dept: CARDIOLOGY CLINIC | Facility: CLINIC | Age: 75
End: 2020-07-31

## 2020-07-31 ENCOUNTER — ANTICOAG VISIT (OUTPATIENT)
Dept: CARDIOLOGY CLINIC | Facility: CLINIC | Age: 75
End: 2020-07-31

## 2020-07-31 ENCOUNTER — APPOINTMENT (OUTPATIENT)
Dept: LAB | Facility: CLINIC | Age: 75
End: 2020-07-31
Payer: COMMERCIAL

## 2020-07-31 DIAGNOSIS — Z95.2 HISTORY OF HEART VALVE REPLACEMENT: ICD-10-CM

## 2020-07-31 DIAGNOSIS — I48.0 PAROXYSMAL ATRIAL FIBRILLATION (HCC): Primary | ICD-10-CM

## 2020-07-31 LAB
INR PPP: 2.08 (ref 0.84–1.19)
PROTHROMBIN TIME: 23.3 SECONDS (ref 11.6–14.5)

## 2020-07-31 PROCEDURE — 85610 PROTHROMBIN TIME: CPT

## 2020-07-31 PROCEDURE — 36415 COLL VENOUS BLD VENIPUNCTURE: CPT

## 2020-07-31 NOTE — PROGRESS NOTES
S/w pt   Instructed to take 4mg m/w/f/s and 2mg the rest and retest again in 2 weeksed to take 4mg m/w/f/s and 2mg the rest and retest again in 2 weeks

## 2020-08-03 ENCOUNTER — APPOINTMENT (OUTPATIENT)
Dept: LAB | Facility: CLINIC | Age: 75
End: 2020-08-03
Payer: COMMERCIAL

## 2020-08-03 DIAGNOSIS — R30.0 DYSURIA: ICD-10-CM

## 2020-08-03 LAB
BACTERIA UR QL AUTO: ABNORMAL /HPF
BILIRUB UR QL STRIP: NEGATIVE
CLARITY UR: CLEAR
COLOR UR: YELLOW
GLUCOSE UR STRIP-MCNC: NEGATIVE MG/DL
HGB UR QL STRIP.AUTO: ABNORMAL
HYALINE CASTS #/AREA URNS LPF: ABNORMAL /LPF
KETONES UR STRIP-MCNC: NEGATIVE MG/DL
LEUKOCYTE ESTERASE UR QL STRIP: NEGATIVE
NITRITE UR QL STRIP: NEGATIVE
NON-SQ EPI CELLS URNS QL MICRO: ABNORMAL /HPF
PH UR STRIP.AUTO: 6 [PH]
PROT UR STRIP-MCNC: ABNORMAL MG/DL
RBC #/AREA URNS AUTO: ABNORMAL /HPF
SP GR UR STRIP.AUTO: 1.01 (ref 1–1.03)
UROBILINOGEN UR QL STRIP.AUTO: 0.2 E.U./DL
WBC #/AREA URNS AUTO: ABNORMAL /HPF

## 2020-08-03 PROCEDURE — 81001 URINALYSIS AUTO W/SCOPE: CPT | Performed by: NURSE PRACTITIONER

## 2020-08-03 PROCEDURE — 87086 URINE CULTURE/COLONY COUNT: CPT

## 2020-08-04 ENCOUNTER — TELEPHONE (OUTPATIENT)
Dept: INTERNAL MEDICINE CLINIC | Facility: CLINIC | Age: 75
End: 2020-08-04

## 2020-08-04 DIAGNOSIS — R31.29 MICROSCOPIC HEMATURIA: Primary | ICD-10-CM

## 2020-08-04 LAB — BACTERIA UR CULT: NORMAL

## 2020-08-04 NOTE — TELEPHONE ENCOUNTER
----- Message from Ayaan Nicholson, [address]  sent at 8/4/2020 12:46 PM EDT -----  Her urine test was normal but she did have some microscopic blood in urine and shouldn't- she needs to see urology to have this evaulated further

## 2020-08-14 ENCOUNTER — APPOINTMENT (OUTPATIENT)
Dept: LAB | Facility: CLINIC | Age: 75
End: 2020-08-14
Payer: COMMERCIAL

## 2020-08-14 ENCOUNTER — ANTICOAG VISIT (OUTPATIENT)
Dept: CARDIOLOGY CLINIC | Facility: CLINIC | Age: 75
End: 2020-08-14

## 2020-08-14 DIAGNOSIS — Z95.2 HISTORY OF HEART VALVE REPLACEMENT: ICD-10-CM

## 2020-08-16 DIAGNOSIS — I48.91 ATRIAL FIBRILLATION, UNSPECIFIED TYPE (HCC): ICD-10-CM

## 2020-08-17 RX ORDER — WARFARIN SODIUM 2 MG/1
TABLET ORAL
Qty: 90 TABLET | Refills: 3 | Status: SHIPPED | OUTPATIENT
Start: 2020-08-17 | End: 2020-11-15

## 2020-08-17 RX ORDER — WARFARIN SODIUM 3 MG/1
TABLET ORAL
Qty: 90 TABLET | Refills: 3 | Status: SHIPPED | OUTPATIENT
Start: 2020-08-17 | End: 2020-11-17 | Stop reason: RX

## 2020-08-31 ENCOUNTER — APPOINTMENT (OUTPATIENT)
Dept: LAB | Facility: CLINIC | Age: 75
End: 2020-08-31
Payer: COMMERCIAL

## 2020-08-31 ENCOUNTER — TELEPHONE (OUTPATIENT)
Dept: CARDIOLOGY CLINIC | Facility: CLINIC | Age: 75
End: 2020-08-31

## 2020-08-31 NOTE — TELEPHONE ENCOUNTER
I s/w the patient and told her the Coumadin is no longer being manufactured in the [address] and I don't know why  I told her that Warfarin is only being manufactured so she will have to switch to Warfarin  She still has a lot of Coumadin left so it will be a while before she switches  I told her to let me know  when she switches so I can watch her closely to see if there is a change in her INR results

## 2020-08-31 NOTE — TELEPHONE ENCOUNTER
Pt called and lvm on office phone stating that the drug manufactures for coumadin will no longer be making that medication  Pt would like a call back to discuss her options  Arely [place]

## 2020-09-03 DIAGNOSIS — I10 ESSENTIAL HYPERTENSION: ICD-10-CM

## 2020-09-03 PROCEDURE — 4010F ACE/ARB THERAPY RXD/TAKEN: CPT | Performed by: INTERNAL MEDICINE

## 2020-09-03 RX ORDER — LOSARTAN POTASSIUM 100 MG/1
TABLET ORAL
Qty: 90 TABLET | Refills: 1 | Status: SHIPPED | OUTPATIENT
Start: 2020-09-03 | End: 2021-03-12 | Stop reason: SDUPTHER

## 2020-09-11 ENCOUNTER — APPOINTMENT (EMERGENCY)
Dept: RADIOLOGY | Facility: HOSPITAL | Age: 75
End: 2020-09-11
Payer: COMMERCIAL

## 2020-09-11 ENCOUNTER — HOSPITAL ENCOUNTER (OUTPATIENT)
Facility: HOSPITAL | Age: 75
Setting detail: OBSERVATION
Discharge: HOME | End: 2020-09-12
Attending: EMERGENCY MEDICINE | Admitting: STUDENT IN AN ORGANIZED HEALTH CARE EDUCATION/TRAINING PROGRAM
Payer: COMMERCIAL

## 2020-09-11 DIAGNOSIS — I49.3 FREQUENT PVCS: ICD-10-CM

## 2020-09-11 DIAGNOSIS — R07.9 CHEST PAIN: Primary | ICD-10-CM

## 2020-09-11 PROBLEM — E03.8 SUBCLINICAL HYPOTHYROIDISM: Status: ACTIVE | Noted: 2020-09-11

## 2020-09-11 LAB
ALBUMIN SERPL BCP-MCNC: 4 G/DL (ref 3.5–5)
ALP SERPL-CCNC: 75 U/L (ref 46–116)
ALT SERPL W P-5'-P-CCNC: 25 U/L (ref 12–78)
ANION GAP SERPL CALCULATED.3IONS-SCNC: 7 MMOL/L (ref 4–13)
APTT PPP: 42 SECONDS (ref 23–37)
AST SERPL W P-5'-P-CCNC: 28 U/L (ref 5–45)
ATRIAL RATE: 100 BPM
BASOPHILS # BLD AUTO: 0.05 THOUSANDS/ΜL (ref 0–0.1)
BASOPHILS NFR BLD AUTO: 1 % (ref 0–1)
BILIRUB DIRECT SERPL-MCNC: 0.12 MG/DL (ref 0–0.2)
BILIRUB SERPL-MCNC: 0.5 MG/DL (ref 0.2–1)
BUN SERPL-MCNC: 23 MG/DL (ref 5–25)
CALCIUM SERPL-MCNC: 9.4 MG/DL (ref 8.3–10.1)
CHLORIDE SERPL-SCNC: 105 MMOL/L (ref 100–108)
CO2 SERPL-SCNC: 30 MMOL/L (ref 21–32)
CREAT SERPL-MCNC: 0.99 MG/DL (ref 0.6–1.3)
D DIMER PPP FEU-MCNC: 0.31 UG/ML FEU
EOSINOPHIL # BLD AUTO: 0.35 THOUSAND/ΜL (ref 0–0.61)
EOSINOPHIL NFR BLD AUTO: 5 % (ref 0–6)
ERYTHROCYTE [DISTWIDTH] IN BLOOD BY AUTOMATED COUNT: 12.7 % (ref 11.6–15.1)
EST. AVERAGE GLUCOSE BLD GHB EST-MCNC: 108 MG/DL
GFR SERPL CREATININE-BSD FRML MDRD: 57 ML/MIN/1.73SQ M
GLUCOSE SERPL-MCNC: 112 MG/DL (ref 65–140)
HBA1C MFR BLD: 5.4 %
HCT VFR BLD AUTO: 38.9 % (ref 34.8–46.1)
HGB BLD-MCNC: 12.3 G/DL (ref 11.5–15.4)
IMM GRANULOCYTES # BLD AUTO: 0.05 THOUSAND/UL (ref 0–0.2)
IMM GRANULOCYTES NFR BLD AUTO: 1 % (ref 0–2)
INR PPP: 3.2 (ref 0.84–1.19)
LYMPHOCYTES # BLD AUTO: 1.62 THOUSANDS/ΜL (ref 0.6–4.47)
LYMPHOCYTES NFR BLD AUTO: 23 % (ref 14–44)
MAGNESIUM SERPL-MCNC: 2 MG/DL (ref 1.6–2.6)
MCH RBC QN AUTO: 31.2 PG (ref 26.8–34.3)
MCHC RBC AUTO-ENTMCNC: 31.6 G/DL (ref 31.4–37.4)
MCV RBC AUTO: 99 FL (ref 82–98)
MONOCYTES # BLD AUTO: 0.77 THOUSAND/ΜL (ref 0.17–1.22)
MONOCYTES NFR BLD AUTO: 11 % (ref 4–12)
NEUTROPHILS # BLD AUTO: 4.33 THOUSANDS/ΜL (ref 1.85–7.62)
NEUTS SEG NFR BLD AUTO: 59 % (ref 43–75)
NRBC BLD AUTO-RTO: 0 /100 WBCS
NT-PROBNP SERPL-MCNC: 1200 PG/ML
PLATELET # BLD AUTO: 227 THOUSANDS/UL (ref 149–390)
PMV BLD AUTO: 9.6 FL (ref 8.9–12.7)
POTASSIUM SERPL-SCNC: 5 MMOL/L (ref 3.5–5.3)
PROT SERPL-MCNC: 8.2 G/DL (ref 6.4–8.2)
PROTHROMBIN TIME: 31.3 SECONDS (ref 11.6–14.5)
QRS AXIS: 84 DEGREES
QRSD INTERVAL: 98 MS
QT INTERVAL: 376 MS
QTC INTERVAL: 441 MS
RBC # BLD AUTO: 3.94 MILLION/UL (ref 3.81–5.12)
SODIUM SERPL-SCNC: 142 MMOL/L (ref 136–145)
T WAVE AXIS: -23 DEGREES
T4 FREE SERPL-MCNC: 1.16 NG/DL (ref 0.76–1.46)
TROPONIN I SERPL-MCNC: <0.02 NG/ML
TSH SERPL DL<=0.05 MIU/L-ACNC: 3.97 UIU/ML (ref 0.36–3.74)
VENTRICULAR RATE: 83 BPM
WBC # BLD AUTO: 7.17 THOUSAND/UL (ref 4.31–10.16)

## 2020-09-11 PROCEDURE — 83735 ASSAY OF MAGNESIUM: CPT | Performed by: EMERGENCY MEDICINE

## 2020-09-11 PROCEDURE — 83880 ASSAY OF NATRIURETIC PEPTIDE: CPT | Performed by: EMERGENCY MEDICINE

## 2020-09-11 PROCEDURE — 36415 COLL VENOUS BLD VENIPUNCTURE: CPT | Performed by: EMERGENCY MEDICINE

## 2020-09-11 PROCEDURE — 85025 COMPLETE CBC W/AUTO DIFF WBC: CPT | Performed by: EMERGENCY MEDICINE

## 2020-09-11 PROCEDURE — 96360 HYDRATION IV INFUSION INIT: CPT

## 2020-09-11 PROCEDURE — 84484 ASSAY OF TROPONIN QUANT: CPT | Performed by: STUDENT IN AN ORGANIZED HEALTH CARE EDUCATION/TRAINING PROGRAM

## 2020-09-11 PROCEDURE — 80048 BASIC METABOLIC PNL TOTAL CA: CPT | Performed by: EMERGENCY MEDICINE

## 2020-09-11 PROCEDURE — 85610 PROTHROMBIN TIME: CPT | Performed by: EMERGENCY MEDICINE

## 2020-09-11 PROCEDURE — 99220 PR INITIAL OBSERVATION CARE/DAY 70 MINUTES: CPT | Performed by: STUDENT IN AN ORGANIZED HEALTH CARE EDUCATION/TRAINING PROGRAM

## 2020-09-11 PROCEDURE — 84439 ASSAY OF FREE THYROXINE: CPT | Performed by: EMERGENCY MEDICINE

## 2020-09-11 PROCEDURE — 93010 ELECTROCARDIOGRAM REPORT: CPT | Performed by: INTERNAL MEDICINE

## 2020-09-11 PROCEDURE — 80076 HEPATIC FUNCTION PANEL: CPT | Performed by: EMERGENCY MEDICINE

## 2020-09-11 PROCEDURE — 3044F HG A1C LEVEL LT 7.0%: CPT | Performed by: INTERNAL MEDICINE

## 2020-09-11 PROCEDURE — 93005 ELECTROCARDIOGRAM TRACING: CPT

## 2020-09-11 PROCEDURE — 99285 EMERGENCY DEPT VISIT HI MDM: CPT | Performed by: EMERGENCY MEDICINE

## 2020-09-11 PROCEDURE — 85379 FIBRIN DEGRADATION QUANT: CPT | Performed by: EMERGENCY MEDICINE

## 2020-09-11 PROCEDURE — 84443 ASSAY THYROID STIM HORMONE: CPT | Performed by: EMERGENCY MEDICINE

## 2020-09-11 PROCEDURE — 85730 THROMBOPLASTIN TIME PARTIAL: CPT | Performed by: EMERGENCY MEDICINE

## 2020-09-11 PROCEDURE — 71045 X-RAY EXAM CHEST 1 VIEW: CPT

## 2020-09-11 PROCEDURE — 84484 ASSAY OF TROPONIN QUANT: CPT | Performed by: EMERGENCY MEDICINE

## 2020-09-11 PROCEDURE — 99285 EMERGENCY DEPT VISIT HI MDM: CPT

## 2020-09-11 PROCEDURE — 83036 HEMOGLOBIN GLYCOSYLATED A1C: CPT | Performed by: STUDENT IN AN ORGANIZED HEALTH CARE EDUCATION/TRAINING PROGRAM

## 2020-09-11 RX ORDER — ASPIRIN 81 MG/1
243 TABLET, CHEWABLE ORAL ONCE
Status: COMPLETED | OUTPATIENT
Start: 2020-09-11 | End: 2020-09-11

## 2020-09-11 RX ORDER — LOSARTAN POTASSIUM 50 MG/1
100 TABLET ORAL DAILY
Status: DISCONTINUED | OUTPATIENT
Start: 2020-09-11 | End: 2020-09-12 | Stop reason: HOSPADM

## 2020-09-11 RX ORDER — PRAVASTATIN SODIUM 20 MG
20 TABLET ORAL
Status: DISCONTINUED | OUTPATIENT
Start: 2020-09-11 | End: 2020-09-11

## 2020-09-11 RX ORDER — ASPIRIN 81 MG/1
81 TABLET, CHEWABLE ORAL DAILY
Status: DISCONTINUED | OUTPATIENT
Start: 2020-09-11 | End: 2020-09-12 | Stop reason: HOSPADM

## 2020-09-11 RX ORDER — PRAVASTATIN SODIUM 20 MG
20 TABLET ORAL
Status: DISCONTINUED | OUTPATIENT
Start: 2020-09-11 | End: 2020-09-12 | Stop reason: HOSPADM

## 2020-09-11 RX ORDER — WARFARIN SODIUM 2.5 MG/1
2.5 TABLET ORAL
Status: DISCONTINUED | OUTPATIENT
Start: 2020-09-11 | End: 2020-09-12 | Stop reason: HOSPADM

## 2020-09-11 RX ORDER — PROPRANOLOL HYDROCHLORIDE 20 MG/1
20 TABLET ORAL EVERY 12 HOURS SCHEDULED
Status: DISCONTINUED | OUTPATIENT
Start: 2020-09-11 | End: 2020-09-12 | Stop reason: HOSPADM

## 2020-09-11 RX ORDER — PANTOPRAZOLE SODIUM 40 MG/1
40 TABLET, DELAYED RELEASE ORAL DAILY
Status: DISCONTINUED | OUTPATIENT
Start: 2020-09-11 | End: 2020-09-11

## 2020-09-11 RX ORDER — AMLODIPINE BESYLATE 5 MG/1
5 TABLET ORAL EVERY MORNING
Status: DISCONTINUED | OUTPATIENT
Start: 2020-09-12 | End: 2020-09-12 | Stop reason: HOSPADM

## 2020-09-11 RX ORDER — PANTOPRAZOLE SODIUM 40 MG/1
40 TABLET, DELAYED RELEASE ORAL DAILY
Status: DISCONTINUED | OUTPATIENT
Start: 2020-09-11 | End: 2020-09-12 | Stop reason: HOSPADM

## 2020-09-11 RX ORDER — WARFARIN SODIUM 2.5 MG/1
2.5 TABLET ORAL
Status: DISCONTINUED | OUTPATIENT
Start: 2020-09-11 | End: 2020-09-11

## 2020-09-11 RX ORDER — GABAPENTIN 300 MG/1
300 CAPSULE ORAL
Status: DISCONTINUED | OUTPATIENT
Start: 2020-09-11 | End: 2020-09-12 | Stop reason: HOSPADM

## 2020-09-11 RX ORDER — LOSARTAN POTASSIUM 50 MG/1
100 TABLET ORAL DAILY
Status: DISCONTINUED | OUTPATIENT
Start: 2020-09-11 | End: 2020-09-11

## 2020-09-11 RX ADMIN — SODIUM CHLORIDE 500 ML: 0.9 INJECTION, SOLUTION INTRAVENOUS at 12:48

## 2020-09-11 RX ADMIN — PROPRANOLOL HYDROCHLORIDE 20 MG: 20 TABLET ORAL at 20:44

## 2020-09-11 RX ADMIN — GABAPENTIN 300 MG: 300 CAPSULE ORAL at 20:43

## 2020-09-11 RX ADMIN — ASPIRIN 243 MG: 81 TABLET, CHEWABLE ORAL at 12:46

## 2020-09-11 RX ADMIN — PRAVASTATIN SODIUM 20 MG: 20 TABLET ORAL at 18:27

## 2020-09-11 RX ADMIN — NITROGLYCERIN 1 INCH: 20 OINTMENT TOPICAL at 12:49

## 2020-09-11 RX ADMIN — WARFARIN SODIUM 2.5 MG: 2.5 TABLET ORAL at 18:27

## 2020-09-11 RX ADMIN — LOSARTAN POTASSIUM 100 MG: 50 TABLET, FILM COATED ORAL at 18:27

## 2020-09-11 NOTE — PLAN OF CARE
Problem: Potential for Falls  Goal: Patient will remain free of falls  Description: INTERVENTIONS:  - Assess patient frequently for physical needs  -  Identify cognitive and physical deficits and behaviors that affect risk of falls    -  [place] fall precautions as indicated by assessment   - Educate patient/family on patient safety including physical limitations  - Instruct patient to call for assistance with activity based on assessment  - Modify environment to reduce risk of injury  - Consider OT/PT consult to assist with strengthening/mobility  Outcome: Progressing     Problem: PAIN - ADULT  Goal: Verbalizes/displays adequate comfort level or baseline comfort level  Description: Interventions:  - Encourage patient to monitor pain and request assistance  - Assess pain using appropriate pain scale  - Administer analgesics based on type and severity of pain and evaluate response  - Implement non-pharmacological measures as appropriate and evaluate response  - Consider cultural and social influences on pain and pain management  - Notify physician/advanced practitioner if interventions unsuccessful or patient reports new pain  Outcome: Progressing     Problem: INFECTION - ADULT  Goal: Absence or prevention of progression during hospitalization  Description: INTERVENTIONS:  - Assess and monitor for signs and symptoms of infection  - Monitor lab/diagnostic results  - Monitor all insertion sites, i e  indwelling lines, tubes, and drains  - Monitor endotracheal if appropriate and nasal secretions for changes in amount and color  - [place] appropriate cooling/warming therapies per order  - Administer medications as ordered  - Instruct and encourage patient and family to use good hand hygiene technique  - Identify and instruct in appropriate isolation precautions for identified infection/condition  Outcome: Progressing  Goal: Absence of fever/infection during neutropenic period  Description: INTERVENTIONS:  - Monitor WBC    Outcome: Progressing     Problem: SAFETY ADULT  Goal: Patient will remain free of falls  Description: INTERVENTIONS:  - Assess patient frequently for physical needs  -  Identify cognitive and physical deficits and behaviors that affect risk of falls    -  [place] fall precautions as indicated by assessment   - Educate patient/family on patient safety including physical limitations  - Instruct patient to call for assistance with activity based on assessment  - Modify environment to reduce risk of injury  - Consider OT/PT consult to assist with strengthening/mobility  Outcome: Progressing  Goal: Maintain or return to baseline ADL function  Description: INTERVENTIONS:  -  Assess patient's ability to carry out ADLs; assess patient's baseline for ADL function and identify physical deficits which impact ability to perform ADLs (bathing, care of mouth/teeth, toileting, grooming, dressing, etc )  - Assess/evaluate cause of self-care deficits   - Assess range of motion  - Assess patient's mobility; develop plan if impaired  - Assess patient's need for assistive devices and provide as appropriate  - Encourage maximum independence but intervene and supervise when necessary  - Involve family in performance of ADLs  - Assess for home care needs following discharge   - Consider OT consult to assist with ADL evaluation and planning for discharge  - Provide patient education as appropriate  Outcome: Progressing  Goal: Maintain or return mobility status to optimal level  Description: INTERVENTIONS:  - Assess patient's baseline mobility status (ambulation, transfers, stairs, etc )    - Identify cognitive and physical deficits and behaviors that affect mobility  - Identify mobility aids required to assist with transfers and/or ambulation (gait belt, sit-to-stand, lift, walker, cane, etc )  - [place] fall precautions as indicated by assessment  - Record patient progress and toleration of activity level on Mobility SBAR; progress patient to next Phase/Stage  - Instruct patient to call for assistance with activity based on assessment  - Consider rehabilitation consult to assist with strengthening/weightbearing, etc   Outcome: Progressing     Problem: DISCHARGE PLANNING  Goal: Discharge to home or other facility with appropriate resources  Description: INTERVENTIONS:  - Identify barriers to discharge w/patient and caregiver  - Arrange for needed discharge resources and transportation as appropriate  - Identify discharge learning needs (meds, wound care, etc )  - Arrange for interpretive services to assist at discharge as needed  - Refer to Case Management Department for coordinating discharge planning if the patient needs post-hospital services based on physician/advanced practitioner order or complex needs related to functional status, cognitive ability, or social support system  Outcome: Progressing     Problem: Knowledge Deficit  Goal: Patient/family/caregiver demonstrates understanding of disease process, treatment plan, medications, and discharge instructions  Description: Complete learning assessment and assess knowledge base    Interventions:  - Provide teaching at level of understanding  - Provide teaching via preferred learning methods  Outcome: Progressing     Problem: CARDIOVASCULAR - ADULT  Goal: Maintains optimal cardiac output and hemodynamic stability  Description: INTERVENTIONS:  - Monitor I/O, vital signs and rhythm  - Monitor for S/S and trends of decreased cardiac output  - Administer and titrate ordered vasoactive medications to optimize hemodynamic stability  - Assess quality of pulses, skin color and temperature  - Assess for signs of decreased coronary artery perfusion  - Instruct patient to report change in severity of symptoms  Outcome: Progressing  Goal: Absence of cardiac dysrhythmias or at baseline rhythm  Description: INTERVENTIONS:  - Continuous cardiac monitoring, vital signs, obtain 12 lead EKG if ordered  - Administer antiarrhythmic and heart rate control medications as ordered  - Monitor electrolytes and administer replacement therapy as ordered  Outcome: Progressing

## 2020-09-11 NOTE — ASSESSMENT & PLAN NOTE
· Valvular afib, on coumadin  · Mechanical mitral valve- Goal INR 2 5-3 5  · INR within therapeutic range  · Continue coumadin  · Follow up INR in AM  · EKG showing afib with PVC

## 2020-09-11 NOTE — ASSESSMENT & PLAN NOTE
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

## 2020-09-11 NOTE — ASSESSMENT & PLAN NOTE
· Previously on digoxin, but discontinued and adjusted to propanolol due to concerns of dig related bradycardia and development of tremors  · Cardiology is aware, last appt in 6/2020, recommending decreasing dose of beta blocker  · No episodes of syncope or near syncope, can continue propanolol for now at current dose  · EKG showing afib with PVC

## 2020-09-11 NOTE — ED PROVIDER NOTES
History  Chief Complaint   Patient presents with    Chest Pain     Pt states she was woken this am around 0630 by a stabbing feeling in the center of her chest  Denies any radiation  Patient is a 62-year-old female with past medical history of atrial fibrillation on Coumadin, hypertension, hyperlipidemia, mitral valve disorder status post mitral valve replacement surgery, migraines, IBS, presents to the emergency department for chest pain  Patient reports at around 6:00 a m , she awoke with sharp stabbing chest pain in the center of her chest   She continued on with her day and went grocery shopping in which the pain lessened  When she returned home she started feeling heavy pressure on her chest and she denies ever having this type of pain before  She does report getting diaphoretic with her pain  She denies any radiation of the pain  Reports chronic palpitations from AFib but denies worsening today  Denies any associated fever, shaking chills, recent cough, hemoptysis URI symptoms, headache, dizziness or near syncope, dyspnea, abdominal pain, nausea, vomiting, diarrhea, constipation, blood per rectum or melena, dysuria, change in urinary frequency, hematuria, flank pain, skin rash or color change, extremity swelling or pain, extremity weakness or paresthesia other focal neurologic deficits  Denies any known history of coronary artery disease or prior MI  Denies history of venous thromboembolism  History provided by:  Patient   used: No    Chest Pain   Associated symptoms: diaphoresis and palpitations    Associated symptoms: no abdominal pain, no back pain, no cough, no dizziness, no fever, no headache, no nausea, no numbness, no shortness of breath, not vomiting and no weakness        Prior to Admission Medications   Prescriptions Last Dose Informant Patient Reported? Taking?    COUMADIN 1 MG tablet  Self No No   Sig: TAKE 1 DAILY AS DIRECTED - BRAND NECESSARY!!!!!!!   Calcium Carbonate (CALTRATE 600) 1500 (600 Ca) MG TABS  Self Yes No   Sig: Take by mouth daily    Coumadin 2 MG tablet   No No   Sig: TAKE 1 TABLET BY MOUTH EVERY DAY   Coumadin 3 MG tablet   No No   Sig: TAKE ONE TAB DAILY AS DIRECTED BY DOCTOR- BRAND NECESSARY   Flaxseed, Linseed, (FLAXSEED OIL) 1200 MG CAPS  Self Yes No   Sig: Take by mouth daily    LORazepam (ATIVAN) 1 mg tablet  Self No No   Sig: Take 1 po bid prn   Patient taking differently: as needed Take 1 po bid prn   Misc Natural Products (OSTEO BI-FLEX TRIPLE STRENGTH) TABS  Self Yes No   Sig: Take by mouth daily    amLODIPine (NORVASC) 5 mg tablet  Self No No   Sig: TAKE 1 TABLET BY MOUTH EVERY DAY IN THE MORNING   aspirin 81 mg chewable tablet  Self Yes No   Sig: Chew daily   butalbital-acetaminophen-caffeine (FIORICET,ESGIC) -40 mg per tablet  Self No No   Sig: Take 1 tablet by mouth every 4 (four) hours as needed for headaches   clobetasol (OLUX) 0 05 % topical foam  Self No No   Sig: Apply topically 2 (two) times a day   clobetasol (TEMOVATE) 0 05 % cream  Self No No   Sig: Apply to elbows bid   Patient taking differently: as needed Apply to elbows bid   clobetasol (TEMOVATE) 0 05 % ointment  Self No No   Sig: Apply topically 2 (two) times a day Apply twice a day to foot x 7-10   Patient taking differently: Apply topically as needed Apply twice a day to foot x 7-10   gabapentin (NEURONTIN) 100 mg capsule  Self No No   Sig: Take 1 capsule (100 mg total) by mouth 2 (two) times a day TAKE 1 CAPSULE (100 MG TOTAL) BY MOUTH BID   gabapentin (NEURONTIN) 300 mg capsule  Self No No   Sig: Take a 300mg + 100mg at bedtime   glycopyrrolate (ROBINUL) 2 MG tablet  Self Yes No   Sig: Take by mouth as needed    losartan (COZAAR) 100 MG tablet   No No   Sig: TAKE 1 TABLET BY MOUTH EVERY DAY   pantoprazole (PROTONIX) 40 mg tablet   No No   Sig: Take 1 tablet (40 mg total) by mouth daily   propranolol (INDERAL) 20 mg tablet  Self No No   Si tab twice a day simvastatin (ZOCOR) 10 mg tablet  Self No No   Sig: TAKE 1 TABLET BY MOUTH EVERY DAY      Facility-Administered Medications Last Administration Doses Remaining   cyanocobalamin injection 1,000 mcg 5/14/2019  9:57 AM           Past Medical History:   Diagnosis Date    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    Atrial fibrillation (HCC)     Hashimoto's thyroiditis     Hyperlipidemia     Hypertension     IBS (irritable bowel syndrome)     Migraine     Mitral valve disorder     Nasal congestion     Non-toxic uninodular goiter     Nosebleed        Past Surgical History:   Procedure Laterality Date    APPENDECTOMY      BREAST CYST EXCISION Right 01/01/1977    COLONOSCOPY  08/08/2011    VALVE REPLACEMENT  01/04/2017    mitral valve replacement, 6/5/14       Family History   Problem Relation Age of Onset    Hypertension Mother     Coronary artery disease Father     Migraines Father     Leukemia Brother     Migraines Brother     Hypertension Brother     Coronary artery disease Paternal Grandfather     Leukemia Maternal Aunt     Multiple myeloma Maternal Aunt     Thyroid disease Other     No Known Problems Maternal Aunt     No Known Problems Maternal Aunt     No Known Problems Paternal Aunt     No Known Problems Paternal Aunt     No Known Problems Paternal Aunt     No Known Problems Paternal Aunt     Breast cancer Neg Hx      I have reviewed and agree with the history as documented  E-Cigarette/Vaping    E-Cigarette Use Never User      E-Cigarette/Vaping Substances    Nicotine No     THC No     CBD No     Flavoring No     Other No     Unknown No      Social History     Tobacco Use    Smoking status: Never Smoker    Smokeless tobacco: Never Used   Substance Use Topics    Alcohol use: Yes     Frequency: Monthly or less     Drinks per session: 1 or 2     Binge frequency: Never     Comment: rarely    Drug use: No       Review of Systems   Constitutional: Positive for diaphoresis  Negative for chills and fever  HENT: Negative for congestion, ear pain, rhinorrhea and sore throat  Respiratory: Negative for cough, chest tightness, shortness of breath and wheezing  Cardiovascular: Positive for chest pain and palpitations  Negative for leg swelling  Gastrointestinal: Negative for abdominal distention, abdominal pain, blood in stool, constipation, diarrhea, nausea and vomiting  Genitourinary: Negative for dysuria, flank pain, frequency and hematuria  Musculoskeletal: Negative for back pain, neck pain and neck stiffness  Skin: Negative for color change, pallor and rash  Allergic/Immunologic: Negative for immunocompromised state  Neurological: Negative for dizziness, syncope, weakness, light-headedness, numbness and headaches  Hematological: Negative for adenopathy  Psychiatric/Behavioral: Negative for confusion and decreased concentration  All other systems reviewed and are negative  Physical Exam  Physical Exam  Vitals signs and nursing note reviewed  Constitutional:       General: She is not in acute distress  Appearance: Normal appearance  She is well-developed  She is not ill-appearing, toxic-appearing or diaphoretic  HENT:      Head: Normocephalic and atraumatic  Right Ear: External ear normal       Left Ear: External ear normal       Mouth/Throat:      Comments: Orpharyngeal exam deferred at this time due to risk of exposure to COVID-19 during current pandemic  Patient has no oropharyngeal complaints  Eyes:      Extraocular Movements: Extraocular movements intact  Pupils: Pupils are equal, round, and reactive to light  Neck:      Musculoskeletal: Normal range of motion and neck supple  Vascular: No JVD  Cardiovascular:      Rate and Rhythm: Normal rate  Rhythm irregular  Pulses: Normal pulses  Heart sounds: Normal heart sounds  No murmur  No friction rub  No gallop      Pulmonary:      Effort: Pulmonary effort is normal  No respiratory distress  Breath sounds: Normal breath sounds  No wheezing or rales  Chest:      Chest wall: No tenderness  Abdominal:      General: Bowel sounds are normal  There is no distension  Palpations: Abdomen is soft  Tenderness: There is no abdominal tenderness  There is no guarding or rebound  Musculoskeletal: Normal range of motion  General: No swelling or tenderness  Right lower leg: No edema  Left lower leg: No edema  Lymphadenopathy:      Cervical: No cervical adenopathy  Skin:     General: Skin is warm and dry  Coloration: Skin is not pale  Findings: No erythema or rash  Neurological:      General: No focal deficit present  Mental Status: She is alert and oriented to person, place, and time  Cranial Nerves: No cranial nerve deficit  Sensory: No sensory deficit  Motor: No weakness     Psychiatric:         Mood and Affect: Mood normal          Behavior: Behavior normal          Vital Signs  ED Triage Vitals [09/11/20 1212]   Temperature Pulse Respirations Blood Pressure SpO2   98 5 °F (36 9 °C) 58 17 151/72 98 %      Temp Source Heart Rate Source Patient Position - Orthostatic VS BP Location FiO2 (%)   Oral -- Sitting Left arm --      Pain Score       --         Vitals:    09/11/20 1212   BP: 151/72   BP Location: Left arm   Pulse: 58   Resp: 17   Temp: 98 5 °F (36 9 °C)   TempSrc: Oral   SpO2: 98%   Weight: 78 9 kg (174 lb)   Height: 5' 9" (1 753 m)       Visual Acuity      ED Medications  Medications   sodium chloride 0 9 % bolus 500 mL (500 mL Intravenous New Bag 9/11/20 1248)   aspirin chewable tablet 243 mg (243 mg Oral Given 9/11/20 1246)   nitroglycerin (NITRO-BID) 2 % TD ointment 1 inch (1 inch Topical Given 9/11/20 1249)       Diagnostic Studies  Results Reviewed     Procedure Component Value Units Date/Time    Hepatic function panel [[id number]]  (Normal) Collected:  09/11/20 1246    Lab Status:  Final result Specimen: Blood from Arm, Left Updated:  09/11/20 1329     Total Bilirubin 0 50 mg/dL      Bilirubin, Direct 0 12 mg/dL      Alkaline Phosphatase 75 U/L      AST 28 U/L      ALT 25 U/L      Total Protein 8 2 g/dL      Albumin 4 0 g/dL     Magnesium [[id number]]  (Normal) Collected:  09/11/20 1246    Lab Status:  Final result Specimen:  Blood from Arm, Left Updated:  09/11/20 1329     Magnesium 2 0 mg/dL     TSH, 3rd generation with Free T4 reflex [[id number]]  (Abnormal) Collected:  09/11/20 1246    Lab Status:  Final result Specimen:  Blood from Arm, Left Updated:  09/11/20 1329     TSH 3RD GENERATON 3 969 uIU/mL     Narrative:       Patients undergoing fluorescein dye angiography may retain small amounts of fluorescein in the body for 48-72 hours post procedure  Samples containing fluorescein can produce falsely depressed TSH values  If the patient had this procedure,a specimen should be resubmitted post fluorescein clearance  NT-BNP PRO [[id number]]  (Abnormal) Collected:  09/11/20 1246    Lab Status:  Final result Specimen:  Blood from Arm, Left Updated:  09/11/20 1329     NT-proBNP 1,200 pg/mL     T4, free [[id number]] Collected:  09/11/20 1246    Lab Status:   In process Specimen:  Blood from Arm, Left Updated:  09/11/20 1329    Troponin I [[id number]]  (Normal) Collected:  09/11/20 1246    Lab Status:  Final result Specimen:  Blood from Arm, Left Updated:  09/11/20 1321     Troponin I <0 02 ng/mL     Protime-INR [[id number]]  (Abnormal) Collected:  09/11/20 1246    Lab Status:  Final result Specimen:  Blood from Arm, Left Updated:  09/11/20 1319     Protime 31 3 seconds      INR 3 20    APTT [[id number]]  (Abnormal) Collected:  09/11/20 1246    Lab Status:  Final result Specimen:  Blood from Arm, Left Updated:  09/11/20 1319     PTT 42 seconds     Basic metabolic panel [[id number]] Collected:  09/11/20 1246    Lab Status:  Final result Specimen:  Blood from Arm, Left Updated:  09/11/20 1319     Sodium 142 mmol/L Potassium 5 0 mmol/L      Chloride 105 mmol/L      CO2 30 mmol/L      ANION GAP 7 mmol/L      BUN 23 mg/dL      Creatinine 0 99 mg/dL      Glucose 112 mg/dL      Calcium 9 4 mg/dL      eGFR 57 ml/min/1 73sq m     Narrative:       [place] guidelines for Chronic Kidney Disease (CKD):     Stage 1 with normal or high GFR (GFR > 90 mL/min/1 73 square meters)    Stage 2 Mild CKD (GFR = 60-89 mL/min/1 73 square meters)    Stage 3A Moderate CKD (GFR = 45-59 mL/min/1 73 square meters)    Stage 3B Moderate CKD (GFR = 30-44 mL/min/1 73 square meters)    Stage 4 Severe CKD (GFR = 15-29 mL/min/1 73 square meters)    Stage 5 End Stage CKD (GFR <15 mL/min/1 73 square meters)  Note: GFR calculation is accurate only with a steady state creatinine    D-Dimer [[id number]]  (Normal) Collected:  09/11/20 1246    Lab Status:  Final result Specimen:  Blood from Arm, Left Updated:  09/11/20 1315     D-Dimer, Quant 0 31 ug/ml FEU     CBC and differential [[id number]]  (Abnormal) Collected:  09/11/20 1246    Lab Status:  Final result Specimen:  Blood from Arm, Left Updated:  09/11/20 1259     WBC 7 17 Thousand/uL      RBC 3 94 Million/uL      Hemoglobin 12 3 g/dL      Hematocrit 38 9 %      MCV 99 fL      MCH 31 2 pg      MCHC 31 6 g/dL      RDW 12 7 %      MPV 9 6 fL      Platelets 218 Thousands/uL      nRBC 0 /100 WBCs      Neutrophils Relative 59 %      Immat GRANS % 1 %      Lymphocytes Relative 23 %      Monocytes Relative 11 %      Eosinophils Relative 5 %      Basophils Relative 1 %      Neutrophils Absolute 4 33 Thousands/µL      Immature Grans Absolute 0 05 Thousand/uL      Lymphocytes Absolute 1 62 Thousands/µL      Monocytes Absolute 0 77 Thousand/µL      Eosinophils Absolute 0 35 Thousand/µL      Basophils Absolute 0 05 Thousands/µL                  XR chest 1 view portable   Final Result by Robert Villegas DO (09/11 1253)      No acute cardiopulmonary disease              Workstation performed: [id number]                    Procedures  ECG 12 Lead Documentation Only    Date/Time: 9/11/2020 1:20 PM  Performed by: Vi Carlos DO  Authorized by: Vi Carlos DO     ECG reviewed by me, the ED Provider: yes    Patient location:  ED  Rate:     ECG rate:  83    ECG rate assessment: normal    Rhythm:     Rhythm: atrial fibrillation    Ectopy:     Ectopy: PVCs      PVCs:  Frequent  QRS:     QRS axis:  Normal    QRS intervals:  Normal  Conduction:     Conduction: normal    ST segments:     ST segments:  Non-specific  T waves:     T waves: normal               ED Course  ED Course as of Sep 11 1418   Fri Sep 11, 2020   1326 Troponin I: <0 02   1417 Updated patient about essentially normal cardiac workup thus far  Her symptoms did improve with the nitro  Patient is agreeable to being admitted for observation                HEART Risk Score      Most Recent Value   Heart Score Risk Calculator   History  1 Filed at: 09/11/2020 1325   ECG  0 Filed at: 09/11/2020 1325   Age  2 Filed at: 09/11/2020 1325   Risk Factors  2 Filed at: 09/11/2020 1325   Troponin  0 Filed at: 09/11/2020 1325   HEART Score  5 Filed at: 09/11/2020 1325              PERC Rule for PE      Most Recent Value   PERC Rule for PE   Age >=50  1 Filed at: 09/11/2020 1324   HR >=100  0 Filed at: 09/11/2020 1324   O2 Sat on room air < 95%  0 Filed at: 09/11/2020 1324   History of PE or DVT  0 Filed at: 09/11/2020 1324   Recent trauma or surgery  0 Filed at: 09/11/2020 1324   Hemoptysis  0 Filed at: 09/11/2020 1324   Exogenous estrogen  0 Filed at: 09/11/2020 1324   Unilateral leg swelling  0 Filed at: 09/11/2020 1324   PERC Rule for PE Results  1 Filed at: 09/11/2020 1324                  Wells' Criteria for PE      Most Recent Value   Wells' Criteria for PE   Clinical signs and symptoms of DVT  0 Filed at: 09/11/2020 1324   PE is primary diagnosis or equally likely  0 Filed at: 09/11/2020 1324   HR >100  0 Filed at: 09/11/2020 1324 Immobilization at least 3 days or Surgery in the previous 4 weeks  0 Filed at: 09/11/2020 1324   Previous, objectively diagnosed PE or DVT  0 Filed at: 09/11/2020 1324   Hemoptysis  0 Filed at: 09/11/2020 1324   Malignancy with treatment within 6 months or palliative  0 Filed at: 09/11/2020 1324   Wells' Criteria Total  0 Filed at: 09/11/2020 1324              [facility]  Number of Diagnoses or Management Options  Diagnosis management comments: 46-year-old female presents with acute onset chest pain initially sharp and stabbing and now pressure associated with diaphoresis  Symptoms concerning for acute coronary syndrome, possible MI  Differential includes anxiety, pleurisy, pneumonia, PE, less likely aortic dissection but still considered  Will do full cardiac workup, D-dimer, chest x-ray  Will give aspirin and nitro paste  Patient takes 81 mg daily and already took her morning dose  Will most likely recommend observation admission for serial troponin  Amount and/or Complexity of Data Reviewed  Clinical lab tests: ordered and reviewed  Tests in the radiology section of CPT®: ordered and reviewed  Tests in the medicine section of CPT®: ordered and reviewed  Independent visualization of images, tracings, or specimens: yes        Disposition  Final diagnoses:   Chest pain   Frequent PVCs     Time reflects when diagnosis was documented in both MDM as applicable and the Disposition within this note     Time User Action Codes Description Comment    9/11/2020  2:16 PM Opal Osei E Add [R07 9] Chest pain     9/11/2020  2:16 PM Opal Osei E Add [I49 3] Frequent PVCs       ED Disposition     ED Disposition Condition Date/Time Comment    Admit Stable Fri Sep 11, 2020  2:16 PM Case was discussed with LILLIAM and the patient's admission status was agreed to be Admission Status: observation status to the service of Dr Bianca Gonzalez           Follow-up Information    None         Patient's Medications   Discharge Prescriptions No medications on file     No discharge procedures on file      PDMP Review     None          ED Provider  Electronically Signed by           Micheal Sandoval DO  09/11/20 3450

## 2020-09-11 NOTE — H&P
H&P- Da Nolen 1945, 69 y o  female MRN: [id number]    Unit/Bed#: -01 Encounter: [id number]    Primary Care Provider: Jaymie Peterson MD   Date and time admitted to hospital: 9/11/2020 12:20 PM        * Chest pain  Assessment & Plan  · ADONIS score 2-3, acute onset chest palpitations and chest discomfort  · Patient states that she had some chest tightness but not so much chest pain  · No known history of coronary artery disease, does have mechanical mitral valve in 1996 secondary to mitral regurgitation, initial troponin negative  · Trend troponin x 2, if negative, consider discharge in AM  · Depending on telemetry, and troponin x 2, consider cardiology consult if warranted    Subclinical hypothyroidism  Assessment & Plan  · TSH 3 9, follow up with outpatient PCP    [facility]  Assessment & Plan  · Valvular afib, on coumadin  · Mechanical mitral valve- Goal INR 2 5-3 5  · INR within therapeutic range  · Continue coumadin  · Follow up INR in AM  · EKG showing afib with PVC    Tremor  Assessment & Plan  · Continue home dose propanolol, no significant tremor visualized on exam    Peripheral neuropathy  Assessment & Plan  · Secondary to history of polio, resume home gabapentin    Carotid artery stenosis  Assessment & Plan  · CTA head and neck previously showing mild atherosclerotic calcification of cavernous internal carotid arteries  · Recommend outpatient follow up with PCP    Essential tremor  Assessment & Plan  · Continue home dose propanolol     Bradycardia  Assessment & Plan  · Previously on digoxin, but discontinued and adjusted to propanolol due to concerns of dig related bradycardia and development of tremors  · Cardiology is aware, last appt in 6/2020, recommending decreasing dose of beta blocker  · No episodes of syncope or near syncope, can continue propanolol for now at current dose  · EKG showing afib with PVC    Benign hypertension  Assessment & Plan  · Continue home dose losartan and amlodipine   · Monitor vital signs     History of heart valve replacement  Assessment & Plan  · History of mechanical mitral valve in 1996    Hyperlipidemia  Assessment & Plan  · Continue home dose equivalent zocor-> pravastatin 20 mg     VTE Prophylaxis: Warfarin (Coumadin)   Code Status: Full Code  POLST: POLST form is not discussed and not completed at this time  Anticipated Length of Stay:  Patient will be admitted on an Observation basis with an anticipated length of stay of 2 midnights  Justification for Hospital Stay: chest pain    Total Time for Visit, including Counseling / Coordination of Care: 30 minutes  Greater than 50% of this total time spent on direct patient counseling and coordination of care  Chief Complaint:   Chest pain    History of Present Illness: Rajinder Islas is a 69 y o  female who presents with chest discomfort/tightness and chest palpitations that were first noted this AM  Patient states she initially disregarded symptoms and carried on with her day  Was at grocery store when symptoms worsened at which point she came to ed for further management  Denies any decreased exercise tolerance, PND, orthopnea, lower extremity swelling, previous episodes of chest pain, shortness of breath, nausea, vomiting, diarrhea, dysuria, headache, blurred vision  Follows with cardiology as an outpatient for mechanical mitral valve and afib  Admitted to medicine for further management      Review of Systems:    See above    Past Medical and Surgical History:     Past Medical History:   Diagnosis Date    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    Atrial fibrillation (HCC)     Hashimoto's thyroiditis     Hyperlipidemia     Hypertension     IBS (irritable bowel syndrome)     Migraine     Mitral valve disorder     Nasal congestion     Non-toxic uninodular goiter     Nosebleed        Past Surgical History:   Procedure Laterality Date    APPENDECTOMY      BREAST CYST EXCISION Right 01/01/1977    COLONOSCOPY  08/08/2011    VALVE REPLACEMENT  01/04/2017    mitral valve replacement, 6/5/14       Meds/Allergies:    Prior to Admission medications    Medication Sig Start Date End Date Taking?  Authorizing Provider   amLODIPine (NORVASC) 5 mg tablet TAKE 1 TABLET BY MOUTH EVERY DAY IN THE MORNING 4/22/20   Heidy Barcenas MD   aspirin 81 mg chewable tablet Chew daily 8/3/17   Historical Provider, MD   butalbital-acetaminophen-caffeine (FIORICET,ESGIC) -40 mg per tablet Take 1 tablet by mouth every 4 (four) hours as needed for headaches 7/21/20   Earnesteen Locket, CRNP   Calcium Carbonate (CALTRATE 600) 1500 (600 Ca) MG TABS Take by mouth daily     Historical Provider, MD   clobetasol (OLUX) 0 05 % topical foam Apply topically 2 (two) times a day 3/7/19   Earnesteen Locket, CRNP   clobetasol (TEMOVATE) 0 05 % cream Apply to elbows bid  Patient taking differently: as needed Apply to elbows bid 5/14/19   Earnesteen Locket, CRNP   clobetasol (TEMOVATE) 0 05 % ointment Apply topically 2 (two) times a day Apply twice a day to foot x 7-10  Patient taking differently: Apply topically as needed Apply twice a day to foot x 7-10 5/14/19   Earnesteen Locket, CRNP   COUMADIN 1 MG tablet TAKE 1 DAILY AS DIRECTED - BRAND NECESSARY!!!!!!! 4/29/20   Susie Byrnes PA-C   Coumadin 2 MG tablet TAKE 1 TABLET BY MOUTH EVERY DAY 8/17/20   Minda Montiel MD   Coumadin 3 MG tablet TAKE ONE TAB DAILY AS DIRECTED BY DOCTOR- BRAND NECESSARY 8/17/20   Minda Montiel MD   Flaxseed, Linseed, (FLAXSEED OIL) 1200 MG CAPS Take by mouth daily     Historical Provider, MD   gabapentin (NEURONTIN) 100 mg capsule Take 1 capsule (100 mg total) by mouth 2 (two) times a day TAKE 1 CAPSULE (100 MG TOTAL) BY MOUTH BID 6/18/20   Earnesteen Locket, HANNY   gabapentin (NEURONTIN) 300 mg capsule Take a 300mg + 100mg at bedtime 3/26/20   Earnesteen LocketHANNY   glycopyrrolate (ROBINUL) 2 MG tablet Take by mouth as needed  7/31/14   Historical Provider, MD   LORazepam (ATIVAN) 1 mg tablet Take 1 po bid prn  Patient taking differently: as needed Take 1 po bid prn 10/18/18   HANNY Snow   losartan (COZAAR) 100 MG tablet TAKE 1 TABLET BY MOUTH EVERY DAY 9/3/20   Susie Byrnes PA-C   Misc Natural Products (OSTEO BI-FLEX TRIPLE STRENGTH) TABS Take by mouth daily     Historical Provider, MD   pantoprazole (PROTONIX) 40 mg tablet Take 1 tablet (40 mg total) by mouth daily 7/28/20   HANNY Snow   propranolol (INDERAL) 20 mg tablet 1 tab twice a day 6/30/20   Heidy Barcenas MD   simvastatin (ZOCOR) 10 mg tablet TAKE 1 TABLET BY MOUTH EVERY DAY 5/26/20   Heidy Barcenas MD     I have reviewed home medications with patient personally  Allergies:    Allergies   Allergen Reactions    Enablex [Darifenacin] Other (See Comments)     Sweating, HA, urinary hesitancy, flushing of face    Fentanyl Nausea Only and Vomiting    Hyoscyamine Palpitations    Dicyclomine Other (See Comments)     Jittery, disorientation, light HAs    Hydromorphone Other (See Comments)     Per pt does not remember the type or severity level    Midazolam Other (See Comments)     Per pt does not remember the type or severity level    Sulfamethoxazole-Trimethoprim Nausea Only    Venlafaxine Other (See Comments)     Urinary urgency, polyuria    Codeine Polt-Chlorphen Polt Er Headache    Ultracet [Tramadol-Acetaminophen] Nausea Only and Vomiting       Social History:     Marital Status: Single   Occupation: na  Patient Pre-hospital Living Situation: na  Patient Pre-hospital Level of Mobility: na  Patient Pre-hospital Diet Restrictions: na  Substance Use History:   Social History     Substance and Sexual Activity   Alcohol Use Yes    Frequency: Monthly or less    Drinks per session: 1 or 2    Binge frequency: Never    Comment: rarely     Social History     Tobacco Use   Smoking Status Never Smoker   Smokeless Tobacco Never Used     Social History     Substance and Sexual Activity   Drug Use No       Family History:    non-contributory    Physical Exam:     Vitals:   Blood Pressure: 133/75 (09/11/20 2044)  Pulse: 62 (09/11/20 2044)  Temperature: 98 8 °F (37 1 °C) (09/11/20 1729)  Temp Source: Oral (09/11/20 1212)  Respirations: 18 (09/11/20 1729)  Height: 5' 9" (175 3 cm) (09/11/20 1212)  Weight - Scale: 78 9 kg (174 lb) (09/11/20 1212)  SpO2: 94 % (09/11/20 2044)    Physical Exam  Constitutional:       Appearance: Normal appearance  HENT:      Head: Normocephalic and atraumatic  Cardiovascular:      Rate and Rhythm: Normal rate  Rhythm irregular  Pulmonary:      Effort: Pulmonary effort is normal  No respiratory distress  Abdominal:      General: Abdomen is flat  Bowel sounds are normal       Palpations: Abdomen is soft  Skin:     General: Skin is warm and dry  Neurological:      General: No focal deficit present  Mental Status: She is alert and oriented to person, place, and time  Psychiatric:         Mood and Affect: Mood normal          Behavior: Behavior normal              Additional Data:     Lab Results: I have personally reviewed pertinent reports  Results from last 7 days   Lab Units 09/11/20  1246   WBC Thousand/uL 7 17   HEMOGLOBIN g/dL 12 3   HEMATOCRIT % 38 9   PLATELETS Thousands/uL 227   NEUTROS PCT % 59   LYMPHS PCT % 23   MONOS PCT % 11   EOS PCT % 5     Results from last 7 days   Lab Units 09/11/20  1246   SODIUM mmol/L 142   POTASSIUM mmol/L 5 0   CHLORIDE mmol/L 105   CO2 mmol/L 30   BUN mg/dL 23   CREATININE mg/dL 0 99   ANION GAP mmol/L 7   CALCIUM mg/dL 9 4   ALBUMIN g/dL 4 0   TOTAL BILIRUBIN mg/dL 0 50   ALK PHOS U/L 75   ALT U/L 25   AST U/L 28   GLUCOSE RANDOM mg/dL 112     Results from last 7 days   Lab Units 09/11/20  1246   INR  3 20*                   Imaging: I have personally reviewed pertinent reports  XR chest 1 view portable   Final Result by Jabari Rod DO (09/11 1253)      No acute cardiopulmonary disease  Workstation performed: [id number]             EKG, Pathology, and Other Studies Reviewed on Admission:   · EKG: afib with pvc    Allscripts / Epic Records Reviewed: Yes     ** Please Note: This note has been constructed using a voice recognition system   **

## 2020-09-11 NOTE — ED NOTES
1  Chief Complaint Chest pain  2  Orientation Status AOx4  3  Abnormal Labs, tests, vitals TSH 3 969  4  Important drips  5  Time of last narcotics  6  IV lines, drains, tubes 20 LAC  7  Isolation status  8  Skin Check  9  Ambulation Status addie rasheed   10   ED RN phone number [phone] Kendall Barton RN  09/11/20 8420

## 2020-09-12 VITALS
BODY MASS INDEX: 25.77 KG/M2 | WEIGHT: 174 LBS | TEMPERATURE: 97.9 F | HEIGHT: 69 IN | DIASTOLIC BLOOD PRESSURE: 72 MMHG | SYSTOLIC BLOOD PRESSURE: 124 MMHG | OXYGEN SATURATION: 93 % | RESPIRATION RATE: 16 BRPM | HEART RATE: 59 BPM

## 2020-09-12 LAB
ALBUMIN SERPL BCP-MCNC: 3.6 G/DL (ref 3.5–5)
ALP SERPL-CCNC: 67 U/L (ref 46–116)
ALT SERPL W P-5'-P-CCNC: 21 U/L (ref 12–78)
ANION GAP SERPL CALCULATED.3IONS-SCNC: 11 MMOL/L (ref 4–13)
AST SERPL W P-5'-P-CCNC: 25 U/L (ref 5–45)
BASOPHILS # BLD AUTO: 0.04 THOUSANDS/ΜL (ref 0–0.1)
BASOPHILS NFR BLD AUTO: 1 % (ref 0–1)
BILIRUB SERPL-MCNC: 0.4 MG/DL (ref 0.2–1)
BUN SERPL-MCNC: 18 MG/DL (ref 5–25)
CALCIUM SERPL-MCNC: 8.8 MG/DL (ref 8.3–10.1)
CHLORIDE SERPL-SCNC: 106 MMOL/L (ref 100–108)
CO2 SERPL-SCNC: 25 MMOL/L (ref 21–32)
CREAT SERPL-MCNC: 0.84 MG/DL (ref 0.6–1.3)
EOSINOPHIL # BLD AUTO: 0.31 THOUSAND/ΜL (ref 0–0.61)
EOSINOPHIL NFR BLD AUTO: 5 % (ref 0–6)
ERYTHROCYTE [DISTWIDTH] IN BLOOD BY AUTOMATED COUNT: 12.7 % (ref 11.6–15.1)
GFR SERPL CREATININE-BSD FRML MDRD: 70 ML/MIN/1.73SQ M
GLUCOSE SERPL-MCNC: 93 MG/DL (ref 65–140)
HCT VFR BLD AUTO: 36.9 % (ref 34.8–46.1)
HGB BLD-MCNC: 11.8 G/DL (ref 11.5–15.4)
IMM GRANULOCYTES # BLD AUTO: 0.03 THOUSAND/UL (ref 0–0.2)
IMM GRANULOCYTES NFR BLD AUTO: 1 % (ref 0–2)
INR PPP: 3.22 (ref 0.84–1.19)
LYMPHOCYTES # BLD AUTO: 1.49 THOUSANDS/ΜL (ref 0.6–4.47)
LYMPHOCYTES NFR BLD AUTO: 23 % (ref 14–44)
MAGNESIUM SERPL-MCNC: 1.9 MG/DL (ref 1.6–2.6)
MCH RBC QN AUTO: 31.5 PG (ref 26.8–34.3)
MCHC RBC AUTO-ENTMCNC: 32 G/DL (ref 31.4–37.4)
MCV RBC AUTO: 98 FL (ref 82–98)
MONOCYTES # BLD AUTO: 0.61 THOUSAND/ΜL (ref 0.17–1.22)
MONOCYTES NFR BLD AUTO: 9 % (ref 4–12)
NEUTROPHILS # BLD AUTO: 4.01 THOUSANDS/ΜL (ref 1.85–7.62)
NEUTS SEG NFR BLD AUTO: 61 % (ref 43–75)
NRBC BLD AUTO-RTO: 0 /100 WBCS
PLATELET # BLD AUTO: 195 THOUSANDS/UL (ref 149–390)
PMV BLD AUTO: 9.3 FL (ref 8.9–12.7)
POTASSIUM SERPL-SCNC: 3.6 MMOL/L (ref 3.5–5.3)
PROT SERPL-MCNC: 7.7 G/DL (ref 6.4–8.2)
PROTHROMBIN TIME: 31.4 SECONDS (ref 11.6–14.5)
RBC # BLD AUTO: 3.75 MILLION/UL (ref 3.81–5.12)
SODIUM SERPL-SCNC: 142 MMOL/L (ref 136–145)
WBC # BLD AUTO: 6.49 THOUSAND/UL (ref 4.31–10.16)

## 2020-09-12 PROCEDURE — 80053 COMPREHEN METABOLIC PANEL: CPT | Performed by: STUDENT IN AN ORGANIZED HEALTH CARE EDUCATION/TRAINING PROGRAM

## 2020-09-12 PROCEDURE — 83735 ASSAY OF MAGNESIUM: CPT | Performed by: STUDENT IN AN ORGANIZED HEALTH CARE EDUCATION/TRAINING PROGRAM

## 2020-09-12 PROCEDURE — 85610 PROTHROMBIN TIME: CPT | Performed by: STUDENT IN AN ORGANIZED HEALTH CARE EDUCATION/TRAINING PROGRAM

## 2020-09-12 PROCEDURE — 85025 COMPLETE CBC W/AUTO DIFF WBC: CPT | Performed by: STUDENT IN AN ORGANIZED HEALTH CARE EDUCATION/TRAINING PROGRAM

## 2020-09-12 PROCEDURE — 99217 PR OBSERVATION CARE DISCHARGE MANAGEMENT: CPT | Performed by: INTERNAL MEDICINE

## 2020-09-12 RX ORDER — ACETAMINOPHEN 325 MG/1
975 TABLET ORAL EVERY 6 HOURS PRN
Status: DISCONTINUED | OUTPATIENT
Start: 2020-09-12 | End: 2020-09-12 | Stop reason: HOSPADM

## 2020-09-12 RX ADMIN — PROPRANOLOL HYDROCHLORIDE 20 MG: 20 TABLET ORAL at 08:46

## 2020-09-12 RX ADMIN — ASPIRIN 81 MG: 81 TABLET, CHEWABLE ORAL at 08:46

## 2020-09-12 RX ADMIN — LOSARTAN POTASSIUM 100 MG: 50 TABLET, FILM COATED ORAL at 08:46

## 2020-09-12 RX ADMIN — ACETAMINOPHEN 975 MG: 325 TABLET, FILM COATED ORAL at 02:29

## 2020-09-12 RX ADMIN — AMLODIPINE BESYLATE 5 MG: 5 TABLET ORAL at 08:50

## 2020-09-12 NOTE — DISCHARGE SUMMARY
Discharge Summary - Sandra Flores 69 y o  female MRN: [id number]  Unit/Bed#: -01 Encounter: [id number]    Admission Date:    9/11/2020   Discharge Date:   09/12/20   Admitting Diagnosis:   Chest pain [R07 9]  Frequent PVCs [I49 3]  Admitting Provider:   April Silva MD  Discharge Provider:   Crow Griffith MD     Primary Care Physician at Discharge:   Crow Griffith MD,[phone]    HPI:      Sandra Flores is a 69 y o  female who presents with chest discomfort/tightness and chest palpitations that were first noted this AM  Patient states she initially disregarded symptoms and carried on with her day  Was at grocery store when symptoms worsened at which point she came to ed for further management  Denies any decreased exercise tolerance, PND, orthopnea, lower extremity swelling, previous episodes of chest pain, shortness of breath, nausea, vomiting, diarrhea, dysuria, headache, blurred vision  Follows with cardiology as an outpatient for mechanical mitral valve and afib  Admitted to medicine for further management  Procedures Performed:   Orders Placed This Encounter   Procedures    ED ECG Documentation Only       Hospital Course:   Atypical chest pain-patient was woken yesterday morning with severe left-sided chest pain like somebody was stabbing her with an ice pick and it lasted only a brief 2nd  She was able to get out of bed and complete her ADLs including shopping and going to [employer] but felt uneasy about the pain though had no recurrence of the same discomfort  She feels as though she got a bit anxious and came to the emergency department  Under observation telemetry was unremarkable and she ruled out for myocardial infarction by serial troponins  She has had no recurrence of her chest pain  We reviewed her stress echo from September of 2015  She swears she had more recent stress test but none available in the system    Based upon her age and comorbidities including prosthetic mitral valve, hypertension, hyperlipidemia I feel it prudent to repeat outpatient stress test   She does not do well walking on treadmill so I have ordered Galdino Cantu  She will be discharged this morning with no change in her outpatient medical regimen  INR therapeutic this morning at 3 2  Current Facility-Administered Medications:     acetaminophen (TYLENOL) tablet 975 mg, 975 mg, Oral, Q6H PRN, Kylah Erazo PA-C, 975 mg at 09/12/20 0229    amLODIPine (NORVASC) tablet 5 mg, 5 mg, Oral, QAM, Maddi Arellano MD, 5 mg at 09/12/20 0850    aspirin chewable tablet 81 mg, 81 mg, Oral, Daily, Maddi Arellano MD, 81 mg at 09/12/20 0846    gabapentin (NEURONTIN) capsule 300 mg, 300 mg, Oral, HS, Maddi Arlelano MD, 300 mg at 09/11/20 2043    losartan (COZAAR) tablet 100 mg, 100 mg, Oral, Daily, Maddi Arellano MD, 100 mg at 09/12/20 0846    pantoprazole (PROTONIX) EC tablet 40 mg, 40 mg, Oral, Daily, Maddi Arellano MD    pravastatin (PRAVACHOL) tablet 20 mg, 20 mg, Oral, Daily With Sarah Clay MD    propranolol (INDERAL) tablet 20 mg, 20 mg, Oral, Q12H [facility], Maddi Arellano MD, 20 mg at 09/12/20 0846    warfarin (COUMADIN) tablet 2 5 mg, 2 5 mg, Oral, Daily (warfarin), Maddi Arellano MD      Consulting Providers   Non    Significant Findings, Care, Treatment and Services Provided:   As above    Complications:   None  Physical Exam:  General Appearance:    Alert, cooperative, no distress   Head:    Normocephalic, without obvious abnormality, atraumatic   Eyes:    PERRL, conjunctiva/corneas clear, EOM's intact       Nose:   Moist mucous membranes, no drainage or sinus tenderness   Throat:   No tenderness, no exudates   Neck:   Supple, symmetrical, trachea midline, no JVD   Lungs:     Clear to auscultation bilaterally, respirations unlabored   Heart:    Irregularly irregular with crisp prosthetic mitral valve    Chest pain is not reproducible to direct pressure or maneuvers   Abdomen: Soft, non-tender, positive bowel sounds, no masses, no organomegaly   Extremities:  No pedal edema, calf tenderness  Distal pulses palpable  Neurologic:     CNII-XII intact  Labs:   Lab Results   Component Value Date    WBC 6 49 09/12/2020    RBC 3 75 (L) 09/12/2020    HGB 11 8 09/12/2020    HCT 36 9 09/12/2020    MCV 98 09/12/2020    MCH 31 5 09/12/2020    RDW 12 7 09/12/2020     09/12/2020     Lab Results   Component Value Date    CREATININE 0 84 09/12/2020    BUN 18 09/12/2020    K 3 6 09/12/2020     09/12/2020    CO2 25 09/12/2020    ALKPHOS 67 09/12/2020    ALT 21 09/12/2020    AST 25 09/12/2020    BILIDIR 0 12 09/11/2020       Treatments:  anticoagulation: warfarin    Discharge Diagnosis:   Principal Problem:    Chest pain  Active Problems:    Atrial fibrillation (HCC)    Hyperlipidemia    History of heart valve replacement    Benign hypertension    Bradycardia    Essential tremor    Carotid artery stenosis    Peripheral neuropathy    Tremor    Subclinical hypothyroidism  Resolved Problems:    * No resolved hospital problems  *      Condition at Discharge:   Good     Code Status: No Order  Advance Directive and Living Will: Received  Power of :    POLST:      Discharge instructions/Information to patient and family:   See after visit summary for information provided to patient and family  Provisions for Follow-Up Care:  See after visit summary for information related to follow-up care and any pertinent home health orders  Disposition:   Home    Planned Readmission:   No    Discharge Statement   I spent 25 minutes discharging the patient  This time was spent on the day of discharge  I had direct contact with the patient on the day of discharge  Additional documentation is required if more than 30 minutes were spent on discharge   Greater than 50% of the total time was spent examining patient, answering all patient questions, arranging and discussing plan of care with patient as well as directly providing post-discharge instructions  Additional time then spent on discharge activities  Discharge Medications:  See after visit summary for reconciled discharge medications provided to patient and family        Mehdi Ng MD  9/12/2020,10:41 AM

## 2020-09-12 NOTE — PLAN OF CARE
Problem: Potential for Falls  Goal: Patient will remain free of falls  Description: INTERVENTIONS:  - Assess patient frequently for physical needs  -  Identify cognitive and physical deficits and behaviors that affect risk of falls    -  [place] fall precautions as indicated by assessment   - Educate patient/family on patient safety including physical limitations  - Instruct patient to call for assistance with activity based on assessment  - Modify environment to reduce risk of injury  - Consider OT/PT consult to assist with strengthening/mobility  Outcome: Progressing     Problem: PAIN - ADULT  Goal: Verbalizes/displays adequate comfort level or baseline comfort level  Description: Interventions:  - Encourage patient to monitor pain and request assistance  - Assess pain using appropriate pain scale  - Administer analgesics based on type and severity of pain and evaluate response  - Implement non-pharmacological measures as appropriate and evaluate response  - Consider cultural and social influences on pain and pain management  - Notify physician/advanced practitioner if interventions unsuccessful or patient reports new pain  Outcome: Progressing     Problem: INFECTION - ADULT  Goal: Absence or prevention of progression during hospitalization  Description: INTERVENTIONS:  - Assess and monitor for signs and symptoms of infection  - Monitor lab/diagnostic results  - Monitor all insertion sites, i e  indwelling lines, tubes, and drains  - Monitor endotracheal if appropriate and nasal secretions for changes in amount and color  - [place] appropriate cooling/warming therapies per order  - Administer medications as ordered  - Instruct and encourage patient and family to use good hand hygiene technique  - Identify and instruct in appropriate isolation precautions for identified infection/condition  Outcome: Progressing  Goal: Absence of fever/infection during neutropenic period  Description: INTERVENTIONS:  - Monitor WBC    Outcome: Progressing     Problem: SAFETY ADULT  Goal: Patient will remain free of falls  Description: INTERVENTIONS:  - Assess patient frequently for physical needs  -  Identify cognitive and physical deficits and behaviors that affect risk of falls    -  [place] fall precautions as indicated by assessment   - Educate patient/family on patient safety including physical limitations  - Instruct patient to call for assistance with activity based on assessment  - Modify environment to reduce risk of injury  - Consider OT/PT consult to assist with strengthening/mobility  Outcome: Progressing  Goal: Maintain or return to baseline ADL function  Description: INTERVENTIONS:  -  Assess patient's ability to carry out ADLs; assess patient's baseline for ADL function and identify physical deficits which impact ability to perform ADLs (bathing, care of mouth/teeth, toileting, grooming, dressing, etc )  - Assess/evaluate cause of self-care deficits   - Assess range of motion  - Assess patient's mobility; develop plan if impaired  - Assess patient's need for assistive devices and provide as appropriate  - Encourage maximum independence but intervene and supervise when necessary  - Involve family in performance of ADLs  - Assess for home care needs following discharge   - Consider OT consult to assist with ADL evaluation and planning for discharge  - Provide patient education as appropriate  Outcome: Progressing  Goal: Maintain or return mobility status to optimal level  Description: INTERVENTIONS:  - Assess patient's baseline mobility status (ambulation, transfers, stairs, etc )    - Identify cognitive and physical deficits and behaviors that affect mobility  - Identify mobility aids required to assist with transfers and/or ambulation (gait belt, sit-to-stand, lift, walker, cane, etc )  - [place] fall precautions as indicated by assessment  - Record patient progress and toleration of activity level on Mobility SBAR; progress patient to next Phase/Stage  - Instruct patient to call for assistance with activity based on assessment  - Consider rehabilitation consult to assist with strengthening/weightbearing, etc   Outcome: Progressing     Problem: DISCHARGE PLANNING  Goal: Discharge to home or other facility with appropriate resources  Description: INTERVENTIONS:  - Identify barriers to discharge w/patient and caregiver  - Arrange for needed discharge resources and transportation as appropriate  - Identify discharge learning needs (meds, wound care, etc )  - Arrange for interpretive services to assist at discharge as needed  - Refer to Case Management Department for coordinating discharge planning if the patient needs post-hospital services based on physician/advanced practitioner order or complex needs related to functional status, cognitive ability, or social support system  Outcome: Progressing     Problem: Knowledge Deficit  Goal: Patient/family/caregiver demonstrates understanding of disease process, treatment plan, medications, and discharge instructions  Description: Complete learning assessment and assess knowledge base    Interventions:  - Provide teaching at level of understanding  - Provide teaching via preferred learning methods  Outcome: Progressing     Problem: CARDIOVASCULAR - ADULT  Goal: Maintains optimal cardiac output and hemodynamic stability  Description: INTERVENTIONS:  - Monitor I/O, vital signs and rhythm  - Monitor for S/S and trends of decreased cardiac output  - Administer and titrate ordered vasoactive medications to optimize hemodynamic stability  - Assess quality of pulses, skin color and temperature  - Assess for signs of decreased coronary artery perfusion  - Instruct patient to report change in severity of symptoms  Outcome: Progressing  Goal: Absence of cardiac dysrhythmias or at baseline rhythm  Description: INTERVENTIONS:  - Continuous cardiac monitoring, vital signs, obtain 12 lead EKG if ordered  - Administer antiarrhythmic and heart rate control medications as ordered  - Monitor electrolytes and administer replacement therapy as ordered  Outcome: Progressing

## 2020-09-12 NOTE — UTILIZATION REVIEW
Initial Clinical Review    Admission: Date/Time/Statement:   Admission Orders (From admission, onward)     Ordered        09/11/20 1417  Place in Observation  Once                   Orders Placed This Encounter   Procedures    Place in Observation     Standing Status:   Standing     Number of Occurrences:   1     Order Specific Question:   Admitting Physician     Answer:   Seble Macdonald     Order Specific Question:   Level of Care     Answer:   Med Surg [16]     Order Specific Question:   Bed request comments     Answer:   tele     ED Arrival Information     Expected Arrival Acuity Means of Arrival Escorted By Service Admission Type    - 9/11/2020 12:05 Emergent [address] [facility] Emergency    Arrival Complaint    CHEST PAIN        Chief Complaint   Patient presents with    Chest Pain     Pt states she was woken this am around 0630 by a stabbing feeling in the center of her chest  Denies any radiation  Assessment/Plan:     69  Y O female  Presents to ED  From home with chest discomfort/tightness and palpitations which started the am of admission  Initially  Ignores symptoms  But  Worsened throughout  The day  PMH  Is   MVR 1996, hypertension,  Bradycardia,  essential tremor, peripheral neuropathy, carotid artery stenosis and hypothyroidism  EKG  Shows  afib with PVC's  ADONIS score  2-3  CTA  Head/neck  Shows mild  Atherosclerotic calcification  Admit  Observation  With  Chest pain, atrial fibrillation and  Plan is  Monitor labs, trend troponin, tele, possibly  Cardiology consult, and continue home meds         ED Triage Vitals   Temperature Pulse Respirations Blood Pressure SpO2   09/11/20 1212 09/11/20 1212 09/11/20 1212 09/11/20 1212 09/11/20 1212   98 5 °F (36 9 °C) 58 17 151/72 98 %      Temp Source Heart Rate Source Patient Position - Orthostatic VS BP Location FiO2 (%)   09/11/20 1212 -- 09/11/20 1212 09/11/20 1212 --   Oral  Sitting Left arm       Pain Score       09/11/20 1800 No Pain          Wt Readings from Last 1 Encounters:   09/11/20 78 9 kg (174 lb)     Additional Vital Signs:   09/12/20 07:04:33   97 9 °F (36 6 °C)   59   16   124/72   89   93 %          09/12/20 03:11:08   97 8 °F (36 6 °C)   56      152/80   104   95 %          09/12/20 03:08:57   97 8 °F (36 6 °C)   55   18   134/111Abnormal     119   93 %          09/11/20 22:11:20   98 °F (36 7 °C)   61   18   150/76   101   98 %          09/11/20 20:44:22      62      133/75   94   94 %          09/11/20 2043      66      133/75                09/11/20 17:29:12   98 8 °F (37 1 °C)   68   18   132/74   93   95 %          09/11/20 1212   98 5 °F (36 9 °C)   58   17   151/72      98 %   None (Room air)   Sitting          Pertinent Labs/Diagnostic Test Results:   CXR  ( 9/11)   NAD  EKG  (  9/11)   Atrial fibrillation      Frequent  PVC's    Normal  QRS      Results from last 7 days   Lab Units 09/12/20  0548 09/11/20  1246   WBC Thousand/uL 6 49 7 17   HEMOGLOBIN g/dL 11 8 12 3   HEMATOCRIT % 36 9 38 9   PLATELETS Thousands/uL 195 227   NEUTROS ABS Thousands/µL 4 01 4 33         Results from last 7 days   Lab Units 09/12/20  0548 09/11/20  1246   SODIUM mmol/L 142 142   POTASSIUM mmol/L 3 6 5 0   CHLORIDE mmol/L 106 105   CO2 mmol/L 25 30   ANION GAP mmol/L 11 7   BUN mg/dL 18 23   CREATININE mg/dL 0 84 0 99   EGFR ml/min/1 73sq m 70 57   CALCIUM mg/dL 8 8 9 4   MAGNESIUM mg/dL 1 9 2 0     Results from last 7 days   Lab Units 09/12/20  0548 09/11/20  1246   AST U/L 25 28   ALT U/L 21 25   ALK PHOS U/L 67 75   TOTAL PROTEIN g/dL 7 7 8 2   ALBUMIN g/dL 3 6 4 0   TOTAL BILIRUBIN mg/dL 0 40 0 50   BILIRUBIN DIRECT mg/dL  --  0 12         Results from last 7 days   Lab Units 09/12/20  0548 09/11/20  1246   GLUCOSE RANDOM mg/dL 93 112         Results from last 7 days   Lab Units 09/11/20  1246   HEMOGLOBIN A1C % 5 4   EAG mg/dl 108       Results from last 7 days   Lab Units 09/11/20  5595 09/11/20  3118 09/11/20  1246   TROPONIN I ng/mL <0 02 <0 02 <0 02     Results from last 7 days   Lab Units 09/11/20  1246   D-DIMER QUANTITATIVE ug/ml FEU 0 31     Results from last 7 days   Lab Units 09/12/20  0549 09/11/20  1246   PROTIME seconds 31 4* 31 3*   INR  3 22* 3 20*   PTT seconds  --  42*     Results from last 7 days   Lab Units 09/11/20  1246   TSH 3RD GENERATON uIU/mL 3 969*           Results from last 7 days   Lab Units 09/11/20  1246   NT-PRO BNP pg/mL 1,200*         ED Treatment:   Medication Administration from 09/11/2020 1204 to 09/11/2020 1719       Date/Time Order Dose Route Action Comments     09/11/2020 1503 sodium chloride 0 9 % bolus 500 mL 0 mL Intravenous Stopped      09/11/2020 1248 sodium chloride 0 9 % bolus 500 mL 500 mL Intravenous New Bag      09/11/2020 1246 aspirin chewable tablet 243 mg 243 mg Oral Given      09/11/2020 1249 nitroglycerin (NITRO-BID) 2 % TD ointment 1 inch 1 inch Topical Given         Present on Admission:   Atrial fibrillation (HCC)   Hyperlipidemia   Benign hypertension   Essential tremor   Carotid artery stenosis   Chest pain   Bradycardia   Subclinical hypothyroidism   Peripheral neuropathy   Tremor      Admitting Diagnosis: Chest pain [R07 9]  Frequent PVCs [I49 3]  Age/Sex: 69 y o  female  Admission Orders:  Scheduled Medications:  amLODIPine, 5 mg, Oral, QAM  aspirin, 81 mg, Oral, Daily  gabapentin, 300 mg, Oral, HS  losartan, 100 mg, Oral, Daily  pantoprazole, 40 mg, Oral, Daily  pravastatin, 20 mg, Oral, Daily With Dinner  propranolol, 20 mg, Oral, Q12H [facility]  warfarin, 2 5 mg, Oral, Daily (warfarin)      Continuous IV Infusions:     PRN Meds:  acetaminophen, 975 mg, Oral, Q6H PRN          Network Utilization Review Department  [email or web address]  ATTENTION: Please call with any questions or concerns to [phone] and carefully listen to the prompts so that you are directed to the right person   All voicemails are confidential   Thaddeus Nguyen all requests for admission clinical reviews, approved or denied determinations and any other requests to dedicated fax number below belonging to the campus where the patient is receiving treatment   List of dedicated fax numbers for the Facilities:  [address] [place] [address] DENIALS (Administrative/Medical Necessity) [phone]   [address] (Maternity/NICU/Pediatrics) [phone]   Melissa Grijalva [phone]   David Gaston [phone]   Annel Campbell [phone]   Lord Frias [phone]   [address] [place] [address] [phone]   Marco Monahan [phone]   [address] [facility]  [address] [facility] [address] [facility] [phone]

## 2020-09-12 NOTE — PLAN OF CARE
Problem: Potential for Falls  Goal: Patient will remain free of falls  Description: INTERVENTIONS:  - Assess patient frequently for physical needs  -  Identify cognitive and physical deficits and behaviors that affect risk of falls    -  [place] fall precautions as indicated by assessment   - Educate patient/family on patient safety including physical limitations  - Instruct patient to call for assistance with activity based on assessment  - Modify environment to reduce risk of injury  - Consider OT/PT consult to assist with strengthening/mobility  9/12/2020 0714 by Deepak Saha RN  Outcome: Progressing  9/11/2020 1941 by Deepak Saha RN  Outcome: Progressing     Problem: PAIN - ADULT  Goal: Verbalizes/displays adequate comfort level or baseline comfort level  Description: Interventions:  - Encourage patient to monitor pain and request assistance  - Assess pain using appropriate pain scale  - Administer analgesics based on type and severity of pain and evaluate response  - Implement non-pharmacological measures as appropriate and evaluate response  - Consider cultural and social influences on pain and pain management  - Notify physician/advanced practitioner if interventions unsuccessful or patient reports new pain  9/12/2020 0714 by Deepak Saha RN  Outcome: Progressing  9/11/2020 1941 by Deepak Saha RN  Outcome: Progressing     Problem: INFECTION - ADULT  Goal: Absence or prevention of progression during hospitalization  Description: INTERVENTIONS:  - Assess and monitor for signs and symptoms of infection  - Monitor lab/diagnostic results  - Monitor all insertion sites, i e  indwelling lines, tubes, and drains  - Monitor endotracheal if appropriate and nasal secretions for changes in amount and color  - [place] appropriate cooling/warming therapies per order  - Administer medications as ordered  - Instruct and encourage patient and family to use good hand hygiene technique  - Identify and instruct in appropriate isolation precautions for identified infection/condition  9/12/2020 0714 by Miguel Angel Christianson RN  Outcome: Progressing  9/11/2020 1941 by Miguel Angel Christianson RN  Outcome: Progressing  Goal: Absence of fever/infection during neutropenic period  Description: INTERVENTIONS:  - Monitor WBC    9/12/2020 0714 by Miguel Angel Christianson RN  Outcome: Progressing  9/11/2020 1941 by Miguel Angel Christianson RN  Outcome: Progressing     Problem: SAFETY ADULT  Goal: Patient will remain free of falls  Description: INTERVENTIONS:  - Assess patient frequently for physical needs  -  Identify cognitive and physical deficits and behaviors that affect risk of falls    -  [place] fall precautions as indicated by assessment   - Educate patient/family on patient safety including physical limitations  - Instruct patient to call for assistance with activity based on assessment  - Modify environment to reduce risk of injury  - Consider OT/PT consult to assist with strengthening/mobility  9/12/2020 0714 by Miguel Angel Christianson RN  Outcome: Progressing  9/11/2020 1941 by Miguel Angel Christianson RN  Outcome: Progressing  Goal: Maintain or return to baseline ADL function  Description: INTERVENTIONS:  -  Assess patient's ability to carry out ADLs; assess patient's baseline for ADL function and identify physical deficits which impact ability to perform ADLs (bathing, care of mouth/teeth, toileting, grooming, dressing, etc )  - Assess/evaluate cause of self-care deficits   - Assess range of motion  - Assess patient's mobility; develop plan if impaired  - Assess patient's need for assistive devices and provide as appropriate  - Encourage maximum independence but intervene and supervise when necessary  - Involve family in performance of ADLs  - Assess for home care needs following discharge   - Consider OT consult to assist with ADL evaluation and planning for discharge  - Provide patient education as appropriate  9/12/2020 07Berenice by Miguel Angel Christianson RN  Outcome: Progressing  9/11/2020 1941 by Estefani Byrd RN  Outcome: Progressing  Goal: Maintain or return mobility status to optimal level  Description: INTERVENTIONS:  - Assess patient's baseline mobility status (ambulation, transfers, stairs, etc )    - Identify cognitive and physical deficits and behaviors that affect mobility  - Identify mobility aids required to assist with transfers and/or ambulation (gait belt, sit-to-stand, lift, walker, cane, etc )  - [place] fall precautions as indicated by assessment  - Record patient progress and toleration of activity level on Mobility SBAR; progress patient to next Phase/Stage  - Instruct patient to call for assistance with activity based on assessment  - Consider rehabilitation consult to assist with strengthening/weightbearing, etc   9/12/2020 0714 by Estefani Byrd RN  Outcome: Progressing  9/11/2020 1941 by Estefani Byrd RN  Outcome: Progressing     Problem: DISCHARGE PLANNING  Goal: Discharge to home or other facility with appropriate resources  Description: INTERVENTIONS:  - Identify barriers to discharge w/patient and caregiver  - Arrange for needed discharge resources and transportation as appropriate  - Identify discharge learning needs (meds, wound care, etc )  - Arrange for interpretive services to assist at discharge as needed  - Refer to Case Management Department for coordinating discharge planning if the patient needs post-hospital services based on physician/advanced practitioner order or complex needs related to functional status, cognitive ability, or social support system  9/12/2020 0714 by Estefani Byrd RN  Outcome: Progressing  9/11/2020 1941 by Estefani Byrd RN  Outcome: Progressing     Problem: Knowledge Deficit  Goal: Patient/family/caregiver demonstrates understanding of disease process, treatment plan, medications, and discharge instructions  Description: Complete learning assessment and assess knowledge base    Interventions:  - Provide teaching at level of understanding  - Provide teaching via preferred learning methods  9/12/2020 0714 by Ivan Ferrari RN  Outcome: Progressing  9/11/2020 1941 by Ivan Ferrari RN  Outcome: Progressing     Problem: CARDIOVASCULAR - ADULT  Goal: Maintains optimal cardiac output and hemodynamic stability  Description: INTERVENTIONS:  - Monitor I/O, vital signs and rhythm  - Monitor for S/S and trends of decreased cardiac output  - Administer and titrate ordered vasoactive medications to optimize hemodynamic stability  - Assess quality of pulses, skin color and temperature  - Assess for signs of decreased coronary artery perfusion  - Instruct patient to report change in severity of symptoms  9/12/2020 0714 by Ivan Ferrari RN  Outcome: Progressing  9/11/2020 1941 by Ivan Ferrari RN  Outcome: Progressing  Goal: Absence of cardiac dysrhythmias or at baseline rhythm  Description: INTERVENTIONS:  - Continuous cardiac monitoring, vital signs, obtain 12 lead EKG if ordered  - Administer antiarrhythmic and heart rate control medications as ordered  - Monitor electrolytes and administer replacement therapy as ordered  9/12/2020 0714 by Ivan Ferrari RN  Outcome: Progressing  9/11/2020 1941 by Ivan Ferrari RN  Outcome: Progressing

## 2020-09-17 ENCOUNTER — HOSPITAL ENCOUNTER (OUTPATIENT)
Dept: NON INVASIVE DIAGNOSTICS | Facility: CLINIC | Age: 75
Discharge: HOME | End: 2020-09-17
Payer: COMMERCIAL

## 2020-09-17 DIAGNOSIS — R07.9 CHEST PAIN: ICD-10-CM

## 2020-09-17 LAB
MAX DIASTOLIC BP: 86 MMHG
MAX HEART RATE: 105 BPM
MAX PREDICTED HEART RATE: 148 BPM
MAX. SYSTOLIC BP: 182 MMHG
PROTOCOL NAME: NORMAL
REASON FOR TERMINATION: NORMAL
TARGET HR FORMULA: NORMAL
TEST INDICATION: NORMAL
TIME IN EXERCISE PHASE: NORMAL

## 2020-09-17 PROCEDURE — 93018 CV STRESS TEST I&R ONLY: CPT | Performed by: INTERNAL MEDICINE

## 2020-09-17 PROCEDURE — 78452 HT MUSCLE IMAGE SPECT MULT: CPT

## 2020-09-17 PROCEDURE — 78452 HT MUSCLE IMAGE SPECT MULT: CPT | Performed by: INTERNAL MEDICINE

## 2020-09-17 PROCEDURE — 93016 CV STRESS TEST SUPVJ ONLY: CPT | Performed by: INTERNAL MEDICINE

## 2020-09-17 PROCEDURE — G1004 CDSM NDSC: HCPCS

## 2020-09-17 PROCEDURE — A9502 TC99M TETROFOSMIN: HCPCS

## 2020-09-17 PROCEDURE — 93017 CV STRESS TEST TRACING ONLY: CPT

## 2020-09-17 RX ADMIN — REGADENOSON 0.4 MG: 0.08 INJECTION, SOLUTION INTRAVENOUS at 09:03

## 2020-09-22 ENCOUNTER — OFFICE VISIT (OUTPATIENT)
Dept: INTERNAL MEDICINE CLINIC | Facility: CLINIC | Age: 75
End: 2020-09-22
Payer: COMMERCIAL

## 2020-09-22 VITALS
HEART RATE: 76 BPM | SYSTOLIC BLOOD PRESSURE: 150 MMHG | RESPIRATION RATE: 18 BRPM | WEIGHT: 177 LBS | TEMPERATURE: 97.7 F | HEIGHT: 69 IN | BODY MASS INDEX: 26.22 KG/M2 | DIASTOLIC BLOOD PRESSURE: 92 MMHG

## 2020-09-22 DIAGNOSIS — R07.9 CHEST PAIN, UNSPECIFIED TYPE: ICD-10-CM

## 2020-09-22 DIAGNOSIS — R93.1 REGIONAL WALL MOTION ABNORMALITY OF HEART: ICD-10-CM

## 2020-09-22 DIAGNOSIS — Z23 ENCOUNTER FOR IMMUNIZATION: Primary | ICD-10-CM

## 2020-09-22 DIAGNOSIS — I10 BENIGN HYPERTENSION: ICD-10-CM

## 2020-09-22 PROCEDURE — 90662 IIV NO PRSV INCREASED AG IM: CPT | Performed by: INTERNAL MEDICINE

## 2020-09-22 PROCEDURE — G0008 ADMIN INFLUENZA VIRUS VAC: HCPCS | Performed by: INTERNAL MEDICINE

## 2020-09-22 PROCEDURE — 99213 OFFICE O/P EST LOW 20 MIN: CPT | Performed by: INTERNAL MEDICINE

## 2020-09-22 NOTE — PROGRESS NOTES
Assessment/Plan:    Diagnoses and all orders for this visit:    Encounter for immunization  -     influenza vaccine, high-dose, PF 0 7 mL (FLUZONE HIGH-DOSE)    Chest pain, unspecified type  -     Echo follow up/limited with contrast if indicated; Future    Regional wall motion abnormality of heart  -     Echo follow up/limited with contrast if indicated; Future    Benign hypertension         Patient Instructions   Atypical chest pain with multiple cardiac risk factors-nuclear stress test reviewed showing anterior fixed defect with mole motion abnormality  Echo from April 1st showed EF 55% with no regional wall motion abnormality  Case discussed with Cardiology  Will check limited echo to rule out wall motion abnormality that may be related to breast attenuation on nuclear study  If wall motion abnormality shows on echo would need to be more aggressive regarding coronary artery disease risk factor modification  Hypertension is suboptimally controlled today-patient feels apprehensive  She will monitor home blood pressures prior to follow-up  Subjective:      Patient ID: Amy La is a 69 y o  female    Hosp f/u and review stress  Feeling well, no further cp/sob            Current Outpatient Medications:     amLODIPine (NORVASC) 5 mg tablet, TAKE 1 TABLET BY MOUTH EVERY DAY IN THE MORNING, Disp: 90 tablet, Rfl: 3    aspirin 81 mg chewable tablet, Chew daily, Disp: , Rfl:     butalbital-acetaminophen-caffeine (FIORICET,ESGIC) -40 mg per tablet, Take 1 tablet by mouth every 4 (four) hours as needed for headaches, Disp: 30 tablet, Rfl: 2    Calcium Carbonate (CALTRATE 600) 1500 (600 Ca) MG TABS, Take by mouth daily , Disp: , Rfl:     clobetasol (OLUX) 0 05 % topical foam, Apply topically 2 (two) times a day, Disp: 100 g, Rfl: 2    clobetasol (TEMOVATE) 0 05 % cream, Apply to elbows bid, Disp: 30 g, Rfl: 3    clobetasol (TEMOVATE) 0 05 % ointment, Apply topically 2 (two) times a day Apply twice a day to foot x 7-10, Disp: 30 g, Rfl: 3    COUMADIN 1 MG tablet, TAKE 1 DAILY AS DIRECTED - BRAND NECESSARY!!!!!!!, Disp: 90 tablet, Rfl: 3    Coumadin 2 MG tablet, TAKE 1 TABLET BY MOUTH EVERY DAY, Disp: 90 tablet, Rfl: 3    Coumadin 3 MG tablet, TAKE ONE TAB DAILY AS DIRECTED BY DOCTOR- BRAND NECESSARY, Disp: 90 tablet, Rfl: 3    Flaxseed, Linseed, (FLAXSEED OIL) 1200 MG CAPS, Take by mouth daily , Disp: , Rfl:     gabapentin (NEURONTIN) 100 mg capsule, Take 1 capsule (100 mg total) by mouth 2 (two) times a day TAKE 1 CAPSULE (100 MG TOTAL) BY MOUTH BID, Disp: 180 capsule, Rfl: 1    gabapentin (NEURONTIN) 300 mg capsule, Take a 300mg + 100mg at bedtime, Disp: 90 capsule, Rfl: 3    losartan (COZAAR) 100 MG tablet, TAKE 1 TABLET BY MOUTH EVERY DAY, Disp: 90 tablet, Rfl: 1    Misc Natural Products (OSTEO BI-FLEX TRIPLE STRENGTH) TABS, Take by mouth daily , Disp: , Rfl:     pantoprazole (PROTONIX) 40 mg tablet, Take 1 tablet (40 mg total) by mouth daily, Disp: 30 tablet, Rfl: 2    propranolol (INDERAL) 20 mg tablet, 1 tab twice a day, Disp: 270 tablet, Rfl: 1    simvastatin (ZOCOR) 10 mg tablet, TAKE 1 TABLET BY MOUTH EVERY DAY, Disp: 90 tablet, Rfl: 3    Current Facility-Administered Medications:     cyanocobalamin injection 1,000 mcg, 1,000 mcg, Intramuscular, Q30 Days, HANNY Carrillo, 1,000 mcg at 05/14/19 0957    Recent Results (from the past 1008 hour(s))   Protime-INR    Collection Time: 08/14/20 10:11 AM   Result Value Ref Range    Protime 33 7 (H) 11 6 - 14 5 seconds    INR 3 35 (H) 0 84 - 1 19   Protime-INR    Collection Time: 08/31/20  9:56 AM   Result Value Ref Range    Protime 32 6 (H) 11 6 - 14 5 seconds    INR 3 21 (H) 0 84 - 1 19   ECG 12 lead    Collection Time: 09/11/20 12:16 PM   Result Value Ref Range    Ventricular Rate 83 BPM    Atrial Rate 100 BPM    MT Interval  ms    QRSD Interval 98 ms    QT Interval 376 ms    QTC Interval 441 ms    P Axis  degrees    QRS Axis 84 degrees    T Wave Axis -23 degrees   CBC and differential    Collection Time: 09/11/20 12:46 PM   Result Value Ref Range    WBC 7 17 4 31 - 10 16 Thousand/uL    RBC 3 94 3 81 - 5 12 Million/uL    Hemoglobin 12 3 11 5 - 15 4 g/dL    Hematocrit 38 9 34 8 - 46 1 %    MCV 99 (H) 82 - 98 fL    MCH 31 2 26 8 - 34 3 pg    MCHC 31 6 31 4 - 37 4 g/dL    RDW 12 7 11 6 - 15 1 %    MPV 9 6 8 9 - 12 7 fL    Platelets 094 043 - 366 Thousands/uL    nRBC 0 /100 WBCs    Neutrophils Relative 59 43 - 75 %    Immat GRANS % 1 0 - 2 %    Lymphocytes Relative 23 14 - 44 %    Monocytes Relative 11 4 - 12 %    Eosinophils Relative 5 0 - 6 %    Basophils Relative 1 0 - 1 %    Neutrophils Absolute 4 33 1 85 - 7 62 Thousands/µL    Immature Grans Absolute 0 05 0 00 - 0 20 Thousand/uL    Lymphocytes Absolute 1 62 0 60 - 4 47 Thousands/µL    Monocytes Absolute 0 77 0 17 - 1 22 Thousand/µL    Eosinophils Absolute 0 35 0 00 - 0 61 Thousand/µL    Basophils Absolute 0 05 0 00 - 0 10 Thousands/µL   Basic metabolic panel    Collection Time: 09/11/20 12:46 PM   Result Value Ref Range    Sodium 142 136 - 145 mmol/L    Potassium 5 0 3 5 - 5 3 mmol/L    Chloride 105 100 - 108 mmol/L    CO2 30 21 - 32 mmol/L    ANION GAP 7 4 - 13 mmol/L    BUN 23 5 - 25 mg/dL    Creatinine 0 99 0 60 - 1 30 mg/dL    Glucose 112 65 - 140 mg/dL    Calcium 9 4 8 3 - 10 1 mg/dL    eGFR 57 ml/min/1 73sq m   Hepatic function panel    Collection Time: 09/11/20 12:46 PM   Result Value Ref Range    Total Bilirubin 0 50 0 20 - 1 00 mg/dL    Bilirubin, Direct 0 12 0 00 - 0 20 mg/dL    Alkaline Phosphatase 75 46 - 116 U/L    AST 28 5 - 45 U/L    ALT 25 12 - 78 U/L    Total Protein 8 2 6 4 - 8 2 g/dL    Albumin 4 0 3 5 - 5 0 g/dL   Protime-INR    Collection Time: 09/11/20 12:46 PM   Result Value Ref Range    Protime 31 3 (H) 11 6 - 14 5 seconds    INR 3 20 (H) 0 84 - 1 19   APTT    Collection Time: 09/11/20 12:46 PM   Result Value Ref Range    PTT 42 (H) 23 - 37 seconds   Magnesium Collection Time: 09/11/20 12:46 PM   Result Value Ref Range    Magnesium 2 0 1 6 - 2 6 mg/dL   TSH, 3rd generation with Free T4 reflex    Collection Time: 09/11/20 12:46 PM   Result Value Ref Range    TSH 3RD GENERATON 3 969 (H) 0 358 - 3 740 uIU/mL   Troponin I    Collection Time: 09/11/20 12:46 PM   Result Value Ref Range    Troponin I <0 02 <=0 04 ng/mL   D-Dimer    Collection Time: 09/11/20 12:46 PM   Result Value Ref Range    D-Dimer, Quant 0 31 <0 50 ug/ml FEU   NT-BNP PRO    Collection Time: 09/11/20 12:46 PM   Result Value Ref Range    NT-proBNP 1,200 (H) <125 pg/mL   T4, free    Collection Time: 09/11/20 12:46 PM   Result Value Ref Range    Free T4 1 16 0 76 - 1 46 ng/dL   Hemoglobin A1C w/ EAG Estimation    Collection Time: 09/11/20 12:46 PM   Result Value Ref Range    Hemoglobin A1C 5 4 Normal 3 8-5 6%; PreDiabetic 5 7-6 4%;  Diabetic >=6 5%; Glycemic control for adults with diabetes <7 0% %     mg/dl   Troponin I    Collection Time: 09/11/20  5:47 PM   Result Value Ref Range    Troponin I <0 02 <=0 04 ng/mL   Troponin I    Collection Time: 09/11/20  9:35 PM   Result Value Ref Range    Troponin I <0 02 <=0 04 ng/mL   CBC and differential    Collection Time: 09/12/20  5:48 AM   Result Value Ref Range    WBC 6 49 4 31 - 10 16 Thousand/uL    RBC 3 75 (L) 3 81 - 5 12 Million/uL    Hemoglobin 11 8 11 5 - 15 4 g/dL    Hematocrit 36 9 34 8 - 46 1 %    MCV 98 82 - 98 fL    MCH 31 5 26 8 - 34 3 pg    MCHC 32 0 31 4 - 37 4 g/dL    RDW 12 7 11 6 - 15 1 %    MPV 9 3 8 9 - 12 7 fL    Platelets 167 904 - 699 Thousands/uL    nRBC 0 /100 WBCs    Neutrophils Relative 61 43 - 75 %    Immat GRANS % 1 0 - 2 %    Lymphocytes Relative 23 14 - 44 %    Monocytes Relative 9 4 - 12 %    Eosinophils Relative 5 0 - 6 %    Basophils Relative 1 0 - 1 %    Neutrophils Absolute 4 01 1 85 - 7 62 Thousands/µL    Immature Grans Absolute 0 03 0 00 - 0 20 Thousand/uL    Lymphocytes Absolute 1 49 0 60 - 4 47 Thousands/µL    Monocytes Absolute 0 61 0 17 - 1 22 Thousand/µL    Eosinophils Absolute 0 31 0 00 - 0 61 Thousand/µL    Basophils Absolute 0 04 0 00 - 0 10 Thousands/µL   Magnesium    Collection Time: 09/12/20  5:48 AM   Result Value Ref Range    Magnesium 1 9 1 6 - 2 6 mg/dL   Comprehensive metabolic panel    Collection Time: 09/12/20  5:48 AM   Result Value Ref Range    Sodium 142 136 - 145 mmol/L    Potassium 3 6 3 5 - 5 3 mmol/L    Chloride 106 100 - 108 mmol/L    CO2 25 21 - 32 mmol/L    ANION GAP 11 4 - 13 mmol/L    BUN 18 5 - 25 mg/dL    Creatinine 0 84 0 60 - 1 30 mg/dL    Glucose 93 65 - 140 mg/dL    Calcium 8 8 8 3 - 10 1 mg/dL    AST 25 5 - 45 U/L    ALT 21 12 - 78 U/L    Alkaline Phosphatase 67 46 - 116 U/L    Total Protein 7 7 6 4 - 8 2 g/dL    Albumin 3 6 3 5 - 5 0 g/dL    Total Bilirubin 0 40 0 20 - 1 00 mg/dL    eGFR 70 ml/min/1 73sq m   Protime-INR    Collection Time: 09/12/20  5:49 AM   Result Value Ref Range    Protime 31 4 (H) 11 6 - 14 5 seconds    INR 3 22 (H) 0 84 - 1 19   Stress strip    Collection Time: 09/17/20  8:51 AM   Result Value Ref Range    Protocol Name LEXISCAN-SIT     Time In Exercise Phase 00:03:00     MAX  SYSTOLIC  mmHg    Max Diastolic Bp 86 mmHg    Max Heart Rate 105 BPM    Max Predicted Heart Rate 148 BPM    Reason for Termination Protocol Complete     Test Indication CHEST PAIN     Target Hr Formular (220 - Age)*85%     Arrhy During Ex      ECG Interp Before Ex      ECG Interp during Ex      Ex Summary Comment      Overall Hr Response To Exercise      Overall BP Response To Exercise         The following portions of the patient's history were reviewed and updated as appropriate: allergies, current medications, past family history, past medical history, past social history, past surgical history and problem list      Review of Systems   Constitutional: Negative for appetite change, chills, diaphoresis, fatigue, fever and unexpected weight change     HENT: Negative for congestion, hearing loss and rhinorrhea  Eyes: Negative for visual disturbance  Respiratory: Negative for cough, chest tightness, shortness of breath and wheezing  Cardiovascular: Negative for chest pain, palpitations and leg swelling  Gastrointestinal: Negative for abdominal pain and blood in stool  Endocrine: Negative for cold intolerance, heat intolerance, polydipsia and polyuria  Genitourinary: Negative for difficulty urinating, dysuria, frequency and urgency  Musculoskeletal: Negative for arthralgias and myalgias  Skin: Negative for rash  Neurological: Negative for dizziness, weakness, light-headedness and headaches  Hematological: Does not bruise/bleed easily  Psychiatric/Behavioral: Negative for dysphoric mood and sleep disturbance  Objective:      Vitals:    09/22/20 1458   BP: 150/92   Pulse:    Resp:    Temp:           Physical Exam  Constitutional:       Appearance: She is well-developed  HENT:      Head: Normocephalic and atraumatic  Nose: Nose normal    Eyes:      General: No scleral icterus  Conjunctiva/sclera: Conjunctivae normal       Pupils: Pupils are equal, round, and reactive to light  Neck:      Musculoskeletal: Normal range of motion and neck supple  Thyroid: No thyromegaly  Vascular: No JVD  Trachea: No tracheal deviation  Cardiovascular:      Rate and Rhythm: Normal rate and regular rhythm  Heart sounds: No murmur  No friction rub  No gallop  Pulmonary:      Effort: Pulmonary effort is normal  No respiratory distress  Breath sounds: Normal breath sounds  No wheezing or rales  Musculoskeletal:         General: No deformity  Lymphadenopathy:      Cervical: No cervical adenopathy  Skin:     General: Skin is warm and dry  Coloration: Skin is not pale  Findings: No erythema or rash  Neurological:      Mental Status: She is alert and oriented to person, place, and time  Cranial Nerves: No cranial nerve deficit     Psychiatric: Behavior: Behavior normal          Thought Content:  Thought content normal          Judgment: Judgment normal

## 2020-09-22 NOTE — PATIENT INSTRUCTIONS
Atypical chest pain with multiple cardiac risk factors-nuclear stress test reviewed showing anterior fixed defect with mole motion abnormality  Echo from April 1st showed EF 55% with no regional wall motion abnormality  Case discussed with Cardiology  Will check limited echo to rule out wall motion abnormality that may be related to breast attenuation on nuclear study  If wall motion abnormality shows on echo would need to be more aggressive regarding coronary artery disease risk factor modification  Hypertension is suboptimally controlled today-patient feels apprehensive  She will monitor home blood pressures prior to follow-up

## 2020-09-28 ENCOUNTER — APPOINTMENT (OUTPATIENT)
Dept: LAB | Facility: CLINIC | Age: 75
End: 2020-09-28
Payer: COMMERCIAL

## 2020-09-28 ENCOUNTER — ANTICOAG VISIT (OUTPATIENT)
Dept: CARDIOLOGY CLINIC | Facility: CLINIC | Age: 75
End: 2020-09-28

## 2020-09-28 DIAGNOSIS — Z95.2 HISTORY OF HEART VALVE REPLACEMENT: ICD-10-CM

## 2020-09-28 DIAGNOSIS — E78.2 MIXED HYPERLIPIDEMIA: ICD-10-CM

## 2020-09-28 DIAGNOSIS — E03.8 SUBCLINICAL HYPOTHYROIDISM: ICD-10-CM

## 2020-09-28 DIAGNOSIS — I10 BENIGN HYPERTENSION: ICD-10-CM

## 2020-09-28 LAB
ALBUMIN SERPL BCP-MCNC: 3.9 G/DL (ref 3.5–5)
ALP SERPL-CCNC: 71 U/L (ref 46–116)
ALT SERPL W P-5'-P-CCNC: 20 U/L (ref 12–78)
ANION GAP SERPL CALCULATED.3IONS-SCNC: 3 MMOL/L (ref 4–13)
AST SERPL W P-5'-P-CCNC: 20 U/L (ref 5–45)
BASOPHILS # BLD AUTO: 0.05 THOUSANDS/ΜL (ref 0–0.1)
BASOPHILS NFR BLD AUTO: 1 % (ref 0–1)
BILIRUB SERPL-MCNC: 0.42 MG/DL (ref 0.2–1)
BUN SERPL-MCNC: 20 MG/DL (ref 5–25)
CALCIUM SERPL-MCNC: 9.3 MG/DL (ref 8.3–10.1)
CHLORIDE SERPL-SCNC: 110 MMOL/L (ref 100–108)
CHOLEST SERPL-MCNC: 172 MG/DL (ref 50–200)
CO2 SERPL-SCNC: 29 MMOL/L (ref 21–32)
CREAT SERPL-MCNC: 0.92 MG/DL (ref 0.6–1.3)
EOSINOPHIL # BLD AUTO: 0.27 THOUSAND/ΜL (ref 0–0.61)
EOSINOPHIL NFR BLD AUTO: 5 % (ref 0–6)
ERYTHROCYTE [DISTWIDTH] IN BLOOD BY AUTOMATED COUNT: 12.9 % (ref 11.6–15.1)
GFR SERPL CREATININE-BSD FRML MDRD: 62 ML/MIN/1.73SQ M
GLUCOSE SERPL-MCNC: 80 MG/DL (ref 65–140)
HCT VFR BLD AUTO: 39.4 % (ref 34.8–46.1)
HDLC SERPL-MCNC: 66 MG/DL
HGB BLD-MCNC: 12.5 G/DL (ref 11.5–15.4)
IMM GRANULOCYTES # BLD AUTO: 0.01 THOUSAND/UL (ref 0–0.2)
IMM GRANULOCYTES NFR BLD AUTO: 0 % (ref 0–2)
LDLC SERPL CALC-MCNC: 80 MG/DL (ref 0–100)
LYMPHOCYTES # BLD AUTO: 1.04 THOUSANDS/ΜL (ref 0.6–4.47)
LYMPHOCYTES NFR BLD AUTO: 20 % (ref 14–44)
MCH RBC QN AUTO: 32 PG (ref 26.8–34.3)
MCHC RBC AUTO-ENTMCNC: 31.7 G/DL (ref 31.4–37.4)
MCV RBC AUTO: 101 FL (ref 82–98)
MONOCYTES # BLD AUTO: 0.56 THOUSAND/ΜL (ref 0.17–1.22)
MONOCYTES NFR BLD AUTO: 11 % (ref 4–12)
NEUTROPHILS # BLD AUTO: 3.37 THOUSANDS/ΜL (ref 1.85–7.62)
NEUTS SEG NFR BLD AUTO: 63 % (ref 43–75)
NONHDLC SERPL-MCNC: 106 MG/DL
NRBC BLD AUTO-RTO: 0 /100 WBCS
PLATELET # BLD AUTO: 221 THOUSANDS/UL (ref 149–390)
PMV BLD AUTO: 9.6 FL (ref 8.9–12.7)
POTASSIUM SERPL-SCNC: 4.4 MMOL/L (ref 3.5–5.3)
PROT SERPL-MCNC: 8.2 G/DL (ref 6.4–8.2)
RBC # BLD AUTO: 3.91 MILLION/UL (ref 3.81–5.12)
SODIUM SERPL-SCNC: 142 MMOL/L (ref 136–145)
TRIGL SERPL-MCNC: 132 MG/DL
TSH SERPL DL<=0.05 MIU/L-ACNC: 3.06 UIU/ML (ref 0.36–3.74)
WBC # BLD AUTO: 5.3 THOUSAND/UL (ref 4.31–10.16)

## 2020-09-28 PROCEDURE — 80053 COMPREHEN METABOLIC PANEL: CPT

## 2020-09-28 PROCEDURE — 85025 COMPLETE CBC W/AUTO DIFF WBC: CPT

## 2020-09-28 PROCEDURE — 84443 ASSAY THYROID STIM HORMONE: CPT

## 2020-09-28 PROCEDURE — 80061 LIPID PANEL: CPT

## 2020-10-01 DIAGNOSIS — G25.0 ESSENTIAL TREMOR: ICD-10-CM

## 2020-10-01 RX ORDER — PROPRANOLOL HYDROCHLORIDE 20 MG/1
TABLET ORAL
Qty: 270 TABLET | Refills: 1 | Status: SHIPPED | OUTPATIENT
Start: 2020-10-01 | End: 2020-10-21

## 2020-10-05 ENCOUNTER — OFFICE VISIT (OUTPATIENT)
Dept: INTERNAL MEDICINE CLINIC | Facility: CLINIC | Age: 75
End: 2020-10-05
Payer: COMMERCIAL

## 2020-10-05 VITALS
SYSTOLIC BLOOD PRESSURE: 118 MMHG | HEIGHT: 69 IN | DIASTOLIC BLOOD PRESSURE: 62 MMHG | BODY MASS INDEX: 26.28 KG/M2 | OXYGEN SATURATION: 97 % | TEMPERATURE: 98.5 F | WEIGHT: 177.4 LBS | HEART RATE: 76 BPM

## 2020-10-05 DIAGNOSIS — G25.0 ESSENTIAL TREMOR: ICD-10-CM

## 2020-10-05 DIAGNOSIS — I10 BENIGN HYPERTENSION: ICD-10-CM

## 2020-10-05 DIAGNOSIS — N02.9 FAMILIAL HEMATURIA: ICD-10-CM

## 2020-10-05 DIAGNOSIS — E78.2 MIXED HYPERLIPIDEMIA: ICD-10-CM

## 2020-10-05 DIAGNOSIS — I48.91 ATRIAL FIBRILLATION, UNSPECIFIED TYPE (HCC): ICD-10-CM

## 2020-10-05 DIAGNOSIS — E03.8 SUBCLINICAL HYPOTHYROIDISM: Primary | ICD-10-CM

## 2020-10-05 DIAGNOSIS — Z78.0 POSTMENOPAUSAL: ICD-10-CM

## 2020-10-05 PROCEDURE — 1170F FXNL STATUS ASSESSED: CPT | Performed by: NURSE PRACTITIONER

## 2020-10-05 PROCEDURE — G0439 PPPS, SUBSEQ VISIT: HCPCS | Performed by: NURSE PRACTITIONER

## 2020-10-05 PROCEDURE — 99214 OFFICE O/P EST MOD 30 MIN: CPT | Performed by: NURSE PRACTITIONER

## 2020-10-05 PROCEDURE — 3066F NEPHROPATHY DOC TX: CPT | Performed by: INTERNAL MEDICINE

## 2020-10-05 PROCEDURE — 1125F AMNT PAIN NOTED PAIN PRSNT: CPT | Performed by: NURSE PRACTITIONER

## 2020-10-08 ENCOUNTER — HOSPITAL ENCOUNTER (OUTPATIENT)
Dept: NON INVASIVE DIAGNOSTICS | Facility: CLINIC | Age: 75
Discharge: HOME | End: 2020-10-08
Payer: COMMERCIAL

## 2020-10-08 DIAGNOSIS — R93.1 REGIONAL WALL MOTION ABNORMALITY OF HEART: ICD-10-CM

## 2020-10-08 DIAGNOSIS — R07.9 CHEST PAIN, UNSPECIFIED TYPE: ICD-10-CM

## 2020-10-08 PROCEDURE — 93308 TTE F-UP OR LMTD: CPT | Performed by: INTERNAL MEDICINE

## 2020-10-08 PROCEDURE — 93325 DOPPLER ECHO COLOR FLOW MAPG: CPT | Performed by: INTERNAL MEDICINE

## 2020-10-08 PROCEDURE — 93321 DOPPLER ECHO F-UP/LMTD STD: CPT | Performed by: INTERNAL MEDICINE

## 2020-10-08 PROCEDURE — 93308 TTE F-UP OR LMTD: CPT

## 2020-10-15 ENCOUNTER — LAB (OUTPATIENT)
Dept: LAB | Facility: CLINIC | Age: 75
End: 2020-10-15
Payer: COMMERCIAL

## 2020-10-15 ENCOUNTER — ANTICOAG VISIT (OUTPATIENT)
Dept: CARDIOLOGY CLINIC | Facility: CLINIC | Age: 75
End: 2020-10-15

## 2020-10-15 DIAGNOSIS — Z95.2 HISTORY OF HEART VALVE REPLACEMENT: ICD-10-CM

## 2020-10-21 ENCOUNTER — OFFICE VISIT (OUTPATIENT)
Dept: INTERNAL MEDICINE CLINIC | Facility: CLINIC | Age: 75
End: 2020-10-21
Payer: COMMERCIAL

## 2020-10-21 VITALS
DIASTOLIC BLOOD PRESSURE: 90 MMHG | SYSTOLIC BLOOD PRESSURE: 158 MMHG | WEIGHT: 187 LBS | BODY MASS INDEX: 27.7 KG/M2 | TEMPERATURE: 98.3 F | OXYGEN SATURATION: 98 % | HEIGHT: 69 IN | HEART RATE: 90 BPM

## 2020-10-21 DIAGNOSIS — I48.21 PERMANENT ATRIAL FIBRILLATION (HCC): Primary | ICD-10-CM

## 2020-10-21 DIAGNOSIS — I10 BENIGN HYPERTENSION: ICD-10-CM

## 2020-10-21 DIAGNOSIS — G45.3 AMAUROSIS FUGAX, BOTH EYES: ICD-10-CM

## 2020-10-21 DIAGNOSIS — G25.0 ESSENTIAL TREMOR: ICD-10-CM

## 2020-10-21 PROCEDURE — 99214 OFFICE O/P EST MOD 30 MIN: CPT | Performed by: INTERNAL MEDICINE

## 2020-10-21 PROCEDURE — 1160F RVW MEDS BY RX/DR IN RCRD: CPT | Performed by: INTERNAL MEDICINE

## 2020-10-21 PROCEDURE — 1036F TOBACCO NON-USER: CPT | Performed by: INTERNAL MEDICINE

## 2020-10-21 PROCEDURE — 3080F DIAST BP >= 90 MM HG: CPT | Performed by: INTERNAL MEDICINE

## 2020-10-21 RX ORDER — PROPRANOLOL HCL 60 MG
60 CAPSULE, EXTENDED RELEASE 24HR ORAL DAILY
Qty: 30 CAPSULE | Refills: 5 | Status: SHIPPED | OUTPATIENT
Start: 2020-10-21 | End: 2021-04-09

## 2020-10-21 RX ORDER — ASCORBIC ACID 500 MG
500 TABLET ORAL DAILY
COMMUNITY
End: 2021-08-03

## 2020-10-26 ENCOUNTER — OFFICE VISIT (OUTPATIENT)
Dept: CARDIOLOGY CLINIC | Facility: CLINIC | Age: 75
End: 2020-10-26
Payer: COMMERCIAL

## 2020-10-26 VITALS
WEIGHT: 177 LBS | HEIGHT: 69 IN | HEART RATE: 56 BPM | SYSTOLIC BLOOD PRESSURE: 128 MMHG | OXYGEN SATURATION: 98 % | BODY MASS INDEX: 26.22 KG/M2 | RESPIRATION RATE: 18 BRPM | DIASTOLIC BLOOD PRESSURE: 80 MMHG

## 2020-10-26 DIAGNOSIS — I05.9 MITRAL VALVE DISORDER: ICD-10-CM

## 2020-10-26 DIAGNOSIS — I48.20 CHRONIC ATRIAL FIBRILLATION (HCC): Primary | ICD-10-CM

## 2020-10-26 DIAGNOSIS — I10 HYPERTENSION, ESSENTIAL: ICD-10-CM

## 2020-10-26 DIAGNOSIS — Z79.01 CHRONIC ANTICOAGULATION: ICD-10-CM

## 2020-10-26 PROCEDURE — 99214 OFFICE O/P EST MOD 30 MIN: CPT | Performed by: INTERNAL MEDICINE

## 2020-10-28 DIAGNOSIS — G62.9 NEUROPATHY: ICD-10-CM

## 2020-10-28 RX ORDER — GABAPENTIN 100 MG/1
CAPSULE ORAL
Qty: 90 CAPSULE | Refills: 0 | Status: SHIPPED | OUTPATIENT
Start: 2020-10-28 | End: 2020-10-29 | Stop reason: SDUPTHER

## 2020-10-29 DIAGNOSIS — G62.9 NEUROPATHY: ICD-10-CM

## 2020-10-29 DIAGNOSIS — K29.70 GASTRITIS WITHOUT BLEEDING, UNSPECIFIED CHRONICITY, UNSPECIFIED GASTRITIS TYPE: ICD-10-CM

## 2020-10-29 RX ORDER — PANTOPRAZOLE SODIUM 40 MG/1
TABLET, DELAYED RELEASE ORAL
Qty: 90 TABLET | Refills: 0 | Status: SHIPPED | OUTPATIENT
Start: 2020-10-29 | End: 2021-03-29

## 2020-10-29 RX ORDER — GABAPENTIN 100 MG/1
100 CAPSULE ORAL DAILY
Qty: 90 CAPSULE | Refills: 3 | Status: SHIPPED | OUTPATIENT
Start: 2020-10-29 | End: 2020-11-12

## 2020-11-09 ENCOUNTER — ANTICOAG VISIT (OUTPATIENT)
Dept: CARDIOLOGY CLINIC | Facility: CLINIC | Age: 75
End: 2020-11-09

## 2020-11-09 ENCOUNTER — LAB (OUTPATIENT)
Dept: LAB | Facility: CLINIC | Age: 75
End: 2020-11-09
Payer: COMMERCIAL

## 2020-11-09 DIAGNOSIS — Z95.2 HISTORY OF HEART VALVE REPLACEMENT: ICD-10-CM

## 2020-11-11 DIAGNOSIS — G62.9 NEUROPATHY: ICD-10-CM

## 2020-11-12 ENCOUNTER — OFFICE VISIT (OUTPATIENT)
Dept: INTERNAL MEDICINE CLINIC | Facility: CLINIC | Age: 75
End: 2020-11-12
Payer: COMMERCIAL

## 2020-11-12 VITALS
BODY MASS INDEX: 25.92 KG/M2 | HEIGHT: 69 IN | HEART RATE: 64 BPM | TEMPERATURE: 96.4 F | DIASTOLIC BLOOD PRESSURE: 82 MMHG | RESPIRATION RATE: 16 BRPM | SYSTOLIC BLOOD PRESSURE: 122 MMHG | WEIGHT: 175 LBS

## 2020-11-12 DIAGNOSIS — M54.6 LEFT-SIDED THORACIC BACK PAIN, UNSPECIFIED CHRONICITY: Primary | ICD-10-CM

## 2020-11-12 PROCEDURE — 99213 OFFICE O/P EST LOW 20 MIN: CPT | Performed by: NURSE PRACTITIONER

## 2020-11-12 PROCEDURE — 3008F BODY MASS INDEX DOCD: CPT | Performed by: NURSE PRACTITIONER

## 2020-11-12 PROCEDURE — 3074F SYST BP LT 130 MM HG: CPT | Performed by: NURSE PRACTITIONER

## 2020-11-12 PROCEDURE — 3079F DIAST BP 80-89 MM HG: CPT | Performed by: NURSE PRACTITIONER

## 2020-11-12 PROCEDURE — 1036F TOBACCO NON-USER: CPT | Performed by: NURSE PRACTITIONER

## 2020-11-12 PROCEDURE — 1160F RVW MEDS BY RX/DR IN RCRD: CPT | Performed by: NURSE PRACTITIONER

## 2020-11-12 RX ORDER — GABAPENTIN 100 MG/1
CAPSULE ORAL
Qty: 180 CAPSULE | Refills: 1 | Status: SHIPPED | OUTPATIENT
Start: 2020-11-12 | End: 2021-06-11 | Stop reason: SDUPTHER

## 2020-11-14 DIAGNOSIS — I48.91 ATRIAL FIBRILLATION, UNSPECIFIED TYPE (HCC): ICD-10-CM

## 2020-11-15 RX ORDER — WARFARIN SODIUM 2 MG/1
TABLET ORAL
Qty: 90 TABLET | Refills: 3 | Status: SHIPPED | OUTPATIENT
Start: 2020-11-15 | End: 2020-11-17 | Stop reason: RX

## 2020-11-17 DIAGNOSIS — Z95.2 HISTORY OF HEART VALVE REPLACEMENT: Primary | ICD-10-CM

## 2020-11-17 RX ORDER — WARFARIN SODIUM 2 MG/1
2 TABLET ORAL
Qty: 30 TABLET | Refills: 3 | Status: SHIPPED | OUTPATIENT
Start: 2020-11-17 | End: 2021-02-12

## 2020-11-17 RX ORDER — WARFARIN SODIUM 1 MG/1
1 TABLET ORAL
Qty: 30 TABLET | Refills: 0 | Status: SHIPPED | OUTPATIENT
Start: 2020-11-17 | End: 2020-12-12

## 2020-11-17 RX ORDER — WARFARIN SODIUM 3 MG/1
3 TABLET ORAL
Qty: 30 TABLET | Refills: 3 | Status: SHIPPED | OUTPATIENT
Start: 2020-11-17 | End: 2021-02-12

## 2020-11-23 ENCOUNTER — ANTICOAG VISIT (OUTPATIENT)
Dept: CARDIOLOGY CLINIC | Facility: CLINIC | Age: 75
End: 2020-11-23

## 2020-11-23 ENCOUNTER — LAB (OUTPATIENT)
Dept: LAB | Facility: CLINIC | Age: 75
End: 2020-11-23
Payer: COMMERCIAL

## 2020-11-23 DIAGNOSIS — I48.91 ATRIAL FIBRILLATION, UNSPECIFIED TYPE (HCC): ICD-10-CM

## 2020-11-23 DIAGNOSIS — Z95.2 HISTORY OF HEART VALVE REPLACEMENT: ICD-10-CM

## 2020-11-25 ENCOUNTER — EVALUATION (OUTPATIENT)
Dept: PHYSICAL THERAPY | Facility: CLINIC | Age: 75
End: 2020-11-25
Payer: COMMERCIAL

## 2020-11-25 DIAGNOSIS — M54.6 LEFT-SIDED THORACIC BACK PAIN, UNSPECIFIED CHRONICITY: Primary | ICD-10-CM

## 2020-11-25 PROCEDURE — 97110 THERAPEUTIC EXERCISES: CPT | Performed by: PHYSICAL THERAPIST

## 2020-11-25 PROCEDURE — 97161 PT EVAL LOW COMPLEX 20 MIN: CPT | Performed by: PHYSICAL THERAPIST

## 2020-11-30 ENCOUNTER — OFFICE VISIT (OUTPATIENT)
Dept: PHYSICAL THERAPY | Facility: CLINIC | Age: 75
End: 2020-11-30
Payer: COMMERCIAL

## 2020-11-30 DIAGNOSIS — M54.6 LEFT-SIDED THORACIC BACK PAIN, UNSPECIFIED CHRONICITY: Primary | ICD-10-CM

## 2020-11-30 PROCEDURE — 97112 NEUROMUSCULAR REEDUCATION: CPT | Performed by: PHYSICAL THERAPIST

## 2020-11-30 PROCEDURE — 97110 THERAPEUTIC EXERCISES: CPT | Performed by: PHYSICAL THERAPIST

## 2020-12-03 ENCOUNTER — OFFICE VISIT (OUTPATIENT)
Dept: PHYSICAL THERAPY | Facility: CLINIC | Age: 75
End: 2020-12-03
Payer: COMMERCIAL

## 2020-12-03 DIAGNOSIS — M54.6 LEFT-SIDED THORACIC BACK PAIN, UNSPECIFIED CHRONICITY: Primary | ICD-10-CM

## 2020-12-03 PROCEDURE — 97112 NEUROMUSCULAR REEDUCATION: CPT | Performed by: PHYSICAL THERAPIST

## 2020-12-03 PROCEDURE — 97110 THERAPEUTIC EXERCISES: CPT | Performed by: PHYSICAL THERAPIST

## 2020-12-04 ENCOUNTER — APPOINTMENT (OUTPATIENT)
Dept: LAB | Facility: CLINIC | Age: 75
End: 2020-12-04
Payer: COMMERCIAL

## 2020-12-04 ENCOUNTER — ANTICOAG VISIT (OUTPATIENT)
Dept: CARDIOLOGY CLINIC | Facility: CLINIC | Age: 75
End: 2020-12-04

## 2020-12-04 ENCOUNTER — TELEPHONE (OUTPATIENT)
Dept: INTERNAL MEDICINE CLINIC | Facility: CLINIC | Age: 75
End: 2020-12-04

## 2020-12-04 DIAGNOSIS — Z95.2 HISTORY OF HEART VALVE REPLACEMENT: ICD-10-CM

## 2020-12-07 ENCOUNTER — APPOINTMENT (OUTPATIENT)
Dept: PHYSICAL THERAPY | Facility: CLINIC | Age: 75
End: 2020-12-07
Payer: COMMERCIAL

## 2020-12-10 ENCOUNTER — APPOINTMENT (OUTPATIENT)
Dept: PHYSICAL THERAPY | Facility: CLINIC | Age: 75
End: 2020-12-10
Payer: COMMERCIAL

## 2020-12-12 DIAGNOSIS — Z95.2 HISTORY OF HEART VALVE REPLACEMENT: ICD-10-CM

## 2020-12-12 RX ORDER — WARFARIN SODIUM 1 MG/1
TABLET ORAL
Qty: 30 TABLET | Refills: 0 | Status: SHIPPED | OUTPATIENT
Start: 2020-12-12 | End: 2021-01-22

## 2020-12-14 ENCOUNTER — APPOINTMENT (OUTPATIENT)
Dept: PHYSICAL THERAPY | Facility: CLINIC | Age: 75
End: 2020-12-14
Payer: COMMERCIAL

## 2020-12-17 ENCOUNTER — APPOINTMENT (OUTPATIENT)
Dept: PHYSICAL THERAPY | Facility: CLINIC | Age: 75
End: 2020-12-17
Payer: COMMERCIAL

## 2020-12-21 ENCOUNTER — APPOINTMENT (OUTPATIENT)
Dept: PHYSICAL THERAPY | Facility: CLINIC | Age: 75
End: 2020-12-21
Payer: COMMERCIAL

## 2020-12-22 ENCOUNTER — ANTICOAG VISIT (OUTPATIENT)
Dept: CARDIOLOGY CLINIC | Facility: CLINIC | Age: 75
End: 2020-12-22

## 2020-12-22 ENCOUNTER — LAB (OUTPATIENT)
Dept: LAB | Facility: CLINIC | Age: 75
End: 2020-12-22
Payer: COMMERCIAL

## 2020-12-22 DIAGNOSIS — Z95.2 HISTORY OF HEART VALVE REPLACEMENT: ICD-10-CM

## 2020-12-24 ENCOUNTER — APPOINTMENT (OUTPATIENT)
Dept: PHYSICAL THERAPY | Facility: CLINIC | Age: 75
End: 2020-12-24
Payer: COMMERCIAL

## 2020-12-31 ENCOUNTER — OFFICE VISIT (OUTPATIENT)
Dept: INTERNAL MEDICINE CLINIC | Facility: CLINIC | Age: 75
End: 2020-12-31
Payer: COMMERCIAL

## 2020-12-31 ENCOUNTER — TELEPHONE (OUTPATIENT)
Dept: INTERNAL MEDICINE CLINIC | Facility: CLINIC | Age: 75
End: 2020-12-31

## 2020-12-31 VITALS
WEIGHT: 177 LBS | RESPIRATION RATE: 16 BRPM | SYSTOLIC BLOOD PRESSURE: 122 MMHG | TEMPERATURE: 98.6 F | HEIGHT: 69 IN | DIASTOLIC BLOOD PRESSURE: 82 MMHG | HEART RATE: 52 BPM | BODY MASS INDEX: 26.22 KG/M2

## 2020-12-31 DIAGNOSIS — K58.1 IRRITABLE BOWEL SYNDROME WITH CONSTIPATION: Primary | ICD-10-CM

## 2020-12-31 DIAGNOSIS — G89.29 CHRONIC MIDLINE LOW BACK PAIN WITHOUT SCIATICA: ICD-10-CM

## 2020-12-31 DIAGNOSIS — M54.50 CHRONIC MIDLINE LOW BACK PAIN WITHOUT SCIATICA: ICD-10-CM

## 2020-12-31 DIAGNOSIS — N32.81 OAB (OVERACTIVE BLADDER): ICD-10-CM

## 2020-12-31 PROCEDURE — 1036F TOBACCO NON-USER: CPT | Performed by: INTERNAL MEDICINE

## 2020-12-31 PROCEDURE — 1160F RVW MEDS BY RX/DR IN RCRD: CPT | Performed by: INTERNAL MEDICINE

## 2020-12-31 PROCEDURE — 3008F BODY MASS INDEX DOCD: CPT | Performed by: INTERNAL MEDICINE

## 2020-12-31 PROCEDURE — 99214 OFFICE O/P EST MOD 30 MIN: CPT | Performed by: INTERNAL MEDICINE

## 2020-12-31 PROCEDURE — 3074F SYST BP LT 130 MM HG: CPT | Performed by: INTERNAL MEDICINE

## 2020-12-31 PROCEDURE — 3079F DIAST BP 80-89 MM HG: CPT | Performed by: INTERNAL MEDICINE

## 2020-12-31 RX ORDER — OXYBUTYNIN CHLORIDE 5 MG/1
5 TABLET ORAL 3 TIMES DAILY
Qty: 30 TABLET | Refills: 3 | Status: SHIPPED | OUTPATIENT
Start: 2020-12-31 | End: 2021-01-14 | Stop reason: SINTOL

## 2021-01-06 ENCOUNTER — APPOINTMENT (OUTPATIENT)
Dept: RADIOLOGY | Facility: CLINIC | Age: 76
End: 2021-01-06
Payer: COMMERCIAL

## 2021-01-06 DIAGNOSIS — G89.29 CHRONIC MIDLINE LOW BACK PAIN WITHOUT SCIATICA: ICD-10-CM

## 2021-01-06 DIAGNOSIS — M54.50 CHRONIC MIDLINE LOW BACK PAIN WITHOUT SCIATICA: ICD-10-CM

## 2021-01-06 PROCEDURE — 72110 X-RAY EXAM L-2 SPINE 4/>VWS: CPT

## 2021-01-13 ENCOUNTER — LAB (OUTPATIENT)
Dept: LAB | Facility: CLINIC | Age: 76
End: 2021-01-13
Payer: COMMERCIAL

## 2021-01-13 ENCOUNTER — TELEPHONE (OUTPATIENT)
Dept: INTERNAL MEDICINE CLINIC | Facility: CLINIC | Age: 76
End: 2021-01-13

## 2021-01-13 ENCOUNTER — ANTICOAG VISIT (OUTPATIENT)
Dept: CARDIOLOGY CLINIC | Facility: CLINIC | Age: 76
End: 2021-01-13

## 2021-01-13 DIAGNOSIS — Z95.2 HISTORY OF HEART VALVE REPLACEMENT: ICD-10-CM

## 2021-01-14 ENCOUNTER — TELEPHONE (OUTPATIENT)
Dept: INTERNAL MEDICINE CLINIC | Facility: CLINIC | Age: 76
End: 2021-01-14

## 2021-01-14 DIAGNOSIS — N32.81 OAB (OVERACTIVE BLADDER): Primary | ICD-10-CM

## 2021-01-14 RX ORDER — MIRABEGRON 25 MG/1
25 TABLET, FILM COATED, EXTENDED RELEASE ORAL DAILY
Qty: 30 TABLET | Refills: 1 | Status: SHIPPED | OUTPATIENT
Start: 2021-01-14 | End: 2021-01-15 | Stop reason: CLARIF

## 2021-01-14 NOTE — TELEPHONE ENCOUNTER
Patient came in and dropped you a note stating that she has been taking oxybutynin  5mg for over a week as directed she feels some improvements noted along with extreme mouth dryness, she wants to know if perhaps another medication would be better      Please advise

## 2021-01-14 NOTE — TELEPHONE ENCOUNTER
There are several options we can choose from  I am not certain what her insurance will cover  Myrbetriq does not cause dry mouth but it can increase blood pressure  I will try that  She needs to monitor blood pressures closely

## 2021-01-15 ENCOUNTER — TELEPHONE (OUTPATIENT)
Dept: INTERNAL MEDICINE CLINIC | Facility: CLINIC | Age: 76
End: 2021-01-15

## 2021-01-15 DIAGNOSIS — N32.81 OAB (OVERACTIVE BLADDER): Primary | ICD-10-CM

## 2021-01-15 RX ORDER — OXYBUTYNIN CHLORIDE 5 MG/1
5 TABLET ORAL
Qty: 30 TABLET | Refills: 1 | Status: SHIPPED | OUTPATIENT
Start: 2021-01-15 | End: 2021-02-08

## 2021-01-15 NOTE — TELEPHONE ENCOUNTER
Patient does not want to take the Mirabegron ER (Myrbetriq)  Pharmacist told her it raises her blood pressure  She would like to go back to the Oxybutynin  Send script for this  Patient is at the pharmacy now

## 2021-01-17 DIAGNOSIS — I10 HYPERTENSION, ESSENTIAL: ICD-10-CM

## 2021-01-17 RX ORDER — AMLODIPINE BESYLATE 5 MG/1
TABLET ORAL
Qty: 90 TABLET | Refills: 3 | Status: SHIPPED | OUTPATIENT
Start: 2021-01-17 | End: 2021-11-23

## 2021-01-22 DIAGNOSIS — Z95.2 HISTORY OF HEART VALVE REPLACEMENT: ICD-10-CM

## 2021-01-22 RX ORDER — WARFARIN SODIUM 1 MG/1
TABLET ORAL
Qty: 30 TABLET | Refills: 0 | Status: SHIPPED | OUTPATIENT
Start: 2021-01-22 | End: 2021-02-05

## 2021-01-28 ENCOUNTER — LAB (OUTPATIENT)
Dept: LAB | Facility: CLINIC | Age: 76
End: 2021-01-28
Payer: COMMERCIAL

## 2021-01-28 ENCOUNTER — ANTICOAG VISIT (OUTPATIENT)
Dept: CARDIOLOGY CLINIC | Facility: CLINIC | Age: 76
End: 2021-01-28

## 2021-01-28 DIAGNOSIS — I48.21 PERMANENT ATRIAL FIBRILLATION (HCC): ICD-10-CM

## 2021-01-28 DIAGNOSIS — Z95.2 HISTORY OF HEART VALVE REPLACEMENT: ICD-10-CM

## 2021-01-28 NOTE — PROGRESS NOTES
As per DR CINTRON pt is to take 6mg tonight then resume regular dosing schedule and retest in 2 weeks  Pt verbally understood

## 2021-02-05 DIAGNOSIS — Z95.2 HISTORY OF HEART VALVE REPLACEMENT: ICD-10-CM

## 2021-02-05 RX ORDER — WARFARIN SODIUM 1 MG/1
TABLET ORAL
Qty: 90 TABLET | Refills: 1 | Status: SHIPPED | OUTPATIENT
Start: 2021-02-05 | End: 2021-06-14 | Stop reason: SDUPTHER

## 2021-02-08 DIAGNOSIS — N32.81 OAB (OVERACTIVE BLADDER): ICD-10-CM

## 2021-02-08 RX ORDER — OXYBUTYNIN CHLORIDE 5 MG/1
TABLET ORAL
Qty: 30 TABLET | Refills: 1 | Status: SHIPPED | OUTPATIENT
Start: 2021-02-08 | End: 2021-02-11

## 2021-02-11 DIAGNOSIS — N32.81 OAB (OVERACTIVE BLADDER): ICD-10-CM

## 2021-02-11 RX ORDER — OXYBUTYNIN CHLORIDE 10 MG/1
10 TABLET, EXTENDED RELEASE ORAL
Qty: 30 TABLET | Refills: 5 | Status: SHIPPED | OUTPATIENT
Start: 2021-02-11 | End: 2021-05-29

## 2021-02-12 ENCOUNTER — LAB (OUTPATIENT)
Dept: LAB | Facility: CLINIC | Age: 76
End: 2021-02-12
Payer: COMMERCIAL

## 2021-02-12 ENCOUNTER — ANTICOAG VISIT (OUTPATIENT)
Dept: CARDIOLOGY CLINIC | Facility: CLINIC | Age: 76
End: 2021-02-12

## 2021-02-12 DIAGNOSIS — Z95.2 HISTORY OF HEART VALVE REPLACEMENT: ICD-10-CM

## 2021-02-12 RX ORDER — WARFARIN SODIUM 2 MG/1
TABLET ORAL
Qty: 90 TABLET | Refills: 1 | Status: SHIPPED | OUTPATIENT
Start: 2021-02-12 | End: 2021-06-10

## 2021-02-12 RX ORDER — WARFARIN SODIUM 3 MG/1
TABLET ORAL
Qty: 90 TABLET | Refills: 1 | Status: SHIPPED | OUTPATIENT
Start: 2021-02-12 | End: 2022-06-28

## 2021-02-12 NOTE — PROGRESS NOTES
Pt states the only change in her diet was prunes    States she is not going to eat them any longer I advised to take 6mg x 2 days then resume reg dose, retest in 2 weeks   If she is still running low with change her weekly regimen

## 2021-02-25 ENCOUNTER — ANTICOAG VISIT (OUTPATIENT)
Dept: CARDIOLOGY CLINIC | Facility: CLINIC | Age: 76
End: 2021-02-25

## 2021-02-25 ENCOUNTER — LAB (OUTPATIENT)
Dept: LAB | Facility: CLINIC | Age: 76
End: 2021-02-25
Payer: COMMERCIAL

## 2021-02-25 DIAGNOSIS — Z95.2 HISTORY OF HEART VALVE REPLACEMENT: ICD-10-CM

## 2021-03-05 DIAGNOSIS — Z23 ENCOUNTER FOR IMMUNIZATION: ICD-10-CM

## 2021-03-06 DIAGNOSIS — G62.9 NEUROPATHY: ICD-10-CM

## 2021-03-08 RX ORDER — GABAPENTIN 300 MG/1
CAPSULE ORAL
Qty: 90 CAPSULE | Refills: 3 | Status: SHIPPED | OUTPATIENT
Start: 2021-03-08 | End: 2021-11-23

## 2021-03-11 ENCOUNTER — ANTICOAG VISIT (OUTPATIENT)
Dept: CARDIOLOGY CLINIC | Facility: CLINIC | Age: 76
End: 2021-03-11

## 2021-03-11 ENCOUNTER — APPOINTMENT (OUTPATIENT)
Dept: LAB | Facility: CLINIC | Age: 76
End: 2021-03-11
Payer: COMMERCIAL

## 2021-03-11 DIAGNOSIS — I10 ESSENTIAL HYPERTENSION: ICD-10-CM

## 2021-03-11 DIAGNOSIS — Z95.2 HISTORY OF HEART VALVE REPLACEMENT: ICD-10-CM

## 2021-03-12 DIAGNOSIS — I10 ESSENTIAL HYPERTENSION: ICD-10-CM

## 2021-03-12 RX ORDER — LOSARTAN POTASSIUM 100 MG/1
100 TABLET ORAL DAILY
Qty: 90 TABLET | Refills: 3 | Status: SHIPPED | OUTPATIENT
Start: 2021-03-12 | End: 2021-04-19 | Stop reason: SDUPTHER

## 2021-03-12 NOTE — TELEPHONE ENCOUNTER
Radha called from [facility] to request a refill for pt for losartan (COZAAR) 100 MG tablet for a 90 day supply  Please send refill to pharmacy on file

## 2021-03-15 RX ORDER — LOSARTAN POTASSIUM 100 MG/1
TABLET ORAL
Qty: 90 TABLET | Refills: 1 | Status: SHIPPED | OUTPATIENT
Start: 2021-03-15 | End: 2021-06-10 | Stop reason: SDUPTHER

## 2021-03-25 ENCOUNTER — IMMUNIZATIONS (OUTPATIENT)
Dept: FAMILY MEDICINE CLINIC | Facility: HOSPITAL | Age: 76
End: 2021-03-25

## 2021-03-25 DIAGNOSIS — Z23 ENCOUNTER FOR IMMUNIZATION: Primary | ICD-10-CM

## 2021-03-25 PROCEDURE — 91300 SARS-COV-2 / COVID-19 MRNA VACCINE (PFIZER-BIONTECH) 30 MCG: CPT

## 2021-03-25 PROCEDURE — 0001A SARS-COV-2 / COVID-19 MRNA VACCINE (PFIZER-BIONTECH) 30 MCG: CPT

## 2021-03-26 ENCOUNTER — ANTICOAG VISIT (OUTPATIENT)
Dept: CARDIOLOGY CLINIC | Facility: CLINIC | Age: 76
End: 2021-03-26

## 2021-03-26 ENCOUNTER — APPOINTMENT (OUTPATIENT)
Dept: LAB | Facility: CLINIC | Age: 76
End: 2021-03-26
Payer: COMMERCIAL

## 2021-03-26 DIAGNOSIS — Z95.2 HISTORY OF HEART VALVE REPLACEMENT: ICD-10-CM

## 2021-03-29 DIAGNOSIS — K29.70 GASTRITIS WITHOUT BLEEDING, UNSPECIFIED CHRONICITY, UNSPECIFIED GASTRITIS TYPE: ICD-10-CM

## 2021-03-29 RX ORDER — PANTOPRAZOLE SODIUM 40 MG/1
TABLET, DELAYED RELEASE ORAL
Qty: 90 TABLET | Refills: 0 | Status: SHIPPED | OUTPATIENT
Start: 2021-03-29 | End: 2021-04-12

## 2021-04-05 ENCOUNTER — APPOINTMENT (OUTPATIENT)
Dept: LAB | Facility: CLINIC | Age: 76
End: 2021-04-05
Payer: COMMERCIAL

## 2021-04-05 ENCOUNTER — ANTICOAG VISIT (OUTPATIENT)
Dept: CARDIOLOGY CLINIC | Facility: CLINIC | Age: 76
End: 2021-04-05

## 2021-04-05 DIAGNOSIS — Z95.2 HISTORY OF HEART VALVE REPLACEMENT: ICD-10-CM

## 2021-04-05 DIAGNOSIS — I48.0 PAROXYSMAL ATRIAL FIBRILLATION (HCC): ICD-10-CM

## 2021-04-05 DIAGNOSIS — I10 BENIGN HYPERTENSION: ICD-10-CM

## 2021-04-05 DIAGNOSIS — E78.2 MIXED HYPERLIPIDEMIA: ICD-10-CM

## 2021-04-05 DIAGNOSIS — E03.8 SUBCLINICAL HYPOTHYROIDISM: ICD-10-CM

## 2021-04-05 LAB
ALBUMIN SERPL BCP-MCNC: 3.8 G/DL (ref 3.5–5)
ALP SERPL-CCNC: 71 U/L (ref 46–116)
ALT SERPL W P-5'-P-CCNC: 21 U/L (ref 12–78)
ANION GAP SERPL CALCULATED.3IONS-SCNC: 4 MMOL/L (ref 4–13)
AST SERPL W P-5'-P-CCNC: 22 U/L (ref 5–45)
BASOPHILS # BLD AUTO: 0.04 THOUSANDS/ΜL (ref 0–0.1)
BASOPHILS NFR BLD AUTO: 1 % (ref 0–1)
BILIRUB SERPL-MCNC: 0.55 MG/DL (ref 0.2–1)
BUN SERPL-MCNC: 23 MG/DL (ref 5–25)
CALCIUM SERPL-MCNC: 9 MG/DL (ref 8.3–10.1)
CHLORIDE SERPL-SCNC: 108 MMOL/L (ref 100–108)
CHOLEST SERPL-MCNC: 150 MG/DL (ref 50–200)
CO2 SERPL-SCNC: 28 MMOL/L (ref 21–32)
CREAT SERPL-MCNC: 1.01 MG/DL (ref 0.6–1.3)
EOSINOPHIL # BLD AUTO: 0.24 THOUSAND/ΜL (ref 0–0.61)
EOSINOPHIL NFR BLD AUTO: 5 % (ref 0–6)
ERYTHROCYTE [DISTWIDTH] IN BLOOD BY AUTOMATED COUNT: 13.1 % (ref 11.6–15.1)
GFR SERPL CREATININE-BSD FRML MDRD: 55 ML/MIN/1.73SQ M
GLUCOSE P FAST SERPL-MCNC: 87 MG/DL (ref 65–99)
HCT VFR BLD AUTO: 39 % (ref 34.8–46.1)
HDLC SERPL-MCNC: 57 MG/DL
HGB BLD-MCNC: 12.2 G/DL (ref 11.5–15.4)
IMM GRANULOCYTES # BLD AUTO: 0.01 THOUSAND/UL (ref 0–0.2)
IMM GRANULOCYTES NFR BLD AUTO: 0 % (ref 0–2)
LDLC SERPL CALC-MCNC: 76 MG/DL (ref 0–100)
LYMPHOCYTES # BLD AUTO: 1.12 THOUSANDS/ΜL (ref 0.6–4.47)
LYMPHOCYTES NFR BLD AUTO: 23 % (ref 14–44)
MCH RBC QN AUTO: 31 PG (ref 26.8–34.3)
MCHC RBC AUTO-ENTMCNC: 31.3 G/DL (ref 31.4–37.4)
MCV RBC AUTO: 99 FL (ref 82–98)
MONOCYTES # BLD AUTO: 0.61 THOUSAND/ΜL (ref 0.17–1.22)
MONOCYTES NFR BLD AUTO: 13 % (ref 4–12)
NEUTROPHILS # BLD AUTO: 2.84 THOUSANDS/ΜL (ref 1.85–7.62)
NEUTS SEG NFR BLD AUTO: 58 % (ref 43–75)
NONHDLC SERPL-MCNC: 93 MG/DL
NRBC BLD AUTO-RTO: 0 /100 WBCS
PLATELET # BLD AUTO: 203 THOUSANDS/UL (ref 149–390)
PMV BLD AUTO: 10.1 FL (ref 8.9–12.7)
POTASSIUM SERPL-SCNC: 4.6 MMOL/L (ref 3.5–5.3)
PROT SERPL-MCNC: 7.7 G/DL (ref 6.4–8.2)
RBC # BLD AUTO: 3.93 MILLION/UL (ref 3.81–5.12)
SODIUM SERPL-SCNC: 140 MMOL/L (ref 136–145)
T4 FREE SERPL-MCNC: 1.17 NG/DL (ref 0.76–1.46)
TRIGL SERPL-MCNC: 85 MG/DL
TSH SERPL DL<=0.05 MIU/L-ACNC: 4.03 UIU/ML (ref 0.36–3.74)
WBC # BLD AUTO: 4.86 THOUSAND/UL (ref 4.31–10.16)

## 2021-04-05 PROCEDURE — 84443 ASSAY THYROID STIM HORMONE: CPT

## 2021-04-05 PROCEDURE — 85025 COMPLETE CBC W/AUTO DIFF WBC: CPT

## 2021-04-05 PROCEDURE — 80061 LIPID PANEL: CPT

## 2021-04-05 PROCEDURE — 84439 ASSAY OF FREE THYROXINE: CPT

## 2021-04-05 PROCEDURE — 80053 COMPREHEN METABOLIC PANEL: CPT

## 2021-04-09 DIAGNOSIS — I10 BENIGN HYPERTENSION: ICD-10-CM

## 2021-04-09 DIAGNOSIS — G25.0 ESSENTIAL TREMOR: ICD-10-CM

## 2021-04-09 RX ORDER — PROPRANOLOL HCL 60 MG
CAPSULE, EXTENDED RELEASE 24HR ORAL
Qty: 90 CAPSULE | Refills: 1 | Status: SHIPPED | OUTPATIENT
Start: 2021-04-09 | End: 2021-09-24

## 2021-04-12 ENCOUNTER — HOSPITAL ENCOUNTER (OUTPATIENT)
Dept: MAMMOGRAPHY | Facility: CLINIC | Age: 76
Discharge: HOME | End: 2021-04-12
Payer: COMMERCIAL

## 2021-04-12 DIAGNOSIS — E78.2 COMBINED HYPERLIPIDEMIA: ICD-10-CM

## 2021-04-12 DIAGNOSIS — K29.70 GASTRITIS WITHOUT BLEEDING, UNSPECIFIED CHRONICITY, UNSPECIFIED GASTRITIS TYPE: ICD-10-CM

## 2021-04-12 DIAGNOSIS — M81.0 OSTEOPOROSIS, UNSPECIFIED OSTEOPOROSIS TYPE, UNSPECIFIED PATHOLOGICAL FRACTURE PRESENCE: ICD-10-CM

## 2021-04-12 PROCEDURE — 77080 DXA BONE DENSITY AXIAL: CPT

## 2021-04-12 RX ORDER — PANTOPRAZOLE SODIUM 40 MG/1
TABLET, DELAYED RELEASE ORAL
Qty: 90 TABLET | Refills: 0 | Status: SHIPPED | OUTPATIENT
Start: 2021-04-12 | End: 2021-09-26

## 2021-04-12 RX ORDER — SIMVASTATIN 10 MG
TABLET ORAL
Qty: 90 TABLET | Refills: 3 | Status: SHIPPED | OUTPATIENT
Start: 2021-04-12 | End: 2021-11-23

## 2021-04-13 ENCOUNTER — TELEPHONE (OUTPATIENT)
Dept: INTERNAL MEDICINE CLINIC | Facility: CLINIC | Age: 76
End: 2021-04-13

## 2021-04-13 NOTE — TELEPHONE ENCOUNTER
----- Message from Latricia Mcclelland, [address] Dest[address] Johnson sent at 4/13/2021 10:19 AM EDT -----  Bone density shows mild bone loss ( osteopenia) recommend calcium 1200mg and vit d 2000 units if she isnt already taking it

## 2021-04-13 NOTE — TELEPHONE ENCOUNTER
Spoke with: patient  Re:  lab results/med instructions  Provider's message/resuts/instructions/inquiries relayed in full detal   Comments:

## 2021-04-16 ENCOUNTER — IMMUNIZATIONS (OUTPATIENT)
Dept: FAMILY MEDICINE CLINIC | Facility: HOSPITAL | Age: 76
End: 2021-04-16

## 2021-04-16 DIAGNOSIS — Z23 ENCOUNTER FOR IMMUNIZATION: Primary | ICD-10-CM

## 2021-04-16 PROCEDURE — 91300 SARS-COV-2 / COVID-19 MRNA VACCINE (PFIZER-BIONTECH) 30 MCG: CPT

## 2021-04-16 PROCEDURE — 0002A SARS-COV-2 / COVID-19 MRNA VACCINE (PFIZER-BIONTECH) 30 MCG: CPT

## 2021-04-19 ENCOUNTER — OFFICE VISIT (OUTPATIENT)
Dept: INTERNAL MEDICINE CLINIC | Facility: CLINIC | Age: 76
End: 2021-04-19
Payer: COMMERCIAL

## 2021-04-19 VITALS
WEIGHT: 175.2 LBS | DIASTOLIC BLOOD PRESSURE: 70 MMHG | BODY MASS INDEX: 25.95 KG/M2 | HEIGHT: 69 IN | SYSTOLIC BLOOD PRESSURE: 124 MMHG | TEMPERATURE: 97.8 F | HEART RATE: 73 BPM | OXYGEN SATURATION: 90 %

## 2021-04-19 DIAGNOSIS — I48.91 ATRIAL FIBRILLATION, UNSPECIFIED TYPE (HCC): ICD-10-CM

## 2021-04-19 DIAGNOSIS — R73.01 IFG (IMPAIRED FASTING GLUCOSE): ICD-10-CM

## 2021-04-19 DIAGNOSIS — I10 BENIGN HYPERTENSION: Primary | ICD-10-CM

## 2021-04-19 DIAGNOSIS — E03.8 SUBCLINICAL HYPOTHYROIDISM: ICD-10-CM

## 2021-04-19 DIAGNOSIS — E78.2 MIXED HYPERLIPIDEMIA: ICD-10-CM

## 2021-04-19 DIAGNOSIS — Z12.11 SCREENING FOR COLON CANCER: ICD-10-CM

## 2021-04-19 DIAGNOSIS — G61.9 INFLAMMATORY POLYNEUROPATHY, UNSPECIFIED (HCC): ICD-10-CM

## 2021-04-19 PROBLEM — E13.49: Status: RESOLVED | Noted: 2021-04-19 | Resolved: 2021-04-19

## 2021-04-19 PROBLEM — E13.49: Status: ACTIVE | Noted: 2021-04-19

## 2021-04-19 PROCEDURE — 3008F BODY MASS INDEX DOCD: CPT | Performed by: NURSE PRACTITIONER

## 2021-04-19 PROCEDURE — 3078F DIAST BP <80 MM HG: CPT | Performed by: NURSE PRACTITIONER

## 2021-04-19 PROCEDURE — 1160F RVW MEDS BY RX/DR IN RCRD: CPT | Performed by: NURSE PRACTITIONER

## 2021-04-19 PROCEDURE — 3074F SYST BP LT 130 MM HG: CPT | Performed by: NURSE PRACTITIONER

## 2021-04-19 PROCEDURE — 99214 OFFICE O/P EST MOD 30 MIN: CPT | Performed by: NURSE PRACTITIONER

## 2021-04-19 PROCEDURE — 1036F TOBACCO NON-USER: CPT | Performed by: NURSE PRACTITIONER

## 2021-04-19 PROCEDURE — 3725F SCREEN DEPRESSION PERFORMED: CPT | Performed by: NURSE PRACTITIONER

## 2021-04-19 NOTE — PATIENT INSTRUCTIONS
Hypertension stable on current medication     hyperlipidemia LDL at goal on statin continue same     essential tremor improved on propanolol     valve replacement/AFib continue Coumadin     history of amaurosis fugax in 2017 concerned for Coumadin failure placed since inr had been theraputic at that time placed  on baby aspirin has been on this since then  No evidence of bleeding  Continue to modify risk factors    She was noted to have small vertebrals all narrowing no stroke no significant stenosis was evaluated by Neurology     osteopenia recommend calcium vitamin-D     will check Cologuard for colon cancer screening

## 2021-04-19 NOTE — PROGRESS NOTES
Assessment/Plan:    Patient Instructions   Hypertension stable on current medication     hyperlipidemia LDL at goal on statin continue same     essential tremor improved on propanolol     valve replacement/AFib continue Coumadin     history of amaurosis fugax in 2017 concerned for Coumadin failure placed since inr had been theraputic at that time placed  on baby aspirin has been on this since then  No evidence of bleeding  Continue to modify risk factors  She was noted to have small vertebrals all narrowing no stroke no significant stenosis was evaluated by Neurology     osteopenia recommend calcium vitamin-D     will check Cologuard for colon cancer screening         Diagnoses and all orders for this visit:    Benign hypertension  -     CBC and differential; Future  -     Comprehensive metabolic panel; Future    Subclinical hypothyroidism  -     TSH, 3rd generation with Free T4 reflex; Future    Mixed hyperlipidemia  -     Lipid panel; Future    Atrial fibrillation, unspecified type (HCC)    IFG (impaired fasting glucose)  -     Hemoglobin A1C; Future    [facility], [facility] ([facility] )    Screening for colon cancer  -     Cologuard; Future         Subjective:      Patient ID: Giselle Gibbs is a 70 y o  female     Patient is feeling well, has some sadness at times just because of being locked up, she tries to stay active she is trying to watch what she eats, occasional home blood pressure is stable, no evidence of bleeding  Did receive COVID vaccine     Neuropathy is stable on current dose        Current Outpatient Medications:     amLODIPine (NORVASC) 5 mg tablet, TAKE 1 TABLET BY MOUTH EVERY DAY IN THE MORNING, Disp: 90 tablet, Rfl: 3    ascorbic acid (VITAMIN C) 500 mg tablet, Take 500 mg by mouth daily, Disp: , Rfl:     aspirin 81 mg chewable tablet, Chew daily, Disp: , Rfl:     butalbital-acetaminophen-caffeine (FIORICET,ESGIC) -40 mg per tablet, Take 1 tablet by mouth every 4 (four) hours as needed for headaches, Disp: 30 tablet, Rfl: 2    Calcium Carbonate (CALTRATE 600) 1500 (600 Ca) MG TABS, Take by mouth daily , Disp: , Rfl:     Flaxseed, Linseed, (FLAXSEED OIL) 1200 MG CAPS, Take by mouth daily , Disp: , Rfl:     gabapentin (NEURONTIN) 100 mg capsule, TAKE 1 CAPSULE BY MOUTH 2 TIMES A DAY, Disp: 180 capsule, Rfl: 1    gabapentin (NEURONTIN) 300 mg capsule, TAKE A 300MG + 100MG AT BEDTIME, Disp: 90 capsule, Rfl: 3    losartan (COZAAR) 100 MG tablet, TAKE 1 TABLET BY MOUTH EVERY DAY, Disp: 90 tablet, Rfl: 1    Misc Natural Products (OSTEO BI-FLEX TRIPLE STRENGTH) TABS, Take by mouth daily , Disp: , Rfl:     oxybutynin (DITROPAN-XL) 10 MG 24 hr tablet, Take 1 tablet (10 mg total) by mouth daily at bedtime, Disp: 30 tablet, Rfl: 5    pantoprazole (PROTONIX) 40 mg tablet, TAKE 1 TABLET BY MOUTH EVERY DAY, Disp: 90 tablet, Rfl: 0    propranolol (INDERAL LA) 60 mg 24 hr capsule, TAKE 1 CAPSULE BY MOUTH EVERY DAY, Disp: 90 capsule, Rfl: 1    simvastatin (ZOCOR) 10 mg tablet, TAKE 1 TABLET BY MOUTH EVERY DAY, Disp: 90 tablet, Rfl: 3    warfarin (COUMADIN) 1 mg tablet, TAKE 1 TABLET BY MOUTH EVERY DAY, Disp: 90 tablet, Rfl: 1    warfarin (COUMADIN) 2 mg tablet, TAKE 1 TABLET BY MOUTH EVERY DAY, Disp: 90 tablet, Rfl: 1    warfarin (COUMADIN) 3 mg tablet, TAKE 1 TABLET BY MOUTH DAILY, Disp: 90 tablet, Rfl: 1    clobetasol (TEMOVATE) 0 05 % cream, Apply to elbows bid (Patient not taking: Reported on 4/19/2021), Disp: 30 g, Rfl: 3    Current Facility-Administered Medications:     cyanocobalamin injection 1,000 mcg, 1,000 mcg, Intramuscular, Q30 Days, HANNY Cornell, 1,000 mcg at 05/14/19 0957    Recent Results (from the past 1008 hour(s))   Protime-INR    Collection Time: 03/11/21  9:34 AM   Result Value Ref Range    Protime 35 8 (H) 11 6 - 14 5 seconds    INR 3 63 (H) 0 84 - 1 19   Protime-INR    Collection Time: 03/26/21  9:48 AM   Result Value Ref Range    Protime 20 8 (H) 11 6 - 14 5 seconds    INR 1 80 (H) 0 84 - 1 19   Protime-INR    Collection Time: 04/05/21  8:40 AM   Result Value Ref Range    Protime 27 1 (H) 11 6 - 14 5 seconds    INR 2 53 (H) 0 84 - 1 19   CBC and differential    Collection Time: 04/05/21  8:40 AM   Result Value Ref Range    WBC 4 86 4 31 - 10 16 Thousand/uL    RBC 3 93 3 81 - 5 12 Million/uL    Hemoglobin 12 2 11 5 - 15 4 g/dL    Hematocrit 39 0 34 8 - 46 1 %    MCV 99 (H) 82 - 98 fL    MCH 31 0 26 8 - 34 3 pg    MCHC 31 3 (L) 31 4 - 37 4 g/dL    RDW 13 1 11 6 - 15 1 %    MPV 10 1 8 9 - 12 7 fL    Platelets 535 431 - 485 Thousands/uL    nRBC 0 /100 WBCs    Neutrophils Relative 58 43 - 75 %    Immat GRANS % 0 0 - 2 %    Lymphocytes Relative 23 14 - 44 %    Monocytes Relative 13 (H) 4 - 12 %    Eosinophils Relative 5 0 - 6 %    Basophils Relative 1 0 - 1 %    Neutrophils Absolute 2 84 1 85 - 7 62 Thousands/µL    Immature Grans Absolute 0 01 0 00 - 0 20 Thousand/uL    Lymphocytes Absolute 1 12 0 60 - 4 47 Thousands/µL    Monocytes Absolute 0 61 0 17 - 1 22 Thousand/µL    Eosinophils Absolute 0 24 0 00 - 0 61 Thousand/µL    Basophils Absolute 0 04 0 00 - 0 10 Thousands/µL   Comprehensive metabolic panel    Collection Time: 04/05/21  8:40 AM   Result Value Ref Range    Sodium 140 136 - 145 mmol/L    Potassium 4 6 3 5 - 5 3 mmol/L    Chloride 108 100 - 108 mmol/L    CO2 28 21 - 32 mmol/L    ANION GAP 4 4 - 13 mmol/L    BUN 23 5 - 25 mg/dL    Creatinine 1 01 0 60 - 1 30 mg/dL    Glucose, Fasting 87 65 - 99 mg/dL    Calcium 9 0 8 3 - 10 1 mg/dL    AST 22 5 - 45 U/L    ALT 21 12 - 78 U/L    Alkaline Phosphatase 71 46 - 116 U/L    Total Protein 7 7 6 4 - 8 2 g/dL    Albumin 3 8 3 5 - 5 0 g/dL    Total Bilirubin 0 55 0 20 - 1 00 mg/dL    eGFR 55 ml/min/1 73sq m   Lipid panel    Collection Time: 04/05/21  8:40 AM   Result Value Ref Range    Cholesterol 150 50 - 200 mg/dL    Triglycerides 85 <=150 mg/dL    HDL, Direct 57 >=40 mg/dL    LDL Calculated 76 0 - 100 mg/dL Non-HDL-Chol (CHOL-HDL) 93 mg/dl   TSH, 3rd generation with Free T4 reflex    Collection Time: 04/05/21  8:40 AM   Result Value Ref Range    TSH 3RD GENERATON 4 030 (H) 0 358 - 3 740 uIU/mL   T4, free    Collection Time: 04/05/21  8:40 AM   Result Value Ref Range    Free T4 1 17 0 76 - 1 46 ng/dL       The following portions of the patient's history were reviewed and updated as appropriate: allergies, current medications, past family history, past medical history, past social history, past surgical history and problem list      Review of Systems   Constitutional: Negative for appetite change, chills, diaphoresis, fatigue, fever and unexpected weight change  HENT: Negative for postnasal drip and sneezing  Eyes: Negative for visual disturbance  Respiratory: Negative for chest tightness and shortness of breath  Cardiovascular: Negative for chest pain, palpitations and leg swelling  Gastrointestinal: Negative for abdominal pain and blood in stool  Endocrine: Negative for cold intolerance, heat intolerance, polydipsia, polyphagia and polyuria  Genitourinary: Negative for difficulty urinating, dysuria, frequency and urgency  Musculoskeletal: Negative for arthralgias and myalgias  Skin: Negative for rash and wound  Neurological: Negative for dizziness, weakness, light-headedness and headaches  Hematological: Negative for adenopathy  Psychiatric/Behavioral: Negative for confusion, dysphoric mood and sleep disturbance  The patient is not nervous/anxious  Objective:      /70 (BP Location: Right arm, Patient Position: Sitting, Cuff Size: Large)   Pulse 73   Temp 97 8 °F (36 6 °C)   Ht 5' 9" (1 753 m)   Wt 79 5 kg (175 lb 3 2 oz)   LMP  (LMP Unknown)   SpO2 90%   BMI 25 87 kg/m²        Physical Exam  Constitutional:       General: She is not in acute distress  Appearance: She is well-developed  She is not diaphoretic  HENT:      Head: Normocephalic and atraumatic        Nose: Nose normal    Eyes:      Conjunctiva/sclera: Conjunctivae normal       Pupils: Pupils are equal, round, and reactive to light  Neck:      Musculoskeletal: Normal range of motion and neck supple  Thyroid: No thyromegaly  Vascular: No JVD  Trachea: No tracheal deviation  Cardiovascular:      Rate and Rhythm: Normal rate  Rhythm irregular  Heart sounds: Murmur present  No friction rub  No gallop  Pulmonary:      Effort: Pulmonary effort is normal  No respiratory distress  Breath sounds: Normal breath sounds  No wheezing or rales  Abdominal:      General: Bowel sounds are normal  There is no distension  Palpations: Abdomen is soft  Tenderness: There is no abdominal tenderness  Musculoskeletal: Normal range of motion  Lymphadenopathy:      Cervical: No cervical adenopathy  Skin:     General: Skin is warm and dry  Findings: No rash  Neurological:      Mental Status: She is alert and oriented to person, place, and time  Cranial Nerves: No cranial nerve deficit  Psychiatric:         Behavior: Behavior normal          Thought Content: Thought content normal          Judgment: Judgment normal      BMI Counseling: Body mass index is 25 87 kg/m²  The BMI is above normal  Nutrition recommendations include decreasing portion sizes and decreasing fast food intake  Exercise recommendations include exercising 3-5 times per week  No pharmacotherapy was ordered

## 2021-04-26 ENCOUNTER — ANTICOAG VISIT (OUTPATIENT)
Dept: CARDIOLOGY CLINIC | Facility: CLINIC | Age: 76
End: 2021-04-26

## 2021-04-26 ENCOUNTER — APPOINTMENT (OUTPATIENT)
Dept: LAB | Facility: CLINIC | Age: 76
End: 2021-04-26
Payer: COMMERCIAL

## 2021-04-26 DIAGNOSIS — Z95.2 HISTORY OF HEART VALVE REPLACEMENT: ICD-10-CM

## 2021-05-04 ENCOUNTER — OFFICE VISIT (OUTPATIENT)
Dept: OBGYN CLINIC | Facility: CLINIC | Age: 76
End: 2021-05-04
Payer: COMMERCIAL

## 2021-05-04 VITALS
BODY MASS INDEX: 25.92 KG/M2 | WEIGHT: 175 LBS | DIASTOLIC BLOOD PRESSURE: 76 MMHG | HEART RATE: 50 BPM | HEIGHT: 69 IN | SYSTOLIC BLOOD PRESSURE: 125 MMHG

## 2021-05-04 DIAGNOSIS — M19.049 ARTHRITIS OF FINGER: Primary | ICD-10-CM

## 2021-05-04 PROCEDURE — 20600 DRAIN/INJ JOINT/BURSA W/O US: CPT | Performed by: ORTHOPAEDIC SURGERY

## 2021-05-04 PROCEDURE — 99203 OFFICE O/P NEW LOW 30 MIN: CPT | Performed by: ORTHOPAEDIC SURGERY

## 2021-05-04 RX ORDER — LIDOCAINE HYDROCHLORIDE 10 MG/ML
2.5 INJECTION, SOLUTION INFILTRATION; PERINEURAL
Status: COMPLETED | OUTPATIENT
Start: 2021-05-04 | End: 2021-05-04

## 2021-05-04 RX ORDER — TRIAMCINOLONE ACETONIDE 40 MG/ML
20 INJECTION, SUSPENSION INTRA-ARTICULAR; INTRAMUSCULAR
Status: COMPLETED | OUTPATIENT
Start: 2021-05-04 | End: 2021-05-04

## 2021-05-04 RX ADMIN — TRIAMCINOLONE ACETONIDE 20 MG: 40 INJECTION, SUSPENSION INTRA-ARTICULAR; INTRAMUSCULAR at 09:22

## 2021-05-04 RX ADMIN — Medication 0.05 MEQ: at 09:22

## 2021-05-04 RX ADMIN — LIDOCAINE HYDROCHLORIDE 2.5 ML: 10 INJECTION, SOLUTION INFILTRATION; PERINEURAL at 09:22

## 2021-05-04 NOTE — PROGRESS NOTES
CHIEF COMPLAINT:  Chief Complaint   Patient presents with    Right Hand - Pain       SUBJECTIVE:  Marisol Marino is a 70y o  year old  female who presents with right hand pain over the small finger that has been present for the past few years  Known history of arthritis  Has been treated by Dr Jeannette Davis in the past at [place] [address]  and received injections in bilateral index fingers with relief  Desires injection today for the left small finger  She denies any injuries or trauma to the area  She denies any numbness or tingling        PAST MEDICAL HISTORY:  Past Medical History:   Diagnosis Date    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    Atrial fibrillation (HCC)     Hashimoto's thyroiditis     Hyperlipidemia     Hypertension     IBS (irritable bowel syndrome)     Migraine     Mitral valve disorder     Nasal congestion     Non-toxic uninodular goiter     Nosebleed        PAST SURGICAL HISTORY:  Past Surgical History:   Procedure Laterality Date    APPENDECTOMY      BREAST CYST EXCISION Right 01/01/1977    COLONOSCOPY  08/08/2011    VALVE REPLACEMENT  01/04/2017    mitral valve replacement, 6/5/14       FAMILY HISTORY:  Family History   Problem Relation Age of Onset    Hypertension Mother     Coronary artery disease Father     Migraines Father     Leukemia Brother     Migraines Brother     Hypertension Brother     Coronary artery disease Paternal Grandfather     Leukemia Maternal Aunt     Multiple myeloma Maternal Aunt     Thyroid disease Other     No Known Problems Maternal Aunt     No Known Problems Maternal Aunt     No Known Problems Paternal Aunt     No Known Problems Paternal Aunt     No Known Problems Paternal Aunt     No Known Problems Paternal Aunt     Breast cancer Neg Hx        SOCIAL HISTORY:  Social History     Tobacco Use    Smoking status: Never Smoker    Smokeless tobacco: Never Used   Substance Use Topics    Alcohol use: Yes     Frequency: Monthly or less Drinks per session: 1 or 2     Binge frequency: Never     Comment: rarely    Drug use: No       MEDICATIONS:    Current Outpatient Medications:     amLODIPine (NORVASC) 5 mg tablet, TAKE 1 TABLET BY MOUTH EVERY DAY IN THE MORNING, Disp: 90 tablet, Rfl: 3    ascorbic acid (VITAMIN C) 500 mg tablet, Take 500 mg by mouth daily, Disp: , Rfl:     aspirin 81 mg chewable tablet, Chew daily, Disp: , Rfl:     butalbital-acetaminophen-caffeine (FIORICET,ESGIC) -40 mg per tablet, Take 1 tablet by mouth every 4 (four) hours as needed for headaches, Disp: 30 tablet, Rfl: 2    Calcium Carbonate (CALTRATE 600) 1500 (600 Ca) MG TABS, Take by mouth daily , Disp: , Rfl:     Flaxseed, Linseed, (FLAXSEED OIL) 1200 MG CAPS, Take by mouth daily , Disp: , Rfl:     gabapentin (NEURONTIN) 100 mg capsule, TAKE 1 CAPSULE BY MOUTH 2 TIMES A DAY, Disp: 180 capsule, Rfl: 1    gabapentin (NEURONTIN) 300 mg capsule, TAKE A 300MG + 100MG AT BEDTIME, Disp: 90 capsule, Rfl: 3    losartan (COZAAR) 100 MG tablet, TAKE 1 TABLET BY MOUTH EVERY DAY, Disp: 90 tablet, Rfl: 1    Misc Natural Products (OSTEO BI-FLEX TRIPLE STRENGTH) TABS, Take by mouth daily , Disp: , Rfl:     oxybutynin (DITROPAN-XL) 10 MG 24 hr tablet, Take 1 tablet (10 mg total) by mouth daily at bedtime, Disp: 30 tablet, Rfl: 5    pantoprazole (PROTONIX) 40 mg tablet, TAKE 1 TABLET BY MOUTH EVERY DAY, Disp: 90 tablet, Rfl: 0    propranolol (INDERAL LA) 60 mg 24 hr capsule, TAKE 1 CAPSULE BY MOUTH EVERY DAY, Disp: 90 capsule, Rfl: 1    simvastatin (ZOCOR) 10 mg tablet, TAKE 1 TABLET BY MOUTH EVERY DAY, Disp: 90 tablet, Rfl: 3    warfarin (COUMADIN) 1 mg tablet, TAKE 1 TABLET BY MOUTH EVERY DAY, Disp: 90 tablet, Rfl: 1    warfarin (COUMADIN) 2 mg tablet, TAKE 1 TABLET BY MOUTH EVERY DAY, Disp: 90 tablet, Rfl: 1    warfarin (COUMADIN) 3 mg tablet, TAKE 1 TABLET BY MOUTH DAILY, Disp: 90 tablet, Rfl: 1    clobetasol (TEMOVATE) 0 05 % cream, Apply to elbows bid (Patient not taking: Reported on 4/19/2021), Disp: 30 g, Rfl: 3    Current Facility-Administered Medications:     cyanocobalamin injection 1,000 mcg, 1,000 mcg, Intramuscular, Q30 Days, Jagjit Plasencia, HANNY, 1,000 mcg at 05/14/19 8230    ALLERGIES:  Allergies   Allergen Reactions    Enablex [Darifenacin] Other (See Comments)     Sweating, HA, urinary hesitancy, flushing of face    Fentanyl Nausea Only and Vomiting    Hyoscyamine Palpitations    Dicyclomine Other (See Comments)     Jittery, disorientation, light HAs    Hydromorphone Other (See Comments)     Per pt does not remember the type or severity level    Midazolam Other (See Comments)     Per pt does not remember the type or severity level    Sulfamethoxazole-Trimethoprim Nausea Only    Venlafaxine Other (See Comments)     Urinary urgency, polyuria    Codeine Polt-Chlorphen Polt Er Headache    Ultracet [Tramadol-Acetaminophen] Nausea Only and Vomiting       REVIEW OF SYSTEMS:  Review of Systems  ROS:   General: no fever, no chills  HEENT:  No loss of hearing or eyesight problems  Eyes:  No red eyes  Respiratory:  No coughing, shortness of breath or wheezing  Cardiovascular:  No chest pain, no palpitations  GI:  Abdomen soft nontender, denies nausea  Endocrine:  No muscle weakness, no frequent urination, no excessive thirst  Urinary:  No dysuria, no incontinence  Musculoskeletal: see HPI and PE  SKIN:  No skin rash, no dry skin  Neurological:  No headaches, no confusion  Psychiatric:  No suicide thoughts, no anxiety, no depression  Review of all other systems is negative    VITALS:  Vitals:    05/04/21 0840   BP: 125/76   Pulse: (!) 50       LABS:  HgA1c:   Lab Results   Component Value Date    HGBA1C 5 4 09/11/2020     BMP:   Lab Results   Component Value Date    CALCIUM 9 0 04/05/2021    K 4 6 04/05/2021    CO2 28 04/05/2021     04/05/2021    BUN 23 04/05/2021    CREATININE 1 01 04/05/2021 _____________________________________________________  PHYSICAL EXAMINATION:  General: well developed and well nourished, alert, oriented times 3 and appears comfortable  Psychiatric: Normal  HEENT: Trachea Midline, No torticollis  Pulmonary: No audible wheezing or strider  Cardiovascular: No discernable arrhythmia   Skin: No masses, erythema, lacerations, fluctation, ulcerations  Neurovascular: Sensation Intact to the Median, Ulnar, Radial Nerve, Motor Intact to the Median, Ulnar, Radial Nerve and Pulses Intact    MUSCULOSKELETAL EXAMINATION:  Right small finger:  Swelling noted near PIP and DIP joint, no erythema or echymosis noted  Mild tenderness on palpation at PIP and DIP joints  Limited ROM at DIP and PIP joints due to stiffness  Ligamentously stable   ___________________________________________________  STUDIES REVIEWED:  No studies reviewed  PROCEDURES PERFORMED:  Small joint arthrocentesis: R small PIP  Universal Protocol:  Consent: Verbal consent obtained    Risks and benefits: risks, benefits and alternatives were discussed  Consent given by: patient  Patient understanding: patient states understanding of the procedure being performed  Patient consent: the patient's understanding of the procedure matches consent given  Site marked: the operative site was marked  Patient identity confirmed: verbally with patient    Supporting Documentation  Indications: pain   Procedure Details  Location: small finger - R small PIP  Preparation: Patient was prepped and draped in the usual sterile fashion  Needle size: 25 G  Approach: dorsal  Medications administered: 0 05 mEq sodium bicarbonate 8 4 %; 2 5 mL lidocaine 1 %; 20 mg triamcinolone acetonide 40 mg/mL    Patient tolerance: patient tolerated the procedure well with no immediate complications  Dressing:  Sterile dressing applied             _____________________________________________________  ASSESSMENT/PLAN:    Right small finger PIP joint arthritis  - patient was given cortisone injection today  She tolerated well     -she can take Tylenol as needed for pain  -she can follow up with us in 2 weeks for re-evaluation      Follow Up:  Return in about 2 weeks (around 5/18/2021)  Work/school status:  No limitations     To Do Next Visit:  Re-evaluation of current issue    General Discussions:  Osteoarthritis:  The anatomy and physiology of osteoarthritis was discussed with the patient today in the office  Deterioration of the articular cartilage eventually leads to altered mobility at the joint, resulting in joint subluxation, osteophyte formation, cystic changes, as well as subchondral sclerosis  Eventually, pain, limited mobility, and compensatory hypermobility at surrounding joints may develop  While normal activity and usage of the joint may provide a painful experience to the patient, this typically does not result in damage to the limb  Treatment options include splints to decreased joint edema, pain, and inflammation  Therapy exercises to strengthen the surrounding musculature may relieve pain, but do not alter the overall continued development of osteoarthritis  Oral medications, topical medications, corticosteroid injections may decrease pain and increase overall function  Eventually, some patients may require surgical intervention  Scribe Attestation    I,:  Ned Aguilar PA-C am acting as a scribe while in the presence of the attending physician :       I,:  Merced Choe MD personally performed the services described in this documentation    as scribed in my presence :           Portions of the record may have been created with voice recognition software  Occasional wrong word or "sound a like" substitutions may have occurred due to the inherent limitations of voice recognition software  Read the chart carefully and recognize, using context, where substitutions have occurred

## 2021-05-10 ENCOUNTER — ANTICOAG VISIT (OUTPATIENT)
Dept: CARDIOLOGY CLINIC | Facility: CLINIC | Age: 76
End: 2021-05-10

## 2021-05-10 ENCOUNTER — APPOINTMENT (OUTPATIENT)
Dept: LAB | Facility: CLINIC | Age: 76
End: 2021-05-10
Payer: COMMERCIAL

## 2021-05-10 DIAGNOSIS — Z95.2 HISTORY OF HEART VALVE REPLACEMENT: ICD-10-CM

## 2021-05-14 ENCOUNTER — TRANSCRIBE ORDERS (OUTPATIENT)
Dept: ADMINISTRATIVE | Facility: HOSPITAL | Age: 76
End: 2021-05-14

## 2021-05-14 DIAGNOSIS — R59.0 VIRCHOW'S NODE: ICD-10-CM

## 2021-05-14 DIAGNOSIS — E04.1 THYROID NODULE: Primary | ICD-10-CM

## 2021-05-18 ENCOUNTER — OFFICE VISIT (OUTPATIENT)
Dept: OBGYN CLINIC | Facility: CLINIC | Age: 76
End: 2021-05-18
Payer: COMMERCIAL

## 2021-05-18 VITALS — WEIGHT: 175 LBS | BODY MASS INDEX: 25.92 KG/M2 | HEIGHT: 69 IN

## 2021-05-18 DIAGNOSIS — M19.049 ARTHRITIS OF FINGER: Primary | ICD-10-CM

## 2021-05-18 PROCEDURE — 99213 OFFICE O/P EST LOW 20 MIN: CPT | Performed by: ORTHOPAEDIC SURGERY

## 2021-05-18 RX ORDER — AMOXICILLIN 500 MG/1
CAPSULE ORAL
COMMUNITY
Start: 2021-05-14 | End: 2021-08-03

## 2021-05-18 NOTE — PROGRESS NOTES
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    Atrial fibrillation (HCC)     Hashimoto's thyroiditis     Hyperlipidemia     Hypertension     IBS (irritable bowel syndrome)     Migraine     Mitral valve disorder     Nasal congestion     Non-toxic uninodular goiter     Nosebleed        PAST SURGICAL HISTORY:  Past Surgical History:   Procedure Laterality Date    APPENDECTOMY      BREAST CYST EXCISION Right 01/01/1977    COLONOSCOPY  08/08/2011    VALVE REPLACEMENT  01/04/2017    mitral valve replacement, 6/5/14       FAMILY HISTORY:  Family History   Problem Relation Age of Onset    Hypertension Mother     Coronary artery disease Father     Migraines Father     Leukemia Brother     Migraines Brother     Hypertension Brother     Coronary artery disease Paternal Grandfather     Leukemia Maternal Aunt     Multiple myeloma Maternal Aunt     Thyroid disease Other     No Known Problems Maternal Aunt     No Known Problems Maternal Aunt     No Known Problems Paternal Aunt     No Known Problems Paternal Aunt     No Known Problems Paternal Aunt     No Known Problems Paternal Aunt     Breast cancer Neg Hx        SOCIAL HISTORY:  Social History     Tobacco Use    Smoking status: Never Smoker    Smokeless tobacco: Never Used   Substance Use Topics    Alcohol use: Yes     Frequency: Monthly or less     Drinks per session: 1 or 2     Binge frequency: Never     Comment: rarely    Drug use: No       MEDICATIONS:    Current Outpatient Medications:     amLODIPine (NORVASC) 5 mg tablet, TAKE 1 TABLET BY MOUTH EVERY DAY IN THE MORNING, Disp: 90 tablet, Rfl: 3    ascorbic acid (VITAMIN C) 500 mg tablet, Take 500 mg by mouth daily, Disp: , Rfl:     aspirin 81 mg chewable tablet, Chew daily, Disp: , Rfl:     butalbital-acetaminophen-caffeine (FIORICET,ESGIC) -40 mg per tablet, Take 1 tablet by mouth every 4 (four) hours as needed for headaches, Disp: 30 tablet, Rfl: 2    Calcium Carbonate (CALTRATE 600) 1500 (600 Ca) MG TABS, Take by mouth daily , Disp: , Rfl:     Flaxseed, Linseed, (FLAXSEED OIL) 1200 MG CAPS, Take by mouth daily , Disp: , Rfl:     gabapentin (NEURONTIN) 100 mg capsule, TAKE 1 CAPSULE BY MOUTH 2 TIMES A DAY, Disp: 180 capsule, Rfl: 1    gabapentin (NEURONTIN) 300 mg capsule, TAKE A 300MG + 100MG AT BEDTIME, Disp: 90 capsule, Rfl: 3    losartan (COZAAR) 100 MG tablet, TAKE 1 TABLET BY MOUTH EVERY DAY, Disp: 90 tablet, Rfl: 1    Misc Natural Products (OSTEO BI-FLEX TRIPLE STRENGTH) TABS, Take by mouth daily , Disp: , Rfl:     oxybutynin (DITROPAN-XL) 10 MG 24 hr tablet, Take 1 tablet (10 mg total) by mouth daily at bedtime, Disp: 30 tablet, Rfl: 5    pantoprazole (PROTONIX) 40 mg tablet, TAKE 1 TABLET BY MOUTH EVERY DAY, Disp: 90 tablet, Rfl: 0    propranolol (INDERAL LA) 60 mg 24 hr capsule, TAKE 1 CAPSULE BY MOUTH EVERY DAY, Disp: 90 capsule, Rfl: 1    simvastatin (ZOCOR) 10 mg tablet, TAKE 1 TABLET BY MOUTH EVERY DAY, Disp: 90 tablet, Rfl: 3    warfarin (COUMADIN) 1 mg tablet, TAKE 1 TABLET BY MOUTH EVERY DAY, Disp: 90 tablet, Rfl: 1    warfarin (COUMADIN) 2 mg tablet, TAKE 1 TABLET BY MOUTH EVERY DAY, Disp: 90 tablet, Rfl: 1    warfarin (COUMADIN) 3 mg tablet, TAKE 1 TABLET BY MOUTH DAILY, Disp: 90 tablet, Rfl: 1    amoxicillin (AMOXIL) 500 mg capsule, , Disp: , Rfl:     clobetasol (TEMOVATE) 0 05 % cream, Apply to elbows bid (Patient not taking: Reported on 4/19/2021), Disp: 30 g, Rfl: 3    Current Facility-Administered Medications:   cyanocobalamin injection 1,000 mcg, 1,000 mcg, Intramuscular, Q30 Days, Ana Luisa Santiago, AMANDANP, 1,000 mcg at 05/14/19 2314    ALLERGIES:  Allergies   Allergen Reactions    Enablex [Darifenacin] Other (See Comments)     Sweating, HA, urinary hesitancy, flushing of face    Fentanyl Nausea Only and Vomiting    Hyoscyamine Palpitations    Dicyclomine Other (See Comments)     Jittery, disorientation, light HAs    Hydromorphone Other (See Comments)     Per pt does not remember the type or severity level    Midazolam Other (See Comments)     Per pt does not remember the type or severity level    Sulfamethoxazole-Trimethoprim Nausea Only    Venlafaxine Other (See Comments)     Urinary urgency, polyuria    Codeine Polt-Chlorphen Polt Er Headache    Ultracet [Tramadol-Acetaminophen] Nausea Only and Vomiting       REVIEW OF SYSTEMS:  Review of Systems  ROS:   General: no fever, no chills  HEENT:  No loss of hearing or eyesight problems  Eyes:  No red eyes  Respiratory:  No coughing, shortness of breath or wheezing  Cardiovascular:  No chest pain, no palpitations  GI:  Abdomen soft nontender, denies nausea  Endocrine:  No muscle weakness, no frequent urination, no excessive thirst  Urinary:  No dysuria, no incontinence  Musculoskeletal: see HPI and PE  SKIN:  No skin rash, no dry skin  Neurological:  No headaches, no confusion  Psychiatric:  No suicide thoughts, no anxiety, no depression  Review of all other systems is negative    VITALS:  There were no vitals filed for this visit      LABS:  HgA1c:   Lab Results   Component Value Date    HGBA1C 5 4 09/11/2020     BMP:   Lab Results   Component Value Date    CALCIUM 9 0 04/05/2021    K 4 6 04/05/2021    CO2 28 04/05/2021     04/05/2021    BUN 23 04/05/2021    CREATININE 1 01 04/05/2021       _____________________________________________________  PHYSICAL EXAMINATION:  General: well developed and well nourished, alert, oriented times 3 and appears comfortable  Psychiatric: Normal  HEENT: Trachea Midline, No torticollis  Pulmonary: No audible wheezing or respiratory distress   Skin: No masses, erythema, lacerations, fluctation, ulcerations  Neurovascular: Sensation Intact to the Median, Ulnar, Radial Nerve, Motor Intact to the Median, Ulnar, Radial Nerve and Pulses Intact    MUSCULOSKELETAL EXAMINATION:  Right small finger  No erythema, edema or ecchymosis noted  No tenderness to palpation over the PIP joint  Stiffness is noted at the PIP joint while making a full composite fist  ___________________________________________________  STUDIES REVIEWED:  No studies reviewed  PROCEDURES PERFORMED:  Procedures  No Procedures performed today    _____________________________________________________  ASSESSMENT/PLAN:      Right small finger PIP joint arthritis  -patient was advised that we can repeat the cortisone injections every 3-4 months as needed for pain   -she can take Tylenol as needed for the pain   -she will follow-up with us as needed        Follow Up:  Return if symptoms worsen or fail to improve  Work/school status:  No restrictions    To Do Next Visit:  Re-evaluation of current issue        Scribe Attestation    I,:  Jose Alicia PA-C am acting as a scribe while in the presence of the attending physician :       I,:  Susannah Chapman MD personally performed the services described in this documentation    as scribed in my presence :           Portions of the record may have been created with voice recognition software  Occasional wrong word or "sound a like" substitutions may have occurred due to the inherent limitations of voice recognition software  Read the chart carefully and recognize, using context, where substitutions have occurred

## 2021-05-20 ENCOUNTER — OFFICE VISIT (OUTPATIENT)
Dept: INTERNAL MEDICINE CLINIC | Facility: CLINIC | Age: 76
End: 2021-05-20
Payer: COMMERCIAL

## 2021-05-20 VITALS
TEMPERATURE: 98.2 F | DIASTOLIC BLOOD PRESSURE: 76 MMHG | HEART RATE: 58 BPM | HEIGHT: 69 IN | SYSTOLIC BLOOD PRESSURE: 136 MMHG | OXYGEN SATURATION: 96 % | WEIGHT: 172.6 LBS | BODY MASS INDEX: 25.56 KG/M2

## 2021-05-20 DIAGNOSIS — R23.8 OTHER SKIN CHANGES: Primary | ICD-10-CM

## 2021-05-20 PROBLEM — R07.9 CHEST PAIN: Status: RESOLVED | Noted: 2020-09-11 | Resolved: 2021-05-20

## 2021-05-20 PROBLEM — M54.2 CERVICALGIA: Status: RESOLVED | Noted: 2019-12-13 | Resolved: 2021-05-20

## 2021-05-20 PROCEDURE — 3078F DIAST BP <80 MM HG: CPT | Performed by: NURSE PRACTITIONER

## 2021-05-20 PROCEDURE — 3288F FALL RISK ASSESSMENT DOCD: CPT | Performed by: NURSE PRACTITIONER

## 2021-05-20 PROCEDURE — 99214 OFFICE O/P EST MOD 30 MIN: CPT | Performed by: NURSE PRACTITIONER

## 2021-05-20 PROCEDURE — 3725F SCREEN DEPRESSION PERFORMED: CPT | Performed by: NURSE PRACTITIONER

## 2021-05-20 PROCEDURE — 1101F PT FALLS ASSESS-DOCD LE1/YR: CPT | Performed by: NURSE PRACTITIONER

## 2021-05-20 PROCEDURE — 3075F SYST BP GE 130 - 139MM HG: CPT | Performed by: NURSE PRACTITIONER

## 2021-05-20 NOTE — PROGRESS NOTES
Assessment/Plan:    Patient Instructions     Skin lesion left auriculectomy region, likely actinic keratosis but she states that local ENT told her it was melanoma although no biopsy was taken, at this time I referred her to Dermatology     history of thyroid nodule with questionable left cervical lymphadenopathy although she states she has had this since she was a child she is already scheduled for thyroid and neck ultrasound by ENT     left axillary discomfort questionable small lymph node noted but she did have COVID vaccine less than a month ago in this arm  Continue to monitor         Diagnoses and all orders for this visit:    Other skin changes  -     Ambulatory referral to Dermatology; Future         Subjective:      Patient ID: Sajan Barros is a 70 y o  female    Pt was seen by ENT yesterday and told she may have melanoma  No bx done or ordered was given a steriod cream which she didn't use     Was having ear pain had ear cleaned out and given abx??  I do not have note for review  He also ordered thyroid and neck u/s hx 8mm nodule  States she has always had and enlarged lymph node left neck since a child  Pain under arm pit for weeks now gone same arm as covid shot last month        Current Outpatient Medications:     amLODIPine (NORVASC) 5 mg tablet, TAKE 1 TABLET BY MOUTH EVERY DAY IN THE MORNING, Disp: 90 tablet, Rfl: 3    amoxicillin (AMOXIL) 500 mg capsule, , Disp: , Rfl:     aspirin 81 mg chewable tablet, Chew daily, Disp: , Rfl:     butalbital-acetaminophen-caffeine (FIORICET,ESGIC) -40 mg per tablet, Take 1 tablet by mouth every 4 (four) hours as needed for headaches, Disp: 30 tablet, Rfl: 2    Calcium Carbonate (CALTRATE 600) 1500 (600 Ca) MG TABS, Take by mouth daily , Disp: , Rfl:     gabapentin (NEURONTIN) 100 mg capsule, TAKE 1 CAPSULE BY MOUTH 2 TIMES A DAY, Disp: 180 capsule, Rfl: 1    gabapentin (NEURONTIN) 300 mg capsule, TAKE A 300MG + 100MG AT BEDTIME, Disp: 90 capsule, Rfl: 3    losartan (COZAAR) 100 MG tablet, TAKE 1 TABLET BY MOUTH EVERY DAY, Disp: 90 tablet, Rfl: 1    Misc Natural Products (OSTEO BI-FLEX TRIPLE STRENGTH) TABS, Take by mouth daily , Disp: , Rfl:     oxybutynin (DITROPAN-XL) 10 MG 24 hr tablet, Take 1 tablet (10 mg total) by mouth daily at bedtime, Disp: 30 tablet, Rfl: 5    pantoprazole (PROTONIX) 40 mg tablet, TAKE 1 TABLET BY MOUTH EVERY DAY, Disp: 90 tablet, Rfl: 0    propranolol (INDERAL LA) 60 mg 24 hr capsule, TAKE 1 CAPSULE BY MOUTH EVERY DAY, Disp: 90 capsule, Rfl: 1    simvastatin (ZOCOR) 10 mg tablet, TAKE 1 TABLET BY MOUTH EVERY DAY, Disp: 90 tablet, Rfl: 3    warfarin (COUMADIN) 1 mg tablet, TAKE 1 TABLET BY MOUTH EVERY DAY, Disp: 90 tablet, Rfl: 1    warfarin (COUMADIN) 2 mg tablet, TAKE 1 TABLET BY MOUTH EVERY DAY, Disp: 90 tablet, Rfl: 1    warfarin (COUMADIN) 3 mg tablet, TAKE 1 TABLET BY MOUTH DAILY, Disp: 90 tablet, Rfl: 1    ascorbic acid (VITAMIN C) 500 mg tablet, Take 500 mg by mouth daily, Disp: , Rfl:     clobetasol (TEMOVATE) 0 05 % cream, Apply to elbows bid (Patient not taking: Reported on 4/19/2021), Disp: 30 g, Rfl: 3    Flaxseed, Linseed, (FLAXSEED OIL) 1200 MG CAPS, Take by mouth daily , Disp: , Rfl:     Current Facility-Administered Medications:     cyanocobalamin injection 1,000 mcg, 1,000 mcg, Intramuscular, Q30 Days, HANNY Carvalho, 1,000 mcg at 05/14/19 9384    Recent Results (from the past 1008 hour(s))   Protime-INR    Collection Time: 04/26/21 10:04 AM   Result Value Ref Range    Protime 35 1 (H) 11 6 - 14 5 seconds    INR 3 53 (H) 0 84 - 1 19   Protime-INR    Collection Time: 05/10/21  9:28 AM   Result Value Ref Range    Protime 29 9 (H) 11 6 - 14 5 seconds    INR 2 87 (H) 0 84 - 1 19       The following portions of the patient's history were reviewed and updated as appropriate: allergies, current medications, past family history, past medical history, past social history, past surgical history and problem list      Review of Systems   Constitutional: Negative for appetite change, chills, diaphoresis, fatigue, fever and unexpected weight change  HENT: Negative for postnasal drip and sneezing  Eyes: Negative for visual disturbance  Respiratory: Negative for chest tightness and shortness of breath  Cardiovascular: Negative for chest pain, palpitations and leg swelling  Gastrointestinal: Negative for abdominal pain and blood in stool  Endocrine: Negative for cold intolerance, heat intolerance, polydipsia, polyphagia and polyuria  Genitourinary: Negative for difficulty urinating, dysuria, frequency and urgency  Musculoskeletal: Negative for arthralgias and myalgias  Discomfort under left arm pit   Skin: Negative for rash and wound  Neurological: Negative for dizziness, weakness, light-headedness and headaches  Hematological: Negative for adenopathy  Psychiatric/Behavioral: Negative for confusion, dysphoric mood and sleep disturbance  The patient is not nervous/anxious  Objective:      /76   Pulse 58   Temp 98 2 °F (36 8 °C) (Tympanic)   Ht 5' 9" (1 753 m)   Wt 78 3 kg (172 lb 9 6 oz)   LMP  (LMP Unknown)   SpO2 96%   BMI 25 49 kg/m²        Physical Exam  Constitutional:       General: She is not in acute distress  Appearance: She is well-developed  She is not diaphoretic  HENT:      Head: Normocephalic and atraumatic  Nose: Nose normal    Eyes:      Conjunctiva/sclera: Conjunctivae normal       Pupils: Pupils are equal, round, and reactive to light  Neck:      Musculoskeletal: Normal range of motion and neck supple  Thyroid: No thyromegaly  Vascular: No JVD  Trachea: No tracheal deviation  Cardiovascular:      Rate and Rhythm: Normal rate and regular rhythm  Heart sounds: Murmur present  No friction rub  No gallop  Pulmonary:      Effort: Pulmonary effort is normal  No respiratory distress  Breath sounds: Normal breath sounds  No wheezing or rales  Abdominal:      General: Bowel sounds are normal  There is no distension  Palpations: Abdomen is soft  Tenderness: There is no abdominal tenderness  Musculoskeletal: Normal range of motion  Lymphadenopathy:      Cervical: Cervical adenopathy present  Skin:     General: Skin is warm and dry  Findings: No rash  Comments: ? Tiny left axilla lymph node   Neurological:      Mental Status: She is alert and oriented to person, place, and time  Cranial Nerves: No cranial nerve deficit  Psychiatric:         Behavior: Behavior normal          Thought Content:  Thought content normal          Judgment: Judgment normal

## 2021-05-20 NOTE — PATIENT INSTRUCTIONS
Skin lesion left auriculectomy region, likely actinic keratosis but she states that local ENT told her it was melanoma although no biopsy was taken, at this time I referred her to Dermatology     history of thyroid nodule with questionable left cervical lymphadenopathy although she states she has had this since she was a child she is already scheduled for thyroid and neck ultrasound by ENT     left axillary discomfort questionable small lymph node noted but she did have COVID vaccine less than a month ago in this arm    Continue to monitor

## 2021-05-26 ENCOUNTER — HOSPITAL ENCOUNTER (OUTPATIENT)
Dept: ULTRASOUND IMAGING | Facility: HOSPITAL | Age: 76
Discharge: HOME | End: 2021-05-26
Payer: COMMERCIAL

## 2021-05-26 DIAGNOSIS — R59.0 VIRCHOW'S NODE: ICD-10-CM

## 2021-05-26 DIAGNOSIS — E04.1 THYROID NODULE: ICD-10-CM

## 2021-05-26 PROCEDURE — 76536 US EXAM OF HEAD AND NECK: CPT

## 2021-05-28 DIAGNOSIS — N32.81 OAB (OVERACTIVE BLADDER): ICD-10-CM

## 2021-05-29 RX ORDER — OXYBUTYNIN CHLORIDE 10 MG/1
TABLET, EXTENDED RELEASE ORAL
Qty: 90 TABLET | Refills: 1 | Status: SHIPPED | OUTPATIENT
Start: 2021-05-29 | End: 2021-11-22

## 2021-06-01 ENCOUNTER — APPOINTMENT (OUTPATIENT)
Dept: LAB | Facility: CLINIC | Age: 76
End: 2021-06-01
Payer: COMMERCIAL

## 2021-06-01 ENCOUNTER — ANTICOAG VISIT (OUTPATIENT)
Dept: CARDIOLOGY CLINIC | Facility: CLINIC | Age: 76
End: 2021-06-01

## 2021-06-01 DIAGNOSIS — Z95.2 HISTORY OF HEART VALVE REPLACEMENT: ICD-10-CM

## 2021-06-07 ENCOUNTER — HOSPITAL ENCOUNTER (OUTPATIENT)
Dept: MAMMOGRAPHY | Facility: CLINIC | Age: 76
Discharge: HOME | End: 2021-06-07
Payer: COMMERCIAL

## 2021-06-07 VITALS — HEIGHT: 69 IN | WEIGHT: 172 LBS | BODY MASS INDEX: 25.48 KG/M2

## 2021-06-07 DIAGNOSIS — Z12.31 OTHER SCREENING MAMMOGRAM: ICD-10-CM

## 2021-06-07 PROCEDURE — 77067 SCR MAMMO BI INCL CAD: CPT

## 2021-06-07 PROCEDURE — 77063 BREAST TOMOSYNTHESIS BI: CPT

## 2021-06-08 ENCOUNTER — OFFICE VISIT (OUTPATIENT)
Dept: DERMATOLOGY | Facility: CLINIC | Age: 76
End: 2021-06-08
Payer: COMMERCIAL

## 2021-06-08 VITALS — BODY MASS INDEX: 25.48 KG/M2 | HEIGHT: 69 IN | WEIGHT: 172 LBS | TEMPERATURE: 98.2 F

## 2021-06-08 DIAGNOSIS — L57.0 ACTINIC KERATOSIS: ICD-10-CM

## 2021-06-08 DIAGNOSIS — L82.1 SEBORRHEIC KERATOSES: Primary | ICD-10-CM

## 2021-06-08 DIAGNOSIS — D18.01 CHERRY ANGIOMA: ICD-10-CM

## 2021-06-08 DIAGNOSIS — D22.60 MULTIPLE BENIGN MELANOCYTIC NEVI OF UPPER AND LOWER EXTREMITIES AND TRUNK: ICD-10-CM

## 2021-06-08 DIAGNOSIS — D22.70 MULTIPLE BENIGN MELANOCYTIC NEVI OF UPPER AND LOWER EXTREMITIES AND TRUNK: ICD-10-CM

## 2021-06-08 DIAGNOSIS — D22.5 MULTIPLE BENIGN MELANOCYTIC NEVI OF UPPER AND LOWER EXTREMITIES AND TRUNK: ICD-10-CM

## 2021-06-08 DIAGNOSIS — L85.3 XEROSIS OF SKIN: ICD-10-CM

## 2021-06-08 DIAGNOSIS — L81.4 LENTIGO: ICD-10-CM

## 2021-06-08 PROCEDURE — 1160F RVW MEDS BY RX/DR IN RCRD: CPT | Performed by: DERMATOLOGY

## 2021-06-08 PROCEDURE — 17000 DESTRUCT PREMALG LESION: CPT | Performed by: DERMATOLOGY

## 2021-06-08 PROCEDURE — 99204 OFFICE O/P NEW MOD 45 MIN: CPT | Performed by: DERMATOLOGY

## 2021-06-08 PROCEDURE — 1036F TOBACCO NON-USER: CPT | Performed by: DERMATOLOGY

## 2021-06-08 PROCEDURE — 3008F BODY MASS INDEX DOCD: CPT | Performed by: DERMATOLOGY

## 2021-06-08 PROCEDURE — 17110 DESTRUCTION B9 LES UP TO 14: CPT | Performed by: DERMATOLOGY

## 2021-06-08 NOTE — PROGRESS NOTES
[facility] Note     Patient Name: Gibran Dailey  Encounter Date: 06/08/2021     Have you been cared for by a [facility] in the last 3 years and, if so, which one? No    · Have you traveled outside of the [address] [place] [address] in the past 3 months or outside of the [facility] area in the last 2 weeks? No     May we call your Preferred Phone number to discuss your specific medical information? Yes     May we leave a detailed message that includes your specific medical information? Yes      Today's Chief Concerns:   Concern #1:  Lesion on left side of face    Concern #2:  Lesions under breast     Past Medical History:  Have you personally ever had or currently have any of the following? · Skin cancer (such as Melanoma, Basal Cell Carcinoma, Squamous Cell Carcinoma? (If Yes, please provide more detail)- No  · Eczema: No  · Psoriasis: YES  · HIV/AIDS: No  · Hepatitis B or C: No  · Tuberculosis: No  · Systemic Immunosuppression such as Diabetes, Biologic or Immunotherapy, Chemotherapy, Organ Transplantation, Bone Marrow Transplantation (If YES, please provide more detail): No  · Radiation Treatment (If YES, please provide more detail): No  · Any other major medical conditions/concerns? (If Yes, which types)- No    Social History:     What is/was your primary occupation?  What are your hobbies/past-times? Family History:  Have any of your "first degree relatives" (parent, brother, sister, or child) had any of the following       · Skin cancer such as Melanoma or Merkel Cell Carcinoma or Pancreatic Cancer? No  · Eczema, Asthma, Hay Fever or Seasonal Allergies: No  · Psoriasis or Psoriatic Arthritis: No  · Do any other medical conditions seem to run in your family? If Yes, what condition and which relatives?   No    Current Medications:         Current Outpatient Medications:     amLODIPine (NORVASC) 5 mg tablet, TAKE 1 TABLET BY MOUTH EVERY DAY IN THE MORNING, Disp: 90 tablet, Rfl: 3    amoxicillin (AMOXIL) 500 mg capsule, , Disp: , Rfl:     ascorbic acid (VITAMIN C) 500 mg tablet, Take 500 mg by mouth daily, Disp: , Rfl:     aspirin 81 mg chewable tablet, Chew daily, Disp: , Rfl:     butalbital-acetaminophen-caffeine (FIORICET,ESGIC) -40 mg per tablet, Take 1 tablet by mouth every 4 (four) hours as needed for headaches, Disp: 30 tablet, Rfl: 2    Calcium Carbonate (CALTRATE 600) 1500 (600 Ca) MG TABS, Take by mouth daily , Disp: , Rfl:     clobetasol (TEMOVATE) 0 05 % cream, Apply to elbows bid, Disp: 30 g, Rfl: 3    Flaxseed, Linseed, (FLAXSEED OIL) 1200 MG CAPS, Take by mouth daily , Disp: , Rfl:     gabapentin (NEURONTIN) 100 mg capsule, TAKE 1 CAPSULE BY MOUTH 2 TIMES A DAY, Disp: 180 capsule, Rfl: 1    gabapentin (NEURONTIN) 300 mg capsule, TAKE A 300MG + 100MG AT BEDTIME, Disp: 90 capsule, Rfl: 3    losartan (COZAAR) 100 MG tablet, TAKE 1 TABLET BY MOUTH EVERY DAY, Disp: 90 tablet, Rfl: 1    Misc Natural Products (OSTEO BI-FLEX TRIPLE STRENGTH) TABS, Take by mouth daily , Disp: , Rfl:     oxybutynin (DITROPAN-XL) 10 MG 24 hr tablet, TAKE 1 TABLET BY MOUTH DAILY AT BEDTIME, Disp: 90 tablet, Rfl: 1    pantoprazole (PROTONIX) 40 mg tablet, TAKE 1 TABLET BY MOUTH EVERY DAY, Disp: 90 tablet, Rfl: 0    propranolol (INDERAL LA) 60 mg 24 hr capsule, TAKE 1 CAPSULE BY MOUTH EVERY DAY, Disp: 90 capsule, Rfl: 1    simvastatin (ZOCOR) 10 mg tablet, TAKE 1 TABLET BY MOUTH EVERY DAY, Disp: 90 tablet, Rfl: 3    warfarin (COUMADIN) 1 mg tablet, TAKE 1 TABLET BY MOUTH EVERY DAY, Disp: 90 tablet, Rfl: 1    warfarin (COUMADIN) 2 mg tablet, TAKE 1 TABLET BY MOUTH EVERY DAY, Disp: 90 tablet, Rfl: 1    warfarin (COUMADIN) 3 mg tablet, TAKE 1 TABLET BY MOUTH DAILY, Disp: 90 tablet, Rfl: 1    Current Facility-Administered Medications:     cyanocobalamin injection 1,000 mcg, 1,000 mcg, Intramuscular, Q30 Days, Taylor Pozoine, CRNP, 1,000 mcg at 05/14/19 6164      Review of Systems:  Have you recently had or currently have any of the following? If YES, what are you doing for the problem? · Fever, chills or unintended weight loss: No  · Sudden loss or change in your vision: No  · Nausea, vomiting or blood in your stool: No  · Painful or swollen joints: No  · Wheezing or cough: No  · Changing mole or non-healing wound: No  · Nosebleeds: No  · Excessive sweating: No  · Easy or prolonged bleeding? No  · Over the last 2 weeks, how often have you been bothered by the following problems? · Taking little interest or pleasure in doing things: 1 - Not at All  · Feeling down, depressed, or hopeless: 1 - Not at All  · Rapid heartbeat with epinephrine:  YES    · FEMALES ONLY:    · Are you pregnant or planning to become pregnant? No  · Are you currently or planning to be nursing or breast feeding? No    · Any known allergies? Allergies   Allergen Reactions    Enablex [Darifenacin] Other (See Comments)     Sweating, HA, urinary hesitancy, flushing of face    Fentanyl Nausea Only and Vomiting    Hyoscyamine Palpitations    Dicyclomine Other (See Comments)     Jittery, disorientation, light HAs    Hydromorphone Other (See Comments)     Per pt does not remember the type or severity level    Midazolam Other (See Comments)     Per pt does not remember the type or severity level    Sulfamethoxazole-Trimethoprim Nausea Only    Venlafaxine Other (See Comments)     Urinary urgency, polyuria    Codeine Polt-Chlorphen Polt Er Headache    Ultracet [Tramadol-Acetaminophen] Nausea Only and Vomiting   ·       Physical Exam:     Was a chaperone (Derm Clinical Assistant) present throughout the entire Physical Exam? Yes     Did the Dermatology Team specifically  the patient on the importance of a Full Skin Exam to be sure that nothing is missed clinically?  Yes}  o Did the patient ultimately request or accept a Full Skin Exam?  NO  o Did the patient specifically refuse to have the areas "under-the-bra" examined by the Dermatologist? Kierra ledesma Did the patient specifically refuse to have the areas "under-the-underwear" examined by the Dermatologist? YES    CONSTITUTIONAL:   Vitals:    06/08/21 1240   Temp: 98 2 °F (36 8 °C)   TempSrc: Tympanic   Weight: 78 kg (172 lb)   Height: 5' 9" (1 753 m)         PSYCH: Normal mood and affect  EYES: Normal conjunctiva  ENT: Normal lips and oral mucosa  CARDIOVASCULAR: No edema  RESPIRATORY: Normal respirations  HEME/LYMPH/IMMUNO:  No regional lymphadenopathy except as noted below in "ASSESSMENT AND PLAN BY DIAGNOSIS"    SKIN:  FULL ORGAN SYSTEM EXAM   Hair, Scalp, Ears, Face Normal except as noted below in Assessment   Neck, Cervical Chain Nodes Normal except as noted below in Assessment   Right Arm/Hand/Fingers Normal except as noted below in Assessment   Left Arm/Hand/Fingers Normal except as noted below in Assessment   Chest/Breasts/Axillae Viewed areas Normal except as noted below in Assessment   Abdomen, Umbilicus    Back/Spine    Groin/Genitalia/Buttocks    Right Leg, Foot, Toes    Left Leg, Foot, Toes         Assessment and Plan by Diagnosis:    History of Present Condition:     Duration:  How long has this been an issue for you?    o  years    Location Affected:  Where on the body is this affecting you?    o  left cheek and under breast    Quality:  Is there any bleeding, pain, itch, burning/irritation, or redness associated with the skin lesion? o  denies    Severity:  Describe any bleeding, pain, itch, burning/irritation, or redness on a scale of 1 to 10 (with 10 being the worst)  o  denies    Timing:  Does this condition seem to be there pretty constantly or do you notice it more at specific times throughout the day?     o  constant    Context:  Have you ever noticed that this condition seems to be associated with specific activities you do?    o  denies    Modifying Factors:    o Anything that seems to make the condition worse?    -  denies   o What have you tried to do to make the condition better?    -  denies    Associated Signs and Symptoms:  Does this skin lesion seem to be associated with any of the following:   Denies       1  ACTINIC KERATOSIS    Physical Exam:   Anatomic Location Affected:  Left Shinto    Morphological Description:  Scaly pink papules      Assessment and Plan:  Based on a thorough discussion of this condition and the management approach to it (including a comprehensive discussion of the known risks, side effects and potential benefits of treatment), the patient (family) agrees to implement the following specific plan:     Liquid nitrogen was applied for 10-12 seconds to the skin lesion and the expected blistering or scabbing reaction explained  Do not pick at the area  Patient reminded to expect hypopigmented scars from the procedure  Return if lesion fails to fully resolve  Actinic keratoses are very common on sites repeatedly exposed to the sun, especially the backs of the hands and the face, most often affecting the ears, nose, cheeks, upper lip, vermilion of the lower lip, temples, forehead and balding scalp  In severely chronically sun-damaged individuals, they may also be found on the upper trunk, upper and lower limbs, and dorsum of feet  We discussed the theoretical premalignant (pre-cancerous) nature and etiology of these growths  We discussed the prevailing notion that actinic keratoses are a reflection of abnormal skin cell development due to DNA damage by short wavelength UVB  They are more likely to appear if the immune function is poor, due to aging, recent sun exposure, predisposing disease or certain drugs  We discussed that the main concern is that actinic keratoses may predispose to squamous cell carcinoma   It is rare for a solitary actinic keratosis to evolve to squamous cell carcinoma (SCC), but the risk of SCC occurring at some stage in a patient with more than 10 actinic keratoses is thought to be about 10 to 15%  A tender, thickened, ulcerated or enlarging actinic keratosis is suspicious of SCC  Actinic keratoses may be prevented by strict sun protection  If already present, keratoses may improve with a very high sun protection factor (50+) broad-spectrum sunscreen applied at least daily to affected areas, year-round  We recommend that UPF-rated clothing and hats and sunglasses be worn whenever possible and that a sunscreen-moisturizer combination product such as Neutrogena Daily Defense be applied at least three times a day  We performed a thorough discussion of treatment options and specific risk/benefits/alternatives including but not limited to medical field treatment with medications such as the following:     Topical field area medications such as 5-fluorouracil or Aldara (specifically, the trouble with long-term compliance, blistering and local skin reaction versus the convenience of at-home therapy and that field therapy gets what is not yet seen)   Cryotherapy (specifically, local pain, scarring, dyspigmentation, blistering, possible superinfection, and treats only what we see versus directed treatment today)   Photodynamic therapy (specifically, local pain, scarring, dyspigmentation, blistering, possible superinfection, need to schedule for a later date, and time spent in the office versus field therapy that gets what is not yet seen)  PROCEDURE:  DESTRUCTION OF PRE-MALIGNANT LESIONS  After a thorough discussion of treatment options and risk/benefits/alternatives (including but not limited to local pain, scarring, dyspigmentation, blistering, and possible superinfection), verbal and written consent were obtained and the aforementioned lesions were treated on with cryotherapy using liquid nitrogen x 3 cycle for 5-10 seconds   TOTAL NUMBER of 1 pre-malignant lesions were treated today on the ANATOMIC LOCATION: left temple       The patient tolerated the procedure well, and after-care instructions were provided  2  SEBORRHEIC KERATOSIS; INFLAMED- irritated with bra    Physical Exam:   Anatomic Location Affected:  Back and under breasts    Morphological Description:  Flat and raised, waxy, smooth to warty textured, yellow to brownish-grey to dark brown to blackish, discrete, "stuck-on" appearing papules   Pertinent Positives:   Pertinent Negatives:        Assessment and Plan:  Based on a thorough discussion of this condition and the management approach to it (including a comprehensive discussion of the known risks, side effects and potential benefits of treatment), the patient (family) agrees to implement the following specific plan:   Liquid nitrogen was applied for 10-12 seconds to the skin lesion and the expected blistering or scabbing reaction explained  Do not pick at the area  Patient reminded to expect hypopigmented scars from the procedure  Return if lesion fails to fully resolve       Seborrheic Keratosis  A seborrheic keratosis is a harmless warty spot that appears during adult life as a common sign of skin aging  Seborrheic keratoses can arise on any area of skin, covered or uncovered, with the usual exception of the palms and soles  They do not arise from mucous membranes  Seborrheic keratoses can have highly variable appearance  Seborrheic keratoses are extremely common  It has been estimated that over 90% of adults over the age of 61 years have one or more of them  They occur in males and females of all races, typically beginning to erupt in the 35s or 45s  They are uncommon under the age of 21 years  The precise cause of seborrhoeic keratoses is not known  Seborrhoeic keratoses are considered degenerative in nature  As time goes by, seborrheic keratoses tend to become more numerous  Some people inherit a tendency to develop a very large number of them; some people may have hundreds of them      The name "seborrheic keratosis" is misleading, because these lesions are not limited to a seborrhoeic distribution (scalp, mid-face, chest, upper back), nor are they formed from sebaceous glands, nor are they associated with sebum -- which is greasy  Seborrheic keratosis may also be called "SK," "Seb K," "basal cell papilloma," "senile wart," or "barnacle "      Researchers have noted:   Eruptive seborrhoeic keratoses can follow sunburn or dermatitis   Skin friction may be the reason they appear in body folds   Viral cause (e g , human papillomavirus) seems unlikely   Stable and clonal mutations or activation of FRFR3, PIK3CA, DEEPALI, AKT1 and EGFR genes are found in seborrhoeic keratoses   Seborrhoeic keratosis can arise from solar lentigo   FRFR3 mutations also arise in solar lentigines  These mutations are associated with increased age and location on the head and neck, suggesting a role of ultraviolet radiation in these lesions   Seborrheic keratoses do not harbour tumour suppressor gene mutations   Epidermal growth factor receptor inhibitors, which are used to treat some cancers, often result in an increase in verrucal (warty) keratoses  There is no easy way to remove multiple lesions on a single occasion  Unless a specific lesion is "inflamed" and is causing pain or stinging/burning or is bleeding, most insurance companies do not authorize treatment  PROCEDURE:  DESTRUCTION OF BENIGN LESIONS  After a thorough discussion of treatment options and risk/benefits/alternatives (including but not limited to local pain, scarring, dyspigmentation, blistering, and possible superinfection), verbal and written consent were obtained and the aforementioned lesions were treated on with cryotherapy using liquid nitrogen x 3 cycle for 5-10 seconds   TOTAL NUMBER of 6 lesions were treated today on the ANATOMIC LOCATION: under breasts        The patient tolerated the procedure well, and after-care instructions were provided  3  CHERRY ANGIOMAS    Physical Exam:   Anatomic Location Affected:  Trunk and extremities    Morphological Description:  Scattered cherry red, 1-4 mm papules   Pertinent Positives:   Pertinent Negatives:    Assessment and Plan:  Based on a thorough discussion of this condition and the management approach to it (including a comprehensive discussion of the known risks, side effects and potential benefits of treatment), the patient (family) agrees to implement the following specific plan:   Reassure benign     Assessment and Plan:    Cherry angioma, also known as [employer] spots, are benign vascular skin lesions  A "cherry angioma" is a firm red, blue or purple papule, 0 1-1 cm in diameter  When thrombosed, they can appear black in colour until evaluated with a dermatoscope when the red or purple colour is more easily seen  Cherry angioma may develop on any part of the body but most often appear on the scalp, face, lips and trunk  An angioma is due to proliferating endothelial cells; these are the cells that line the inside of a blood vessel  Angiomas can arise in early life or later in life; the most common type of angioma is a cherry angioma  Cherry angiomas are very common in males and females of any age or race  They are more noticeable in white skin than in skin of colour  They markedly increase in number from about the age of 36  There may be a family history of similar lesions  Eruptive cherry angiomas have been rarely reported to be associated with internal malignancy  The cause of angiomas is unknown  Genetic analysis of cherry angiomas has shown that they frequently carry specific somatic missense mutations in the GNAQ and GNA11 (Q209H) genes, which are involved in other vascular and melanocytic proliferations  Cherry angioma is usually diagnosed clinically and no investigations are necessary for the majority of lesions   It has a characteristic red-clod or lobular pattern on dermatoscopy (called lacunar pattern using conventional pattern analysis)  When there is uncertainty about the diagnosis, a biopsy may be performed  The angioma is composed of venules in a thickened papillary dermis  Collagen bundles may be prominent between the lobules  Cherry angiomas are harmless, so they do not usually have to be treated  Occasionally, they are removed to exclude a malignant skin lesion such as a nodular melanoma or because they are irritated or bleeding (and a subsequent risk for infection)  To decrease friction over the lesions, we recommend Neutrogena Daily Defense SPF 50+ at least 3 times a day  4  LENTIGO    Physical Exam:   Anatomic Location Affected:  arms   Morphological Description:  Light brown macules   Pertinent Positives:   Pertinent Negatives:    Assessment and Plan:  Based on a thorough discussion of this condition and the management approach to it (including a comprehensive discussion of the known risks, side effects and potential benefits of treatment), the patient (family) agrees to implement the following specific plan:   Reassure benign     What is a lentigo? A lentigo is a pigmented flat or slightly raised lesion with a clearly defined edge  Unlike an ephelis (freckle), it does not fade in the winter months  There are several kinds of lentigo  The name lentigo originally referred to its appearance resembling a small lentil  The plural of lentigo is lentigines, although lentigos is also in common use  Who gets lentigines? Lentigines can affect males and females of all ages and races  Solar lentigines are especially prevalent in fair skinned adults  Lentigines associated with syndromes are present at birth or arise during childhood  What causes lentigines?   Common forms of lentigo are due to exposure to ultraviolet radiation:   Sun damage including sunburn    Indoor tanning    Phototherapy, especially photochemotherapy (PUVA)    Ionizing radiation, eg radiation therapy, can also cause lentigines  Several familial syndromes associated with widespread lentigines originate from mutations in Brett-MAP kinase, mTOR signaling and PTEN pathways  What are the clinical features of lentigines? Lentigines have been classified into several different types depending on what they look like, where they appear on the body, causative factors, and whether they are associated to other diseases or conditions  Lentigines may be solitary or more often, multiple  Most lentigines are smaller than 5 mm in diameter      Lentigo simplex   A precursor to junctional naevus    Arises during childhood and early adult life    Found on trunk and limbs    Small brown round or oval macule or thin plaque    Jagged or smooth edge    May have a dry surface    May disappear in time  Solar lentigo   A precursor to seborrhoeic keratosis    Found on chronically sun exposed sites such as hands, face, lower legs    May also follow sunburn to shoulders    Yellow, light or dark brown regular or irregular macule or thin plaque    May have a dry surface    Often has moth-eaten outline    Can slowly enlarge to several centimeters in diameter    May disappear, often through the process known as lichenoid keratosis    When atypical in appearance, may be difficult to distinguish from melanoma in situ  Ink spot lentigo   Also known as reticulated lentigo    Few in number compared to solar lentigines    Follows sunburn in very fair skinned individuals    Dark brown to black irregular ink spot-like macule  PUVA lentigo   Similar to ink spot lentigo but follows photochemotherapy (PUVA)    Location anywhere exposed to PUVA  Tanning bed lentigo   Similar to ink spot lentigo but follows indoor tanning    Location anywhere exposed to tanning bed  Radiation lentigo   Occurs in site of irradiation (accidental or therapeutic)    Associated with late-stage radiation dermatitis: epidermal atrophy, subcutaneous fibrosis, keratosis, telangiectasias  Melanotic macule   Mucosal surfaces or adjacent glabrous skin eg lip, vulva, penis, anus    Light to dark brown    Also called mucosal melanosis  Generalised lentigines   Found on any exposed or covered site from early childhood    Small macules may merge to form larger patches    Not associated with a syndrome    Also called lentigines profusa, multiple lentigines  Agminated lentigines   Naevoid eruption of lentigos confined to a single segmental area    Sharp demarcation in midline    May have associated neurological and developmental abnormalities  Patterned lentigines   Inherited tendency to lentigines on face, lips, buttocks, palms, soles    Recognised mainly in people of  ethnicity  Centrofacial neurodysraphic lentiginosis  Associated with mental retardation  Lentiginosis syndromes   Syndromes include LEOPARD/Wellford, Peutz-Jeghers, Laugier-Hunziker, Moynahan, Xeroderma pigmentosum, myxoma syndromes (STEELE, NAME, Garay), Ruvalcaba-Myhre-Lantigua, Bannayan-Zonnana syndrome, Cowden disease (multiple hamartoma syndrome )    Inheritance is autosomal dominant; sporadic cases common    Widespread lentigines present at birth or arise in early childhood    Associated with neural, endocrine, and mesenchymal tumors    How is the diagnosis made? Lentigines are usually diagnosed clinically by their typical appearance   Concern regarding possibility of melanoma may lead to:   Dermatoscopy    Diagnostic excision biopsy    Histopathology of a lentigo shows:   Thickened epidermis    An increased number of melanocytes along the basal layer of epidermis    Unlike junctional melanocytic naevus, there are no nests of melanocytes    Increased melanin pigment within the keratinocytes    Additional features depending on type of lentigo    In contrast, an ephelis (freckle) shows sun-induced increased melanin within the keratinocytes, without an increase in number of cells  What is the treatment for lentigines? Most lentigines are left alone  Attempts to lighten them may not be successful  The following approaches are used:   SPF 50+ broad-spectrum sunscreen    Hydroquinone bleaching cream    Alpha hydroxy acids    Vitamin C    Retinoids    Azelaic acid    Chemical peels  Individual lesions can be permanently removed using:   Cryotherapy    Intense pulsed light    Pigment lasers    How can lentigines be prevented? Lentigines associated with exposure ultraviolet radiation can be prevented by very careful sun protection  Clothing is more successful at preventing new lentigines than are sunscreens  What is the outlook for lentigines? Lentigines usually persist  They may increase in number with age and sun exposure  Some in sun-protected sites may fade and disappear  5  MELANOCYTIC NEVI ("Moles")    Physical Exam:   Anatomic Location Affected:   Mostly on sun-exposed areas of the trunk and extremities    Morphological Description:  Scattered, 1-4mm round to ovoid, symmetrical-appearing, even bordered, skin colored to dark brown macules/papules, mostly in sun-exposed areas   Pertinent Positives:   Pertinent Negatives:    Assessment and Plan:  Based on a thorough discussion of this condition and the management approach to it (including a comprehensive discussion of the known risks, side effects and potential benefits of treatment), the patient (family) agrees to implement the following specific plan:   When outside we recommend using a wide brim hat, sunglasses, long sleeve and pants, sunscreen with SPF 30+ with reapplication every 2 hours, or SPF specific clothing         Melanocytic Nevi  Melanocytic nevi ("moles") are tan or brown, raised or flat areas of the skin which have an increased number of melanocytes  Melanocytes are the cells in our body which make pigment and account for skin color      Some moles are present at birth (I e , "congenital nevi"), while others come up later in life (i e , "acquired nevi")  The sun can stimulate the body to make more moles  Sunburns are not the only thing that triggers more moles  Chronic sun exposure can do it too  Clinically distinguishing a healthy mole from melanoma may be difficult, even for experienced dermatologists  The "ABCDE's" of moles have been suggested as a means of helping to alert a person to a suspicious mole and the possible increased risk of melanoma  The suggestions for raising alert are as follows:    Asymmetry: Healthy moles tend to be symmetric, while melanomas are often asymmetric  Asymmetry means if you draw a line through the mole, the two halves do not match in color, size, shape, or surface texture  Asymmetry can be a result of rapid enlargement of a mole, the development of a raised area on a previously flat lesion, scaling, ulceration, bleeding or scabbing within the mole  Any mole that starts to demonstrate "asymmetry" should be examined promptly by a board certified dermatologist      Border: Healthy moles tend to have discrete, even borders  The border of a melanoma often blends into the normal skin and does not sharply delineate the mole from normal skin  Any mole that starts to demonstrate "uneven borders" should be examined promptly by a board certified dermatologist      Color: Healthy moles tend to be one color throughout  Melanomas tend to be made up of different colors ranging from dark black, blue, white, or red  Any mole that demonstrates a color change should be examined promptly by a board certified dermatologist      Diameter: Healthy moles tend to be smaller than 0 6 cm in size; an exception are "congenital nevi" that can be larger  Melanomas tend to grow and can often be greater than 0 6 cm (1/4 of an inch, or the size of a pencil eraser)   This is only a guideline, and many normal moles may be larger than 0 6 cm without being unhealthy  Any mole that starts to change in size (small to bigger or bigger to smaller) should be examined promptly by a board certified dermatologist      Evolving: Healthy moles tend to "stay the same "  Melanomas may often show signs of change or evolution such as a change in size, shape, color, or elevation  Any mole that starts to itch, bleed, crust, burn, hurt, or ulcerate or demonstrate a change or evolution should be examined promptly by a board certified dermatologist       Dysplastic Nevi  Dysplastic moles are moles that fit the ABCDE rules of melanoma but are not identified as melanomas when examined under the microscope  They may indicate an increased risk of melanoma in that person  If there is a family history of melanoma, most experts agree that the person may be at an increased risk for developing a melanoma  Experts still do not agree on what dysplastic moles mean in patients without a personal or family history of melanoma  Dysplastic moles are usually larger than common moles and have different colors within it with irregular borders  The appearance can be very similar to a melanoma  Biopsies of dysplastic moles may show abnormalities which are different from a regular mole  Melanoma  Malignant melanoma is a type of skin cancer that can be deadly if it spreads throughout the body  The incidence of melanoma in the [place] is growing faster than any other cancer  Melanoma usually grows near the surface of the skin for a period of time, and then begins to grow deeper into the skin  Once it grows deeper into the skin, the risk of spread to other organs greatly increases  Therefore, early detection and removal of a malignant melanoma may result in a better chance at a complete cure; removal after the tumor has spread may not be as effective, leading to worse clinical outcomes such as death  The true rate of nevus transformation into a melanoma is unknown   It has been estimated that the lifetime risk for any acquired melanocytic nevus on any 21year-old individual transforming into melanoma by age [de-identified] is 0 03% (1 in 3,164) for men and 0 009% (1 in 10,800) for women  The appearance of a "new mole" remains one of the most reliable methods for identifying a malignant melanoma  Occasionally, melanomas appear as rapidly growing, blue-black, dome-shaped bumps within a previous mole or previous area of normal skin  Other times, melanomas are suspected when a mole suddenly appears or changes  Itching, burning, or pain in a pigmented lesion should increase suspicion, but most patients with early melanoma have no skin discomfort whatsoever  Melanoma can occur anywhere on the skin, including areas that are difficult for self-examination  Many melanomas are first noticed by other family members  Suspicious-looking moles may be removed for microscopic examination  You may be able to prevent death from melanoma by doing two simple things:    1  Try to avoid unnecessary sun exposure and protect your skin when it is exposed to the sun  People who live near the equator, people who have intermittent exposures to large amounts of sun, and people who have had sunburns in childhood or adolescence have an increased risk for melanoma  Sun sense and vigilant sun protection may be keys to helping to prevent melanoma  We recommend wearing UPF-rated sun protective clothing and sunglasses whenever possible and applying a moisturizer-sunscreen combination product (SPF 50+) such as Neutrogena Daily Defense to sun exposed areas of skin at least three times a day  2  Have your moles regularly examined by a board certified dermatologist AND by yourself or a family member/friend at home    We recommend that you have your moles examined at least once a year by a board certified dermatologist   Use your birthday as an annual reminder to have your "Birthday Suit" (I e , your skin) examined; it is a nice birthday gift to yourself to know that your skin is healthy appearing! Additionally, at-home self examinations may be helpful for detecting a possible melanoma  Use the ABCDEs we discussed and check your moles once a month at home  6  XEROSIS ("DRY SKIN")    Physical Exam:   Anatomic Location Affected:  Back    Morphological Description:  Xerosis    Pertinent Positives:   Pertinent Negatives:    Assessment and Plan:  Based on a thorough discussion of this condition and the management approach to it (including a comprehensive discussion of the known risks, side effects and potential benefits of treatment), the patient (family) agrees to implement the following specific plan:  Use moisturizer like Eucerin,Cerave or Aveeno Cream 3 times a day for the dry skin              Dry skin refers to skin that feels dry to touch  Dry skin has a dull surface with a rough, scaly quality  The skin is less pliable and cracked  When dryness is severe, the skin may become inflamed and fissured  Although any body site can be dry, dry skin tends to affect the shins more than any other site  Dry skin is lacking moisture in the outer horny cell layer (stratum corneum) and this results in cracks in the skin surface  Dry skin is also called xerosis, xeroderma or asteatosis (lack of fat)  It can affect males and females of all ages  There is some racial variability in water and lipid content of the skin   Dry skin that starts in early childhood may be one of about 20 types of ichthyosis (fish-scale skin)  There is often a family history of dry skin   Dry skin is commonly seen in people with atopic dermatitis   Nearly everyone > 60 years has dry skin  Dry skin that begins later may be seen in people with certain diseases and conditions     Postmenopausal women   Hypothyroidism   Chronic renal disease    Malnutrition and weight loss    Subclinical dermatitis    Treatment with certain drugs such as oral retinoids, diuretics and epidermal growth factor receptor inhibitors    People exposed to a dry environment may experience dry skin   Low humidity: in desert climates or cool, windy conditions    Excessive air conditioning    Direct heat from a fire or fan heater    Excessive bathing    Contact with soap, detergents and solvents    Inappropriate topical agents such as alcohol    Frictional irritation from rough clothing or abrasives    Dry skin is due to abnormalities in the integrity of the barrier function of the stratum corneum, which is made up of corneocytes   There is an overall reduction in the lipids in the stratum corneum   Ratio of ceramides, cholesterol and free fatty acids may be normal or altered   There may be a reduction in the proliferation of keratinocytes   Keratinocyte subtypes change in dry skin with a decrease in keratins K1, K10 and increase in K5, K14     Involucrin (a protein) may be expressed early, increasing cell stiffness   The result is retention of corneocytes and reduced water-holding capacity  The inherited forms of ichthyosis are due to loss of function mutations in various genes (listed in parentheses below)  The clinical features of ichthyosis depend on the specific type of ichthyosis:   Ichthyosis vulgaris (FLG)   Recessive X-linked ichthyosis (STS)    Autosomal recessive congenital ichthyosis (ABCA12, TGM1, ALOXE3)    Keratinopathic ichthyoses (KRT1, KRT10, KRT2)    Acquired ichthyosis may be due to:   Metabolic factors: thyroid deficiency    Illness: lymphoma, internal malignancy, sarcoidosis, HIV infection    Drugs: nicotinic acid, kava, protein kinase inhibitors (eg EGFR inhibitors), hydroxyurea  Complications of dry skin:  Dry areas of skin may become itchy, indicating a form of eczema/dermatitis has developed   Atopic eczema -- especially in people with ichthyosis vulgaris    Eczema craquelé -- especially in elderly people   Also called asteatotic eczema    A dry form of nummular dermatitis/discoid eczema -- especially in people that wash their skin excessively  When the dry skin of an elderly person is itchy without a visible rash, it is sometimes called winter itch, 7th age itch, senile pruritus or chronic pruritus of the elderly  Other complications of dry skin may include:   Skin infection when bacteria or viruses penetrate a break in the skin surface    Overheating, especially in some forms of ichthyosis    Food allergy, eg, to peanuts, has been associated with filaggrin mutations    Contact allergy, eg, to nickel, has also been correlated with barrier function defects  How is the type of dry skin diagnosed? The type of dry skin is diagnosed by careful history and examination  In children:   Family history    Age of onset    Appearance at birth, if known    Distribution of dry skin    Other features, eg eczema, abnormal nails, hair, dentition, sight, hearing  In adults:   Medical history    Medications and topical preparations    Bathing frequency and use of soap    Evaluation of environmental factors that may contribute to dry skin  What is the treatment for dry skin? The mainstay of treatment of dry skin and ichthyosis is moisturisers/emollients  They should be applied liberally and often enough to:   Reduce itch    Improve the barrier function    Prevent entry of irritants, bacteria    Reduce transepidermal water loss  When considering which emollient is most suitable, consider:   Severity of the dryness    Tolerance    Personal preference    Cost and availability  Emollients generally work best if applied to damp skin, if pH is below 7 (acidic), and if containing humectants such as urea or propylene glycol  Additional treatments include:   Topical steroid if itchy or there is dermatitis -- choose an emollient base    Topical calcineurin inhibitors if topical steroids are unsuitable      How can dry skin be prevented? Eliminate aggravating factors:   Reduce the frequency of bathing   A humidifier in winter and air conditioner in summer    Compare having a short shower with a prolonged soak in a bath   Use lukewarm, not hot, water   Replace standard soap with a substitute such as a synthetic detergent cleanser, water-miscible emollient, bath oil, anti-pruritic tar oil, colloidal oatmeal etc     Apply an emollient liberally and often, particularly shortly after bathing, and when itchy  The drier the skin, the thicker this should be, especially on the hands  What is the outlook for dry skin? A tendency to dry skin may persist life-long, or it may improve once contributing factors are controlled        Scribe Attestation    I,:  Rj Culver MA am acting as a scribe while in the presence of the attending physician :       I,:  Carmelita Monahan MD personally performed the services described in this documentation    as scribed in my presence :

## 2021-06-08 NOTE — PATIENT INSTRUCTIONS
Assessment and Plan:  Based on a thorough discussion of this condition and the management approach to it (including a comprehensive discussion of the known risks, side effects and potential benefits of treatment), the patient (family) agrees to implement the following specific plan:     Liquid nitrogen was applied for 10-12 seconds to the skin lesion and the expected blistering or scabbing reaction explained  Do not pick at the area  Patient reminded to expect hypopigmented scars from the procedure  Return if lesion fails to fully resolve  Actinic keratoses are very common on sites repeatedly exposed to the sun, especially the backs of the hands and the face, most often affecting the ears, nose, cheeks, upper lip, vermilion of the lower lip, temples, forehead and balding scalp  In severely chronically sun-damaged individuals, they may also be found on the upper trunk, upper and lower limbs, and dorsum of feet  We discussed the theoretical premalignant (pre-cancerous) nature and etiology of these growths  We discussed the prevailing notion that actinic keratoses are a reflection of abnormal skin cell development due to DNA damage by short wavelength UVB  They are more likely to appear if the immune function is poor, due to aging, recent sun exposure, predisposing disease or certain drugs  We discussed that the main concern is that actinic keratoses may predispose to squamous cell carcinoma  It is rare for a solitary actinic keratosis to evolve to squamous cell carcinoma (SCC), but the risk of SCC occurring at some stage in a patient with more than 10 actinic keratoses is thought to be about 10 to 15%  A tender, thickened, ulcerated or enlarging actinic keratosis is suspicious of SCC  Actinic keratoses may be prevented by strict sun protection   If already present, keratoses may improve with a very high sun protection factor (50+) broad-spectrum sunscreen applied at least daily to affected areas, year-round  We recommend that UPF-rated clothing and hats and sunglasses be worn whenever possible and that a sunscreen-moisturizer combination product such as Neutrogena Daily Defense be applied at least three times a day  We performed a thorough discussion of treatment options and specific risk/benefits/alternatives including but not limited to medical field treatment with medications such as the following:     Topical field area medications such as 5-fluorouracil or Aldara (specifically, the trouble with long-term compliance, blistering and local skin reaction versus the convenience of at-home therapy and that field therapy gets what is not yet seen)   Cryotherapy (specifically, local pain, scarring, dyspigmentation, blistering, possible superinfection, and treats only what we see versus directed treatment today)   Photodynamic therapy (specifically, local pain, scarring, dyspigmentation, blistering, possible superinfection, need to schedule for a later date, and time spent in the office versus field therapy that gets what is not yet seen)  Assessment and Plan:  Based on a thorough discussion of this condition and the management approach to it (including a comprehensive discussion of the known risks, side effects and potential benefits of treatment), the patient (family) agrees to implement the following specific plan:   Liquid nitrogen was applied for 10-12 seconds to the skin lesion and the expected blistering or scabbing reaction explained  Do not pick at the area  Patient reminded to expect hypopigmented scars from the procedure  Return if lesion fails to fully resolve       Seborrheic Keratosis  A seborrheic keratosis is a harmless warty spot that appears during adult life as a common sign of skin aging  Seborrheic keratoses can arise on any area of skin, covered or uncovered, with the usual exception of the palms and soles  They do not arise from mucous membranes   Seborrheic keratoses can have highly variable appearance  Seborrheic keratoses are extremely common  It has been estimated that over 90% of adults over the age of 61 years have one or more of them  They occur in males and females of all races, typically beginning to erupt in the 35s or 45s  They are uncommon under the age of 21 years  The precise cause of seborrhoeic keratoses is not known  Seborrhoeic keratoses are considered degenerative in nature  As time goes by, seborrheic keratoses tend to become more numerous  Some people inherit a tendency to develop a very large number of them; some people may have hundreds of them  The name "seborrheic keratosis" is misleading, because these lesions are not limited to a seborrhoeic distribution (scalp, mid-face, chest, upper back), nor are they formed from sebaceous glands, nor are they associated with sebum -- which is greasy  Seborrheic keratosis may also be called "SK," "Seb K," "basal cell papilloma," "senile wart," or "barnacle "      Researchers have noted:   Eruptive seborrhoeic keratoses can follow sunburn or dermatitis   Skin friction may be the reason they appear in body folds   Viral cause (e g , human papillomavirus) seems unlikely   Stable and clonal mutations or activation of FRFR3, PIK3CA, DEEPALI, AKT1 and EGFR genes are found in seborrhoeic keratoses   Seborrhoeic keratosis can arise from solar lentigo   FRFR3 mutations also arise in solar lentigines  These mutations are associated with increased age and location on the head and neck, suggesting a role of ultraviolet radiation in these lesions   Seborrheic keratoses do not harbour tumour suppressor gene mutations   Epidermal growth factor receptor inhibitors, which are used to treat some cancers, often result in an increase in verrucal (warty) keratoses  There is no easy way to remove multiple lesions on a single occasion    Unless a specific lesion is "inflamed" and is causing pain or stinging/burning or is bleeding, most insurance companies do not authorize treatment  Assessment and Plan:  Based on a thorough discussion of this condition and the management approach to it (including a comprehensive discussion of the known risks, side effects and potential benefits of treatment), the patient (family) agrees to implement the following specific plan:   Reassure benign     Assessment and Plan:    Cherry angioma, also known as Laurann Brown spots, are benign vascular skin lesions  A "cherry angioma" is a firm red, blue or purple papule, 0 1-1 cm in diameter  When thrombosed, they can appear black in colour until evaluated with a dermatoscope when the red or purple colour is more easily seen  Cherry angioma may develop on any part of the body but most often appear on the scalp, face, lips and trunk  An angioma is due to proliferating endothelial cells; these are the cells that line the inside of a blood vessel  Angiomas can arise in early life or later in life; the most common type of angioma is a cherry angioma  Cherry angiomas are very common in males and females of any age or race  They are more noticeable in white skin than in skin of colour  They markedly increase in number from about the age of 36  There may be a family history of similar lesions  Eruptive cherry angiomas have been rarely reported to be associated with internal malignancy  The cause of angiomas is unknown  Genetic analysis of cherry angiomas has shown that they frequently carry specific somatic missense mutations in the GNAQ and GNA11 (Q209H) genes, which are involved in other vascular and melanocytic proliferations  Cherry angioma is usually diagnosed clinically and no investigations are necessary for the majority of lesions  It has a characteristic red-clod or lobular pattern on dermatoscopy (called lacunar pattern using conventional pattern analysis)    When there is uncertainty about the diagnosis, a biopsy may be performed  The angioma is composed of venules in a thickened papillary dermis  Collagen bundles may be prominent between the lobules  Cherry angiomas are harmless, so they do not usually have to be treated  Occasionally, they are removed to exclude a malignant skin lesion such as a nodular melanoma or because they are irritated or bleeding (and a subsequent risk for infection)  To decrease friction over the lesions, we recommend Neutrogena Daily Defense SPF 50+ at least 3 times a day  Assessment and Plan:  Based on a thorough discussion of this condition and the management approach to it (including a comprehensive discussion of the known risks, side effects and potential benefits of treatment), the patient (family) agrees to implement the following specific plan:   Reassure benign     What is a lentigo? A lentigo is a pigmented flat or slightly raised lesion with a clearly defined edge  Unlike an ephelis (freckle), it does not fade in the winter months  There are several kinds of lentigo  The name lentigo originally referred to its appearance resembling a small lentil  The plural of lentigo is lentigines, although lentigos is also in common use  Who gets lentigines? Lentigines can affect males and females of all ages and races  Solar lentigines are especially prevalent in fair skinned adults  Lentigines associated with syndromes are present at birth or arise during childhood  What causes lentigines? Common forms of lentigo are due to exposure to ultraviolet radiation:   Sun damage including sunburn    Indoor tanning    Phototherapy, especially photochemotherapy (PUVA)    Ionizing radiation, eg radiation therapy, can also cause lentigines  Several familial syndromes associated with widespread lentigines originate from mutations in Brett-MAP kinase, mTOR signaling and PTEN pathways  What are the clinical features of lentigines?   Lentigines have been classified into several different types depending on what they look like, where they appear on the body, causative factors, and whether they are associated to other diseases or conditions  Lentigines may be solitary or more often, multiple  Most lentigines are smaller than 5 mm in diameter      Lentigo simplex   A precursor to junctional naevus    Arises during childhood and early adult life    Found on trunk and limbs    Small brown round or oval macule or thin plaque    Jagged or smooth edge    May have a dry surface    May disappear in time  Solar lentigo   A precursor to seborrhoeic keratosis    Found on chronically sun exposed sites such as hands, face, lower legs    May also follow sunburn to shoulders    Yellow, light or dark brown regular or irregular macule or thin plaque    May have a dry surface    Often has moth-eaten outline    Can slowly enlarge to several centimeters in diameter    May disappear, often through the process known as lichenoid keratosis    When atypical in appearance, may be difficult to distinguish from melanoma in situ  Ink spot lentigo   Also known as reticulated lentigo    Few in number compared to solar lentigines    Follows sunburn in very fair skinned individuals    Dark brown to black irregular ink spot-like macule  PUVA lentigo   Similar to ink spot lentigo but follows photochemotherapy (PUVA)    Location anywhere exposed to PUVA  Tanning bed lentigo   Similar to ink spot lentigo but follows indoor tanning    Location anywhere exposed to tanning bed  Radiation lentigo   Occurs in site of irradiation (accidental or therapeutic)    Associated with late-stage radiation dermatitis: epidermal atrophy, subcutaneous fibrosis, keratosis, telangiectasias  Melanotic macule   Mucosal surfaces or adjacent glabrous skin eg lip, vulva, penis, anus    Light to dark brown    Also called mucosal melanosis  Generalised lentigines   Found on any exposed or covered site from early childhood    Small macules may merge to form larger patches    Not associated with a syndrome    Also called lentigines profusa, multiple lentigines  Agminated lentigines   Naevoid eruption of lentigos confined to a single segmental area    Sharp demarcation in midline    May have associated neurological and developmental abnormalities  Patterned lentigines   Inherited tendency to lentigines on face, lips, buttocks, palms, soles    Recognised mainly in people of  ethnicity  Centrofacial neurodysraphic lentiginosis  Associated with mental retardation  Lentiginosis syndromes   Syndromes include LEOPARD/Patience, Peutz-Jeghers, Laugier-Hunziker, Moynahan, Xeroderma pigmentosum, myxoma syndromes (STEELE, NAME, Garay), Ruvalcaba-Myhre-Lantigua, Bannayan-Zonnana syndrome, Cowden disease (multiple hamartoma syndrome )    Inheritance is autosomal dominant; sporadic cases common    Widespread lentigines present at birth or arise in early childhood    Associated with neural, endocrine, and mesenchymal tumors    How is the diagnosis made? Lentigines are usually diagnosed clinically by their typical appearance  Concern regarding possibility of melanoma may lead to:   Dermatoscopy    Diagnostic excision biopsy    Histopathology of a lentigo shows:   Thickened epidermis    An increased number of melanocytes along the basal layer of epidermis    Unlike junctional melanocytic naevus, there are no nests of melanocytes    Increased melanin pigment within the keratinocytes    Additional features depending on type of lentigo    In contrast, an ephelis (freckle) shows sun-induced increased melanin within the keratinocytes, without an increase in number of cells  What is the treatment for lentigines? Most lentigines are left alone  Attempts to lighten them may not be successful   The following approaches are used:   SPF 50+ broad-spectrum sunscreen    Hydroquinone bleaching cream    Alpha hydroxy acids    Vitamin C    Retinoids  Azelaic acid    Chemical peels  Individual lesions can be permanently removed using:   Cryotherapy    Intense pulsed light    Pigment lasers    How can lentigines be prevented? Lentigines associated with exposure ultraviolet radiation can be prevented by very careful sun protection  Clothing is more successful at preventing new lentigines than are sunscreens  What is the outlook for lentigines? Lentigines usually persist  They may increase in number with age and sun exposure  Some in sun-protected sites may fade and disappear  Assessment and Plan:  Based on a thorough discussion of this condition and the management approach to it (including a comprehensive discussion of the known risks, side effects and potential benefits of treatment), the patient (family) agrees to implement the following specific plan:   When outside we recommend using a wide brim hat, sunglasses, long sleeve and pants, sunscreen with SPF 10+ with reapplication every 2 hours, or SPF specific clothing         Melanocytic Nevi  Melanocytic nevi ("moles") are tan or brown, raised or flat areas of the skin which have an increased number of melanocytes  Melanocytes are the cells in our body which make pigment and account for skin color  Some moles are present at birth (I e , "congenital nevi"), while others come up later in life (i e , "acquired nevi")  The sun can stimulate the body to make more moles  Sunburns are not the only thing that triggers more moles  Chronic sun exposure can do it too  Clinically distinguishing a healthy mole from melanoma may be difficult, even for experienced dermatologists  The "ABCDE's" of moles have been suggested as a means of helping to alert a person to a suspicious mole and the possible increased risk of melanoma  The suggestions for raising alert are as follows:    Asymmetry: Healthy moles tend to be symmetric, while melanomas are often asymmetric    Asymmetry means if you draw a line through the mole, the two halves do not match in color, size, shape, or surface texture  Asymmetry can be a result of rapid enlargement of a mole, the development of a raised area on a previously flat lesion, scaling, ulceration, bleeding or scabbing within the mole  Any mole that starts to demonstrate "asymmetry" should be examined promptly by a board certified dermatologist      Border: Healthy moles tend to have discrete, even borders  The border of a melanoma often blends into the normal skin and does not sharply delineate the mole from normal skin  Any mole that starts to demonstrate "uneven borders" should be examined promptly by a board certified dermatologist      Color: Healthy moles tend to be one color throughout  Melanomas tend to be made up of different colors ranging from dark black, blue, white, or red  Any mole that demonstrates a color change should be examined promptly by a board certified dermatologist      Diameter: Healthy moles tend to be smaller than 0 6 cm in size; an exception are "congenital nevi" that can be larger  Melanomas tend to grow and can often be greater than 0 6 cm (1/4 of an inch, or the size of a pencil eraser)  This is only a guideline, and many normal moles may be larger than 0 6 cm without being unhealthy  Any mole that starts to change in size (small to bigger or bigger to smaller) should be examined promptly by a board certified dermatologist      Evolving: Healthy moles tend to "stay the same "  Melanomas may often show signs of change or evolution such as a change in size, shape, color, or elevation  Any mole that starts to itch, bleed, crust, burn, hurt, or ulcerate or demonstrate a change or evolution should be examined promptly by a board certified dermatologist       Dysplastic Nevi  Dysplastic moles are moles that fit the ABCDE rules of melanoma but are not identified as melanomas when examined under the microscope    They may indicate an increased risk of melanoma in that person  If there is a family history of melanoma, most experts agree that the person may be at an increased risk for developing a melanoma  Experts still do not agree on what dysplastic moles mean in patients without a personal or family history of melanoma  Dysplastic moles are usually larger than common moles and have different colors within it with irregular borders  The appearance can be very similar to a melanoma  Biopsies of dysplastic moles may show abnormalities which are different from a regular mole  Melanoma  Malignant melanoma is a type of skin cancer that can be deadly if it spreads throughout the body  The incidence of melanoma in the [place] is growing faster than any other cancer  Melanoma usually grows near the surface of the skin for a period of time, and then begins to grow deeper into the skin  Once it grows deeper into the skin, the risk of spread to other organs greatly increases  Therefore, early detection and removal of a malignant melanoma may result in a better chance at a complete cure; removal after the tumor has spread may not be as effective, leading to worse clinical outcomes such as death  The true rate of nevus transformation into a melanoma is unknown  It has been estimated that the lifetime risk for any acquired melanocytic nevus on any 21year-old individual transforming into melanoma by age [de-identified] is 0 03% (1 in 3,164) for men and 0 009% (1 in 10,800) for women  The appearance of a "new mole" remains one of the most reliable methods for identifying a malignant melanoma  Occasionally, melanomas appear as rapidly growing, blue-black, dome-shaped bumps within a previous mole or previous area of normal skin  Other times, melanomas are suspected when a mole suddenly appears or changes  Itching, burning, or pain in a pigmented lesion should increase suspicion, but most patients with early melanoma have no skin discomfort whatsoever    Melanoma can occur anywhere on the skin, including areas that are difficult for self-examination  Many melanomas are first noticed by other family members  Suspicious-looking moles may be removed for microscopic examination  You may be able to prevent death from melanoma by doing two simple things:    1  Try to avoid unnecessary sun exposure and protect your skin when it is exposed to the sun  People who live near the equator, people who have intermittent exposures to large amounts of sun, and people who have had sunburns in childhood or adolescence have an increased risk for melanoma  Sun sense and vigilant sun protection may be keys to helping to prevent melanoma  We recommend wearing UPF-rated sun protective clothing and sunglasses whenever possible and applying a moisturizer-sunscreen combination product (SPF 50+) such as Neutrogena Daily Defense to sun exposed areas of skin at least three times a day  2  Have your moles regularly examined by a board certified dermatologist AND by yourself or a family member/friend at home  We recommend that you have your moles examined at least once a year by a board certified dermatologist   Use your birthday as an annual reminder to have your "Birthday Suit" (I e , your skin) examined; it is a nice birthday gift to yourself to know that your skin is healthy appearing! Additionally, at-home self examinations may be helpful for detecting a possible melanoma  Use the ABCDEs we discussed and check your moles once a month at home  Assessment and Plan:  Based on a thorough discussion of this condition and the management approach to it (including a comprehensive discussion of the known risks, side effects and potential benefits of treatment), the patient (family) agrees to implement the following specific plan:  Use moisturizer like Eucerin,Cerave or Aveeno Cream 3 times a day for the dry skin              Dry skin refers to skin that feels dry to touch   Dry skin has a dull surface with a rough, scaly quality  The skin is less pliable and cracked  When dryness is severe, the skin may become inflamed and fissured  Although any body site can be dry, dry skin tends to affect the shins more than any other site  Dry skin is lacking moisture in the outer horny cell layer (stratum corneum) and this results in cracks in the skin surface  Dry skin is also called xerosis, xeroderma or asteatosis (lack of fat)  It can affect males and females of all ages  There is some racial variability in water and lipid content of the skin   Dry skin that starts in early childhood may be one of about 20 types of ichthyosis (fish-scale skin)  There is often a family history of dry skin   Dry skin is commonly seen in people with atopic dermatitis   Nearly everyone > 60 years has dry skin  Dry skin that begins later may be seen in people with certain diseases and conditions   Postmenopausal women   Hypothyroidism   Chronic renal disease    Malnutrition and weight loss    Subclinical dermatitis    Treatment with certain drugs such as oral retinoids, diuretics and epidermal growth factor receptor inhibitors    People exposed to a dry environment may experience dry skin   Low humidity: in desert climates or cool, windy conditions    Excessive air conditioning    Direct heat from a fire or fan heater    Excessive bathing    Contact with soap, detergents and solvents    Inappropriate topical agents such as alcohol    Frictional irritation from rough clothing or abrasives    Dry skin is due to abnormalities in the integrity of the barrier function of the stratum corneum, which is made up of corneocytes   There is an overall reduction in the lipids in the stratum corneum   Ratio of ceramides, cholesterol and free fatty acids may be normal or altered   There may be a reduction in the proliferation of keratinocytes      Keratinocyte subtypes change in dry skin with a decrease in keratins K1, K10 and increase in K5, K14     Involucrin (a protein) may be expressed early, increasing cell stiffness   The result is retention of corneocytes and reduced water-holding capacity  The inherited forms of ichthyosis are due to loss of function mutations in various genes (listed in parentheses below)  The clinical features of ichthyosis depend on the specific type of ichthyosis:   Ichthyosis vulgaris (FLG)   Recessive X-linked ichthyosis (STS)    Autosomal recessive congenital ichthyosis (ABCA12, TGM1, ALOXE3)    Keratinopathic ichthyoses (KRT1, KRT10, KRT2)    Acquired ichthyosis may be due to:   Metabolic factors: thyroid deficiency    Illness: lymphoma, internal malignancy, sarcoidosis, HIV infection    Drugs: nicotinic acid, kava, protein kinase inhibitors (eg EGFR inhibitors), hydroxyurea  Complications of dry skin:  Dry areas of skin may become itchy, indicating a form of eczema/dermatitis has developed   Atopic eczema -- especially in people with ichthyosis vulgaris    Eczema craquelé -- especially in elderly people  Also called asteatotic eczema    A dry form of nummular dermatitis/discoid eczema -- especially in people that wash their skin excessively  When the dry skin of an elderly person is itchy without a visible rash, it is sometimes called winter itch, 7th age itch, senile pruritus or chronic pruritus of the elderly  Other complications of dry skin may include:   Skin infection when bacteria or viruses penetrate a break in the skin surface    Overheating, especially in some forms of ichthyosis    Food allergy, eg, to peanuts, has been associated with filaggrin mutations    Contact allergy, eg, to nickel, has also been correlated with barrier function defects  How is the type of dry skin diagnosed? The type of dry skin is diagnosed by careful history and examination      In children:   Family history    Age of onset    Appearance at birth, if known  Distribution of dry skin    Other features, eg eczema, abnormal nails, hair, dentition, sight, hearing  In adults:   Medical history    Medications and topical preparations    Bathing frequency and use of soap    Evaluation of environmental factors that may contribute to dry skin  What is the treatment for dry skin? The mainstay of treatment of dry skin and ichthyosis is moisturisers/emollients  They should be applied liberally and often enough to:   Reduce itch    Improve the barrier function    Prevent entry of irritants, bacteria    Reduce transepidermal water loss  When considering which emollient is most suitable, consider:   Severity of the dryness    Tolerance    Personal preference    Cost and availability  Emollients generally work best if applied to damp skin, if pH is below 7 (acidic), and if containing humectants such as urea or propylene glycol  Additional treatments include:   Topical steroid if itchy or there is dermatitis -- choose an emollient base    Topical calcineurin inhibitors if topical steroids are unsuitable  How can dry skin be prevented? Eliminate aggravating factors:   Reduce the frequency of bathing   A humidifier in winter and air conditioner in summer    Compare having a short shower with a prolonged soak in a bath   Use lukewarm, not hot, water   Replace standard soap with a substitute such as a synthetic detergent cleanser, water-miscible emollient, bath oil, anti-pruritic tar oil, colloidal oatmeal etc     Apply an emollient liberally and often, particularly shortly after bathing, and when itchy  The drier the skin, the thicker this should be, especially on the hands  What is the outlook for dry skin? A tendency to dry skin may persist life-long, or it may improve once contributing factors are controlled

## 2021-06-09 DIAGNOSIS — Z95.2 HISTORY OF HEART VALVE REPLACEMENT: ICD-10-CM

## 2021-06-10 ENCOUNTER — TELEPHONE (OUTPATIENT)
Dept: CARDIOLOGY CLINIC | Facility: CLINIC | Age: 76
End: 2021-06-10

## 2021-06-10 DIAGNOSIS — I10 ESSENTIAL HYPERTENSION: ICD-10-CM

## 2021-06-10 PROCEDURE — 4010F ACE/ARB THERAPY RXD/TAKEN: CPT | Performed by: DERMATOLOGY

## 2021-06-10 RX ORDER — WARFARIN SODIUM 2 MG/1
TABLET ORAL
Qty: 90 TABLET | Refills: 1 | Status: SHIPPED | OUTPATIENT
Start: 2021-06-10 | End: 2022-05-26

## 2021-06-10 RX ORDER — LOSARTAN POTASSIUM 100 MG/1
100 TABLET ORAL DAILY
Qty: 90 TABLET | Refills: 3 | Status: SHIPPED | OUTPATIENT
Start: 2021-06-10 | End: 2022-03-04

## 2021-06-10 NOTE — TELEPHONE ENCOUNTER
Patient was told by pharmacy that her Losartan 100 mg  Was discontinued  She has questions      [phone]

## 2021-06-11 DIAGNOSIS — G62.9 NEUROPATHY: ICD-10-CM

## 2021-06-13 RX ORDER — GABAPENTIN 100 MG/1
100 CAPSULE ORAL 2 TIMES DAILY
Qty: 180 CAPSULE | Refills: 3 | Status: SHIPPED | OUTPATIENT
Start: 2021-06-13 | End: 2021-06-15 | Stop reason: SDUPTHER

## 2021-06-14 DIAGNOSIS — Z95.2 HISTORY OF HEART VALVE REPLACEMENT: ICD-10-CM

## 2021-06-14 RX ORDER — WARFARIN SODIUM 1 MG/1
1 TABLET ORAL DAILY
Qty: 90 TABLET | Refills: 3 | Status: SHIPPED | OUTPATIENT
Start: 2021-06-14 | End: 2022-06-28

## 2021-06-15 DIAGNOSIS — G62.9 NEUROPATHY: ICD-10-CM

## 2021-06-15 RX ORDER — GABAPENTIN 100 MG/1
100 CAPSULE ORAL 2 TIMES DAILY
Qty: 180 CAPSULE | Refills: 3 | Status: SHIPPED | OUTPATIENT
Start: 2021-06-15 | End: 2022-06-19

## 2021-07-01 ENCOUNTER — ANTICOAG VISIT (OUTPATIENT)
Dept: CARDIOLOGY CLINIC | Facility: CLINIC | Age: 76
End: 2021-07-01

## 2021-07-01 ENCOUNTER — APPOINTMENT (OUTPATIENT)
Dept: LAB | Facility: CLINIC | Age: 76
End: 2021-07-01
Payer: COMMERCIAL

## 2021-07-01 DIAGNOSIS — Z95.2 HISTORY OF HEART VALVE REPLACEMENT: Primary | ICD-10-CM

## 2021-07-09 ENCOUNTER — OFFICE VISIT (OUTPATIENT)
Dept: INTERNAL MEDICINE CLINIC | Facility: CLINIC | Age: 76
End: 2021-07-09
Payer: COMMERCIAL

## 2021-07-09 VITALS
BODY MASS INDEX: 26.22 KG/M2 | TEMPERATURE: 98.2 F | DIASTOLIC BLOOD PRESSURE: 70 MMHG | OXYGEN SATURATION: 96 % | HEART RATE: 69 BPM | HEIGHT: 69 IN | WEIGHT: 177 LBS | SYSTOLIC BLOOD PRESSURE: 128 MMHG

## 2021-07-09 DIAGNOSIS — M79.89 FOOT SWELLING: Primary | ICD-10-CM

## 2021-07-09 DIAGNOSIS — E53.8 B12 DEFICIENCY: ICD-10-CM

## 2021-07-09 PROCEDURE — 1101F PT FALLS ASSESS-DOCD LE1/YR: CPT | Performed by: NURSE PRACTITIONER

## 2021-07-09 PROCEDURE — 1160F RVW MEDS BY RX/DR IN RCRD: CPT | Performed by: NURSE PRACTITIONER

## 2021-07-09 PROCEDURE — 99213 OFFICE O/P EST LOW 20 MIN: CPT | Performed by: NURSE PRACTITIONER

## 2021-07-09 PROCEDURE — 1036F TOBACCO NON-USER: CPT | Performed by: NURSE PRACTITIONER

## 2021-07-09 PROCEDURE — 3078F DIAST BP <80 MM HG: CPT | Performed by: NURSE PRACTITIONER

## 2021-07-09 PROCEDURE — 3288F FALL RISK ASSESSMENT DOCD: CPT | Performed by: NURSE PRACTITIONER

## 2021-07-09 PROCEDURE — 3008F BODY MASS INDEX DOCD: CPT | Performed by: NURSE PRACTITIONER

## 2021-07-09 PROCEDURE — 3074F SYST BP LT 130 MM HG: CPT | Performed by: NURSE PRACTITIONER

## 2021-07-09 RX ADMIN — CYANOCOBALAMIN 1000 MCG: 1000 INJECTION INTRAMUSCULAR; SUBCUTANEOUS at 15:04

## 2021-07-09 NOTE — PATIENT INSTRUCTIONS
Left lower extremity swelling, this is the same leg that is affected by her polio  No evidence of DVT plus patient is anticoagulated with Coumadin and has been therapeutic  Discussed that is likely dependent edema maybe excess sodium intake, and related to heat and humidity, recommend compression sock, elevating leg        B12 deficiency B12 given today

## 2021-07-09 NOTE — PROGRESS NOTES
Assessment/Plan:    Patient Instructions    Left lower extremity swelling, this is the same leg that is affected by her polio  No evidence of DVT plus patient is anticoagulated with Coumadin and has been therapeutic  Discussed that is likely dependent edema maybe excess sodium intake, and related to heat and humidity, recommend compression sock, elevating leg  B12 deficiency B12 given today         Diagnoses and all orders for this visit:    Foot swelling    B12 deficiency         Subjective:      Patient ID: Rena Trejo is a 70 y o  female     Patient noticed that her left foot was swollen, she happened to be the seeing this a podiatrist who recommend that she follow with her cardiologist, she tried to get a hold of someone from Cardiology and they were not able to get her an appointment so she follows up here  She denies any trauma  It is the foot that has been affected by her polio  She does wear brace  She does not believe she is increasing her sodium  She does spend a lot of time on her feet just doing things around the house  She does not elevate the leg  She is on Coumadin, her Coumadin level has been therapeutic          Current Outpatient Medications:     amLODIPine (NORVASC) 5 mg tablet, TAKE 1 TABLET BY MOUTH EVERY DAY IN THE MORNING, Disp: 90 tablet, Rfl: 3    aspirin 81 mg chewable tablet, Chew daily, Disp: , Rfl:     butalbital-acetaminophen-caffeine (FIORICET,ESGIC) -40 mg per tablet, Take 1 tablet by mouth every 4 (four) hours as needed for headaches, Disp: 30 tablet, Rfl: 2    Calcium Carbonate (CALTRATE 600) 1500 (600 Ca) MG TABS, Take by mouth daily , Disp: , Rfl:     clobetasol (TEMOVATE) 0 05 % cream, Apply to elbows bid, Disp: 30 g, Rfl: 3    gabapentin (NEURONTIN) 100 mg capsule, Take 1 capsule (100 mg total) by mouth 2 (two) times a day, Disp: 180 capsule, Rfl: 3    gabapentin (NEURONTIN) 300 mg capsule, TAKE A 300MG + 100MG AT BEDTIME, Disp: 90 capsule, Rfl: 3    losartan (COZAAR) 100 MG tablet, Take 1 tablet (100 mg total) by mouth daily, Disp: 90 tablet, Rfl: 3    Misc Natural Products (OSTEO BI-FLEX TRIPLE STRENGTH) TABS, Take by mouth daily , Disp: , Rfl:     oxybutynin (DITROPAN-XL) 10 MG 24 hr tablet, TAKE 1 TABLET BY MOUTH DAILY AT BEDTIME, Disp: 90 tablet, Rfl: 1    pantoprazole (PROTONIX) 40 mg tablet, TAKE 1 TABLET BY MOUTH EVERY DAY, Disp: 90 tablet, Rfl: 0    propranolol (INDERAL LA) 60 mg 24 hr capsule, TAKE 1 CAPSULE BY MOUTH EVERY DAY, Disp: 90 capsule, Rfl: 1    simvastatin (ZOCOR) 10 mg tablet, TAKE 1 TABLET BY MOUTH EVERY DAY, Disp: 90 tablet, Rfl: 3    warfarin (COUMADIN) 1 mg tablet, Take 1 tablet (1 mg total) by mouth daily, Disp: 90 tablet, Rfl: 3    warfarin (COUMADIN) 2 mg tablet, TAKE 1 TABLET BY MOUTH EVERY DAY, Disp: 90 tablet, Rfl: 1    warfarin (COUMADIN) 3 mg tablet, TAKE 1 TABLET BY MOUTH DAILY, Disp: 90 tablet, Rfl: 1    amoxicillin (AMOXIL) 500 mg capsule, , Disp: , Rfl:     ascorbic acid (VITAMIN C) 500 mg tablet, Take 500 mg by mouth daily (Patient not taking: Reported on 7/9/2021), Disp: , Rfl:     Flaxseed, Linseed, (FLAXSEED OIL) 1200 MG CAPS, Take by mouth daily  (Patient not taking: Reported on 7/9/2021), Disp: , Rfl:     Current Facility-Administered Medications:     cyanocobalamin injection 1,000 mcg, 1,000 mcg, Intramuscular, Q30 Days, Francesco Heart, CRNP, 1,000 mcg at 07/09/21 1504    Recent Results (from the past 1008 hour(s))   Protime-INR    Collection Time: 06/01/21 11:49 AM   Result Value Ref Range    Protime 27 9 (H) 11 6 - 14 5 seconds    INR 2 62 (H) 0 84 - 1 19   Protime-INR    Collection Time: 07/01/21 10:00 AM   Result Value Ref Range    Protime 30 9 (H) 11 6 - 14 5 seconds    INR 3 00 (H) 0 84 - 1 19       The following portions of the patient's history were reviewed and updated as appropriate: allergies, current medications, past family history, past medical history, past social history, past surgical history and problem list      Review of Systems   Constitutional: Negative for appetite change, chills, diaphoresis, fatigue, fever and unexpected weight change  HENT: Negative for postnasal drip and sneezing  Eyes: Negative for visual disturbance  Respiratory: Negative for chest tightness and shortness of breath  Cardiovascular: Positive for leg swelling  Negative for chest pain and palpitations  Gastrointestinal: Negative for abdominal pain and blood in stool  Endocrine: Negative for cold intolerance, heat intolerance, polydipsia, polyphagia and polyuria  Genitourinary: Negative for difficulty urinating, dysuria, frequency and urgency  Musculoskeletal: Negative for arthralgias and myalgias  Skin: Negative for rash and wound  Neurological: Negative for dizziness, weakness, light-headedness and headaches  Hematological: Negative for adenopathy  Psychiatric/Behavioral: Negative for confusion, dysphoric mood and sleep disturbance  The patient is not nervous/anxious  Objective:      /70 (BP Location: Left arm, Patient Position: Sitting, Cuff Size: Standard)   Pulse 69   Temp 98 2 °F (36 8 °C)   Ht 5' 9" (1 753 m)   Wt 80 3 kg (177 lb)   LMP  (LMP Unknown)   SpO2 96%   BMI 26 14 kg/m²        Physical Exam  Constitutional:       General: She is not in acute distress  Appearance: She is well-developed  She is not diaphoretic  HENT:      Head: Normocephalic and atraumatic  Nose: Nose normal    Eyes:      Conjunctiva/sclera: Conjunctivae normal       Pupils: Pupils are equal, round, and reactive to light  Neck:      Thyroid: No thyromegaly  Vascular: No JVD  Trachea: No tracheal deviation  Cardiovascular:      Rate and Rhythm: Normal rate and regular rhythm  Heart sounds: Murmur heard  No friction rub  No gallop  Pulmonary:      Effort: Pulmonary effort is normal  No respiratory distress  Breath sounds: Normal breath sounds   No wheezing or rales  Abdominal:      General: Bowel sounds are normal  There is no distension  Palpations: Abdomen is soft  Tenderness: There is no abdominal tenderness  Musculoskeletal:         General: Normal range of motion  Cervical back: Normal range of motion and neck supple  Left lower leg: Edema present  Lymphadenopathy:      Cervical: No cervical adenopathy  Skin:     General: Skin is warm and dry  Findings: No rash  Comments:  Chronic petechial rash noted to bilateral lower extremities   Neurological:      Mental Status: She is alert and oriented to person, place, and time  Cranial Nerves: No cranial nerve deficit  Psychiatric:         Behavior: Behavior normal          Thought Content:  Thought content normal          Judgment: Judgment normal

## 2021-07-29 ENCOUNTER — ANTICOAG VISIT (OUTPATIENT)
Dept: CARDIOLOGY CLINIC | Facility: CLINIC | Age: 76
End: 2021-07-29

## 2021-07-29 ENCOUNTER — APPOINTMENT (OUTPATIENT)
Dept: LAB | Facility: CLINIC | Age: 76
End: 2021-07-29
Payer: COMMERCIAL

## 2021-07-29 DIAGNOSIS — Z95.2 HISTORY OF HEART VALVE REPLACEMENT: Primary | ICD-10-CM

## 2021-08-03 ENCOUNTER — OFFICE VISIT (OUTPATIENT)
Dept: OBGYN CLINIC | Facility: CLINIC | Age: 76
End: 2021-08-03
Payer: COMMERCIAL

## 2021-08-03 ENCOUNTER — APPOINTMENT (OUTPATIENT)
Dept: RADIOLOGY | Facility: CLINIC | Age: 76
End: 2021-08-03
Payer: COMMERCIAL

## 2021-08-03 VITALS
HEIGHT: 69 IN | SYSTOLIC BLOOD PRESSURE: 149 MMHG | WEIGHT: 177.4 LBS | DIASTOLIC BLOOD PRESSURE: 81 MMHG | HEART RATE: 54 BPM | BODY MASS INDEX: 26.28 KG/M2

## 2021-08-03 DIAGNOSIS — M67.332: ICD-10-CM

## 2021-08-03 DIAGNOSIS — M25.532 LEFT WRIST PAIN: Primary | ICD-10-CM

## 2021-08-03 DIAGNOSIS — M25.532 LEFT WRIST PAIN: ICD-10-CM

## 2021-08-03 DIAGNOSIS — M19.032 PRIMARY OSTEOARTHRITIS OF LEFT WRIST: ICD-10-CM

## 2021-08-03 PROCEDURE — 3079F DIAST BP 80-89 MM HG: CPT | Performed by: ORTHOPAEDIC SURGERY

## 2021-08-03 PROCEDURE — 99213 OFFICE O/P EST LOW 20 MIN: CPT | Performed by: ORTHOPAEDIC SURGERY

## 2021-08-03 PROCEDURE — 1036F TOBACCO NON-USER: CPT | Performed by: ORTHOPAEDIC SURGERY

## 2021-08-03 PROCEDURE — 1160F RVW MEDS BY RX/DR IN RCRD: CPT | Performed by: ORTHOPAEDIC SURGERY

## 2021-08-03 PROCEDURE — 20605 DRAIN/INJ JOINT/BURSA W/O US: CPT | Performed by: ORTHOPAEDIC SURGERY

## 2021-08-03 PROCEDURE — 73110 X-RAY EXAM OF WRIST: CPT

## 2021-08-03 PROCEDURE — 3077F SYST BP >= 140 MM HG: CPT | Performed by: ORTHOPAEDIC SURGERY

## 2021-08-03 RX ORDER — TRIAMCINOLONE ACETONIDE 40 MG/ML
20 INJECTION, SUSPENSION INTRA-ARTICULAR; INTRAMUSCULAR
Status: COMPLETED | OUTPATIENT
Start: 2021-08-03 | End: 2021-08-03

## 2021-08-03 RX ORDER — LORATADINE 10 MG/1
10 TABLET ORAL AS NEEDED
COMMUNITY
Start: 2021-05-14

## 2021-08-03 RX ORDER — LIDOCAINE HYDROCHLORIDE 10 MG/ML
2 INJECTION, SOLUTION INFILTRATION; PERINEURAL
Status: COMPLETED | OUTPATIENT
Start: 2021-08-03 | End: 2021-08-03

## 2021-08-03 RX ADMIN — LIDOCAINE HYDROCHLORIDE 2 ML: 10 INJECTION, SOLUTION INFILTRATION; PERINEURAL at 10:19

## 2021-08-03 RX ADMIN — TRIAMCINOLONE ACETONIDE 20 MG: 40 INJECTION, SUSPENSION INTRA-ARTICULAR; INTRAMUSCULAR at 10:19

## 2021-08-03 RX ADMIN — Medication 0.5 MEQ: at 10:19

## 2021-08-03 NOTE — PROGRESS NOTES
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    Atrial fibrillation (HCC)     Hashimoto's thyroiditis     Hyperlipidemia     Hypertension     IBS (irritable bowel syndrome)     Migraine     Mitral valve disorder     Nasal congestion     Non-toxic uninodular goiter     Nosebleed        PAST SURGICAL HISTORY:  Past Surgical History:   Procedure Laterality Date    APPENDECTOMY      BREAST CYST EXCISION Right 01/01/1977    COLONOSCOPY  08/08/2011    VALVE REPLACEMENT  01/04/2017    mitral valve replacement, 6/5/14       FAMILY HISTORY:  Family History   Problem Relation Age of Onset    Hypertension Mother     Coronary artery disease Father     Migraines Father     Leukemia Brother     Migraines Brother     Hypertension Brother     Coronary artery disease Paternal Grandfather     Leukemia Maternal Aunt     Multiple myeloma Maternal Aunt     Thyroid disease Other     No Known Problems Maternal Aunt     No Known Problems Maternal Aunt     No Known Problems Paternal Aunt     No Known Problems Paternal Aunt     No Known Problems Paternal Aunt     No Known Problems Paternal Aunt     Breast cancer Neg Hx        SOCIAL HISTORY:  Social History     Tobacco Use    Smoking status: Never Smoker    Smokeless tobacco: Never Used   Vaping Use    Vaping Use: Never used   Substance Use Topics    Alcohol use: Yes     Comment: rarely    Drug use: No       MEDICATIONS:    Current Outpatient Medications:     amLODIPine (NORVASC) 5 mg tablet, TAKE 1 TABLET BY MOUTH EVERY DAY IN THE MORNING, Disp: 90 tablet, Rfl: 3    aspirin 81 mg chewable tablet, Chew daily, Disp: , Rfl:     betamethasone valerate (VALISONE) 0 1 % cream, , Disp: , Rfl:     butalbital-acetaminophen-caffeine (FIORICET,ESGIC) -40 mg per tablet, Take 1 tablet by mouth every 4 (four) hours as needed for headaches, Disp: 30 tablet, Rfl: 2    Calcium Carbonate (CALTRATE 600) 1500 (600 Ca) MG TABS, Take by mouth daily , Disp: , Rfl:     clobetasol (TEMOVATE) 0 05 % cream, Apply to elbows bid, Disp: 30 g, Rfl: 3    gabapentin (NEURONTIN) 100 mg capsule, Take 1 capsule (100 mg total) by mouth 2 (two) times a day, Disp: 180 capsule, Rfl: 3    gabapentin (NEURONTIN) 300 mg capsule, TAKE A 300MG + 100MG AT BEDTIME, Disp: 90 capsule, Rfl: 3    loratadine (CLARITIN) 10 mg tablet, Take 10 mg by mouth daily, Disp: , Rfl:     losartan (COZAAR) 100 MG tablet, Take 1 tablet (100 mg total) by mouth daily, Disp: 90 tablet, Rfl: 3    Misc Natural Products (OSTEO BI-FLEX TRIPLE STRENGTH) TABS, Take by mouth daily , Disp: , Rfl:     oxybutynin (DITROPAN-XL) 10 MG 24 hr tablet, TAKE 1 TABLET BY MOUTH DAILY AT BEDTIME, Disp: 90 tablet, Rfl: 1    pantoprazole (PROTONIX) 40 mg tablet, TAKE 1 TABLET BY MOUTH EVERY DAY, Disp: 90 tablet, Rfl: 0    propranolol (INDERAL LA) 60 mg 24 hr capsule, TAKE 1 CAPSULE BY MOUTH EVERY DAY, Disp: 90 capsule, Rfl: 1    simvastatin (ZOCOR) 10 mg tablet, TAKE 1 TABLET BY MOUTH EVERY DAY, Disp: 90 tablet, Rfl: 3    warfarin (COUMADIN) 1 mg tablet, Take 1 tablet (1 mg total) by mouth daily, Disp: 90 tablet, Rfl: 3    warfarin (COUMADIN) 2 mg tablet, TAKE 1 TABLET BY MOUTH EVERY DAY, Disp: 90 tablet, Rfl: 1    warfarin (COUMADIN) 3 mg tablet, TAKE 1 TABLET BY MOUTH DAILY, Disp: 90 tablet, Rfl: 1    Current Facility-Administered Medications:     cyanocobalamin injection 1,000 mcg, 1,000 mcg, Intramuscular, Q30 Days, HANNY Ho, 1,000 mcg at 07/09/21 0786    ALLERGIES:  Allergies Allergen Reactions    Enablex [Darifenacin] Other (See Comments)     Sweating, HA, urinary hesitancy, flushing of face    Fentanyl Nausea Only and Vomiting    Hyoscyamine Palpitations    Dicyclomine Other (See Comments)     Jittery, disorientation, light HAs    Hydromorphone Other (See Comments)     Per pt does not remember the type or severity level    Midazolam Other (See Comments)     Per pt does not remember the type or severity level    Sulfamethoxazole-Trimethoprim Nausea Only    Venlafaxine Other (See Comments)     Urinary urgency, polyuria    Codeine Polt-Chlorphen Polt Er Headache    Ultracet [Tramadol-Acetaminophen] Nausea Only and Vomiting       REVIEW OF SYSTEMS:  Review of Systems    VITALS:  Vitals:    08/03/21 0930   BP: 149/81   Pulse: (!) 54       LABS:  HgA1c:   Lab Results   Component Value Date    HGBA1C 5 4 09/11/2020     BMP:   Lab Results   Component Value Date    CALCIUM 9 0 04/05/2021    K 4 6 04/05/2021    CO2 28 04/05/2021     04/05/2021    BUN 23 04/05/2021    CREATININE 1 01 04/05/2021       _____________________________________________________  PHYSICAL EXAMINATION:  General: well developed and well nourished, alert, oriented times 3 and appears comfortable  Psychiatric: Normal  HEENT: Trachea Midline, No torticollis  Pulmonary: No audible wheezing or strider  Cardiovascular: No discernable arrhythmia   Skin: No masses, erythema, lacerations, fluctation, ulcerations  Neurovascular: Sensation Intact to the Median, Ulnar, Radial Nerve, Motor Intact to the Median, Ulnar, Radial Nerve and Pulses Intact    MUSCULOSKELETAL EXAMINATION:  Left wrist  Radiocarpal Joint mild tender to palpation  Range of motion is full  There is no swelling present  There is no ecchymosis noted      Pulses are present and capillary fill is normal      [employer] Test is negative for pain and negative for clunk left wrist     Finkelstein's negative      ___________________________________________________  STUDIES REVIEWED:  Images obtained today of the left wrist 3 views demonstrate : no fracture, degenerative changes mostly at her 1st Aia 16 joint, mild at her radiocarpal joint  Cyst in her scaphoid  PROCEDURES PERFORMED:  Medium joint arthrocentesis: L radiocarpal  Universal Protocol:  Risks and benefits: risks, benefits and alternatives were discussed  Consent given by: patient  Time out: Immediately prior to procedure a "time out" was called to verify the correct patient, procedure, equipment, support staff and site/side marked as required  Timeout called at: 8/3/2021 10:16 AM   Patient understanding: patient states understanding of the procedure being performed  Site marked: the operative site was marked  Patient identity confirmed: verbally with patient    Supporting Documentation  Indications: pain and diagnostic evaluation   Procedure Details  Location: wrist - L radiocarpal  Preparation: Patient was prepped and draped in the usual sterile fashion  Needle size: 22 G  Ultrasound guidance: no  Approach: dorsal  Medications administered: 0 5 mEq sodium bicarbonate 8 4 %; 2 mL lidocaine 1 %; 20 mg triamcinolone acetonide 40 mg/mL    Patient tolerance: patient tolerated the procedure well with no immediate complications  Dressing:  Sterile dressing applied             _____________________________________________________  ASSESSMENT/PLAN:      Diagnoses and all orders for this visit:    Left wrist pain  -     XR wrist 3+ vw left; Future  -     Medium joint arthrocentesis: L radiocarpal    Transient synovitis of left wrist  -     Medium joint arthrocentesis: L radiocarpal    Primary osteoarthritis of left wrist  -     Medium joint arthrocentesis: L radiocarpal    Other orders  -     betamethasone valerate (VALISONE) 0 1 % cream  -     loratadine (CLARITIN) 10 mg tablet;  Take 10 mg by mouth daily  -     Cancel: Small joint arthrocentesis she was offered, accepted, performed injection of cortisone to her left radial carpal joint for symptomatic relief  Patient tolerated procedure well  Post injection protocol advised  Activities as tolerated  Follow Up:  Return in about 2 weeks (around 8/17/2021) for re-check  Work/school status:  No restrictions     To Do Next Visit:  Re-evaluation of current issue    General Discussions:  Osteoarthritis:  The anatomy and physiology of osteoarthritis was discussed with the patient today in the office  Deterioration of the articular cartilage eventually leads to altered mobility at the joint, resulting in joint subluxation, osteophyte formation, cystic changes, as well as subchondral sclerosis  Eventually, pain, limited mobility, and compensatory hypermobility at surrounding joints may develop  While normal activity and usage of the joint may provide a painful experience to the patient, this typically does not result in damage to the limb  Treatment options include splints to decreased joint edema, pain, and inflammation  Therapy exercises to strengthen the surrounding musculature may relieve pain, but do not alter the overall continued development of osteoarthritis  Oral medications, topical medications, corticosteroid injections may decrease pain and increase overall function  Eventually, some patients may require surgical intervention       Operative Discussions:  None indicated    Scribe Attestation    I,:  Barron Carrel am acting as a scribe while in the presence of the attending physician :       I,:  Dariela Rowell MD personally performed the services described in this documentation    as scribed in my presence :

## 2021-08-10 ENCOUNTER — OFFICE VISIT (OUTPATIENT)
Dept: CARDIOLOGY CLINIC | Facility: CLINIC | Age: 76
End: 2021-08-10
Payer: COMMERCIAL

## 2021-08-10 VITALS
BODY MASS INDEX: 25.92 KG/M2 | SYSTOLIC BLOOD PRESSURE: 144 MMHG | HEART RATE: 52 BPM | OXYGEN SATURATION: 96 % | DIASTOLIC BLOOD PRESSURE: 84 MMHG | WEIGHT: 175 LBS | HEIGHT: 69 IN

## 2021-08-10 DIAGNOSIS — I05.9 MITRAL VALVE DISORDER: ICD-10-CM

## 2021-08-10 DIAGNOSIS — Z79.01 CHRONIC ANTICOAGULATION: ICD-10-CM

## 2021-08-10 DIAGNOSIS — I10 HYPERTENSION, ESSENTIAL: ICD-10-CM

## 2021-08-10 DIAGNOSIS — I48.21 PERMANENT ATRIAL FIBRILLATION (HCC): Primary | ICD-10-CM

## 2021-08-10 PROCEDURE — 99214 OFFICE O/P EST MOD 30 MIN: CPT | Performed by: INTERNAL MEDICINE

## 2021-08-10 PROCEDURE — 3008F BODY MASS INDEX DOCD: CPT | Performed by: ORTHOPAEDIC SURGERY

## 2021-08-10 NOTE — PROGRESS NOTES
[facility] [place]  [address] [id number]  Cardiology Follow Up    Jasbir Doshi  1945  [id number]      1  Permanent atrial fibrillation ([facility] [address] )     2  Hypertension, essential     3  Mitral valve disorder     4  Chronic anticoagulation         Chief Complaint   Patient presents with    Follow-up       Interval History:   Patient presents for follow-up visit  Patient denies any history of chest pain shortness of breath  Patient denies any history of leg edema or orthopnea PND  No history of presyncope syncope  Patient states compliance with the present list of medications  Patient denies any bleeding issues      Patient Active Problem List   Diagnosis    Atrial fibrillation ([facility] [address] )    Hyperlipidemia    History of heart valve replacement    Benign hypertension    Bradycardia    Essential tremor    Chronic anticoagulation    Amaurosis fugax, both eyes    Carotid artery stenosis    Mitral valve disorder    Osteoporosis    Peripheral neuropathy    Chronic right-sided low back pain without sciatica    Asymptomatic stenosis of right vertebral artery    Subclinical hypothyroidism    Irritable bowel syndrome with constipation    Inflammatory polyneuropathy, unspecified (HCC)     Past Medical History:   Diagnosis Date    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    Atrial fibrillation (HCC)     Hashimoto's thyroiditis     Hyperlipidemia     Hypertension     IBS (irritable bowel syndrome)     Migraine     Mitral valve disorder     Nasal congestion     Non-toxic uninodular goiter     Nosebleed      Social History     Socioeconomic History    Marital status: Single     Spouse name: Not on file    Number of children: Not on file    Years of education: Not on file    Highest education level: Not on file   Occupational History    Occupation: Retired   Tobacco Use    Smoking status: Never Smoker    Smokeless tobacco: Never Used   Vaping Use    Vaping Use: Never used   Substance and Sexual Activity    Alcohol use: Yes     Comment: rarely    Drug use: No    Sexual activity: Not Currently   Other Topics Concern    Not on file   Social History Narrative    Not on file     Social Determinants of Health     Financial Resource Strain:     Difficulty of Paying Living Expenses:    Food Insecurity:     Worried About Running Out of Food in the Last Year:     [address] in the Last Year:    Transportation Needs:     Lack of Transportation (Medical):  Lack of Transportation (Non-Medical):    Physical Activity: Insufficiently Active    Days of Exercise per Week: 4 days    Minutes of Exercise per Session: 30 min   Stress: No Stress Concern Present    Feeling of Stress :  Only a little   Social Connections:     Frequency of Communication with Friends and Family:     Frequency of Social Gatherings with Friends and Family:     Attends Buddhist Services:     Active Member of Clubs or Organizations:     Attends Club or Organization Meetings:     Marital Status:    Intimate Partner Violence:     Fear of Current or Ex-Partner:     Emotionally Abused:     Physically Abused:     Sexually Abused:       Family History   Problem Relation Age of Onset    Hypertension Mother     Coronary artery disease Father     Migraines Father     Leukemia Brother     Migraines Brother     Hypertension Brother     Coronary artery disease Paternal Grandfather     Leukemia Maternal Aunt     Multiple myeloma Maternal Aunt     Thyroid disease Other     No Known Problems Maternal Aunt     No Known Problems Maternal Aunt     No Known Problems Paternal Aunt     No Known Problems Paternal Aunt     No Known Problems Paternal Aunt     No Known Problems Paternal Aunt     Breast cancer Neg Hx      Past Surgical History:   Procedure Laterality Date    APPENDECTOMY      BREAST CYST EXCISION Right 01/01/1977    COLONOSCOPY  08/08/2011    VALVE REPLACEMENT 01/04/2017    mitral valve replacement, 6/5/14       Current Outpatient Medications:     amLODIPine (NORVASC) 5 mg tablet, TAKE 1 TABLET BY MOUTH EVERY DAY IN THE MORNING, Disp: 90 tablet, Rfl: 3    aspirin 81 mg chewable tablet, Chew daily, Disp: , Rfl:     betamethasone valerate (VALISONE) 0 1 % cream, , Disp: , Rfl:     butalbital-acetaminophen-caffeine (FIORICET,ESGIC) -40 mg per tablet, Take 1 tablet by mouth every 4 (four) hours as needed for headaches, Disp: 30 tablet, Rfl: 2    Calcium Carbonate (CALTRATE 600) 1500 (600 Ca) MG TABS, Take by mouth daily , Disp: , Rfl:     clobetasol (TEMOVATE) 0 05 % cream, Apply to elbows bid, Disp: 30 g, Rfl: 3    gabapentin (NEURONTIN) 100 mg capsule, Take 1 capsule (100 mg total) by mouth 2 (two) times a day, Disp: 180 capsule, Rfl: 3    gabapentin (NEURONTIN) 300 mg capsule, TAKE A 300MG + 100MG AT BEDTIME, Disp: 90 capsule, Rfl: 3    loratadine (CLARITIN) 10 mg tablet, Take 10 mg by mouth daily, Disp: , Rfl:     losartan (COZAAR) 100 MG tablet, Take 1 tablet (100 mg total) by mouth daily, Disp: 90 tablet, Rfl: 3    Misc Natural Products (OSTEO BI-FLEX TRIPLE STRENGTH) TABS, Take by mouth daily , Disp: , Rfl:     oxybutynin (DITROPAN-XL) 10 MG 24 hr tablet, TAKE 1 TABLET BY MOUTH DAILY AT BEDTIME, Disp: 90 tablet, Rfl: 1    pantoprazole (PROTONIX) 40 mg tablet, TAKE 1 TABLET BY MOUTH EVERY DAY, Disp: 90 tablet, Rfl: 0    propranolol (INDERAL LA) 60 mg 24 hr capsule, TAKE 1 CAPSULE BY MOUTH EVERY DAY, Disp: 90 capsule, Rfl: 1    simvastatin (ZOCOR) 10 mg tablet, TAKE 1 TABLET BY MOUTH EVERY DAY, Disp: 90 tablet, Rfl: 3    warfarin (COUMADIN) 1 mg tablet, Take 1 tablet (1 mg total) by mouth daily, Disp: 90 tablet, Rfl: 3    warfarin (COUMADIN) 2 mg tablet, TAKE 1 TABLET BY MOUTH EVERY DAY, Disp: 90 tablet, Rfl: 1    warfarin (COUMADIN) 3 mg tablet, TAKE 1 TABLET BY MOUTH DAILY, Disp: 90 tablet, Rfl: 1    Current Facility-Administered Medications:   cyanocobalamin injection 1,000 mcg, 1,000 mcg, Intramuscular, Q30 Days, HANNY Stanford, 1,000 mcg at 07/09/21 1504  Allergies   Allergen Reactions    Enablex [Darifenacin] Other (See Comments)     Sweating, HA, urinary hesitancy, flushing of face    Fentanyl Nausea Only and Vomiting    Hyoscyamine Palpitations    Dicyclomine Other (See Comments)     Jittery, disorientation, light HAs    Hydromorphone Other (See Comments)     Per pt does not remember the type or severity level    Midazolam Other (See Comments)     Per pt does not remember the type or severity level    Sulfamethoxazole-Trimethoprim Nausea Only    Venlafaxine Other (See Comments)     Urinary urgency, polyuria    Codeine Polt-Chlorphen Polt Er Headache    Ultracet [Tramadol-Acetaminophen] Nausea Only and Vomiting       Labs: Ancillary Orders on 07/02/2021   Component Date Value    Protime 07/29/2021 30 2*    INR 07/29/2021 2 91*     Imaging: XR wrist 3+ vw left    Result Date: 8/8/2021  Narrative: LEFT WRIST INDICATION:   M25 532: Pain in left wrist  COMPARISON:  None VIEWS:  XR WRIST 3+ VW LEFT Images: 3 FINDINGS: There is no acute fracture or dislocation  Arthritic changes seen at the triscaphe joint, 1st carpometacarpal joint  A well-circumscribed lucency seen in the proximal pole of the scaphoid, chronic No lytic or blastic osseous lesion  Soft tissues are unremarkable       Impression: Arthritic changes at the 1st carpometacarpal, triscaphe joint and mild degenerative changes in the radioscaphoid joint No acute displaced fracture Workstation performed: [id number]       Review of Systems:  Review of Systems     REVIEW OF SYSTEMS:  Constitutional:  Denies fever or chills   Eyes:  Denies change in visual acuity   HENT:  Denies nasal congestion or sore throat   Respiratory:  Denies cough or shortness of breath   Cardiovascular:  Denies chest pain or edema   GI:  Denies abdominal pain, nausea, vomiting, bloody stools or diarrhea :  Denies dysuria, frequency, difficulty in micturition and nocturia  Musculoskeletal:  Denies back pain or joint pain   Neurologic:  Denies headache, focal weakness or sensory changes   Endocrine:  Denies polyuria or polydipsia   Lymphatic:  Denies swollen glands   Psychiatric:  Denies depression or anxiety     Physical Exam:    /84   Pulse (!) 52   Ht 5' 9" (1 753 m)   Wt 79 4 kg (175 lb)   LMP  (LMP Unknown)   SpO2 96%   BMI 25 84 kg/m²     Physical Exam   PHYSICAL EXAM:  General:  Patient is not in acute distress   Head: Normocephalic, Atraumatic  HEENT:  Both pupils normal-size atraumatic, normocephalic, nonicteric  Neck:  JVP not raised  Trachea central  No carotid bruit  Respiratory:  normal breath sounds no crackles  no rhonchi  Cardiovascular:  Irregularly irregular with heart sounds consistent with prosthetic mechanical valve  GI:  Abdomen soft nontender  No organomegaly  Lymphatic:  No cervical or inguinal lymphadenopathy  Neurologic:  Patient is awake alert, oriented   Grossly nonfocal    Extremities no edema    Discussion/Summary:   Patient with multiple medical problems who seems to be doing reasonably well from cardiac standpoint  Previous studies reviewed with patient  Medications reviewed and possible side effects discussed  concepts of cardiovascular disease , signs and symptoms of heart disease  Dietary and risk factor modification reinforced  All questions answered  Safety measures reviewed  Patient advised to report any problems prompting medical attention  antibiotic prophylaxis reinforced  Patient will have a follow-up echocardiogram to reassess prosthetic mechanical   Mitral valve   Patient will also be scheduled for a Holter monitor to assess heart rate response with history of atrial fibrillation on beta-blockers  Patient understands the risks and benefits of anticoagulation to prevent thrombotic risk from prosthetic mechanical valve     follow-up in 6 months or earlier as needed  Follow-up with primary care physician  Patient is agreeable with the plan of care

## 2021-08-17 DIAGNOSIS — R51.9 NONINTRACTABLE HEADACHE, UNSPECIFIED CHRONICITY PATTERN, UNSPECIFIED HEADACHE TYPE: ICD-10-CM

## 2021-08-18 RX ORDER — BUTALBITAL, ACETAMINOPHEN AND CAFFEINE 50; 325; 40 MG/1; MG/1; MG/1
1 TABLET ORAL EVERY 4 HOURS PRN
Qty: 30 TABLET | Refills: 0 | Status: SHIPPED | OUTPATIENT
Start: 2021-08-18 | End: 2022-07-20

## 2021-08-19 ENCOUNTER — OFFICE VISIT (OUTPATIENT)
Dept: OBGYN CLINIC | Facility: CLINIC | Age: 76
End: 2021-08-19
Payer: COMMERCIAL

## 2021-08-19 VITALS
SYSTOLIC BLOOD PRESSURE: 123 MMHG | DIASTOLIC BLOOD PRESSURE: 79 MMHG | WEIGHT: 175 LBS | HEART RATE: 67 BPM | HEIGHT: 69 IN | BODY MASS INDEX: 25.92 KG/M2

## 2021-08-19 DIAGNOSIS — M19.032 PRIMARY OSTEOARTHRITIS OF LEFT WRIST: Primary | ICD-10-CM

## 2021-08-19 DIAGNOSIS — M18.12 ARTHRITIS OF CARPOMETACARPAL (CMC) JOINT OF LEFT THUMB: ICD-10-CM

## 2021-08-19 PROCEDURE — 1160F RVW MEDS BY RX/DR IN RCRD: CPT | Performed by: ORTHOPAEDIC SURGERY

## 2021-08-19 PROCEDURE — 20550 NJX 1 TENDON SHEATH/LIGAMENT: CPT | Performed by: ORTHOPAEDIC SURGERY

## 2021-08-19 PROCEDURE — 99213 OFFICE O/P EST LOW 20 MIN: CPT | Performed by: ORTHOPAEDIC SURGERY

## 2021-08-19 RX ORDER — TRIAMCINOLONE ACETONIDE 40 MG/ML
20 INJECTION, SUSPENSION INTRA-ARTICULAR; INTRAMUSCULAR
Status: COMPLETED | OUTPATIENT
Start: 2021-08-19 | End: 2021-08-19

## 2021-08-19 RX ADMIN — Medication 0.5 MEQ: at 09:47

## 2021-08-19 RX ADMIN — TRIAMCINOLONE ACETONIDE 20 MG: 40 INJECTION, SUSPENSION INTRA-ARTICULAR; INTRAMUSCULAR at 09:47

## 2021-08-19 NOTE — PROGRESS NOTES
CHIEF COMPLAINT:  Chief Complaint   Patient presents with    Left Wrist - Follow-up       SUBJECTIVE:  Krishna Patel is a 70y o  year old female who presents for follow-up regarding left radial carpal osteoarthritis  She was last seen in regards to this issue on 8/3/2021, which time she received a corticosteroid injection to the left radiocarpal joint  Patient states that she had significant relief following the injection, and has no pain with activity or motion  Unfortunately, she has begun to experience symptoms in the base of the left thumb with gripping motions  She denies any recent onset bruising, swelling, numbness, tingling, or mechanical symptoms  She denies any adverse reactions to the previous injection        PAST MEDICAL HISTORY:  Past Medical History:   Diagnosis Date    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    Atrial fibrillation (HCC)     Hashimoto's thyroiditis     Hyperlipidemia     Hypertension     IBS (irritable bowel syndrome)     Migraine     Mitral valve disorder     Nasal congestion     Non-toxic uninodular goiter     Nosebleed        PAST SURGICAL HISTORY:  Past Surgical History:   Procedure Laterality Date    APPENDECTOMY      BREAST CYST EXCISION Right 01/01/1977    COLONOSCOPY  08/08/2011    VALVE REPLACEMENT  01/04/2017    mitral valve replacement, 6/5/14       FAMILY HISTORY:  Family History   Problem Relation Age of Onset    Hypertension Mother     Coronary artery disease Father     Migraines Father     Leukemia Brother     Migraines Brother     Hypertension Brother     Coronary artery disease Paternal Grandfather     Leukemia Maternal Aunt     Multiple myeloma Maternal Aunt     Thyroid disease Other     No Known Problems Maternal Aunt     No Known Problems Maternal Aunt     No Known Problems Paternal Aunt     No Known Problems Paternal Aunt     No Known Problems Paternal Aunt     No Known Problems Paternal Aunt     Breast cancer Neg Hx SOCIAL HISTORY:  Social History     Tobacco Use    Smoking status: Never Smoker    Smokeless tobacco: Never Used   Vaping Use    Vaping Use: Never used   Substance Use Topics    Alcohol use: Yes     Comment: rarely    Drug use: No       MEDICATIONS:    Current Outpatient Medications:     amLODIPine (NORVASC) 5 mg tablet, TAKE 1 TABLET BY MOUTH EVERY DAY IN THE MORNING, Disp: 90 tablet, Rfl: 3    aspirin 81 mg chewable tablet, Chew daily, Disp: , Rfl:     betamethasone valerate (VALISONE) 0 1 % cream, , Disp: , Rfl:     butalbital-acetaminophen-caffeine (FIORICET,ESGIC) -40 mg per tablet, TAKE 1 TABLET BY MOUTH EVERY 4 (FOUR) HOURS AS NEEDED FOR HEADACHES, Disp: 30 tablet, Rfl: 0    Calcium Carbonate (CALTRATE 600) 1500 (600 Ca) MG TABS, Take by mouth daily , Disp: , Rfl:     clobetasol (TEMOVATE) 0 05 % cream, Apply to elbows bid, Disp: 30 g, Rfl: 3    gabapentin (NEURONTIN) 100 mg capsule, Take 1 capsule (100 mg total) by mouth 2 (two) times a day, Disp: 180 capsule, Rfl: 3    gabapentin (NEURONTIN) 300 mg capsule, TAKE A 300MG + 100MG AT BEDTIME, Disp: 90 capsule, Rfl: 3    loratadine (CLARITIN) 10 mg tablet, Take 10 mg by mouth daily, Disp: , Rfl:     losartan (COZAAR) 100 MG tablet, Take 1 tablet (100 mg total) by mouth daily, Disp: 90 tablet, Rfl: 3    Misc Natural Products (OSTEO BI-FLEX TRIPLE STRENGTH) TABS, Take by mouth daily , Disp: , Rfl:     oxybutynin (DITROPAN-XL) 10 MG 24 hr tablet, TAKE 1 TABLET BY MOUTH DAILY AT BEDTIME, Disp: 90 tablet, Rfl: 1    pantoprazole (PROTONIX) 40 mg tablet, TAKE 1 TABLET BY MOUTH EVERY DAY, Disp: 90 tablet, Rfl: 0    propranolol (INDERAL LA) 60 mg 24 hr capsule, TAKE 1 CAPSULE BY MOUTH EVERY DAY, Disp: 90 capsule, Rfl: 1    simvastatin (ZOCOR) 10 mg tablet, TAKE 1 TABLET BY MOUTH EVERY DAY, Disp: 90 tablet, Rfl: 3    warfarin (COUMADIN) 1 mg tablet, Take 1 tablet (1 mg total) by mouth daily, Disp: 90 tablet, Rfl: 3    warfarin (COUMADIN) 2 mg tablet, TAKE 1 TABLET BY MOUTH EVERY DAY, Disp: 90 tablet, Rfl: 1    warfarin (COUMADIN) 3 mg tablet, TAKE 1 TABLET BY MOUTH DAILY, Disp: 90 tablet, Rfl: 1    Current Facility-Administered Medications:     cyanocobalamin injection 1,000 mcg, 1,000 mcg, Intramuscular, Q30 Days, HANNY Brown, 1,000 mcg at 07/09/21 1504    ALLERGIES:  Allergies   Allergen Reactions    Enablex [Darifenacin] Other (See Comments)     Sweating, HA, urinary hesitancy, flushing of face    Fentanyl Nausea Only and Vomiting    Hyoscyamine Palpitations    Dicyclomine Other (See Comments)     Jittery, disorientation, light HAs    Hydromorphone Other (See Comments)     Per pt does not remember the type or severity level    Midazolam Other (See Comments)     Per pt does not remember the type or severity level    Sulfamethoxazole-Trimethoprim Nausea Only    Venlafaxine Other (See Comments)     Urinary urgency, polyuria    Codeine Polt-Chlorphen Polt Er Headache    Ultracet [Tramadol-Acetaminophen] Nausea Only and Vomiting       REVIEW OF SYSTEMS:  Review of Systems   Constitutional: Negative for chills, fever and unexpected weight change  HENT: Negative for hearing loss, nosebleeds and sore throat  Eyes: Negative for pain, redness and visual disturbance  Respiratory: Negative for cough, shortness of breath and wheezing  Cardiovascular: Negative for chest pain, palpitations and leg swelling  Gastrointestinal: Negative for abdominal pain, nausea and vomiting  Endocrine: Negative for polydipsia and polyuria  Genitourinary: Negative for dysuria and hematuria  Musculoskeletal:        As noted in HPI   Skin: Negative for rash and wound  Neurological: Negative for dizziness, numbness and headaches  Psychiatric/Behavioral: Negative for decreased concentration and suicidal ideas  The patient is not nervous/anxious          VITALS:  Vitals:    08/19/21 0933   BP: 123/79   Pulse: 67       LABS:  HgA1c:   Lab Results   Component Value Date    HGBA1C 5 4 09/11/2020     BMP:   Lab Results   Component Value Date    CALCIUM 9 0 04/05/2021    K 4 6 04/05/2021    CO2 28 04/05/2021     04/05/2021    BUN 23 04/05/2021    CREATININE 1 01 04/05/2021       _____________________________________________________  PHYSICAL EXAMINATION:  General: well developed and well nourished, alert, oriented times 3 and appears comfortable  Psychiatric: Normal  HEENT: Trachea Midline, No torticollis  Pulmonary: No audible wheezing or respiratory distress   Skin: No masses, erythema, lacerations, fluctation, ulcerations  Neurovascular: Sensation Intact to the Median, Ulnar, Radial Nerve, Motor Intact to the Median, Ulnar, Radial Nerve and Pulses Intact    MUSCULOSKELETAL EXAMINATION:  Left hand/thumb -    Patient presents with no obvious anatomical deformity  Skin is warm and dry to touch with no signs of erythema, ecchymosis, infection  No soft tissue swelling or effusion noted   Full FDS, FDP, extensor mechanisms are intact   10° of hyperextension noted at the thumb MCP joint  TTP over thumb CMC joint  Nontender over radial carpal joint  Demonstrates normal wrist, elbow, and shoulder motion  +  CMC grind  Forearm compartments are soft and supple  2+ distal radial pulse with brisk capillary refill to the fingers  Radial, median, and ulnar motor and sensory distributions intact  Sensation light touch intact distally  ___________________________________________________  STUDIES REVIEWED:  No studies reviewed  PROCEDURES PERFORMED:  Hand/upper extremity injection  Universal Protocol:  Consent: Verbal consent obtained  Risks and benefits: risks, benefits and alternatives were discussed  Consent given by: patient  Time out: Immediately prior to procedure a "time out" was called to verify the correct patient, procedure, equipment, support staff and site/side marked as required    Timeout called at: 8/19/2021 9:46 AM   Patient understanding: patient states understanding of the procedure being performed  Site marked: the operative site was marked  Patient identity confirmed: verbally with patient    Supporting Documentation  Indications: pain and joint swelling   Procedure Details  Condition comment: R Thumb CMC Arthritis   Needle size: 25 G  Ultrasound guidance: no  Approach: radial  Medications administered: 0 5 mEq sodium bicarbonate 8 4 %; 20 mg triamcinolone acetonide 40 mg/mL    Patient tolerance: patient tolerated the procedure well with no immediate complications  Dressing:  Sterile dressing applied              _____________________________________________________  ASSESSMENT/PLAN:    Diagnoses and all orders for this visit:    Primary osteoarthritis of left wrist    Arthritis of carpometacarpal (CMC) joint of left thumb  -     Hand/upper extremity injection      · Patient has responded very well to CS injection of the left wrist  · Can continue activities as tolerated in regards to the wrist  · Patient was offered, and accepted, Lidocaine/ Marcaine and Kenalog injection to the  Left thumb CMC joint for relief of pain and inflammation  Patient tolerated treatment well  · Continue NSAIDs/ Tylenol as needed for pain and soreness    Follow Up:  Return in 2 weeks (on 9/2/2021) for Re-evaluation  Work/school status:  No restrictions    To Do Next Visit:  Re-evaluation of current issue    General Discussions:  CMC Arthritis: The anatomy and physiology of carpometacarpal joint arthritis was discussed with the patient today in the office  Deterioration of the articular cartilage eventually leads to hypermobility at the thumb [facility] joint, resulting in joint subluxation, osteophyte formation, cystic changes within the trapezium and base of the first metacarpal, as well as subchondral sclerosis  Eventually, pain, limited mobility, and compensatory hyperextension at the metacarpophalangeal joint may develop    While normal activity and usage of the thumb joint may provide a painful experience to the patient, this typically does not result in damage to the thumb or hand  Treatment options include resting thumb spica splints to decreased joint edema, pain, and inflammation  Therapy exercises to strengthen the thenar musculature may relieve pain, but do not alter the overall continued development of osteoarthritis  Oral medications, topical medications, corticosteroid injections may decrease pain and increase overall function  Eventually, approximately 5% of patients may require surgical intervention  Scribe Attestation    I,:  Domenica Wagner am acting as a scribe while in the presence of the attending physician :       I,:  Tena Deutsch MD personally performed the services described in this documentation    as scribed in my presence :           Portions of the record may have been created with voice recognition software  Occasional wrong word or "sound a like" substitutions may have occurred due to the inherent limitations of voice recognition software  Read the chart carefully and recognize, using context, where substitutions have occurred

## 2021-08-30 ENCOUNTER — APPOINTMENT (OUTPATIENT)
Dept: LAB | Facility: CLINIC | Age: 76
End: 2021-08-30
Payer: COMMERCIAL

## 2021-08-30 ENCOUNTER — ANTICOAG VISIT (OUTPATIENT)
Dept: CARDIOLOGY CLINIC | Facility: CLINIC | Age: 76
End: 2021-08-30

## 2021-08-30 DIAGNOSIS — Z95.2 HISTORY OF HEART VALVE REPLACEMENT: Primary | ICD-10-CM

## 2021-08-30 DIAGNOSIS — Z79.01 CHRONIC ANTICOAGULATION: Primary | ICD-10-CM

## 2021-08-30 LAB
INR PPP: 3.41 (ref 0.84–1.19)
PROTHROMBIN TIME: 34.1 SECONDS (ref 11.6–14.5)

## 2021-08-30 PROCEDURE — 36415 COLL VENOUS BLD VENIPUNCTURE: CPT

## 2021-08-30 PROCEDURE — 85610 PROTHROMBIN TIME: CPT

## 2021-08-31 ENCOUNTER — OFFICE VISIT (OUTPATIENT)
Dept: OBGYN CLINIC | Facility: CLINIC | Age: 76
End: 2021-08-31
Payer: COMMERCIAL

## 2021-08-31 VITALS
HEART RATE: 58 BPM | SYSTOLIC BLOOD PRESSURE: 129 MMHG | WEIGHT: 175 LBS | DIASTOLIC BLOOD PRESSURE: 78 MMHG | HEIGHT: 69 IN | BODY MASS INDEX: 25.92 KG/M2

## 2021-08-31 DIAGNOSIS — M18.12 ARTHRITIS OF CARPOMETACARPAL (CMC) JOINT OF LEFT THUMB: ICD-10-CM

## 2021-08-31 DIAGNOSIS — M19.032 PRIMARY OSTEOARTHRITIS OF LEFT WRIST: Primary | ICD-10-CM

## 2021-08-31 PROCEDURE — 3078F DIAST BP <80 MM HG: CPT | Performed by: ORTHOPAEDIC SURGERY

## 2021-08-31 PROCEDURE — 1036F TOBACCO NON-USER: CPT | Performed by: ORTHOPAEDIC SURGERY

## 2021-08-31 PROCEDURE — 3074F SYST BP LT 130 MM HG: CPT | Performed by: ORTHOPAEDIC SURGERY

## 2021-08-31 PROCEDURE — 99213 OFFICE O/P EST LOW 20 MIN: CPT | Performed by: ORTHOPAEDIC SURGERY

## 2021-08-31 PROCEDURE — 1160F RVW MEDS BY RX/DR IN RCRD: CPT | Performed by: ORTHOPAEDIC SURGERY

## 2021-08-31 PROCEDURE — 3008F BODY MASS INDEX DOCD: CPT | Performed by: ORTHOPAEDIC SURGERY

## 2021-08-31 NOTE — PROGRESS NOTES
CHIEF COMPLAINT:  Chief Complaint   Patient presents with    Left Wrist - Follow-up       SUBJECTIVE  Timothy Gutierrez is a 70y o  year old  female who presents to the office for follow up evaluation of her left wrist  Pt received left radiocarpal CSI 8/3/2021 which did provide her with good relief and then left [facility] CSI 8/19/2021 which provided her with relief of most of the pain  Pt states that she has an aching pain that she points out to be at the [facility] joint of the thumb  PAST MEDICAL HISTORY:  Past Medical History:   Diagnosis Date    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    Atrial fibrillation (HCC)     Hashimoto's thyroiditis     Hyperlipidemia     Hypertension     IBS (irritable bowel syndrome)     Migraine     Mitral valve disorder     Nasal congestion     Non-toxic uninodular goiter     Nosebleed        PAST SURGICAL HISTORY:  Past Surgical History:   Procedure Laterality Date    APPENDECTOMY      BREAST CYST EXCISION Right 01/01/1977    COLONOSCOPY  08/08/2011    VALVE REPLACEMENT  01/04/2017    mitral valve replacement, 6/5/14       FAMILY HISTORY:  Family History   Problem Relation Age of Onset    Hypertension Mother     Coronary artery disease Father     Migraines Father     Leukemia Brother     Migraines Brother     Hypertension Brother     Coronary artery disease Paternal Grandfather     Leukemia Maternal Aunt     Multiple myeloma Maternal Aunt     Thyroid disease Other     No Known Problems Maternal Aunt     No Known Problems Maternal Aunt     No Known Problems Paternal Aunt     No Known Problems Paternal Aunt     No Known Problems Paternal Aunt     No Known Problems Paternal Aunt     Breast cancer Neg Hx        SOCIAL HISTORY:  Social History     Tobacco Use    Smoking status: Never Smoker    Smokeless tobacco: Never Used   Vaping Use    Vaping Use: Never used   Substance Use Topics    Alcohol use: Yes     Comment: rarely    Drug use:  No MEDICATIONS:    Current Outpatient Medications:     amLODIPine (NORVASC) 5 mg tablet, TAKE 1 TABLET BY MOUTH EVERY DAY IN THE MORNING, Disp: 90 tablet, Rfl: 3    aspirin 81 mg chewable tablet, Chew daily, Disp: , Rfl:     betamethasone valerate (VALISONE) 0 1 % cream, , Disp: , Rfl:     butalbital-acetaminophen-caffeine (FIORICET,ESGIC) -40 mg per tablet, TAKE 1 TABLET BY MOUTH EVERY 4 (FOUR) HOURS AS NEEDED FOR HEADACHES, Disp: 30 tablet, Rfl: 0    Calcium Carbonate (CALTRATE 600) 1500 (600 Ca) MG TABS, Take by mouth daily , Disp: , Rfl:     clobetasol (TEMOVATE) 0 05 % cream, Apply to elbows bid, Disp: 30 g, Rfl: 3    gabapentin (NEURONTIN) 100 mg capsule, Take 1 capsule (100 mg total) by mouth 2 (two) times a day, Disp: 180 capsule, Rfl: 3    gabapentin (NEURONTIN) 300 mg capsule, TAKE A 300MG + 100MG AT BEDTIME, Disp: 90 capsule, Rfl: 3    loratadine (CLARITIN) 10 mg tablet, Take 10 mg by mouth daily, Disp: , Rfl:     losartan (COZAAR) 100 MG tablet, Take 1 tablet (100 mg total) by mouth daily, Disp: 90 tablet, Rfl: 3    Misc Natural Products (OSTEO BI-FLEX TRIPLE STRENGTH) TABS, Take by mouth daily , Disp: , Rfl:     oxybutynin (DITROPAN-XL) 10 MG 24 hr tablet, TAKE 1 TABLET BY MOUTH DAILY AT BEDTIME, Disp: 90 tablet, Rfl: 1    pantoprazole (PROTONIX) 40 mg tablet, TAKE 1 TABLET BY MOUTH EVERY DAY, Disp: 90 tablet, Rfl: 0    propranolol (INDERAL LA) 60 mg 24 hr capsule, TAKE 1 CAPSULE BY MOUTH EVERY DAY, Disp: 90 capsule, Rfl: 1    simvastatin (ZOCOR) 10 mg tablet, TAKE 1 TABLET BY MOUTH EVERY DAY, Disp: 90 tablet, Rfl: 3    warfarin (COUMADIN) 1 mg tablet, Take 1 tablet (1 mg total) by mouth daily, Disp: 90 tablet, Rfl: 3    warfarin (COUMADIN) 2 mg tablet, TAKE 1 TABLET BY MOUTH EVERY DAY, Disp: 90 tablet, Rfl: 1    warfarin (COUMADIN) 3 mg tablet, TAKE 1 TABLET BY MOUTH DAILY, Disp: 90 tablet, Rfl: 1    Current Facility-Administered Medications:     cyanocobalamin injection 1,000 mcg, 1,000 mcg, Intramuscular, Q30 Days, Ronita Needle, CRNP, 1,000 mcg at 07/09/21 1504    ALLERGIES:  Allergies   Allergen Reactions    Enablex [Darifenacin] Other (See Comments)     Sweating, HA, urinary hesitancy, flushing of face    Fentanyl Nausea Only and Vomiting    Hyoscyamine Palpitations    Dicyclomine Other (See Comments)     Jittery, disorientation, light HAs    Hydromorphone Other (See Comments)     Per pt does not remember the type or severity level    Midazolam Other (See Comments)     Per pt does not remember the type or severity level    Sulfamethoxazole-Trimethoprim Nausea Only    Venlafaxine Other (See Comments)     Urinary urgency, polyuria    Codeine Polt-Chlorphen Polt Er Headache    Ultracet [Tramadol-Acetaminophen] Nausea Only and Vomiting       REVIEW OF SYSTEMS:  Review of Systems   Constitutional: Negative for chills, fever and unexpected weight change  HENT: Negative for hearing loss, nosebleeds and sore throat  Eyes: Negative for pain, redness and visual disturbance  Respiratory: Negative for cough, shortness of breath and wheezing  Cardiovascular: Negative for chest pain, palpitations and leg swelling  Gastrointestinal: Negative for abdominal pain, nausea and vomiting  Endocrine: Negative for polydipsia and polyuria  Genitourinary: Negative for dysuria and hematuria  Skin: Negative for rash and wound  Neurological: Negative for dizziness and headaches  Psychiatric/Behavioral: Negative for decreased concentration, dysphoric mood and suicidal ideas  The patient is not nervous/anxious          VITALS:  Vitals:    08/31/21 1007   BP: 129/78   Pulse: 58       LABS:  HgA1c:   Lab Results   Component Value Date    HGBA1C 5 4 09/11/2020     BMP:   Lab Results   Component Value Date    CALCIUM 9 0 04/05/2021    K 4 6 04/05/2021    CO2 28 04/05/2021     04/05/2021    BUN 23 04/05/2021    CREATININE 1 01 04/05/2021 _____________________________________________________  PHYSICAL EXAMINATION:  General: well developed and well nourished, alert, oriented times 3 and appears comfortable  Psychiatric: Normal  HEENT: Trachea Midline, No torticollis  Pulmonary: No audible wheezing or strider  Cardiovascular: No discernable arrhythmia   Skin: No masses, erythema, lacerations, fluctation, ulcerations  Neurovascular: Sensation Intact to the Median, Ulnar, Radial Nerve, Motor Intact to the Median, Ulnar, Radial Nerve and Pulses Intact    MUSCULOSKELETAL EXAMINATION:  Left wrist      no radiocarpal tenderness   Tenderness at the [facility] joint   No pain with CMC grind test    ___________________________________________________  STUDIES REVIEWED:  No studies reviewed  PROCEDURES PERFORMED:  Procedures  No Procedures performed today    _____________________________________________________  ASSESSMENT/PLAN:    S/P left radiocarpal CSI- OA- 8/3/2021-resolved   S/P left CMC CSI -8/19/2021-improved   - Pt was advised to continue NSAID as needed  - Pt was provided with an order for therapy  - pt was advised to call the office if her pain worsens and we can consider repeat CSI in aprox 3 months       Follow Up:  No follow-ups on file          To Do Next Visit:  Re-evaluation of current issue        Scribe Attestation    I,:  Uyen Hathaway am acting as a scribe while in the presence of the attending physician :       I,:  Jason Nathan MD personally performed the services described in this documentation    as scribed in my presence :

## 2021-09-09 ENCOUNTER — EVALUATION (OUTPATIENT)
Dept: OCCUPATIONAL THERAPY | Facility: CLINIC | Age: 76
End: 2021-09-09
Payer: COMMERCIAL

## 2021-09-09 DIAGNOSIS — M18.12 ARTHRITIS OF CARPOMETACARPAL (CMC) JOINT OF LEFT THUMB: ICD-10-CM

## 2021-09-09 DIAGNOSIS — M19.032 PRIMARY OSTEOARTHRITIS OF LEFT WRIST: ICD-10-CM

## 2021-09-09 PROCEDURE — 97165 OT EVAL LOW COMPLEX 30 MIN: CPT | Performed by: OCCUPATIONAL THERAPIST

## 2021-09-09 PROCEDURE — 97110 THERAPEUTIC EXERCISES: CPT | Performed by: OCCUPATIONAL THERAPIST

## 2021-09-09 NOTE — PROGRESS NOTES
OT Evaluation     Today's date: 2021  Patient name: Aruna Jones  : 1945  MRN: [id number]  Referring provider: Tenzin Razo MD  Dx:   Encounter Diagnosis     ICD-10-CM    1  Primary osteoarthritis of left wrist  M19 032 Ambulatory referral to PT/OT hand therapy   2  Arthritis of carpometacarpal [place]) joint of left thumb  M18 12 Ambulatory referral to PT/OT hand therapy                  Assessment  Assessment details: Marian Leyden is a 69 y/o right handed female with a chronic history of left hand and thumb pain  She describes her pain as being uncomfortable  She has received one cortisone injection in the left [facility] joint with good reduction of pain  Pain reduced to a mild level of being uncomfortable but not resolving the discomfort completely  Examination reveals functional AROM, functional strength,  CMC pain with axial loading,  No sensory complaints, no edema observed  Evaluation is of low complexity, due to minimal comorbidities and stable clinical presentation  Pt demonstrates good tolerance of therapy today and was provided with a written HEP focusing on improving motion, reducing discomfort     Pt was instructed to perform HEP daily as tolerated  The patient demonstrates HEP instructions appropriately, verbalizes understanding, and is in agreement with the written HEP  Pt will benefit from skilled OT intervention to progress flexibility, resolve discomfort,  and allow return to PLOF  Impairments: abnormal or restricted ROM, lacks appropriate home exercise program and weight-bearing intolerance    Symptom irritability: lowUnderstanding of Dx/Px/POC: good   Prognosis: good    Goals  STG - 2 weeks  Reduce resting pain to less than 5/10  HEP compliance of stretches and glides  HEP compliance in wearing brace/strap as instructed      LTG  Achieve premorbid AROM of Fa/wrist/hand  Pain at rest less than 2/10   strength to at least 50% of uninvolved  Resolve sensory complaints  Return to PLOF with symptom control  Plan  Patient would benefit from: OT eval and skilled occupational therapy  Planned modality interventions: thermotherapy: hydrocollator packs  Planned therapy interventions: joint mobilization, manual therapy, patient education, orthotic management and training, strengthening, stretching, therapeutic activities, therapeutic exercise and home exercise program  Other planned therapy interventions: IASTM, kinesiotaping  Frequency: 1x week  Duration in weeks: 6  Plan of Care beginning date: 2021  Plan of Care expiration date: 10/21/2021  Treatment plan discussed with: patient        Subjective Evaluation    History of Present Illness  Mechanism of injury: Chronic left thumb pain, s/p recent cortisone injection  First injection in left thumb CMC          Recurrent probem    Quality of life: good    Pain  Current pain ratin (Tightness surrounding CMC; uncomfortable)  Quality: tight and pressure  Progression: improved    Social Support    Employment status: not working (Retired)  Hand dominance: right    Treatments  Previous treatment: injection treatment  Current treatment: occupational therapy  Patient Goals  Patient goals for therapy: decreased pain, increased motion and independence with ADLs/IADLs  Patient goal: resolve the feeling of discomfort        Objective     Active Range of Motion     Left Wrist   Normal active range of motion    Right Wrist   Normal active range of motion    Left Thumb   Opposition: Functional thumb AROM demonstrated  Additional Active Range of Motion Details  Full digital AROM demonstrated  Strength/Myotome Testing     Additional Strength Details  Chandan  #2  L  52 lbs,  R  47 lbs    Lat pinch  L  9 7 lbs  R  11 8 lbs  Tests     Left Wrist/Hand   Positive CMC grind       Additional Tests Details  (-) DeQuervains             Precautions:  Cardiac, OA      Date             Visit 1            Manuals Neuro Re-Ed                                                                                                        Ther Ex 10'            HEP Distract  Web stretch  AROM th ops  10'                                                                                                       Ther Activity                                       Gait Training                                       Modalities

## 2021-09-15 ENCOUNTER — OFFICE VISIT (OUTPATIENT)
Dept: OCCUPATIONAL THERAPY | Facility: CLINIC | Age: 76
End: 2021-09-15
Payer: COMMERCIAL

## 2021-09-15 DIAGNOSIS — M19.032 PRIMARY OSTEOARTHRITIS OF LEFT WRIST: Primary | ICD-10-CM

## 2021-09-15 DIAGNOSIS — M18.12 ARTHRITIS OF CARPOMETACARPAL (CMC) JOINT OF LEFT THUMB: ICD-10-CM

## 2021-09-15 PROCEDURE — 97140 MANUAL THERAPY 1/> REGIONS: CPT | Performed by: OCCUPATIONAL THERAPIST

## 2021-09-15 PROCEDURE — 97530 THERAPEUTIC ACTIVITIES: CPT | Performed by: OCCUPATIONAL THERAPIST

## 2021-09-15 PROCEDURE — 97110 THERAPEUTIC EXERCISES: CPT | Performed by: OCCUPATIONAL THERAPIST

## 2021-09-15 NOTE — PROGRESS NOTES
Daily Note     Today's date: 9/15/2021  Patient name: Winifred Antunez  : 1945  MRN: [id number]  Referring provider: Ceci Rivero MD  Dx:   Encounter Diagnosis     ICD-10-CM    1  Primary osteoarthritis of left wrist  M19 032    2  Arthritis of carpometacarpal [place]) joint of left thumb  M18 12                   Subjective:  "I have been doing the exercises, a lot  All the time"      Objective: See treatment diary below; Initiated clinic program and issued putty for HEP      Assessment: Tolerated treatment well  Patient would benefit from continued OT  Good tolerance of HEP and AROM exercises  Plan: Progress treatment as tolerated  Assess pain and  and pinch strength NV        Precautions:  Cardiac, OA      Date 9/9 9/15           Visit 1 2           Manuals  13'           IASTM  Thumb and volar hand  8'           Jt mobs  G 1, distract [facility]  5'                                     Neuro Re-Ed                                                                                                        Ther Ex 10'   15'           HEP Distract  Web stretch  AROM th ops  10' New HEP for putty ex   5'           PROM  Wrist and hand  5'           AROM th ops  2x10           P/H fists  In heat  5'                                                               Ther Activity   10'           Dexterity  RB over ball  2x10           Manipulation  Groove board  Full bd  7'           Gait Training                                       Modalities

## 2021-09-24 DIAGNOSIS — G25.0 ESSENTIAL TREMOR: ICD-10-CM

## 2021-09-24 DIAGNOSIS — I10 BENIGN HYPERTENSION: ICD-10-CM

## 2021-09-24 RX ORDER — PROPRANOLOL HCL 60 MG
CAPSULE, EXTENDED RELEASE 24HR ORAL
Qty: 90 CAPSULE | Refills: 1 | Status: SHIPPED | OUTPATIENT
Start: 2021-09-24 | End: 2022-04-05

## 2021-09-26 DIAGNOSIS — K29.70 GASTRITIS WITHOUT BLEEDING, UNSPECIFIED CHRONICITY, UNSPECIFIED GASTRITIS TYPE: ICD-10-CM

## 2021-09-26 RX ORDER — PANTOPRAZOLE SODIUM 40 MG/1
TABLET, DELAYED RELEASE ORAL
Qty: 90 TABLET | Refills: 0 | Status: SHIPPED | OUTPATIENT
Start: 2021-09-26 | End: 2021-10-19 | Stop reason: CLARIF

## 2021-09-27 ENCOUNTER — TELEPHONE (OUTPATIENT)
Dept: CARDIOLOGY CLINIC | Facility: CLINIC | Age: 76
End: 2021-09-27

## 2021-09-30 ENCOUNTER — OFFICE VISIT (OUTPATIENT)
Dept: OCCUPATIONAL THERAPY | Facility: CLINIC | Age: 76
End: 2021-09-30
Payer: COMMERCIAL

## 2021-09-30 DIAGNOSIS — M19.032 PRIMARY OSTEOARTHRITIS OF LEFT WRIST: Primary | ICD-10-CM

## 2021-09-30 DIAGNOSIS — M18.12 ARTHRITIS OF CARPOMETACARPAL (CMC) JOINT OF LEFT THUMB: ICD-10-CM

## 2021-09-30 PROCEDURE — 97140 MANUAL THERAPY 1/> REGIONS: CPT | Performed by: OCCUPATIONAL THERAPIST

## 2021-09-30 PROCEDURE — 97530 THERAPEUTIC ACTIVITIES: CPT | Performed by: OCCUPATIONAL THERAPIST

## 2021-09-30 PROCEDURE — 97110 THERAPEUTIC EXERCISES: CPT | Performed by: OCCUPATIONAL THERAPIST

## 2021-09-30 NOTE — PROGRESS NOTES
Daily Note     Today's date: 2021  Patient name: Rafaela Martínez  : 1945  MRN: [id number]  Referring provider: Kelsey Rolle MD  Dx:   Encounter Diagnosis     ICD-10-CM    1  Primary osteoarthritis of left wrist  M19 032    2  Arthritis of carpometacarpal [place]) joint of left thumb  M18 12                   Subjective:  "I have been doing the exercises, a lot  All the time"      Objective: See treatment diary below; Initiated clinic program and issued putty for HEP      Assessment: Tolerated treatment well  Patient would benefit from continued OT  Good tolerance of HEP and AROM exercises  Plan: Progress treatment as tolerated  Assess pain and  and pinch strength NV  Precautions:  Cardiac, OA      Date 9/9 9/15 9/30          Visit 1 2 3          Manuals  13' 15'          IASTM  Thumb and volar hand  8' 8'          Jt mobs  G 1, distract [facility]  5' 5'                                    Neuro Re-Ed                                                                                                        Ther Ex 10'   15'   15'          HEP Distract  Web stretch  AROM th ops  10' New HEP for putty ex   5' Orange putty add, progress  Ex  5'            PROM  Wrist and hand  5' 5'          AROM th ops  2x10 2x10          P/H fists  In heat  5' 5'                                                              Ther Activity   10'   15'          Gripping isometric   Dowel into putty  x40          Dexterity  RB over ball  2x10 2x10          Manipulation  Groove board  Full bd  7' In and out     8'          Gait Training                                       Modalities

## 2021-10-01 ENCOUNTER — ANTICOAG VISIT (OUTPATIENT)
Dept: CARDIOLOGY CLINIC | Facility: CLINIC | Age: 76
End: 2021-10-01

## 2021-10-01 ENCOUNTER — APPOINTMENT (OUTPATIENT)
Dept: LAB | Facility: CLINIC | Age: 76
End: 2021-10-01
Payer: COMMERCIAL

## 2021-10-01 DIAGNOSIS — Z95.2 HISTORY OF HEART VALVE REPLACEMENT: Primary | ICD-10-CM

## 2021-10-01 DIAGNOSIS — I48.21 PERMANENT ATRIAL FIBRILLATION (HCC): Primary | ICD-10-CM

## 2021-10-01 DIAGNOSIS — Z79.01 LONG TERM (CURRENT) USE OF ANTICOAGULANTS: ICD-10-CM

## 2021-10-01 DIAGNOSIS — Z79.01 CHRONIC ANTICOAGULATION: ICD-10-CM

## 2021-10-01 LAB
INR PPP: 3.47 (ref 0.84–1.19)
PROTHROMBIN TIME: 33.1 SECONDS (ref 11.6–14.5)

## 2021-10-01 PROCEDURE — 36415 COLL VENOUS BLD VENIPUNCTURE: CPT

## 2021-10-01 PROCEDURE — 85610 PROTHROMBIN TIME: CPT

## 2021-10-06 NOTE — PROGRESS NOTES
Procedure originally documented on 8/19/2021 for corticosteroid injection was incorrectly documented as being provided to the right thumb CMC joint       In actuality, the injection was performed to the left thumb [facility] joint as outlined in the plan, and physical exam

## 2021-10-12 ENCOUNTER — APPOINTMENT (OUTPATIENT)
Dept: NON INVASIVE DIAGNOSTICS | Facility: CLINIC | Age: 76
End: 2021-10-12
Payer: COMMERCIAL

## 2021-10-12 ENCOUNTER — HOSPITAL ENCOUNTER (OUTPATIENT)
Dept: NON INVASIVE DIAGNOSTICS | Facility: CLINIC | Age: 76
Discharge: HOME | End: 2021-10-12
Payer: COMMERCIAL

## 2021-10-12 VITALS
HEART RATE: 55 BPM | HEIGHT: 69 IN | DIASTOLIC BLOOD PRESSURE: 78 MMHG | WEIGHT: 175 LBS | BODY MASS INDEX: 25.92 KG/M2 | SYSTOLIC BLOOD PRESSURE: 129 MMHG

## 2021-10-12 DIAGNOSIS — I05.9 MITRAL VALVE DISORDER: ICD-10-CM

## 2021-10-12 LAB
AORTIC ROOT: 3.1 CM
APICAL FOUR CHAMBER EJECTION FRACTION: 57 %
FRACTIONAL SHORTENING: 25 (ref 28–44)
INTERVENTRICULAR SEPTUM IN DIASTOLE (PARASTERNAL SHORT AXIS VIEW): 1 CM
LEFT INTERNAL DIMENSION IN SYSTOLE: 3.3 CM (ref 2.1–4)
LEFT VENTRICULAR INTERNAL DIMENSION IN DIASTOLE: 4.4 CM (ref 4.69–6.98)
LEFT VENTRICULAR POSTERIOR WALL IN END DIASTOLE: 1 CM
LEFT VENTRICULAR STROKE VOLUME: 44 ML
MV STENOSIS PRESSURE HALF TIME: 0 MS
MV VALVE AREA P 1/2 METHOD: 3.6
RIGHT VENTRICLE ID DIMENSION: 4.4 CM
RV PSP: 39 MMHG
SL CV PED ECHO LEFT VENTRICLE DIASTOLIC VOLUME (MOD BIPLANE) 2D: 87 ML
SL CV PED ECHO LEFT VENTRICLE SYSTOLIC VOLUME (MOD BIPLANE) 2D: 43 ML
TR PEAK VELOCITY: 3 M/S
TRICUSPID VALVE PEAK REGURGITATION VELOCITY: 2.98 M/S
TRICUSPID VALVE S': 0.1 CM/S
TV PEAK GRADIENT: 36 MMHG

## 2021-10-12 PROCEDURE — 93306 TTE W/DOPPLER COMPLETE: CPT | Performed by: INTERNAL MEDICINE

## 2021-10-12 PROCEDURE — 93306 TTE W/DOPPLER COMPLETE: CPT

## 2021-10-13 ENCOUNTER — HOSPITAL ENCOUNTER (OUTPATIENT)
Dept: NON INVASIVE DIAGNOSTICS | Facility: CLINIC | Age: 76
Discharge: HOME | End: 2021-10-13
Payer: COMMERCIAL

## 2021-10-13 ENCOUNTER — TELEPHONE (OUTPATIENT)
Dept: CARDIOLOGY CLINIC | Facility: CLINIC | Age: 76
End: 2021-10-13

## 2021-10-13 DIAGNOSIS — I48.21 PERMANENT ATRIAL FIBRILLATION (HCC): ICD-10-CM

## 2021-10-13 PROCEDURE — 93226 XTRNL ECG REC<48 HR SCAN A/R: CPT

## 2021-10-13 PROCEDURE — 93225 XTRNL ECG REC<48 HRS REC: CPT

## 2021-10-17 PROCEDURE — 93227 XTRNL ECG REC<48 HR R&I: CPT | Performed by: INTERNAL MEDICINE

## 2021-10-18 ENCOUNTER — ANTICOAG VISIT (OUTPATIENT)
Dept: CARDIOLOGY CLINIC | Facility: CLINIC | Age: 76
End: 2021-10-18

## 2021-10-18 ENCOUNTER — APPOINTMENT (OUTPATIENT)
Dept: LAB | Facility: CLINIC | Age: 76
End: 2021-10-18
Payer: COMMERCIAL

## 2021-10-18 DIAGNOSIS — E03.8 SUBCLINICAL HYPOTHYROIDISM: ICD-10-CM

## 2021-10-18 DIAGNOSIS — E78.2 MIXED HYPERLIPIDEMIA: ICD-10-CM

## 2021-10-18 DIAGNOSIS — R73.01 IFG (IMPAIRED FASTING GLUCOSE): ICD-10-CM

## 2021-10-18 DIAGNOSIS — I10 BENIGN HYPERTENSION: ICD-10-CM

## 2021-10-18 LAB
ALBUMIN SERPL BCP-MCNC: 3.6 G/DL (ref 3.5–5)
ALP SERPL-CCNC: 68 U/L (ref 46–116)
ALT SERPL W P-5'-P-CCNC: 19 U/L (ref 12–78)
ANION GAP SERPL CALCULATED.3IONS-SCNC: 4 MMOL/L (ref 4–13)
AST SERPL W P-5'-P-CCNC: 23 U/L (ref 5–45)
BASOPHILS # BLD AUTO: 0.05 THOUSANDS/ΜL (ref 0–0.1)
BASOPHILS NFR BLD AUTO: 1 % (ref 0–1)
BILIRUB SERPL-MCNC: 0.58 MG/DL (ref 0.2–1)
BUN SERPL-MCNC: 20 MG/DL (ref 5–25)
CALCIUM SERPL-MCNC: 9.3 MG/DL (ref 8.3–10.1)
CHLORIDE SERPL-SCNC: 109 MMOL/L (ref 100–108)
CHOLEST SERPL-MCNC: 162 MG/DL (ref 50–200)
CO2 SERPL-SCNC: 27 MMOL/L (ref 21–32)
CREAT SERPL-MCNC: 0.85 MG/DL (ref 0.6–1.3)
EOSINOPHIL # BLD AUTO: 0.3 THOUSAND/ΜL (ref 0–0.61)
EOSINOPHIL NFR BLD AUTO: 5 % (ref 0–6)
ERYTHROCYTE [DISTWIDTH] IN BLOOD BY AUTOMATED COUNT: 13.2 % (ref 11.6–15.1)
EST. AVERAGE GLUCOSE BLD GHB EST-MCNC: 103 MG/DL
GFR SERPL CREATININE-BSD FRML MDRD: 68 ML/MIN/1.73SQ M
GLUCOSE P FAST SERPL-MCNC: 98 MG/DL (ref 65–99)
HBA1C MFR BLD: 5.2 %
HCT VFR BLD AUTO: 38.3 % (ref 34.8–46.1)
HDLC SERPL-MCNC: 57 MG/DL
HGB BLD-MCNC: 12.1 G/DL (ref 11.5–15.4)
IMM GRANULOCYTES # BLD AUTO: 0.02 THOUSAND/UL (ref 0–0.2)
IMM GRANULOCYTES NFR BLD AUTO: 0 % (ref 0–2)
LDLC SERPL CALC-MCNC: 80 MG/DL (ref 0–100)
LYMPHOCYTES # BLD AUTO: 1.03 THOUSANDS/ΜL (ref 0.6–4.47)
LYMPHOCYTES NFR BLD AUTO: 16 % (ref 14–44)
MCH RBC QN AUTO: 31.8 PG (ref 26.8–34.3)
MCHC RBC AUTO-ENTMCNC: 31.6 G/DL (ref 31.4–37.4)
MCV RBC AUTO: 101 FL (ref 82–98)
MONOCYTES # BLD AUTO: 0.78 THOUSAND/ΜL (ref 0.17–1.22)
MONOCYTES NFR BLD AUTO: 12 % (ref 4–12)
NEUTROPHILS # BLD AUTO: 4.16 THOUSANDS/ΜL (ref 1.85–7.62)
NEUTS SEG NFR BLD AUTO: 66 % (ref 43–75)
NONHDLC SERPL-MCNC: 105 MG/DL
NRBC BLD AUTO-RTO: 0 /100 WBCS
PLATELET # BLD AUTO: 201 THOUSANDS/UL (ref 149–390)
PMV BLD AUTO: 9.9 FL (ref 8.9–12.7)
POTASSIUM SERPL-SCNC: 4.5 MMOL/L (ref 3.5–5.3)
PROT SERPL-MCNC: 7.8 G/DL (ref 6.4–8.2)
RBC # BLD AUTO: 3.8 MILLION/UL (ref 3.81–5.12)
SODIUM SERPL-SCNC: 140 MMOL/L (ref 136–145)
TRIGL SERPL-MCNC: 123 MG/DL
TSH SERPL DL<=0.05 MIU/L-ACNC: 3.7 UIU/ML (ref 0.36–3.74)
WBC # BLD AUTO: 6.34 THOUSAND/UL (ref 4.31–10.16)

## 2021-10-18 PROCEDURE — 80053 COMPREHEN METABOLIC PANEL: CPT

## 2021-10-18 PROCEDURE — 83036 HEMOGLOBIN GLYCOSYLATED A1C: CPT

## 2021-10-18 PROCEDURE — 80061 LIPID PANEL: CPT

## 2021-10-18 PROCEDURE — 85025 COMPLETE CBC W/AUTO DIFF WBC: CPT

## 2021-10-18 PROCEDURE — 3044F HG A1C LEVEL LT 7.0%: CPT | Performed by: PHYSICIAN ASSISTANT

## 2021-10-18 PROCEDURE — 84443 ASSAY THYROID STIM HORMONE: CPT

## 2021-10-19 ENCOUNTER — OFFICE VISIT (OUTPATIENT)
Dept: INTERNAL MEDICINE CLINIC | Facility: CLINIC | Age: 76
End: 2021-10-19
Payer: COMMERCIAL

## 2021-10-19 VITALS
OXYGEN SATURATION: 91 % | DIASTOLIC BLOOD PRESSURE: 78 MMHG | WEIGHT: 178.4 LBS | BODY MASS INDEX: 26.42 KG/M2 | RESPIRATION RATE: 14 BRPM | HEIGHT: 69 IN | HEART RATE: 70 BPM | SYSTOLIC BLOOD PRESSURE: 128 MMHG | TEMPERATURE: 98 F

## 2021-10-19 DIAGNOSIS — G25.0 ESSENTIAL TREMOR: ICD-10-CM

## 2021-10-19 DIAGNOSIS — I48.91 ATRIAL FIBRILLATION, UNSPECIFIED TYPE (HCC): ICD-10-CM

## 2021-10-19 DIAGNOSIS — E03.8 SUBCLINICAL HYPOTHYROIDISM: ICD-10-CM

## 2021-10-19 DIAGNOSIS — I10 BENIGN HYPERTENSION: ICD-10-CM

## 2021-10-19 DIAGNOSIS — R73.01 IFG (IMPAIRED FASTING GLUCOSE): ICD-10-CM

## 2021-10-19 DIAGNOSIS — E53.8 B12 DEFICIENCY: ICD-10-CM

## 2021-10-19 DIAGNOSIS — Z23 ENCOUNTER FOR IMMUNIZATION: Primary | ICD-10-CM

## 2021-10-19 DIAGNOSIS — E78.2 MIXED HYPERLIPIDEMIA: ICD-10-CM

## 2021-10-19 PROCEDURE — G0008 ADMIN INFLUENZA VIRUS VAC: HCPCS | Performed by: NURSE PRACTITIONER

## 2021-10-19 PROCEDURE — 1170F FXNL STATUS ASSESSED: CPT | Performed by: NURSE PRACTITIONER

## 2021-10-19 PROCEDURE — G0439 PPPS, SUBSEQ VISIT: HCPCS | Performed by: NURSE PRACTITIONER

## 2021-10-19 PROCEDURE — 1160F RVW MEDS BY RX/DR IN RCRD: CPT | Performed by: NURSE PRACTITIONER

## 2021-10-19 PROCEDURE — 1125F AMNT PAIN NOTED PAIN PRSNT: CPT | Performed by: NURSE PRACTITIONER

## 2021-10-19 PROCEDURE — 3288F FALL RISK ASSESSMENT DOCD: CPT | Performed by: NURSE PRACTITIONER

## 2021-10-19 PROCEDURE — 90662 IIV NO PRSV INCREASED AG IM: CPT | Performed by: NURSE PRACTITIONER

## 2021-10-19 PROCEDURE — 96372 THER/PROPH/DIAG INJ SC/IM: CPT | Performed by: NURSE PRACTITIONER

## 2021-10-19 PROCEDURE — 99214 OFFICE O/P EST MOD 30 MIN: CPT | Performed by: NURSE PRACTITIONER

## 2021-10-19 RX ORDER — CYANOCOBALAMIN 1000 UG/ML
1000 INJECTION INTRAMUSCULAR; SUBCUTANEOUS
Status: SHIPPED | OUTPATIENT
Start: 2021-10-19

## 2021-10-19 RX ADMIN — CYANOCOBALAMIN 1000 MCG: 1000 INJECTION INTRAMUSCULAR; SUBCUTANEOUS at 10:46

## 2021-10-20 ENCOUNTER — TELEPHONE (OUTPATIENT)
Dept: CARDIOLOGY CLINIC | Facility: CLINIC | Age: 76
End: 2021-10-20

## 2021-10-21 ENCOUNTER — OFFICE VISIT (OUTPATIENT)
Dept: GASTROENTEROLOGY | Facility: CLINIC | Age: 76
End: 2021-10-21
Payer: COMMERCIAL

## 2021-10-21 VITALS
HEART RATE: 64 BPM | SYSTOLIC BLOOD PRESSURE: 136 MMHG | HEIGHT: 69 IN | WEIGHT: 178 LBS | DIASTOLIC BLOOD PRESSURE: 84 MMHG | BODY MASS INDEX: 26.36 KG/M2

## 2021-10-21 DIAGNOSIS — R14.0 BLOATING: Primary | ICD-10-CM

## 2021-10-21 DIAGNOSIS — Z12.11 COLON CANCER SCREENING: ICD-10-CM

## 2021-10-21 DIAGNOSIS — R14.0 ABDOMINAL DISTENTION: ICD-10-CM

## 2021-10-21 PROCEDURE — 1036F TOBACCO NON-USER: CPT | Performed by: PHYSICIAN ASSISTANT

## 2021-10-21 PROCEDURE — 99203 OFFICE O/P NEW LOW 30 MIN: CPT | Performed by: PHYSICIAN ASSISTANT

## 2021-10-21 PROCEDURE — 3079F DIAST BP 80-89 MM HG: CPT | Performed by: PHYSICIAN ASSISTANT

## 2021-10-21 PROCEDURE — 1160F RVW MEDS BY RX/DR IN RCRD: CPT | Performed by: PHYSICIAN ASSISTANT

## 2021-10-21 PROCEDURE — 3008F BODY MASS INDEX DOCD: CPT | Performed by: PHYSICIAN ASSISTANT

## 2021-10-21 PROCEDURE — 3075F SYST BP GE 130 - 139MM HG: CPT | Performed by: PHYSICIAN ASSISTANT

## 2021-10-21 RX ORDER — DICYCLOMINE HYDROCHLORIDE 10 MG/1
10 CAPSULE ORAL EVERY 6 HOURS PRN
Qty: 30 CAPSULE | Refills: 1 | Status: SHIPPED | OUTPATIENT
Start: 2021-10-21 | End: 2021-12-09 | Stop reason: CLARIF

## 2021-10-22 ENCOUNTER — APPOINTMENT (OUTPATIENT)
Dept: LAB | Facility: CLINIC | Age: 76
End: 2021-10-22
Payer: COMMERCIAL

## 2021-10-22 ENCOUNTER — TELEPHONE (OUTPATIENT)
Dept: INTERNAL MEDICINE CLINIC | Facility: CLINIC | Age: 76
End: 2021-10-22

## 2021-10-22 DIAGNOSIS — R14.0 BLOATING: ICD-10-CM

## 2021-10-22 DIAGNOSIS — R14.0 ABDOMINAL DISTENTION: ICD-10-CM

## 2021-10-22 PROCEDURE — 83516 IMMUNOASSAY NONANTIBODY: CPT

## 2021-10-22 PROCEDURE — 82784 ASSAY IGA/IGD/IGG/IGM EACH: CPT

## 2021-10-22 PROCEDURE — 86255 FLUORESCENT ANTIBODY SCREEN: CPT

## 2021-10-22 PROCEDURE — 36415 COLL VENOUS BLD VENIPUNCTURE: CPT

## 2021-10-22 NOTE — PROGRESS NOTES
Discharge Summary    10/21/21    Maria M attended three therapy visits to address the pain of her left thumb  Over the three visits, she made excellent progress in pain reduction and return to ADLs  She was scheduled for a fourth  follow up visit but this visit was declined as she was "feeling better"  UA to assess progress as measurements are not available  No further therapy appointments scheduled  Discharge for therapy

## 2021-10-23 LAB
ENDOMYSIUM IGA SER QL: NEGATIVE
GLIADIN PEPTIDE IGA SER-ACNC: 4 UNITS (ref 0–19)
GLIADIN PEPTIDE IGG SER-ACNC: 2 UNITS (ref 0–19)
IGA SERPL-MCNC: 249 MG/DL (ref 64–422)
TTG IGA SER-ACNC: <2 U/ML (ref 0–3)
TTG IGG SER-ACNC: <2 U/ML (ref 0–5)

## 2021-10-25 ENCOUNTER — TELEPHONE (OUTPATIENT)
Dept: GASTROENTEROLOGY | Facility: CLINIC | Age: 76
End: 2021-10-25

## 2021-10-26 ENCOUNTER — TELEPHONE (OUTPATIENT)
Dept: CARDIOLOGY CLINIC | Facility: CLINIC | Age: 76
End: 2021-10-26

## 2021-10-26 DIAGNOSIS — Z95.2 HISTORY OF HEART VALVE REPLACEMENT: Primary | ICD-10-CM

## 2021-10-27 ENCOUNTER — PREP FOR PROCEDURE (OUTPATIENT)
Dept: GASTROENTEROLOGY | Facility: CLINIC | Age: 76
End: 2021-10-27

## 2021-10-27 DIAGNOSIS — R14.0 ABDOMINAL DISTENTION: ICD-10-CM

## 2021-10-27 DIAGNOSIS — R14.0 BLOATING: Primary | ICD-10-CM

## 2021-10-27 DIAGNOSIS — Z12.11 COLON CANCER SCREENING: ICD-10-CM

## 2021-10-28 ENCOUNTER — TELEPHONE (OUTPATIENT)
Dept: GASTROENTEROLOGY | Facility: CLINIC | Age: 76
End: 2021-10-28

## 2021-10-29 ENCOUNTER — TELEPHONE (OUTPATIENT)
Dept: GASTROENTEROLOGY | Facility: CLINIC | Age: 76
End: 2021-10-29

## 2021-11-01 ENCOUNTER — ANTICOAG VISIT (OUTPATIENT)
Dept: CARDIOLOGY CLINIC | Facility: CLINIC | Age: 76
End: 2021-11-01

## 2021-11-01 ENCOUNTER — APPOINTMENT (OUTPATIENT)
Dept: LAB | Facility: CLINIC | Age: 76
End: 2021-11-01
Payer: COMMERCIAL

## 2021-11-01 DIAGNOSIS — Z95.2 HISTORY OF HEART VALVE REPLACEMENT: Primary | ICD-10-CM

## 2021-11-01 DIAGNOSIS — I48.21 PERMANENT ATRIAL FIBRILLATION (HCC): ICD-10-CM

## 2021-11-01 DIAGNOSIS — Z95.2 HISTORY OF HEART VALVE REPLACEMENT: ICD-10-CM

## 2021-11-05 ENCOUNTER — IMMUNIZATIONS (OUTPATIENT)
Dept: FAMILY MEDICINE CLINIC | Facility: HOSPITAL | Age: 76
End: 2021-11-05

## 2021-11-05 DIAGNOSIS — Z23 ENCOUNTER FOR IMMUNIZATION: Primary | ICD-10-CM

## 2021-11-05 PROCEDURE — 91300 COVID-19 PFIZER VACC 0.3 ML: CPT

## 2021-11-05 PROCEDURE — 0001A COVID-19 PFIZER VACC 0.3 ML: CPT

## 2021-11-22 DIAGNOSIS — N32.81 OAB (OVERACTIVE BLADDER): ICD-10-CM

## 2021-11-22 DIAGNOSIS — I10 HYPERTENSION, ESSENTIAL: ICD-10-CM

## 2021-11-22 RX ORDER — OXYBUTYNIN CHLORIDE 10 MG/1
TABLET, EXTENDED RELEASE ORAL
Qty: 90 TABLET | Refills: 1 | Status: SHIPPED | OUTPATIENT
Start: 2021-11-22 | End: 2022-06-20

## 2021-11-23 ENCOUNTER — APPOINTMENT (OUTPATIENT)
Dept: LAB | Facility: CLINIC | Age: 76
End: 2021-11-23
Payer: COMMERCIAL

## 2021-11-23 ENCOUNTER — ANTICOAG VISIT (OUTPATIENT)
Dept: CARDIOLOGY CLINIC | Facility: CLINIC | Age: 76
End: 2021-11-23

## 2021-11-23 DIAGNOSIS — E78.2 COMBINED HYPERLIPIDEMIA: ICD-10-CM

## 2021-11-23 DIAGNOSIS — Z95.2 HISTORY OF HEART VALVE REPLACEMENT: Primary | ICD-10-CM

## 2021-11-23 DIAGNOSIS — G62.9 NEUROPATHY: ICD-10-CM

## 2021-11-23 RX ORDER — AMLODIPINE BESYLATE 5 MG/1
TABLET ORAL
Qty: 90 TABLET | Refills: 3 | Status: SHIPPED | OUTPATIENT
Start: 2021-11-23

## 2021-11-23 RX ORDER — SIMVASTATIN 10 MG
TABLET ORAL
Qty: 90 TABLET | Refills: 3 | Status: SHIPPED | OUTPATIENT
Start: 2021-11-23

## 2021-11-23 RX ORDER — GABAPENTIN 300 MG/1
CAPSULE ORAL
Qty: 90 CAPSULE | Refills: 3 | Status: SHIPPED | OUTPATIENT
Start: 2021-11-23

## 2021-11-29 ENCOUNTER — TELEPHONE (OUTPATIENT)
Dept: GASTROENTEROLOGY | Facility: HOSPITAL | Age: 76
End: 2021-11-29

## 2021-11-30 ENCOUNTER — ANESTHESIA (OUTPATIENT)
Dept: GASTROENTEROLOGY | Facility: HOSPITAL | Age: 76
End: 2021-11-30

## 2021-11-30 ENCOUNTER — HOSPITAL ENCOUNTER (OUTPATIENT)
Dept: GASTROENTEROLOGY | Facility: HOSPITAL | Age: 76
Setting detail: OUTPATIENT SURGERY
Discharge: HOME | End: 2021-11-30
Attending: INTERNAL MEDICINE
Payer: COMMERCIAL

## 2021-11-30 ENCOUNTER — ANESTHESIA EVENT (OUTPATIENT)
Dept: GASTROENTEROLOGY | Facility: HOSPITAL | Age: 76
End: 2021-11-30

## 2021-11-30 VITALS
BODY MASS INDEX: 25.44 KG/M2 | DIASTOLIC BLOOD PRESSURE: 74 MMHG | SYSTOLIC BLOOD PRESSURE: 124 MMHG | OXYGEN SATURATION: 94 % | WEIGHT: 171.74 LBS | RESPIRATION RATE: 18 BRPM | HEIGHT: 69 IN | HEART RATE: 78 BPM | TEMPERATURE: 98.3 F

## 2021-11-30 DIAGNOSIS — Z12.11 COLON CANCER SCREENING: ICD-10-CM

## 2021-11-30 DIAGNOSIS — R14.0 ABDOMINAL DISTENTION: ICD-10-CM

## 2021-11-30 DIAGNOSIS — R14.0 BLOATING: ICD-10-CM

## 2021-11-30 PROCEDURE — 88305 TISSUE EXAM BY PATHOLOGIST: CPT | Performed by: PATHOLOGY

## 2021-11-30 PROCEDURE — 88342 IMHCHEM/IMCYTCHM 1ST ANTB: CPT | Performed by: PATHOLOGY

## 2021-11-30 PROCEDURE — 45385 COLONOSCOPY W/LESION REMOVAL: CPT | Performed by: INTERNAL MEDICINE

## 2021-11-30 PROCEDURE — 43239 EGD BIOPSY SINGLE/MULTIPLE: CPT | Performed by: INTERNAL MEDICINE

## 2021-11-30 RX ORDER — LIDOCAINE HYDROCHLORIDE 20 MG/ML
INJECTION, SOLUTION EPIDURAL; INFILTRATION; INTRACAUDAL; PERINEURAL AS NEEDED
Status: DISCONTINUED | OUTPATIENT
Start: 2021-11-30 | End: 2021-11-30

## 2021-11-30 RX ORDER — GLYCOPYRROLATE 0.2 MG/ML
INJECTION INTRAMUSCULAR; INTRAVENOUS AS NEEDED
Status: DISCONTINUED | OUTPATIENT
Start: 2021-11-30 | End: 2021-11-30

## 2021-11-30 RX ORDER — PROPOFOL 10 MG/ML
INJECTION, EMULSION INTRAVENOUS AS NEEDED
Status: DISCONTINUED | OUTPATIENT
Start: 2021-11-30 | End: 2021-11-30

## 2021-11-30 RX ORDER — SODIUM CHLORIDE, SODIUM LACTATE, POTASSIUM CHLORIDE, CALCIUM CHLORIDE 600; 310; 30; 20 MG/100ML; MG/100ML; MG/100ML; MG/100ML
125 INJECTION, SOLUTION INTRAVENOUS CONTINUOUS
Status: DISCONTINUED | OUTPATIENT
Start: 2021-11-30 | End: 2021-12-04 | Stop reason: HOSPADM

## 2021-11-30 RX ORDER — LIDOCAINE HYDROCHLORIDE 10 MG/ML
0.5 INJECTION, SOLUTION EPIDURAL; INFILTRATION; INTRACAUDAL; PERINEURAL ONCE AS NEEDED
Status: DISCONTINUED | OUTPATIENT
Start: 2021-11-30 | End: 2021-12-04 | Stop reason: HOSPADM

## 2021-11-30 RX ORDER — SODIUM CHLORIDE, SODIUM LACTATE, POTASSIUM CHLORIDE, CALCIUM CHLORIDE 600; 310; 30; 20 MG/100ML; MG/100ML; MG/100ML; MG/100ML
INJECTION, SOLUTION INTRAVENOUS CONTINUOUS PRN
Status: DISCONTINUED | OUTPATIENT
Start: 2021-11-30 | End: 2021-11-30

## 2021-11-30 RX ADMIN — PROPOFOL 20 MG: 10 INJECTION, EMULSION INTRAVENOUS at 13:12

## 2021-11-30 RX ADMIN — PROPOFOL 10 MG: 10 INJECTION, EMULSION INTRAVENOUS at 13:06

## 2021-11-30 RX ADMIN — PROPOFOL 20 MG: 10 INJECTION, EMULSION INTRAVENOUS at 13:19

## 2021-11-30 RX ADMIN — PROPOFOL 20 MG: 10 INJECTION, EMULSION INTRAVENOUS at 13:22

## 2021-11-30 RX ADMIN — SODIUM CHLORIDE, SODIUM LACTATE, POTASSIUM CHLORIDE, AND CALCIUM CHLORIDE 125 ML/HR: .6; .31; .03; .02 INJECTION, SOLUTION INTRAVENOUS at 12:10

## 2021-11-30 RX ADMIN — PROPOFOL 20 MG: 10 INJECTION, EMULSION INTRAVENOUS at 13:05

## 2021-11-30 RX ADMIN — PROPOFOL 70 MG: 10 INJECTION, EMULSION INTRAVENOUS at 13:03

## 2021-11-30 RX ADMIN — LIDOCAINE HYDROCHLORIDE 120 MG: 20 INJECTION, SOLUTION EPIDURAL; INFILTRATION; INTRACAUDAL; PERINEURAL at 13:03

## 2021-11-30 RX ADMIN — PROPOFOL 10 MG: 10 INJECTION, EMULSION INTRAVENOUS at 13:21

## 2021-11-30 RX ADMIN — PROPOFOL 20 MG: 10 INJECTION, EMULSION INTRAVENOUS at 13:09

## 2021-11-30 RX ADMIN — SODIUM CHLORIDE, SODIUM LACTATE, POTASSIUM CHLORIDE, AND CALCIUM CHLORIDE: .6; .31; .03; .02 INJECTION, SOLUTION INTRAVENOUS at 12:59

## 2021-11-30 RX ADMIN — PROPOFOL 20 MG: 10 INJECTION, EMULSION INTRAVENOUS at 13:14

## 2021-11-30 RX ADMIN — PROPOFOL 20 MG: 10 INJECTION, EMULSION INTRAVENOUS at 13:16

## 2021-11-30 RX ADMIN — GLYCOPYRROLATE 0.4 MG: 0.2 INJECTION, SOLUTION INTRAMUSCULAR; INTRAVENOUS at 13:16

## 2021-11-30 RX ADMIN — PROPOFOL 20 MG: 10 INJECTION, EMULSION INTRAVENOUS at 13:23

## 2021-11-30 RX ADMIN — PROPOFOL 20 MG: 10 INJECTION, EMULSION INTRAVENOUS at 13:20

## 2021-12-02 ENCOUNTER — HOSPITAL ENCOUNTER (EMERGENCY)
Facility: HOSPITAL | Age: 76
Discharge: HOME | End: 2021-12-02
Attending: EMERGENCY MEDICINE
Payer: COMMERCIAL

## 2021-12-02 ENCOUNTER — APPOINTMENT (EMERGENCY)
Dept: RADIOLOGY | Facility: HOSPITAL | Age: 76
End: 2021-12-02
Payer: COMMERCIAL

## 2021-12-02 VITALS
RESPIRATION RATE: 16 BRPM | OXYGEN SATURATION: 97 % | SYSTOLIC BLOOD PRESSURE: 135 MMHG | DIASTOLIC BLOOD PRESSURE: 70 MMHG | TEMPERATURE: 98.1 F | HEART RATE: 72 BPM

## 2021-12-02 DIAGNOSIS — L03.116 CELLULITIS OF LEFT FOOT: Primary | ICD-10-CM

## 2021-12-02 PROCEDURE — 99283 EMERGENCY DEPT VISIT LOW MDM: CPT

## 2021-12-02 PROCEDURE — 99284 EMERGENCY DEPT VISIT MOD MDM: CPT | Performed by: PHYSICIAN ASSISTANT

## 2021-12-02 PROCEDURE — 73630 X-RAY EXAM OF FOOT: CPT

## 2021-12-02 RX ORDER — CEPHALEXIN 250 MG/1
500 CAPSULE ORAL ONCE
Status: COMPLETED | OUTPATIENT
Start: 2021-12-02 | End: 2021-12-02

## 2021-12-02 RX ORDER — CEPHALEXIN 500 MG/1
500 CAPSULE ORAL EVERY 6 HOURS SCHEDULED
Qty: 28 CAPSULE | Refills: 0 | Status: SHIPPED | OUTPATIENT
Start: 2021-12-02 | End: 2021-12-09

## 2021-12-02 RX ORDER — ACETAMINOPHEN 325 MG/1
650 TABLET ORAL ONCE
Status: COMPLETED | OUTPATIENT
Start: 2021-12-02 | End: 2021-12-02

## 2021-12-02 RX ADMIN — CEPHALEXIN 500 MG: 250 CAPSULE ORAL at 11:47

## 2021-12-02 RX ADMIN — ACETAMINOPHEN 650 MG: 325 TABLET, FILM COATED ORAL at 11:47

## 2021-12-03 ENCOUNTER — ANTICOAG VISIT (OUTPATIENT)
Dept: CARDIOLOGY CLINIC | Facility: CLINIC | Age: 76
End: 2021-12-03

## 2021-12-03 ENCOUNTER — APPOINTMENT (OUTPATIENT)
Dept: LAB | Facility: CLINIC | Age: 76
End: 2021-12-03
Payer: COMMERCIAL

## 2021-12-03 DIAGNOSIS — Z95.2 HISTORY OF HEART VALVE REPLACEMENT: Primary | ICD-10-CM

## 2021-12-05 ENCOUNTER — TREATMENT (OUTPATIENT)
Dept: GASTROENTEROLOGY | Facility: CLINIC | Age: 76
End: 2021-12-05

## 2021-12-05 DIAGNOSIS — K29.70 HELICOBACTER POSITIVE GASTRITIS: Primary | ICD-10-CM

## 2021-12-05 DIAGNOSIS — B96.81 HELICOBACTER POSITIVE GASTRITIS: Primary | ICD-10-CM

## 2021-12-05 RX ORDER — CLARITHROMYCIN 500 MG/1
500 TABLET, COATED ORAL EVERY 12 HOURS SCHEDULED
Qty: 28 TABLET | Refills: 0 | Status: SHIPPED | OUTPATIENT
Start: 2021-12-05 | End: 2021-12-19

## 2021-12-05 RX ORDER — PANTOPRAZOLE SODIUM 40 MG/1
40 TABLET, DELAYED RELEASE ORAL
Qty: 28 TABLET | Refills: 0 | Status: SHIPPED | OUTPATIENT
Start: 2021-12-05 | End: 2022-04-05 | Stop reason: ALTCHOICE

## 2021-12-05 RX ORDER — AMOXICILLIN 500 MG/1
1000 CAPSULE ORAL EVERY 12 HOURS SCHEDULED
Qty: 56 CAPSULE | Refills: 0 | Status: SHIPPED | OUTPATIENT
Start: 2021-12-05 | End: 2021-12-19

## 2021-12-07 ENCOUNTER — TELEPHONE (OUTPATIENT)
Dept: GASTROENTEROLOGY | Facility: CLINIC | Age: 76
End: 2021-12-07

## 2021-12-08 ENCOUNTER — TELEPHONE (OUTPATIENT)
Dept: GASTROENTEROLOGY | Facility: CLINIC | Age: 76
End: 2021-12-08

## 2021-12-09 ENCOUNTER — APPOINTMENT (OUTPATIENT)
Dept: LAB | Facility: CLINIC | Age: 76
End: 2021-12-09
Payer: COMMERCIAL

## 2021-12-09 ENCOUNTER — OFFICE VISIT (OUTPATIENT)
Dept: INTERNAL MEDICINE CLINIC | Facility: CLINIC | Age: 76
End: 2021-12-09
Payer: COMMERCIAL

## 2021-12-09 ENCOUNTER — TELEPHONE (OUTPATIENT)
Dept: CARDIOLOGY CLINIC | Facility: CLINIC | Age: 76
End: 2021-12-09

## 2021-12-09 ENCOUNTER — TELEPHONE (OUTPATIENT)
Dept: INTERNAL MEDICINE CLINIC | Facility: CLINIC | Age: 76
End: 2021-12-09

## 2021-12-09 ENCOUNTER — ANTICOAG VISIT (OUTPATIENT)
Dept: CARDIOLOGY CLINIC | Facility: CLINIC | Age: 76
End: 2021-12-09

## 2021-12-09 VITALS
SYSTOLIC BLOOD PRESSURE: 134 MMHG | HEIGHT: 69 IN | RESPIRATION RATE: 16 BRPM | WEIGHT: 175.6 LBS | BODY MASS INDEX: 26.01 KG/M2 | HEART RATE: 60 BPM | DIASTOLIC BLOOD PRESSURE: 82 MMHG

## 2021-12-09 DIAGNOSIS — A04.8 H. PYLORI INFECTION: Primary | ICD-10-CM

## 2021-12-09 DIAGNOSIS — Z79.01 CHRONIC ANTICOAGULATION: ICD-10-CM

## 2021-12-09 DIAGNOSIS — L03.90 CELLULITIS, UNSPECIFIED CELLULITIS SITE: ICD-10-CM

## 2021-12-09 DIAGNOSIS — Z95.2 HISTORY OF HEART VALVE REPLACEMENT: Primary | ICD-10-CM

## 2021-12-09 LAB
INR PPP: 2.37 (ref 0.84–1.19)
PROTHROMBIN TIME: 24.7 SECONDS (ref 11.6–14.5)

## 2021-12-09 PROCEDURE — 3725F SCREEN DEPRESSION PERFORMED: CPT | Performed by: NURSE PRACTITIONER

## 2021-12-09 PROCEDURE — 36415 COLL VENOUS BLD VENIPUNCTURE: CPT

## 2021-12-09 PROCEDURE — 1160F RVW MEDS BY RX/DR IN RCRD: CPT | Performed by: NURSE PRACTITIONER

## 2021-12-09 PROCEDURE — 1036F TOBACCO NON-USER: CPT | Performed by: NURSE PRACTITIONER

## 2021-12-09 PROCEDURE — 3079F DIAST BP 80-89 MM HG: CPT | Performed by: NURSE PRACTITIONER

## 2021-12-09 PROCEDURE — 99213 OFFICE O/P EST LOW 20 MIN: CPT | Performed by: NURSE PRACTITIONER

## 2021-12-09 PROCEDURE — 85610 PROTHROMBIN TIME: CPT

## 2021-12-09 PROCEDURE — 3075F SYST BP GE 130 - 139MM HG: CPT | Performed by: NURSE PRACTITIONER

## 2021-12-09 PROCEDURE — 3008F BODY MASS INDEX DOCD: CPT | Performed by: NURSE PRACTITIONER

## 2021-12-20 ENCOUNTER — ANTICOAG VISIT (OUTPATIENT)
Dept: CARDIOLOGY CLINIC | Facility: CLINIC | Age: 76
End: 2021-12-20

## 2021-12-20 ENCOUNTER — APPOINTMENT (OUTPATIENT)
Dept: LAB | Facility: CLINIC | Age: 76
End: 2021-12-20
Payer: COMMERCIAL

## 2021-12-20 DIAGNOSIS — Z79.01 CHRONIC ANTICOAGULATION: ICD-10-CM

## 2021-12-20 DIAGNOSIS — Z95.2 HISTORY OF HEART VALVE REPLACEMENT: Primary | ICD-10-CM

## 2021-12-20 LAB
INR PPP: 3.22 (ref 0.84–1.19)
PROTHROMBIN TIME: 31.3 SECONDS (ref 11.6–14.5)

## 2021-12-20 PROCEDURE — 85610 PROTHROMBIN TIME: CPT

## 2021-12-20 PROCEDURE — 36415 COLL VENOUS BLD VENIPUNCTURE: CPT

## 2021-12-27 ENCOUNTER — TELEPHONE (OUTPATIENT)
Dept: GASTROENTEROLOGY | Facility: CLINIC | Age: 76
End: 2021-12-27

## 2021-12-28 ENCOUNTER — ANTICOAG VISIT (OUTPATIENT)
Dept: CARDIOLOGY CLINIC | Facility: CLINIC | Age: 76
End: 2021-12-28

## 2021-12-28 ENCOUNTER — APPOINTMENT (OUTPATIENT)
Dept: LAB | Facility: CLINIC | Age: 76
End: 2021-12-28
Payer: COMMERCIAL

## 2021-12-28 DIAGNOSIS — I48.21 PERMANENT ATRIAL FIBRILLATION (HCC): ICD-10-CM

## 2021-12-28 DIAGNOSIS — Z79.01 CHRONIC ANTICOAGULATION: ICD-10-CM

## 2021-12-28 DIAGNOSIS — Z95.2 HISTORY OF HEART VALVE REPLACEMENT: Primary | ICD-10-CM

## 2021-12-28 LAB
INR PPP: 2.58 (ref 0.84–1.19)
PROTHROMBIN TIME: 26.4 SECONDS (ref 11.6–14.5)

## 2021-12-28 PROCEDURE — 36415 COLL VENOUS BLD VENIPUNCTURE: CPT

## 2021-12-28 PROCEDURE — 85610 PROTHROMBIN TIME: CPT

## 2022-01-03 ENCOUNTER — TELEPHONE (OUTPATIENT)
Dept: GASTROENTEROLOGY | Facility: CLINIC | Age: 77
End: 2022-01-03

## 2022-01-03 NOTE — TELEPHONE ENCOUNTER
Spoke with patient advised pr Heidi Fabian RN typically the patient is seen first by the PA and then labs are ordered  Patient voiced understanding

## 2022-01-03 NOTE — TELEPHONE ENCOUNTER
Dr Renae Hoang - patient called wanted to be sure the the test for H ployri is being entered into Epic today   Please call Frank at [phone]

## 2022-01-04 ENCOUNTER — TELEPHONE (OUTPATIENT)
Dept: GASTROENTEROLOGY | Facility: CLINIC | Age: 77
End: 2022-01-04

## 2022-01-04 NOTE — TELEPHONE ENCOUNTER
Called and spoke to patient  Gave her the message as per Annalee  Patient voiced understanding and had no further questions or concerns

## 2022-01-12 ENCOUNTER — ANTICOAG VISIT (OUTPATIENT)
Dept: CARDIOLOGY CLINIC | Facility: CLINIC | Age: 77
End: 2022-01-12

## 2022-01-12 ENCOUNTER — APPOINTMENT (OUTPATIENT)
Dept: LAB | Facility: CLINIC | Age: 77
End: 2022-01-12
Payer: COMMERCIAL

## 2022-01-12 DIAGNOSIS — A04.8 H. PYLORI INFECTION: ICD-10-CM

## 2022-01-12 DIAGNOSIS — Z95.2 HISTORY OF HEART VALVE REPLACEMENT: Primary | ICD-10-CM

## 2022-01-12 DIAGNOSIS — Z79.01 CHRONIC ANTICOAGULATION: ICD-10-CM

## 2022-01-12 LAB
INR PPP: 2.63 (ref 0.84–1.19)
PROTHROMBIN TIME: 26.8 SECONDS (ref 11.6–14.5)

## 2022-01-12 PROCEDURE — 85610 PROTHROMBIN TIME: CPT

## 2022-01-12 PROCEDURE — 36415 COLL VENOUS BLD VENIPUNCTURE: CPT

## 2022-01-12 PROCEDURE — 87338 HPYLORI STOOL AG IA: CPT

## 2022-01-13 LAB — H PYLORI AG STL QL IA: NEGATIVE

## 2022-01-17 ENCOUNTER — TELEPHONE (OUTPATIENT)
Dept: GASTROENTEROLOGY | Facility: CLINIC | Age: 77
End: 2022-01-17

## 2022-01-17 NOTE — TELEPHONE ENCOUNTER
Called and spoke to patient  Gave patient test results   Patient voiced understanding and had no further questions or concerns,

## 2022-01-17 NOTE — TELEPHONE ENCOUNTER
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

## 2022-02-02 ENCOUNTER — APPOINTMENT (OUTPATIENT)
Dept: LAB | Facility: CLINIC | Age: 77
End: 2022-02-02
Payer: COMMERCIAL

## 2022-02-02 ENCOUNTER — ANTICOAG VISIT (OUTPATIENT)
Dept: CARDIOLOGY CLINIC | Facility: CLINIC | Age: 77
End: 2022-02-02

## 2022-02-02 DIAGNOSIS — Z95.2 HISTORY OF HEART VALVE REPLACEMENT: Primary | ICD-10-CM

## 2022-02-02 DIAGNOSIS — Z79.01 CHRONIC ANTICOAGULATION: ICD-10-CM

## 2022-02-02 LAB
INR PPP: 2.11 (ref 0.84–1.19)
PROTHROMBIN TIME: 22.6 SECONDS (ref 11.6–14.5)

## 2022-02-02 PROCEDURE — 36415 COLL VENOUS BLD VENIPUNCTURE: CPT

## 2022-02-02 PROCEDURE — 85610 PROTHROMBIN TIME: CPT

## 2022-02-11 ENCOUNTER — APPOINTMENT (OUTPATIENT)
Dept: LAB | Facility: CLINIC | Age: 77
End: 2022-02-11
Payer: COMMERCIAL

## 2022-02-11 ENCOUNTER — TELEPHONE (OUTPATIENT)
Dept: INTERNAL MEDICINE CLINIC | Facility: CLINIC | Age: 77
End: 2022-02-11

## 2022-02-11 DIAGNOSIS — R39.9 UTI SYMPTOMS: ICD-10-CM

## 2022-02-11 LAB
BACTERIA UR QL AUTO: ABNORMAL /HPF
BILIRUB UR QL STRIP: NEGATIVE
CLARITY UR: CLEAR
COLOR UR: YELLOW
GLUCOSE UR STRIP-MCNC: NEGATIVE MG/DL
HGB UR QL STRIP.AUTO: NORMAL
HYALINE CASTS #/AREA URNS LPF: ABNORMAL /LPF
KETONES UR STRIP-MCNC: NEGATIVE MG/DL
LEUKOCYTE ESTERASE UR QL STRIP: NEGATIVE
NITRITE UR QL STRIP: NEGATIVE
NON-SQ EPI CELLS URNS QL MICRO: ABNORMAL /HPF
PH UR STRIP.AUTO: 6.5 [PH]
PROT UR STRIP-MCNC: NEGATIVE MG/DL
RBC #/AREA URNS AUTO: ABNORMAL /HPF
SP GR UR STRIP.AUTO: 1.01 (ref 1–1.03)
UROBILINOGEN UR QL STRIP.AUTO: 0.2 E.U./DL
WBC #/AREA URNS AUTO: ABNORMAL /HPF

## 2022-02-11 PROCEDURE — 81001 URINALYSIS AUTO W/SCOPE: CPT

## 2022-02-11 NOTE — TELEPHONE ENCOUNTER
PT  WALKED  IN  WANTS  TO  GET  URINE   TEST   THINKS SHE  HAS  UTI     PROBLEM  WITH  BURNING  AND  GOING  TO  BATHROOM  A LOT

## 2022-02-14 ENCOUNTER — TELEPHONE (OUTPATIENT)
Dept: INTERNAL MEDICINE CLINIC | Facility: CLINIC | Age: 77
End: 2022-02-14

## 2022-02-14 NOTE — TELEPHONE ENCOUNTER
PT  CALLED BACK AND WAS NOTIFIED AND STATED SHE TOLD YOU PREVIOUSLY SHE HAS FAMILY HISTORY OF MICOSCOPIC BLOOD IN URINE NOT SURE IF YOU CHARTED THIS AND WONDERING IF THIS TEST WILL NOT BE NEEDED, SHE IS SCHEDULED FOR WED

## 2022-02-14 NOTE — TELEPHONE ENCOUNTER
----- Message from Aminata Aviles, [address] sent at 2/11/2022  4:48 PM EST -----  So her urine only shows microscopic blood no evidence of infection- we need to do an ultrasound of the kidney and bladder to see why she has microscopic blood in urine

## 2022-02-16 ENCOUNTER — HOSPITAL ENCOUNTER (OUTPATIENT)
Dept: ULTRASOUND IMAGING | Facility: HOSPITAL | Age: 77
Discharge: HOME | End: 2022-02-16
Payer: COMMERCIAL

## 2022-02-16 DIAGNOSIS — R31.29 MICROSCOPIC HEMATURIA: ICD-10-CM

## 2022-02-16 PROCEDURE — 76770 US EXAM ABDO BACK WALL COMP: CPT

## 2022-02-17 ENCOUNTER — APPOINTMENT (OUTPATIENT)
Dept: LAB | Facility: CLINIC | Age: 77
End: 2022-02-17
Payer: COMMERCIAL

## 2022-02-17 ENCOUNTER — ANTICOAG VISIT (OUTPATIENT)
Dept: CARDIOLOGY CLINIC | Facility: CLINIC | Age: 77
End: 2022-02-17

## 2022-02-17 DIAGNOSIS — Z79.01 CHRONIC ANTICOAGULATION: ICD-10-CM

## 2022-02-17 DIAGNOSIS — Z95.2 HISTORY OF HEART VALVE REPLACEMENT: Primary | ICD-10-CM

## 2022-02-17 LAB
INR PPP: 2.4 (ref 0.84–1.19)
PROTHROMBIN TIME: 25 SECONDS (ref 11.6–14.5)

## 2022-02-17 PROCEDURE — 36415 COLL VENOUS BLD VENIPUNCTURE: CPT

## 2022-02-17 PROCEDURE — 85610 PROTHROMBIN TIME: CPT

## 2022-02-17 NOTE — PROGRESS NOTES
[facility]  [address] [place] [address] [address]. [facility]  [address]  [place]  [address]  Tel: [phone]    June 14, 2019    Shelly JUAREZ Ken  [address] [facility] [id number]        Dear Ms. Rogers,    This is to inform you regarding your test result.    TSH which is thyroid hormone is elevated   Increase levothyroxine to 100 mcg po daily  You ar on 88  mcg currently so we are increasing to 100 mcg po daily.  Please make lab appointment to recheck TSH in 2 months.  Orders are placed.  New script is sent  The testing of your kidney function, liver function and electrolytes was satisfactory   HbA1c which is average glucose during last 3 months is 7.9%.  It shows slight improvement.    Sincerely,      Dr.Nasima Daria MD,FACP/SML      Enclosure: Lab Results  Results for orders placed or performed in visit on 06/10/19   TSH with free T4 reflex   Result Value Ref Range    TSH 5.11 (H) 0.40 - 4.00 mU/L   Hemoglobin A1c   Result Value Ref Range    Hemoglobin A1C 7.9 (H) 0 - 5.6 %   Comprehensive metabolic panel   Result Value Ref Range    Sodium 138 133 - 144 mmol/L    Potassium 4.6 3.4 - 5.3 mmol/L    Chloride 102 94 - 109 mmol/L    Carbon Dioxide 28 20 - 32 mmol/L    Anion Gap 8 3 - 14 mmol/L    Glucose 108 (H) 70 - 99 mg/dL    Urea Nitrogen 22 7 - 30 mg/dL    Creatinine 0.93 0.52 - 1.04 mg/dL    GFR Estimate 56 (L) >60 mL/min/[1.73_m2]    GFR Estimate If Black 65 >60 mL/min/[1.73_m2]    Calcium 9.2 8.5 - 10.1 mg/dL    Bilirubin Total 1.0 0.2 - 1.3 mg/dL    Albumin 3.6 3.4 - 5.0 g/dL    Protein Total 7.3 6.8 - 8.8 g/dL    Alkaline Phosphatase 155 (H) 40 - 150 U/L    ALT 20 0 - 50 U/L    AST 22 0 - 45 U/L   T4 free   Result Value Ref Range    T4 Free 1.42 0.76 - 1.46 ng/dL        lmom instructing to stay on the same dose and retest again in 2 weeks

## 2022-02-22 ENCOUNTER — TELEPHONE (OUTPATIENT)
Dept: INTERNAL MEDICINE CLINIC | Facility: CLINIC | Age: 77
End: 2022-02-22

## 2022-02-22 NOTE — TELEPHONE ENCOUNTER
----- Message from Faye Calles, [address] sent at 2/22/2022 10:00 AM EST -----  Ok so u/s shows small stone on the right   Would she like to see a urologist?

## 2022-03-01 ENCOUNTER — APPOINTMENT (OUTPATIENT)
Dept: LAB | Facility: CLINIC | Age: 77
End: 2022-03-01
Payer: COMMERCIAL

## 2022-03-01 ENCOUNTER — ANTICOAG VISIT (OUTPATIENT)
Dept: CARDIOLOGY CLINIC | Facility: CLINIC | Age: 77
End: 2022-03-01

## 2022-03-01 DIAGNOSIS — Z95.2 HISTORY OF HEART VALVE REPLACEMENT: Primary | ICD-10-CM

## 2022-03-01 DIAGNOSIS — Z79.01 CHRONIC ANTICOAGULATION: ICD-10-CM

## 2022-03-01 LAB
INR PPP: 2.75 (ref 0.84–1.19)
PROTHROMBIN TIME: 27.7 SECONDS (ref 11.6–14.5)

## 2022-03-01 PROCEDURE — 36415 COLL VENOUS BLD VENIPUNCTURE: CPT

## 2022-03-01 PROCEDURE — 85610 PROTHROMBIN TIME: CPT

## 2022-03-02 ENCOUNTER — TELEPHONE (OUTPATIENT)
Dept: UROLOGY | Facility: CLINIC | Age: 77
End: 2022-03-02

## 2022-03-02 NOTE — TELEPHONE ENCOUNTER
What is the reason for the patients appointment? Micro hem & stones        Do we accept the patient's insurance or is the patient Self-Pay? yes       Has the patient had any previous urologist(s)? no      Have patient records been requested? yes       Has the patient had any outside testing done? no      What is the patients location preference for an office visit? [place]       Does the patient have a personal history of cancer?  No    Pt scheduled

## 2022-03-04 DIAGNOSIS — I10 ESSENTIAL HYPERTENSION: ICD-10-CM

## 2022-03-04 PROCEDURE — 4010F ACE/ARB THERAPY RXD/TAKEN: CPT | Performed by: PHYSICIAN ASSISTANT

## 2022-03-04 RX ORDER — LOSARTAN POTASSIUM 100 MG/1
TABLET ORAL
Qty: 90 TABLET | Refills: 3 | Status: SHIPPED | OUTPATIENT
Start: 2022-03-04

## 2022-03-07 NOTE — PROGRESS NOTES
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  - patient has had many negative cystoscopies and defers any further cystoscopies  - recent ultrasound showing small 3 mm nonobstructing right intrarenal stone  Patient is asymptomatic at this time    2  Dysuria  3  Atrophic vaginitis  - negative urine testing  - discussed addition of topical estrogen replacement  Medication and side effects reviewed  Prescription sent to pharmacy  - follow-up in 2 months for symptom reassessment  - call in the meantime with any questions or concerns  - All questions answered; patient understands and agrees with plan        History of Present Illness  Nahid Landin is a 78 y o  female new patient here for evaluation of microscopic hematuria, dysuria, atrophic vaginitis  Patient has a longstanding history of microscopic hematuria  States she has had countless numbers of cystoscopies  All of which have fortunately been negative  Patient states she was previously diagnosed with benign familial microscopic hematuria  Patient is very adamant about not having any further cystoscopies performed  Patient did have in a renal ultrasound performed which shows a 3 mm small nonobstructing right intrarenal stone  Patient is completely asymptomatic from a kidney stone standpoint  Denies flank pain, gross hematuria, suprapubic pressure, fever, chills, nausea, vomiting  Patient states that she does have dysuria and vaginal dryness  Denies use of topical estrogen replacement in the past   Denies family history of  malignancies  Denies seeing urology in the past        Review of Systems   Constitutional: Negative for activity change, appetite change, chills and fever  HENT: Negative for congestion and trouble swallowing      Respiratory: Negative for cough and shortness of breath  Cardiovascular: Negative for chest pain, palpitations and leg swelling  Gastrointestinal: Negative for abdominal pain, constipation, diarrhea, nausea and vomiting  Genitourinary: Positive for dysuria  Negative for difficulty urinating, flank pain, frequency, hematuria and urgency  Musculoskeletal: Negative for back pain and gait problem  Skin: Negative for wound  Allergic/Immunologic: Negative for immunocompromised state  Neurological: Negative for dizziness and syncope  Hematological: Does not bruise/bleed easily  Psychiatric/Behavioral: Negative for confusion  All other systems reviewed and are negative  Vitals  There were no vitals filed for this visit  Physical Exam  Constitutional:       General: She is not in acute distress  Appearance: Normal appearance  She is not ill-appearing, toxic-appearing or diaphoretic  HENT:      Head: Normocephalic  Nose: No congestion  Eyes:      General: No scleral icterus  Right eye: No discharge  Left eye: No discharge  Conjunctiva/sclera: Conjunctivae normal       Pupils: Pupils are equal, round, and reactive to light  Pulmonary:      Effort: Pulmonary effort is normal    Musculoskeletal:      Cervical back: Normal range of motion  Skin:     General: Skin is warm and dry  Coloration: Skin is not jaundiced or pale  Findings: No bruising, erythema, lesion or rash  Neurological:      General: No focal deficit present  Mental Status: She is alert and oriented to person, place, and time  Mental status is at baseline  Gait: Gait normal    Psychiatric:         Mood and Affect: Mood normal          Behavior: Behavior normal          Thought Content:  Thought content normal          Judgment: Judgment normal            Past History  Past Medical History:   Diagnosis Date    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    Atrial fibrillation (HCC)     Hashimoto's thyroiditis     Hyperlipidemia  Hypertension     IBS (irritable bowel syndrome)     Migraine     Mitral valve disorder     Nasal congestion     Non-toxic uninodular goiter     Nosebleed     Raynaud disease      Social History     Socioeconomic History    Marital status: Single     Spouse name: Not on file    Number of children: Not on file    Years of education: Not on file    Highest education level: Not on file   Occupational History    Occupation: Retired   Tobacco Use    Smoking status: Never Smoker    Smokeless tobacco: Never Used   Vaping Use    Vaping Use: Never used   Substance and Sexual Activity    Alcohol use: Yes     Comment: Occassionally--wine    Drug use: No    Sexual activity: Not Currently   Other Topics Concern    Not on file   Social History Narrative    Not on file     Social Determinants of Health     Financial Resource Strain: Not on file   Food Insecurity: Not on file   Transportation Needs: Not on file   Physical Activity: Not on file   Stress: Not on file   Social Connections: Not on file   Intimate Partner Violence: Not on file   Housing Stability: Not on file     Social History     Tobacco Use   Smoking Status Never Smoker   Smokeless Tobacco Never Used     Family History   Problem Relation Age of Onset    Hypertension Mother     Coronary artery disease Father     Migraines Father     Leukemia Brother     Migraines Brother     Hypertension Brother     Coronary artery disease Paternal Grandfather     Leukemia Maternal Aunt     Multiple myeloma Maternal Aunt     Thyroid disease Other     No Known Problems Maternal Aunt     No Known Problems Maternal Aunt     No Known Problems Paternal Aunt     No Known Problems Paternal Aunt     No Known Problems Paternal Aunt     No Known Problems Paternal Aunt     Breast cancer Neg Hx        The following portions of the patient's history were reviewed and updated as appropriate: allergies, current medications, past medical history, past social history, past surgical history and problem list     Results  No results found for this or any previous visit (from the past 1 hour(s))  ]  No results found for: PSA  Lab Results   Component Value Date    CALCIUM 9 3 10/18/2021    K 4 5 10/18/2021    CO2 27 10/18/2021     (H) 10/18/2021    BUN 20 10/18/2021    CREATININE 0 85 10/18/2021     Lab Results   Component Value Date    WBC 6 34 10/18/2021    HGB 12 1 10/18/2021    HCT 38 3 10/18/2021     (H) 10/18/2021     10/18/2021       Deysi Keller PA-C

## 2022-03-08 ENCOUNTER — OFFICE VISIT (OUTPATIENT)
Dept: GASTROENTEROLOGY | Facility: CLINIC | Age: 77
End: 2022-03-08
Payer: COMMERCIAL

## 2022-03-08 VITALS
SYSTOLIC BLOOD PRESSURE: 152 MMHG | HEIGHT: 69 IN | WEIGHT: 175 LBS | DIASTOLIC BLOOD PRESSURE: 100 MMHG | BODY MASS INDEX: 25.92 KG/M2 | HEART RATE: 56 BPM

## 2022-03-08 DIAGNOSIS — K58.1 IRRITABLE BOWEL SYNDROME WITH CONSTIPATION: Primary | ICD-10-CM

## 2022-03-08 DIAGNOSIS — R14.0 ABDOMINAL BLOATING: ICD-10-CM

## 2022-03-08 PROCEDURE — 99213 OFFICE O/P EST LOW 20 MIN: CPT | Performed by: PHYSICIAN ASSISTANT

## 2022-03-08 RX ORDER — DICYCLOMINE HYDROCHLORIDE 10 MG/1
10 CAPSULE ORAL
Qty: 60 CAPSULE | Refills: 3 | Status: SHIPPED | OUTPATIENT
Start: 2022-03-08

## 2022-03-08 RX ORDER — LACTOBACILLUS ACIDOPH-L.BULGARICUS 1 MILLION CELL CHEWABLE TABLET 1MM CELL
1 TABLET,CHEWABLE ORAL
Qty: 90 TABLET | Refills: 3 | Status: SHIPPED | OUTPATIENT
Start: 2022-03-08

## 2022-03-08 RX ORDER — DICYCLOMINE HYDROCHLORIDE 10 MG/1
CAPSULE ORAL
COMMUNITY
End: 2022-03-08 | Stop reason: SDUPTHER

## 2022-03-08 NOTE — LETTER
March 8, 2022     Saida Figueredo MD  [address],  [place] [address]    Patient: Amol Hill   YOB: 1945   Date of Visit: 3/8/2022       Dear Dr Peterson Weeks: Thank you for referring Amol Hill to me for evaluation  Below are my notes for this consultation  If you have questions, please do not hesitate to call me  I look forward to following your patient along with you  Sincerely,        Duong Zapien PA-C        CC: No Recipients  Duong Zapien [place]  3/8/2022  2:06 PM  Incomplete  [facility] - Outpatient Follow-up Note  Amol Hill 78 y o  female MRN: [id number]  Encounter: [id number]          ASSESSMENT AND PLAN:      1  Irritable bowel syndrome with constipation  2  Abdominal bloating  -Will start Lactinex TID  -Will start Gas-ex PRN  -Gas hand out given and reviewed  -Will continue Bentyl PRN  ______________________________________________________________________    SUBJECTIVE:    77-year-old female presents for follow-up after her EGD and colonoscopy  Patient was diagnosed with Helicobacter pylori and cleared the bacteria on her antibiotic regimen  Patient is still reporting abdominal bloating and gas  Patient reports generally happens about 3 times a month  Patient denies any significant alarm symptoms  Patient reports 1 bowel movement a day  Patient denies any melena rectal bleeding  Patient denies any nausea or vomiting  Patient is currently on probiotics  Patient reports she responds very well to Bentyl therapy  REVIEW OF SYSTEMS IS OTHERWISE NEGATIVE        Historical Information   Past Medical History:   Diagnosis Date    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    Atrial fibrillation (HCC)     Hashimoto's thyroiditis     Hyperlipidemia     Hypertension     IBS (irritable bowel syndrome)     Migraine     Mitral valve disorder     Nasal congestion     Non-toxic uninodular goiter  Nosebleed     Raynaud disease      Past Surgical History:   Procedure Laterality Date    APPENDECTOMY      BREAST CYST EXCISION Right 01/01/1977    CARDIAC SURGERY      COLONOSCOPY  08/08/2011    TONSILLECTOMY      VALVE REPLACEMENT  01/04/2017    mitral valve replacement, 6/5/14     Social History   Social History     Substance and Sexual Activity   Alcohol Use Yes    Comment: Occassionally--wine     Social History     Substance and Sexual Activity   Drug Use No     Social History     Tobacco Use   Smoking Status Never Smoker   Smokeless Tobacco Never Used     Family History   Problem Relation Age of Onset    Hypertension Mother     Coronary artery disease Father     Migraines Father     Leukemia Brother     Migraines Brother     Hypertension Brother     Coronary artery disease Paternal Grandfather     Leukemia Maternal Aunt     Multiple myeloma Maternal Aunt     Thyroid disease Other     No Known Problems Maternal Aunt     No Known Problems Maternal Aunt     No Known Problems Paternal Aunt     No Known Problems Paternal Aunt     No Known Problems Paternal Aunt     No Known Problems Paternal Aunt     Breast cancer Neg Hx        Meds/Allergies       Current Outpatient Medications:     amLODIPine (NORVASC) 5 mg tablet    aspirin 81 mg chewable tablet    butalbital-acetaminophen-caffeine (FIORICET,ESGIC) -40 mg per tablet    Calcium Carbonate (CALTRATE 600) 1500 (600 Ca) MG TABS    dicyclomine (BENTYL) 10 mg capsule    gabapentin (NEURONTIN) 100 mg capsule    gabapentin (NEURONTIN) 300 mg capsule    loratadine (CLARITIN) 10 mg tablet    losartan (COZAAR) 100 MG tablet    Misc Natural Products (OSTEO BI-FLEX TRIPLE STRENGTH) TABS    oxybutynin (DITROPAN-XL) 10 MG 24 hr tablet    pantoprazole (PROTONIX) 40 mg tablet    propranolol (INDERAL LA) 60 mg 24 hr capsule    simvastatin (ZOCOR) 10 mg tablet    warfarin (COUMADIN) 1 mg tablet    warfarin (COUMADIN) 2 mg tablet   warfarin (COUMADIN) 3 mg tablet    Current Facility-Administered Medications:     cyanocobalamin injection 1,000 mcg, 1,000 mcg, Intramuscular, Q30 Days, 1,000 mcg at 07/09/21 1504    cyanocobalamin injection 1,000 mcg, 1,000 mcg, Intramuscular, Q30 Days, 1,000 mcg at 10/19/21 1046    Allergies   Allergen Reactions    Enablex [Darifenacin] Other (See Comments)     Sweating, HA, urinary hesitancy, flushing of face    Fentanyl Nausea Only and Vomiting    Hyoscyamine Palpitations    Dicyclomine Other (See Comments)     Jittery, disorientation, light HAs    Hydromorphone Other (See Comments)     Per pt does not remember the type or severity level    Midazolam Other (See Comments)     Per pt does not remember the type or severity level    Sulfamethoxazole-Trimethoprim Nausea Only    Venlafaxine Other (See Comments)     Urinary urgency, polyuria    Codeine Polt-Chlorphen Polt Er Headache    Ultracet [Tramadol-Acetaminophen] Nausea Only and Vomiting           Objective     Blood pressure 152/100, pulse 56, height 5' 9" (1 753 m), weight 79 4 kg (175 lb), not currently breastfeeding  Body mass index is 25 84 kg/m²  PHYSICAL EXAM:      General Appearance:   Alert, cooperative, no distress   HEENT:   Normocephalic, atraumatic, anicteric      Neck:  Supple, symmetrical, trachea midline   Lungs:   Clear to auscultation bilaterally; no rales, rhonchi or wheezing; respirations unlabored    Heart[de-identified]   Regular rate and rhythm; no murmur, rub, or gallop  Abdomen:   Soft, non-tender, non-distended; normal bowel sounds; no masses, no organomegaly    Genitalia:   Deferred    Rectal:   Deferred    Extremities:  No cyanosis, clubbing or edema    Pulses:  2+ and symmetric    Skin:  No jaundice, rashes, or lesions    Lymph nodes:  No palpable cervical lymphadenopathy        Lab Results:   No visits with results within 1 Day(s) from this visit     Latest known visit with results is:   Appointment on 03/01/2022   Component Date Value    Protime 03/01/2022 27 7*    INR 03/01/2022 2 75*         Radiology Results:   US kidney and bladder    Result Date: 2/19/2022  Narrative: RENAL ULTRASOUND INDICATION:   R31 29: Other microscopic hematuria  COMPARISON: None TECHNIQUE:   Ultrasound of the retroperitoneum was performed with a curvilinear transducer utilizing volumetric sweeps and still imaging techniques  FINDINGS: KIDNEYS: Symmetric and normal size  Right kidney:  10 3 x 4 3 x 4 4 cm  Volume 102 0 mL Left kidney:  11 1 x 5 0 x 4 0 cm  Volume 116 6 mL Right kidney Normal echogenicity and contour  No mass is identified  Mid cortical cyst measuring 5 mm  No hydronephrosis  Upper pole echogenic focus may represent a nonobstructing calculus measuring 3 mm  No perinephric fluid collections  Left kidney Normal echogenicity and contour  No mass is identified  Simple upper cyst measures 5 1 x 5 x 4 7 cm  Lower pole cyst measures 1 2 x 1 2 x 1 cm  No hydronephrosis  No shadowing calculi  No perinephric fluid collections  URETERS: Nonvisualized  BLADDER: Normally distended  No focal thickening or mass lesions  Bilateral ureteral jets detected  Impression: 1  3 mm nonobstructing right upper calculus  2  Simple bilateral renal cysts   Workstation performed: [id number]

## 2022-03-08 NOTE — PATIENT INSTRUCTIONS
Gas and Bloating   WHAT YOU NEED TO KNOW:   Gas is air that collects in your digestive system (stomach or intestines)  Bloating is the tight, full feeling you get from too much gas  DISCHARGE INSTRUCTIONS:   Return to the emergency department if:   · You have severe abdominal pain  · You have blood in your bowel movement  Call your doctor if:   · You have a fever  · You vomit or have diarrhea  · You lose weight without trying  · You have questions or concerns about your condition or care  Medicines:   · Gas relief medicines  help decrease gas pain and bloating  These are available without a doctor's order  · Take your medicine as directed  Contact your healthcare provider if you think your medicine is not helping or if you have side effects  Tell him or her if you are allergic to any medicine  Keep a list of the medicines, vitamins, and herbs you take  Include the amounts, and when and why you take them  Bring the list or the pill bottles to follow-up visits  Carry your medicine list with you in case of an emergency  Manage or prevent gas and bloating:   · Keep a record  Write down what you eat and drink and how often you pass gas each day  · Eat and drink slowly  Choose foods that do not cause gas, such as meat, poultry, fish, and eggs  Avoid high-fat foods and vegetables or starches that can cause gas  Do not drink carbonated drinks  Add foods back into your diet one at a time after 1 week  If the food causes symptoms, avoid it  · Be physically active  Physical activity, such as exercise, can help relieve gas and constipation  Physical activity can also help you lose weight and keep it off  Your healthcare provider can help you create a physical activity plan  · Do not smoke cigarettes or chew gum  These can cause you to swallow air  · Make sure your dentures fit properly  Have your dentures fixed if they are loose   Loose dentures can cause you to swallow too much air  Follow up with your doctor as directed:  Write down your questions so you remember to ask them during your visits  © Copyright [employer] 2022 Information is for End User's use only and may not be sold, redistributed or otherwise used for commercial purposes  All illustrations and images included in CareNotes® are the copyrighted property of [employer]  or Miguelangel Johnson  The above information is an  only  It is not intended as medical advice for individual conditions or treatments  Talk to your doctor, nurse or pharmacist before following any medical regimen to see if it is safe and effective for you

## 2022-03-08 NOTE — PROGRESS NOTES
[facility] - Outpatient Follow-up Note  Kia Whitaker 78 y o  female MRN: [id number]  Encounter: [id number]          ASSESSMENT AND PLAN:      1  Irritable bowel syndrome with constipation  2  Abdominal bloating  -Will start Lactinex TID  -Will start Gas-ex PRN  -Gas hand out given and reviewed  -Will continue Bentyl PRN  ______________________________________________________________________    SUBJECTIVE:    68-year-old female presents for follow-up after her EGD and colonoscopy  Patient was diagnosed with Helicobacter pylori and cleared the bacteria on her antibiotic regimen  Patient is still reporting abdominal bloating and gas  Patient reports generally happens about 3 times a month  Patient denies any significant alarm symptoms  Patient reports 1 bowel movement a day  Patient denies any melena rectal bleeding  Patient denies any nausea or vomiting  Patient is currently on probiotics  Patient reports she responds very well to Bentyl therapy  REVIEW OF SYSTEMS IS OTHERWISE NEGATIVE        Historical Information   Past Medical History:   Diagnosis Date    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    Atrial fibrillation (HCC)     Hashimoto's thyroiditis     Hyperlipidemia     Hypertension     IBS (irritable bowel syndrome)     Migraine     Mitral valve disorder     Nasal congestion     Non-toxic uninodular goiter     Nosebleed     Raynaud disease      Past Surgical History:   Procedure Laterality Date    APPENDECTOMY      BREAST CYST EXCISION Right 01/01/1977    CARDIAC SURGERY      COLONOSCOPY  08/08/2011    TONSILLECTOMY      VALVE REPLACEMENT  01/04/2017    mitral valve replacement, 6/5/14     Social History   Social History     Substance and Sexual Activity   Alcohol Use Yes    Comment: Occassionally--wine     Social History     Substance and Sexual Activity   Drug Use No     Social History     Tobacco Use   Smoking Status Never Smoker   Smokeless Tobacco Never Used     Family History   Problem Relation Age of Onset    Hypertension Mother     Coronary artery disease Father     Migraines Father     Leukemia Brother     Migraines Brother     Hypertension Brother     Coronary artery disease Paternal Grandfather     Leukemia Maternal Aunt     Multiple myeloma Maternal Aunt     Thyroid disease Other     No Known Problems Maternal Aunt     No Known Problems Maternal Aunt     No Known Problems Paternal Aunt     No Known Problems Paternal Aunt     No Known Problems Paternal Aunt     No Known Problems Paternal Aunt     Breast cancer Neg Hx        Meds/Allergies       Current Outpatient Medications:     amLODIPine (NORVASC) 5 mg tablet    aspirin 81 mg chewable tablet    butalbital-acetaminophen-caffeine (FIORICET,ESGIC) -40 mg per tablet    Calcium Carbonate (CALTRATE 600) 1500 (600 Ca) MG TABS    dicyclomine (BENTYL) 10 mg capsule    gabapentin (NEURONTIN) 100 mg capsule    gabapentin (NEURONTIN) 300 mg capsule    loratadine (CLARITIN) 10 mg tablet    losartan (COZAAR) 100 MG tablet    Misc Natural Products (OSTEO BI-FLEX TRIPLE STRENGTH) TABS    oxybutynin (DITROPAN-XL) 10 MG 24 hr tablet    pantoprazole (PROTONIX) 40 mg tablet    propranolol (INDERAL LA) 60 mg 24 hr capsule    simvastatin (ZOCOR) 10 mg tablet    warfarin (COUMADIN) 1 mg tablet    warfarin (COUMADIN) 2 mg tablet    warfarin (COUMADIN) 3 mg tablet    Current Facility-Administered Medications:     cyanocobalamin injection 1,000 mcg, 1,000 mcg, Intramuscular, Q30 Days, 1,000 mcg at 07/09/21 1504    cyanocobalamin injection 1,000 mcg, 1,000 mcg, Intramuscular, Q30 Days, 1,000 mcg at 10/19/21 1046    Allergies   Allergen Reactions    Enablex [Darifenacin] Other (See Comments)     Sweating, HA, urinary hesitancy, flushing of face    Fentanyl Nausea Only and Vomiting    Hyoscyamine Palpitations    Dicyclomine Other (See Comments)     Jittery, disorientation, light HAs    Hydromorphone Other (See Comments)     Per pt does not remember the type or severity level    Midazolam Other (See Comments)     Per pt does not remember the type or severity level    Sulfamethoxazole-Trimethoprim Nausea Only    Venlafaxine Other (See Comments)     Urinary urgency, polyuria    Codeine Polt-Chlorphen Polt Er Headache    Ultracet [Tramadol-Acetaminophen] Nausea Only and Vomiting           Objective     Blood pressure 152/100, pulse 56, height 5' 9" (1 753 m), weight 79 4 kg (175 lb), not currently breastfeeding  Body mass index is 25 84 kg/m²  PHYSICAL EXAM:      General Appearance:   Alert, cooperative, no distress   HEENT:   Normocephalic, atraumatic, anicteric      Neck:  Supple, symmetrical, trachea midline   Lungs:   Clear to auscultation bilaterally; no rales, rhonchi or wheezing; respirations unlabored    Heart[de-identified]   Regular rate and rhythm; no murmur, rub, or gallop  Abdomen:   Soft, non-tender, non-distended; normal bowel sounds; no masses, no organomegaly    Genitalia:   Deferred    Rectal:   Deferred    Extremities:  No cyanosis, clubbing or edema    Pulses:  2+ and symmetric    Skin:  No jaundice, rashes, or lesions    Lymph nodes:  No palpable cervical lymphadenopathy        Lab Results:   No visits with results within 1 Day(s) from this visit  Latest known visit with results is:   Appointment on 03/01/2022   Component Date Value    Protime 03/01/2022 27 7*    INR 03/01/2022 2 75*         Radiology Results:   US kidney and bladder    Result Date: 2/19/2022  Narrative: RENAL ULTRASOUND INDICATION:   R31 29: Other microscopic hematuria  COMPARISON: None TECHNIQUE:   Ultrasound of the retroperitoneum was performed with a curvilinear transducer utilizing volumetric sweeps and still imaging techniques  FINDINGS: KIDNEYS: Symmetric and normal size  Right kidney:  10 3 x 4 3 x 4 4 cm  Volume 102 0 mL Left kidney:  11 1 x 5 0 x 4 0 cm    Volume 116 6 mL Right kidney Normal echogenicity and contour  No mass is identified  Mid cortical cyst measuring 5 mm  No hydronephrosis  Upper pole echogenic focus may represent a nonobstructing calculus measuring 3 mm  No perinephric fluid collections  Left kidney Normal echogenicity and contour  No mass is identified  Simple upper cyst measures 5 1 x 5 x 4 7 cm  Lower pole cyst measures 1 2 x 1 2 x 1 cm  No hydronephrosis  No shadowing calculi  No perinephric fluid collections  URETERS: Nonvisualized  BLADDER: Normally distended  No focal thickening or mass lesions  Bilateral ureteral jets detected  Impression: 1  3 mm nonobstructing right upper calculus  2  Simple bilateral renal cysts   Workstation performed: [id number]

## 2022-03-11 ENCOUNTER — OFFICE VISIT (OUTPATIENT)
Dept: UROLOGY | Facility: CLINIC | Age: 77
End: 2022-03-11
Payer: COMMERCIAL

## 2022-03-11 VITALS
SYSTOLIC BLOOD PRESSURE: 116 MMHG | BODY MASS INDEX: 25.65 KG/M2 | OXYGEN SATURATION: 96 % | DIASTOLIC BLOOD PRESSURE: 62 MMHG | HEART RATE: 49 BPM | HEIGHT: 69 IN | WEIGHT: 173.2 LBS

## 2022-03-11 DIAGNOSIS — E13.49 OTHER SPECIFIED DIABETES MELLITUS WITH OTHER DIABETIC NEUROLOGICAL COMPLICATION (HCC): ICD-10-CM

## 2022-03-11 DIAGNOSIS — N95.2 ATROPHIC VAGINITIS: Primary | ICD-10-CM

## 2022-03-11 PROCEDURE — 1160F RVW MEDS BY RX/DR IN RCRD: CPT | Performed by: PHYSICIAN ASSISTANT

## 2022-03-11 PROCEDURE — 3078F DIAST BP <80 MM HG: CPT | Performed by: PHYSICIAN ASSISTANT

## 2022-03-11 PROCEDURE — 99204 OFFICE O/P NEW MOD 45 MIN: CPT | Performed by: PHYSICIAN ASSISTANT

## 2022-03-11 PROCEDURE — 3008F BODY MASS INDEX DOCD: CPT | Performed by: PHYSICIAN ASSISTANT

## 2022-03-11 PROCEDURE — 3074F SYST BP LT 130 MM HG: CPT | Performed by: PHYSICIAN ASSISTANT

## 2022-03-11 PROCEDURE — 1036F TOBACCO NON-USER: CPT | Performed by: PHYSICIAN ASSISTANT

## 2022-03-11 RX ORDER — CONJUGATED ESTROGENS 0.62 MG/G
1 CREAM VAGINAL 3 TIMES WEEKLY
Qty: 30 G | Refills: 0 | Status: SHIPPED | OUTPATIENT
Start: 2022-03-11

## 2022-03-21 ENCOUNTER — APPOINTMENT (OUTPATIENT)
Dept: LAB | Facility: CLINIC | Age: 77
End: 2022-03-21
Payer: COMMERCIAL

## 2022-03-21 ENCOUNTER — ANTICOAG VISIT (OUTPATIENT)
Dept: CARDIOLOGY CLINIC | Facility: CLINIC | Age: 77
End: 2022-03-21

## 2022-03-21 DIAGNOSIS — Z95.2 HISTORY OF HEART VALVE REPLACEMENT: Primary | ICD-10-CM

## 2022-04-04 DIAGNOSIS — G25.0 ESSENTIAL TREMOR: ICD-10-CM

## 2022-04-04 DIAGNOSIS — I10 BENIGN HYPERTENSION: ICD-10-CM

## 2022-04-05 ENCOUNTER — ANTICOAG VISIT (OUTPATIENT)
Dept: CARDIOLOGY CLINIC | Facility: CLINIC | Age: 77
End: 2022-04-05

## 2022-04-05 ENCOUNTER — OFFICE VISIT (OUTPATIENT)
Dept: CARDIOLOGY CLINIC | Facility: CLINIC | Age: 77
End: 2022-04-05
Payer: COMMERCIAL

## 2022-04-05 ENCOUNTER — APPOINTMENT (OUTPATIENT)
Dept: LAB | Facility: CLINIC | Age: 77
End: 2022-04-05
Payer: COMMERCIAL

## 2022-04-05 VITALS
HEIGHT: 69 IN | BODY MASS INDEX: 26.02 KG/M2 | SYSTOLIC BLOOD PRESSURE: 142 MMHG | DIASTOLIC BLOOD PRESSURE: 76 MMHG | HEART RATE: 62 BPM | WEIGHT: 175.7 LBS

## 2022-04-05 DIAGNOSIS — I05.9 MITRAL VALVE DISORDER: ICD-10-CM

## 2022-04-05 DIAGNOSIS — I48.21 PERMANENT ATRIAL FIBRILLATION (HCC): ICD-10-CM

## 2022-04-05 DIAGNOSIS — I10 HYPERTENSION, ESSENTIAL: ICD-10-CM

## 2022-04-05 DIAGNOSIS — I48.20 CHRONIC ATRIAL FIBRILLATION (HCC): Primary | ICD-10-CM

## 2022-04-05 DIAGNOSIS — Z95.2 HISTORY OF HEART VALVE REPLACEMENT: Primary | ICD-10-CM

## 2022-04-05 DIAGNOSIS — Z79.01 CHRONIC ANTICOAGULATION: ICD-10-CM

## 2022-04-05 PROCEDURE — 99214 OFFICE O/P EST MOD 30 MIN: CPT | Performed by: INTERNAL MEDICINE

## 2022-04-05 RX ORDER — PROPRANOLOL HCL 60 MG
CAPSULE, EXTENDED RELEASE 24HR ORAL
Qty: 90 CAPSULE | Refills: 1 | Status: SHIPPED | OUTPATIENT
Start: 2022-04-05 | End: 2022-07-20

## 2022-04-05 NOTE — PROGRESS NOTES
[facility] [place]  [id number]  Cardiology Follow Up    Doraine Peabody  1945  [id number]      1  Chronic atrial fibrillation ([facility] [address] )     2  Mitral valve disorder     3  Chronic anticoagulation     4  Hypertension, essential         Chief Complaint   Patient presents with    Follow-up     6 month follow up       Interval History:  Patient presents for follow-up visit  Patient denies any history of chest pain shortness of breath  Patient denies any history of leg edema or orthopnea PND  No history of presyncope syncope  Patient states compliance with the present list of medications  Patient denies any bleeding issues      Patient Active Problem List   Diagnosis    Atrial fibrillation ([facility] [address] )    Hyperlipidemia    History of heart valve replacement    Benign hypertension    Bradycardia    Essential tremor    Chronic anticoagulation    Amaurosis fugax, both eyes    Carotid artery stenosis    Mitral valve disorder    Osteoporosis    Peripheral neuropathy    Chronic right-sided low back pain without sciatica    Asymptomatic stenosis of right vertebral artery    Subclinical hypothyroidism    Irritable bowel syndrome with constipation    Inflammatory polyneuropathy, unspecified ([facility] [address] )    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)     Past Medical History:   Diagnosis Date    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    Atrial fibrillation (HCC)     Hashimoto's thyroiditis     Hyperlipidemia     Hypertension     IBS (irritable bowel syndrome)     Kidney stone     Microhematuria     Migraine     Mitral valve disorder     Nasal congestion     Non-toxic uninodular goiter     Nosebleed     Raynaud disease      Social History     Socioeconomic History    Marital status: Single     Spouse name: Not on file    Number of children: Not on file    Years of education: Not on file    Highest education level: Not on file   Occupational History    Occupation: Retired   Tobacco Use    Smoking status: Never Smoker    Smokeless tobacco: Never Used   Vaping Use    Vaping Use: Never used   Substance and Sexual Activity    Alcohol use: Yes     Comment: Occassionally--wine    Drug use: No    Sexual activity: Not Currently   Other Topics Concern    Not on file   Social History Narrative    Not on file     Social Determinants of Health     Financial Resource Strain: Not on file   Food Insecurity: Not on file   Transportation Needs: Not on file   Physical Activity: Not on file   Stress: Not on file   Social Connections: Not on file   Intimate Partner Violence: Not on file   Housing Stability: Not on file      Family History   Problem Relation Age of Onset    Hypertension Mother     Coronary artery disease Father     Migraines Father     Leukemia Brother     Migraines Brother     Hypertension Brother     Coronary artery disease Paternal Grandfather     Leukemia Maternal Aunt     Multiple myeloma Maternal Aunt     Thyroid disease Other     No Known Problems Maternal Aunt     No Known Problems Maternal Aunt     No Known Problems Paternal Aunt     No Known Problems Paternal Aunt     No Known Problems Paternal Aunt     No Known Problems Paternal Aunt     Breast cancer Neg Hx      Past Surgical History:   Procedure Laterality Date    APPENDECTOMY      BREAST CYST EXCISION Right 01/01/1977    CARDIAC SURGERY      COLONOSCOPY  08/08/2011    TONSILLECTOMY      VALVE REPLACEMENT  01/04/2017    mitral valve replacement, 6/5/14       Current Outpatient Medications:     amLODIPine (NORVASC) 5 mg tablet, TAKE 1 TABLET BY MOUTH EVERY DAY IN THE MORNING, Disp: 90 tablet, Rfl: 3    aspirin 81 mg chewable tablet, Chew daily, Disp: , Rfl:     butalbital-acetaminophen-caffeine (FIORICET,ESGIC) -40 mg per tablet, TAKE 1 TABLET BY MOUTH EVERY 4 (FOUR) HOURS AS NEEDED FOR HEADACHES, Disp: 30 tablet, Rfl: 0    Calcium Carbonate (CALTRATE 600) 1500 (600 Ca) MG TABS, Take by mouth daily , Disp: , Rfl:     conjugated estrogens (Premarin) vaginal cream, Insert 1 g into the vagina 3 (three) times a week, Disp: 30 g, Rfl: 0    dicyclomine (BENTYL) 10 mg capsule, Take 1 capsule (10 mg total) by mouth 3 (three) times a day before meals, Disp: 60 capsule, Rfl: 3    gabapentin (NEURONTIN) 100 mg capsule, Take 1 capsule (100 mg total) by mouth 2 (two) times a day, Disp: 180 capsule, Rfl: 3    gabapentin (NEURONTIN) 300 mg capsule, TAKE A 300MG + 100MG AT BEDTIME, Disp: 90 capsule, Rfl: 3    lactobacillus acidophilus-bulgaricus (LACTINEX) chewable tablet, Chew 1 tablet 3 (three) times a day with meals (Patient taking differently: Chew 1 tablet 3 (three) times a day with meals Has not picked up yet ), Disp: 90 tablet, Rfl: 3    losartan (COZAAR) 100 MG tablet, TAKE 1 TABLET BY MOUTH EVERY DAY, Disp: 90 tablet, Rfl: 3    Misc Natural Products (OSTEO BI-FLEX TRIPLE STRENGTH) TABS, Take by mouth daily , Disp: , Rfl:     oxybutynin (DITROPAN-XL) 10 MG 24 hr tablet, TAKE 1 TABLET BY MOUTH DAILY AT BEDTIME, Disp: 90 tablet, Rfl: 1    propranolol (INDERAL LA) 60 mg 24 hr capsule, TAKE 1 CAPSULE BY MOUTH EVERY DAY, Disp: 90 capsule, Rfl: 1    simvastatin (ZOCOR) 10 mg tablet, TAKE 1 TABLET BY MOUTH EVERY DAY, Disp: 90 tablet, Rfl: 3    warfarin (COUMADIN) 1 mg tablet, Take 1 tablet (1 mg total) by mouth daily, Disp: 90 tablet, Rfl: 3    warfarin (COUMADIN) 2 mg tablet, TAKE 1 TABLET BY MOUTH EVERY DAY, Disp: 90 tablet, Rfl: 1    warfarin (COUMADIN) 3 mg tablet, TAKE 1 TABLET BY MOUTH DAILY, Disp: 90 tablet, Rfl: 1    loratadine (CLARITIN) 10 mg tablet, Take 10 mg by mouth as needed  , Disp: , Rfl:     Current Facility-Administered Medications:     cyanocobalamin injection 1,000 mcg, 1,000 mcg, Intramuscular, Q30 Days, Bison Solum, CRNP, 1,000 mcg at 07/09/21 1504    cyanocobalamin injection 1,000 mcg, 1,000 mcg, Intramuscular, Q30 Days, LazarusHANNY Goff, 1,000 mcg at 10/19/21 1046  Allergies   Allergen Reactions    Enablex [Darifenacin] Other (See Comments)     Sweating, HA, urinary hesitancy, flushing of face    Fentanyl Nausea Only and Vomiting    Hyoscyamine Palpitations    Dicyclomine Other (See Comments)     Jittery, disorientation, light HAs    Hydromorphone Other (See Comments)     Per pt does not remember the type or severity level    Midazolam Other (See Comments)     Per pt does not remember the type or severity level    Sulfamethoxazole-Trimethoprim Nausea Only    Venlafaxine Other (See Comments)     Urinary urgency, polyuria    Codeine Polt-Chlorphen Polt Er Headache    Ultracet [Tramadol-Acetaminophen] Nausea Only and Vomiting       Labs: Ancillary Orders on 04/01/2022   Component Date Value    Protime 04/05/2022 28 9*    INR 04/05/2022 2 91*   Ancillary Orders on 03/18/2022   Component Date Value    Protime 03/21/2022 24 6*    INR 03/21/2022 2 36*   Appointment on 03/01/2022   Component Date Value    Protime 03/01/2022 27 7*    INR 03/01/2022 2 75*   Appointment on 02/17/2022   Component Date Value    Protime 02/17/2022 25 0*    INR 02/17/2022 2 40*   Appointment on 02/11/2022   Component Date Value    Color, UA 02/11/2022 Yellow     Clarity, UA 02/11/2022 Clear     Specific Gravity, UA 02/11/2022 1 015     pH, UA 02/11/2022 6 5     Leukocytes, UA 02/11/2022 Negative     Nitrite, UA 02/11/2022 Negative     Protein, UA 02/11/2022 Negative     Glucose, UA 02/11/2022 Negative     Ketones, UA 02/11/2022 Negative     Urobilinogen, UA 02/11/2022 0 2     Bilirubin, UA 02/11/2022 Negative     Blood, UA 02/11/2022 Small     RBC, UA 02/11/2022 4-10*    WBC, UA 02/11/2022 None Seen     Epithelial Cells 02/11/2022 None Seen     Bacteria, UA 02/11/2022 None Seen     Hyaline Casts, UA 02/11/2022 None Seen      Imaging: No results found      Review of Systems:  Review of Systems   REVIEW OF SYSTEMS:  Constitutional:  Denies fever or chills   Eyes:  Denies change in visual acuity   HENT:  Denies nasal congestion or sore throat   Respiratory:  Denies cough or shortness of breath   Cardiovascular:  Denies chest pain or edema   GI:  Denies abdominal pain, nausea, vomiting, bloody stools or diarrhea   :  Denies dysuria, frequency, difficulty in micturition and nocturia  Musculoskeletal:  Denies back pain or joint pain   Neurologic:  Denies headache, focal weakness or sensory changes   Endocrine:  Denies polyuria or polydipsia   Lymphatic:  Denies swollen glands   Psychiatric:  Denies depression or anxiety     Physical Exam:    /76 (BP Location: Left arm, Patient Position: Sitting, Cuff Size: Standard)   Pulse 62   Ht 5' 9" (1 753 m)   Wt 79 7 kg (175 lb 11 2 oz)   LMP  (LMP Unknown)   BMI 25 95 kg/m²     Physical Exam   PHYSICAL EXAM:  General:  Patient is not in acute distress   Head: Normocephalic, Atraumatic  HEENT:  Both pupils normal-size atraumatic, normocephalic, nonicteric  Neck:  JVP not raised  Trachea central  No carotid bruit  Respiratory:  normal breath sounds no crackles  no rhonchi  Cardiovascular:  Irregularly irregular heart sounds consistent with prosthetic mechanical valve   GI:  Abdomen soft nontender  No organomegaly  Lymphatic:  No cervical or inguinal lymphadenopathy  Neurologic:  Patient is awake alert, oriented   Grossly nonfocal  Extremities no edema    Discussion/Summary:  Patient with multiple medical problems who seems to be doing reasonably well from cardiac standpoint  Previous studies reviewed with patient  Medications reviewed and possible side effects discussed  concepts of cardiovascular disease , signs and symptoms of heart disease  Dietary and risk factor modification reinforced  All questions answered  Safety measures reviewed  Patient advised to report any problems prompting medical attention  Patient will continue antibiotic prophylaxis  Results of echocardiogram as well as Holter monitor done in the past reviewed with patient  Symptoms to watch out from cardiac standpoint which would indicate the need for further cardiac evaluation discussed with patient  Patient understands risks and benefits of anticoagulation to prevent thrombotic risk from prosthetic mechanical mitral valve as well as atrial fibrillation  Patient report any bleeding issues  Patient had a few questions which were answered  Follow-up in 6 months or earlier as needed  Patient is agreeable with the plan care

## 2022-04-18 ENCOUNTER — ANTICOAG VISIT (OUTPATIENT)
Dept: CARDIOLOGY CLINIC | Facility: CLINIC | Age: 77
End: 2022-04-18

## 2022-04-18 ENCOUNTER — APPOINTMENT (OUTPATIENT)
Dept: LAB | Facility: CLINIC | Age: 77
End: 2022-04-18
Payer: COMMERCIAL

## 2022-04-18 DIAGNOSIS — R73.01 IFG (IMPAIRED FASTING GLUCOSE): ICD-10-CM

## 2022-04-18 DIAGNOSIS — Z95.2 HISTORY OF HEART VALVE REPLACEMENT: Primary | ICD-10-CM

## 2022-04-18 DIAGNOSIS — E78.2 MIXED HYPERLIPIDEMIA: ICD-10-CM

## 2022-04-18 DIAGNOSIS — E03.8 SUBCLINICAL HYPOTHYROIDISM: ICD-10-CM

## 2022-04-18 DIAGNOSIS — E53.8 B12 DEFICIENCY: ICD-10-CM

## 2022-04-18 DIAGNOSIS — I10 BENIGN HYPERTENSION: ICD-10-CM

## 2022-04-18 LAB
ALBUMIN SERPL BCP-MCNC: 4.2 G/DL (ref 3.5–5)
ALP SERPL-CCNC: 75 U/L (ref 46–116)
ALT SERPL W P-5'-P-CCNC: 26 U/L (ref 12–78)
ANION GAP SERPL CALCULATED.3IONS-SCNC: 3 MMOL/L (ref 4–13)
AST SERPL W P-5'-P-CCNC: 28 U/L (ref 5–45)
BASOPHILS # BLD AUTO: 0.05 THOUSANDS/ΜL (ref 0–0.1)
BASOPHILS NFR BLD AUTO: 1 % (ref 0–1)
BILIRUB SERPL-MCNC: 0.59 MG/DL (ref 0.2–1)
BUN SERPL-MCNC: 14 MG/DL (ref 5–25)
CALCIUM SERPL-MCNC: 9.4 MG/DL (ref 8.3–10.1)
CHLORIDE SERPL-SCNC: 109 MMOL/L (ref 100–108)
CHOLEST SERPL-MCNC: 143 MG/DL
CO2 SERPL-SCNC: 29 MMOL/L (ref 21–32)
CREAT SERPL-MCNC: 0.89 MG/DL (ref 0.6–1.3)
EOSINOPHIL # BLD AUTO: 0.27 THOUSAND/ΜL (ref 0–0.61)
EOSINOPHIL NFR BLD AUTO: 5 % (ref 0–6)
ERYTHROCYTE [DISTWIDTH] IN BLOOD BY AUTOMATED COUNT: 13.6 % (ref 11.6–15.1)
EST. AVERAGE GLUCOSE BLD GHB EST-MCNC: 108 MG/DL
GFR SERPL CREATININE-BSD FRML MDRD: 64 ML/MIN/1.73SQ M
GLUCOSE P FAST SERPL-MCNC: 98 MG/DL (ref 65–99)
HBA1C MFR BLD: 5.4 %
HCT VFR BLD AUTO: 38.5 % (ref 34.8–46.1)
HDLC SERPL-MCNC: 68 MG/DL
HGB BLD-MCNC: 12.5 G/DL (ref 11.5–15.4)
IMM GRANULOCYTES # BLD AUTO: 0.02 THOUSAND/UL (ref 0–0.2)
IMM GRANULOCYTES NFR BLD AUTO: 0 % (ref 0–2)
LDLC SERPL CALC-MCNC: 60 MG/DL (ref 0–100)
LYMPHOCYTES # BLD AUTO: 1.14 THOUSANDS/ΜL (ref 0.6–4.47)
LYMPHOCYTES NFR BLD AUTO: 20 % (ref 14–44)
MCH RBC QN AUTO: 31.3 PG (ref 26.8–34.3)
MCHC RBC AUTO-ENTMCNC: 32.5 G/DL (ref 31.4–37.4)
MCV RBC AUTO: 97 FL (ref 82–98)
MONOCYTES # BLD AUTO: 0.64 THOUSAND/ΜL (ref 0.17–1.22)
MONOCYTES NFR BLD AUTO: 11 % (ref 4–12)
NEUTROPHILS # BLD AUTO: 3.48 THOUSANDS/ΜL (ref 1.85–7.62)
NEUTS SEG NFR BLD AUTO: 63 % (ref 43–75)
NONHDLC SERPL-MCNC: 75 MG/DL
NRBC BLD AUTO-RTO: 0 /100 WBCS
PLATELET # BLD AUTO: 211 THOUSANDS/UL (ref 149–390)
PMV BLD AUTO: 9.5 FL (ref 8.9–12.7)
POTASSIUM SERPL-SCNC: 4.3 MMOL/L (ref 3.5–5.3)
PROT SERPL-MCNC: 7.9 G/DL (ref 6.4–8.2)
RBC # BLD AUTO: 3.99 MILLION/UL (ref 3.81–5.12)
SODIUM SERPL-SCNC: 141 MMOL/L (ref 136–145)
TRIGL SERPL-MCNC: 76 MG/DL
TSH SERPL DL<=0.05 MIU/L-ACNC: 3.11 UIU/ML (ref 0.45–4.5)
VIT B12 SERPL-MCNC: 1584 PG/ML (ref 100–900)
WBC # BLD AUTO: 5.6 THOUSAND/UL (ref 4.31–10.16)

## 2022-04-18 PROCEDURE — 85025 COMPLETE CBC W/AUTO DIFF WBC: CPT

## 2022-04-18 PROCEDURE — 80061 LIPID PANEL: CPT

## 2022-04-18 PROCEDURE — 83036 HEMOGLOBIN GLYCOSYLATED A1C: CPT

## 2022-04-18 PROCEDURE — 84443 ASSAY THYROID STIM HORMONE: CPT

## 2022-04-18 PROCEDURE — 80053 COMPREHEN METABOLIC PANEL: CPT

## 2022-04-18 PROCEDURE — 3044F HG A1C LEVEL LT 7.0%: CPT | Performed by: NURSE PRACTITIONER

## 2022-04-18 PROCEDURE — 36415 COLL VENOUS BLD VENIPUNCTURE: CPT

## 2022-04-18 PROCEDURE — 82607 VITAMIN B-12: CPT

## 2022-04-25 ENCOUNTER — OFFICE VISIT (OUTPATIENT)
Dept: INTERNAL MEDICINE CLINIC | Facility: CLINIC | Age: 77
End: 2022-04-25
Payer: COMMERCIAL

## 2022-04-25 VITALS
SYSTOLIC BLOOD PRESSURE: 118 MMHG | BODY MASS INDEX: 25.59 KG/M2 | DIASTOLIC BLOOD PRESSURE: 78 MMHG | HEART RATE: 64 BPM | RESPIRATION RATE: 16 BRPM | WEIGHT: 172.8 LBS | HEIGHT: 69 IN

## 2022-04-25 DIAGNOSIS — E78.2 MIXED HYPERLIPIDEMIA: ICD-10-CM

## 2022-04-25 DIAGNOSIS — G61.9 INFLAMMATORY NEUROPATHY (HCC): ICD-10-CM

## 2022-04-25 DIAGNOSIS — E13.49 OTHER SPECIFIED DIABETES MELLITUS WITH OTHER DIABETIC NEUROLOGICAL COMPLICATION (HCC): ICD-10-CM

## 2022-04-25 DIAGNOSIS — R21 RASH: ICD-10-CM

## 2022-04-25 DIAGNOSIS — I48.91 ATRIAL FIBRILLATION, UNSPECIFIED TYPE (HCC): ICD-10-CM

## 2022-04-25 DIAGNOSIS — R73.01 IFG (IMPAIRED FASTING GLUCOSE): ICD-10-CM

## 2022-04-25 DIAGNOSIS — G25.0 ESSENTIAL TREMOR: ICD-10-CM

## 2022-04-25 DIAGNOSIS — I05.9 MITRAL VALVE DISORDER: ICD-10-CM

## 2022-04-25 DIAGNOSIS — G61.9 INFLAMMATORY POLYNEUROPATHY, UNSPECIFIED (HCC): ICD-10-CM

## 2022-04-25 DIAGNOSIS — R09.89 BRUIT: ICD-10-CM

## 2022-04-25 DIAGNOSIS — E03.8 SUBCLINICAL HYPOTHYROIDISM: Primary | ICD-10-CM

## 2022-04-25 PROCEDURE — 3008F BODY MASS INDEX DOCD: CPT | Performed by: NURSE PRACTITIONER

## 2022-04-25 PROCEDURE — 1036F TOBACCO NON-USER: CPT | Performed by: NURSE PRACTITIONER

## 2022-04-25 PROCEDURE — 3078F DIAST BP <80 MM HG: CPT | Performed by: NURSE PRACTITIONER

## 2022-04-25 PROCEDURE — 99214 OFFICE O/P EST MOD 30 MIN: CPT | Performed by: NURSE PRACTITIONER

## 2022-04-25 PROCEDURE — 3074F SYST BP LT 130 MM HG: CPT | Performed by: NURSE PRACTITIONER

## 2022-04-25 PROCEDURE — 1160F RVW MEDS BY RX/DR IN RCRD: CPT | Performed by: NURSE PRACTITIONER

## 2022-04-25 NOTE — PATIENT INSTRUCTIONS
Hypertension stable on current medication     hyperlipidemia LDL at goal on statin continue same     essential tremor improved on propanolol     valve replacement/AFib continue Coumadin     history of amaurosis fugax in 2017 concerned for Coumadin failure placed since inr had been theraputic at that time placed  on baby aspirin has been on this since then  No evidence of bleeding  Continue to modify risk factors    She was noted to have small vertebrals all narrowing no stroke no significant stenosis  Was noted to have mild atherosclerotic disease to the carotid arteries, we will follow this up with a carotid ultrasound     osteopenia recommend calcium vitamin-D     health maintenance up-to-date with immunizations mammogram scheduled next month

## 2022-04-25 NOTE — PROGRESS NOTES
Assessment/Plan:    Patient Instructions   Hypertension stable on current medication     hyperlipidemia LDL at goal on statin continue same     essential tremor improved on propanolol     valve replacement/AFib continue Coumadin     history of amaurosis fugax in 2017 concerned for Coumadin failure placed since inr had been theraputic at that time placed  on baby aspirin has been on this since then  No evidence of bleeding  Continue to modify risk factors  She was noted to have small vertebrals all narrowing no stroke no significant stenosis  Was noted to have mild atherosclerotic disease to the carotid arteries, we will follow this up with a carotid ultrasound     osteopenia recommend calcium vitamin-D     health maintenance up-to-date with immunizations mammogram scheduled next month           Diagnoses and all orders for this visit:    Subclinical hypothyroidism  -     TSH, 3rd generation with Free T4 reflex; Future    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)    Atrial fibrillation, unspecified type ([facility] [address] )    Mitral valve disorder  -     Echo complete w/ contrast if indicated; Future    Essential tremor    Inflammatory neuropathy (HCC)    Inflammatory polyneuropathy, unspecified (HCC)    Mixed hyperlipidemia  -     CBC and differential; Future  -     Comprehensive metabolic panel; Future  -     Lipid panel; Future    IFG (impaired fasting glucose)  -     Hemoglobin A1C; Future    Rash  -     Ambulatory Referral to Podiatry; Future    Bruit  -     VAS carotid complete study;  Future         Subjective:      Patient ID: Kandy Abraham is a 78 y o  female     Patient is here for routine follow-up and review labs   taking blood pressure medication no home blood pressures   headaches have been stable with as needed Fioricet   following with cardiology for history of valve and AFib   Patient is feeling well,    she tries to stay active she is trying to watch what she eats, occasional home blood pressure is stable, no evidence of bleeding         Neuropathy is stable on current dose        Current Outpatient Medications:     amLODIPine (NORVASC) 5 mg tablet, TAKE 1 TABLET BY MOUTH EVERY DAY IN THE MORNING, Disp: 90 tablet, Rfl: 3    aspirin 81 mg chewable tablet, Chew daily, Disp: , Rfl:     butalbital-acetaminophen-caffeine (FIORICET,ESGIC) -40 mg per tablet, TAKE 1 TABLET BY MOUTH EVERY 4 (FOUR) HOURS AS NEEDED FOR HEADACHES, Disp: 30 tablet, Rfl: 0    Calcium Carbonate (CALTRATE 600) 1500 (600 Ca) MG TABS, Take by mouth daily , Disp: , Rfl:     conjugated estrogens (Premarin) vaginal cream, Insert 1 g into the vagina 3 (three) times a week, Disp: 30 g, Rfl: 0    dicyclomine (BENTYL) 10 mg capsule, Take 1 capsule (10 mg total) by mouth 3 (three) times a day before meals (Patient taking differently: Take 10 mg by mouth as needed  ), Disp: 60 capsule, Rfl: 3    gabapentin (NEURONTIN) 100 mg capsule, Take 1 capsule (100 mg total) by mouth 2 (two) times a day, Disp: 180 capsule, Rfl: 3    gabapentin (NEURONTIN) 300 mg capsule, TAKE A 300MG + 100MG AT BEDTIME, Disp: 90 capsule, Rfl: 3    lactobacillus acidophilus-bulgaricus (LACTINEX) chewable tablet, Chew 1 tablet 3 (three) times a day with meals (Patient taking differently: Chew 1 tablet if needed Has not picked up yet ), Disp: 90 tablet, Rfl: 3    loratadine (CLARITIN) 10 mg tablet, Take 10 mg by mouth as needed  , Disp: , Rfl:     losartan (COZAAR) 100 MG tablet, TAKE 1 TABLET BY MOUTH EVERY DAY, Disp: 90 tablet, Rfl: 3    Misc Natural Products (OSTEO BI-FLEX TRIPLE STRENGTH) TABS, Take by mouth daily , Disp: , Rfl:     oxybutynin (DITROPAN-XL) 10 MG 24 hr tablet, TAKE 1 TABLET BY MOUTH DAILY AT BEDTIME, Disp: 90 tablet, Rfl: 1    propranolol (INDERAL LA) 60 mg 24 hr capsule, TAKE 1 CAPSULE BY MOUTH EVERY DAY, Disp: 90 capsule, Rfl: 1    simvastatin (ZOCOR) 10 mg tablet, TAKE 1 TABLET BY MOUTH EVERY DAY, Disp: 90 tablet, Rfl: 3    warfarin (COUMADIN) 1 mg tablet, Take 1 tablet (1 mg total) by mouth daily, Disp: 90 tablet, Rfl: 3    warfarin (COUMADIN) 2 mg tablet, TAKE 1 TABLET BY MOUTH EVERY DAY, Disp: 90 tablet, Rfl: 1    warfarin (COUMADIN) 3 mg tablet, TAKE 1 TABLET BY MOUTH DAILY, Disp: 90 tablet, Rfl: 1    Current Facility-Administered Medications:     cyanocobalamin injection 1,000 mcg, 1,000 mcg, Intramuscular, Q30 Days, Agus Schwalbe, CRNP, 1,000 mcg at 07/09/21 1504    cyanocobalamin injection 1,000 mcg, 1,000 mcg, Intramuscular, Q30 Days, Agus Schwalbe, CRNP, 1,000 mcg at 10/19/21 1046    Recent Results (from the past 1008 hour(s))   Protime-INR    Collection Time: 03/21/22 11:41 AM   Result Value Ref Range    Protime 24 6 (H) 11 6 - 14 5 seconds    INR 2 36 (H) 0 84 - 1 19   Protime-INR    Collection Time: 04/05/22 10:01 AM   Result Value Ref Range    Protime 28 9 (H) 11 6 - 14 5 seconds    INR 2 91 (H) 0 84 - 1 19   Protime-INR    Collection Time: 04/18/22  8:48 AM   Result Value Ref Range    Protime 25 2 (H) 11 6 - 14 5 seconds    INR 2 43 (H) 0 84 - 1 19   CBC and differential    Collection Time: 04/18/22  8:48 AM   Result Value Ref Range    WBC 5 60 4 31 - 10 16 Thousand/uL    RBC 3 99 3 81 - 5 12 Million/uL    Hemoglobin 12 5 11 5 - 15 4 g/dL    Hematocrit 38 5 34 8 - 46 1 %    MCV 97 82 - 98 fL    MCH 31 3 26 8 - 34 3 pg    MCHC 32 5 31 4 - 37 4 g/dL    RDW 13 6 11 6 - 15 1 %    MPV 9 5 8 9 - 12 7 fL    Platelets 860 466 - 024 Thousands/uL    nRBC 0 /100 WBCs    Neutrophils Relative 63 43 - 75 %    Immat GRANS % 0 0 - 2 %    Lymphocytes Relative 20 14 - 44 %    Monocytes Relative 11 4 - 12 %    Eosinophils Relative 5 0 - 6 %    Basophils Relative 1 0 - 1 %    Neutrophils Absolute 3 48 1 85 - 7 62 Thousands/µL    Immature Grans Absolute 0 02 0 00 - 0 20 Thousand/uL    Lymphocytes Absolute 1 14 0 60 - 4 47 Thousands/µL    Monocytes Absolute 0 64 0 17 - 1 22 Thousand/µL    Eosinophils Absolute 0 27 0 00 - 0 61 Thousand/µL    Basophils Absolute 0 05 0 00 - 0 10 Thousands/µL   Comprehensive metabolic panel    Collection Time: 04/18/22  8:48 AM   Result Value Ref Range    Sodium 141 136 - 145 mmol/L    Potassium 4 3 3 5 - 5 3 mmol/L    Chloride 109 (H) 100 - 108 mmol/L    CO2 29 21 - 32 mmol/L    ANION GAP 3 (L) 4 - 13 mmol/L    BUN 14 5 - 25 mg/dL    Creatinine 0 89 0 60 - 1 30 mg/dL    Glucose, Fasting 98 65 - 99 mg/dL    Calcium 9 4 8 3 - 10 1 mg/dL    AST 28 5 - 45 U/L    ALT 26 12 - 78 U/L    Alkaline Phosphatase 75 46 - 116 U/L    Total Protein 7 9 6 4 - 8 2 g/dL    Albumin 4 2 3 5 - 5 0 g/dL    Total Bilirubin 0 59 0 20 - 1 00 mg/dL    eGFR 64 ml/min/1 73sq m   Lipid panel    Collection Time: 04/18/22  8:48 AM   Result Value Ref Range    Cholesterol 143 See Comment mg/dL    Triglycerides 76 See Comment mg/dL    HDL, Direct 68 >=50 mg/dL    LDL Calculated 60 0 - 100 mg/dL    Non-HDL-Chol (CHOL-HDL) 75 mg/dl   Hemoglobin A1C    Collection Time: 04/18/22  8:48 AM   Result Value Ref Range    Hemoglobin A1C 5 4 Normal 3 8-5 6%; PreDiabetic 5 7-6 4%; Diabetic >=6 5%; Glycemic control for adults with diabetes <7 0% %     mg/dl   TSH, 3rd generation with Free T4 reflex    Collection Time: 04/18/22  8:48 AM   Result Value Ref Range    TSH 3RD GENERATON 3 110 0 450 - 4 500 uIU/mL   Vitamin B12    Collection Time: 04/18/22  8:48 AM   Result Value Ref Range    Vitamin B-12 1,584 (H) 100 - 900 pg/mL       The following portions of the patient's history were reviewed and updated as appropriate: allergies, current medications, past family history, past medical history, past social history, past surgical history and problem list      Review of Systems   Constitutional: Negative for appetite change, chills, diaphoresis, fatigue, fever and unexpected weight change  HENT: Negative for postnasal drip and sneezing  Eyes: Negative for visual disturbance  Respiratory: Negative for chest tightness and shortness of breath  Cardiovascular: Negative for chest pain, palpitations and leg swelling  Gastrointestinal: Negative for abdominal pain and blood in stool  Endocrine: Negative for cold intolerance, heat intolerance, polydipsia, polyphagia and polyuria  Genitourinary: Negative for difficulty urinating, dysuria, frequency and urgency  Musculoskeletal: Negative for arthralgias and myalgias  Skin: Negative for rash and wound  Neurological: Negative for dizziness, weakness, light-headedness and headaches  Hematological: Negative for adenopathy  Psychiatric/Behavioral: Negative for confusion, dysphoric mood and sleep disturbance  The patient is not nervous/anxious  Objective:      /78 (BP Location: Left arm, Patient Position: Sitting, Cuff Size: Standard)   Pulse 64   Resp 16   Ht 5' 9" (1 753 m)   Wt 78 4 kg (172 lb 12 8 oz)   LMP  (LMP Unknown)   BMI 25 52 kg/m²        Physical Exam  Constitutional:       General: She is not in acute distress  Appearance: She is well-developed  She is not diaphoretic  HENT:      Head: Normocephalic and atraumatic  Nose: Nose normal    Eyes:      Conjunctiva/sclera: Conjunctivae normal       Pupils: Pupils are equal, round, and reactive to light  Neck:      Thyroid: No thyromegaly  Vascular: No JVD  Trachea: No tracheal deviation  Cardiovascular:      Rate and Rhythm: Normal rate  Pulses: no weak pulses          Dorsalis pedis pulses are 2+ on the right side and 2+ on the left side  Posterior tibial pulses are 2+ on the right side and 2+ on the left side  Heart sounds: Murmur heard  No friction rub  No gallop  Pulmonary:      Effort: Pulmonary effort is normal  No respiratory distress  Breath sounds: Normal breath sounds  No wheezing or rales  Abdominal:      General: Bowel sounds are normal  There is no distension  Palpations: Abdomen is soft  Tenderness: There is no abdominal tenderness  Musculoskeletal:         General: Normal range of motion  Cervical back: Normal range of motion and neck supple  Feet:      Right foot:      Skin integrity: No ulcer, skin breakdown, erythema, warmth, callus or dry skin  Left foot:      Skin integrity: No ulcer, skin breakdown, erythema, warmth, callus or dry skin  Lymphadenopathy:      Cervical: No cervical adenopathy  Skin:     General: Skin is warm and dry  Findings: No rash  Neurological:      Mental Status: She is alert and oriented to person, place, and time  Cranial Nerves: No cranial nerve deficit  Psychiatric:         Behavior: Behavior normal          Thought Content: Thought content normal          Judgment: Judgment normal      Diabetic Foot Exam    Patient's shoes and socks removed  Right Foot/Ankle   Right Foot Inspection  Skin Exam: skin normal and skin intact  No dry skin, no warmth, no callus, no erythema, no maceration, no abnormal color, no pre-ulcer, no ulcer and no callus  Toe Exam: ROM and strength within normal limits  Sensory   Vibration: intact  Proprioception: intact  Monofilament testing: intact    Vascular  Capillary refills: < 3 seconds  The right DP pulse is 2+  The right PT pulse is 2+  Left Foot/Ankle  Left Foot Inspection  Skin Exam: skin normal and skin intact  No dry skin, no warmth, no erythema, no maceration, normal color, no pre-ulcer, no ulcer and no callus  Toe Exam: ROM and strength within normal limits  Sensory   Vibration: intact  Proprioception: intact  Monofilament testing: intact    Vascular  Capillary refills: < 3 seconds  The left DP pulse is 2+  The left PT pulse is 2+  Assign Risk Category  No deformity present  No loss of protective sensation  No weak pulses  Risk: 1  BMI Counseling: Body mass index is 25 52 kg/m²  The BMI is above normal  Nutrition recommendations include decreasing portion sizes and decreasing fast food intake   Exercise recommendations include exercising 3-5 times per week  No pharmacotherapy was ordered  Rationale for BMI follow-up plan is due to patient being overweight or obese

## 2022-05-09 ENCOUNTER — APPOINTMENT (OUTPATIENT)
Dept: LAB | Facility: CLINIC | Age: 77
End: 2022-05-09
Payer: COMMERCIAL

## 2022-05-09 ENCOUNTER — ANTICOAG VISIT (OUTPATIENT)
Dept: CARDIOLOGY CLINIC | Facility: CLINIC | Age: 77
End: 2022-05-09

## 2022-05-09 DIAGNOSIS — Z95.2 HISTORY OF HEART VALVE REPLACEMENT: Primary | ICD-10-CM

## 2022-05-25 DIAGNOSIS — Z95.2 HISTORY OF HEART VALVE REPLACEMENT: ICD-10-CM

## 2022-05-26 RX ORDER — WARFARIN SODIUM 2 MG/1
TABLET ORAL
Qty: 90 TABLET | Refills: 1 | Status: SHIPPED | OUTPATIENT
Start: 2022-05-26

## 2022-06-01 ENCOUNTER — APPOINTMENT (OUTPATIENT)
Dept: LAB | Facility: CLINIC | Age: 77
End: 2022-06-01
Payer: COMMERCIAL

## 2022-06-01 ENCOUNTER — ANTICOAG VISIT (OUTPATIENT)
Dept: CARDIOLOGY CLINIC | Facility: CLINIC | Age: 77
End: 2022-06-01

## 2022-06-01 DIAGNOSIS — Z95.2 HISTORY OF HEART VALVE REPLACEMENT: Primary | ICD-10-CM

## 2022-06-01 NOTE — PROGRESS NOTES
S/w the pt  She denies missing any pills sand states she was taking as directed  Advised to take 4mg daily except Sun take 2mg and retest in 10 days

## 2022-06-09 ENCOUNTER — HOSPITAL ENCOUNTER (OUTPATIENT)
Dept: MAMMOGRAPHY | Facility: CLINIC | Age: 77
Discharge: HOME | End: 2022-06-09
Payer: COMMERCIAL

## 2022-06-09 VITALS — WEIGHT: 172 LBS | BODY MASS INDEX: 25.48 KG/M2 | HEIGHT: 69 IN

## 2022-06-09 DIAGNOSIS — Z12.31 VISIT FOR SCREENING MAMMOGRAM: ICD-10-CM

## 2022-06-09 PROCEDURE — 77067 SCR MAMMO BI INCL CAD: CPT

## 2022-06-09 PROCEDURE — 77063 BREAST TOMOSYNTHESIS BI: CPT

## 2022-06-13 ENCOUNTER — HOSPITAL ENCOUNTER (OUTPATIENT)
Dept: NON INVASIVE DIAGNOSTICS | Facility: CLINIC | Age: 77
Discharge: HOME | End: 2022-06-13
Payer: COMMERCIAL

## 2022-06-13 ENCOUNTER — APPOINTMENT (OUTPATIENT)
Dept: LAB | Facility: CLINIC | Age: 77
End: 2022-06-13
Payer: COMMERCIAL

## 2022-06-13 ENCOUNTER — ANTICOAG VISIT (OUTPATIENT)
Dept: CARDIOLOGY CLINIC | Facility: CLINIC | Age: 77
End: 2022-06-13

## 2022-06-13 VITALS
HEART RATE: 83 BPM | SYSTOLIC BLOOD PRESSURE: 118 MMHG | BODY MASS INDEX: 25.48 KG/M2 | DIASTOLIC BLOOD PRESSURE: 78 MMHG | HEIGHT: 69 IN | WEIGHT: 172 LBS

## 2022-06-13 DIAGNOSIS — I05.9 MITRAL VALVE DISORDER: ICD-10-CM

## 2022-06-13 DIAGNOSIS — R09.89 BRUIT: ICD-10-CM

## 2022-06-13 DIAGNOSIS — Z95.2 HISTORY OF HEART VALVE REPLACEMENT: Primary | ICD-10-CM

## 2022-06-13 DIAGNOSIS — I48.21 PERMANENT ATRIAL FIBRILLATION (HCC): ICD-10-CM

## 2022-06-13 LAB
AORTIC ROOT: 3.1 CM
APICAL FOUR CHAMBER EJECTION FRACTION: 65 %
AV LVOT PEAK GRADIENT: 2 MMHG
AV PEAK GRADIENT: 4 MMHG
FRACTIONAL SHORTENING: 24 % (ref 28–44)
INTERVENTRICULAR SEPTUM IN DIASTOLE (PARASTERNAL SHORT AXIS VIEW): 1.1 CM
INTERVENTRICULAR SEPTUM: 1.1 CM (ref 0.6–1.1)
LAAS-AP2: 45.1 CM2
LAAS-AP4: 51.3 CM2
LEFT ATRIUM AREA SYSTOLE SINGLE PLANE A4C: 64.6 CM2
LEFT ATRIUM SIZE: 7.4 CM
LEFT INTERNAL DIMENSION IN SYSTOLE: 3.9 CM (ref 2.1–4)
LEFT VENTRICULAR INTERNAL DIMENSION IN DIASTOLE: 5.1 CM (ref 3.5–6)
LEFT VENTRICULAR POSTERIOR WALL IN END DIASTOLE: 1.1 CM
LEFT VENTRICULAR STROKE VOLUME: 59 ML
LVSV (TEICH): 59 ML
RIGHT ATRIUM AREA SYSTOLE A4C: 33.3 CM2
RIGHT VENTRICLE ID DIMENSION: 4.6 CM
SL CV LEFT ATRIUM LENGTH A2C: 8.8 CM
SL CV PED ECHO LEFT VENTRICLE DIASTOLIC VOLUME (MOD BIPLANE) 2D: 124 ML
SL CV PED ECHO LEFT VENTRICLE SYSTOLIC VOLUME (MOD BIPLANE) 2D: 65 ML
TR MAX PG: 36 MMHG
TR PEAK VELOCITY: 3 M/S
TRICUSPID VALVE PEAK REGURGITATION VELOCITY: 2.98 M/S

## 2022-06-13 PROCEDURE — 93880 EXTRACRANIAL BILAT STUDY: CPT | Performed by: INTERNAL MEDICINE

## 2022-06-13 PROCEDURE — 93306 TTE W/DOPPLER COMPLETE: CPT | Performed by: INTERNAL MEDICINE

## 2022-06-13 PROCEDURE — 93306 TTE W/DOPPLER COMPLETE: CPT

## 2022-06-13 PROCEDURE — 93880 EXTRACRANIAL BILAT STUDY: CPT

## 2022-06-19 DIAGNOSIS — G62.9 NEUROPATHY: ICD-10-CM

## 2022-06-19 RX ORDER — GABAPENTIN 100 MG/1
CAPSULE ORAL
Qty: 180 CAPSULE | Refills: 3 | Status: SHIPPED | OUTPATIENT
Start: 2022-06-19

## 2022-06-20 ENCOUNTER — TELEPHONE (OUTPATIENT)
Dept: INTERNAL MEDICINE CLINIC | Facility: CLINIC | Age: 77
End: 2022-06-20

## 2022-06-20 DIAGNOSIS — N32.81 OAB (OVERACTIVE BLADDER): ICD-10-CM

## 2022-06-20 DIAGNOSIS — R93.1 ABNORMAL ECHOCARDIOGRAM: Primary | ICD-10-CM

## 2022-06-20 RX ORDER — OXYBUTYNIN CHLORIDE 10 MG/1
TABLET, EXTENDED RELEASE ORAL
Qty: 90 TABLET | Refills: 1 | Status: SHIPPED | OUTPATIENT
Start: 2022-06-20

## 2022-06-20 NOTE — TELEPHONE ENCOUNTER
----- Message from Devinjorge Rizo sent at 6/20/2022  4:07 PM EDT -----  Let her know she had some changes to her ultrasound of the heart  The squeezing capacity of the heart is reduced  I shared results with dr Veronica Bermeo and he suggested we repeat the stress test  I will place the order   And if she hasnt seen him recently she s  [email or web address] make a follow up

## 2022-06-20 NOTE — TELEPHONE ENCOUNTER
I spoke to pt she is aware of her results and providers message she also stated that she saw doctor P already and will repeat stress test

## 2022-06-21 ENCOUNTER — TELEPHONE (OUTPATIENT)
Dept: CARDIOLOGY CLINIC | Facility: CLINIC | Age: 77
End: 2022-06-21

## 2022-06-21 NOTE — TELEPHONE ENCOUNTER
Pt called & lvm stating that she had an echo done on 6/13/22 & would like a call back with results..       Call back # [phone]

## 2022-06-21 NOTE — TELEPHONE ENCOUNTER
I called the patient and left a message to call back.  Patient had an echocardiogram ordered by HANNY Hand.    It showed mildly reduced ejection fraction.  Patient is supposed to have a pharmacological stress test to rule out ischemia.  I discussed the plan with Brianda Maldonado.    DESIRE.    Patient should have a follow-up appointment to see me after the pharmacological stress test so that we can go over both the tests in detail.  Thank you.

## 2022-06-21 NOTE — TELEPHONE ENCOUNTER
Spoke with patient and gave her result and also give her central scheduling number to get test schedule         Please schedule patient after testing is done .

## 2022-06-28 DIAGNOSIS — Z95.2 HISTORY OF HEART VALVE REPLACEMENT: ICD-10-CM

## 2022-06-28 RX ORDER — WARFARIN SODIUM 1 MG/1
TABLET ORAL
Qty: 90 TABLET | Refills: 3 | Status: SHIPPED | OUTPATIENT
Start: 2022-06-28

## 2022-06-28 RX ORDER — WARFARIN SODIUM 3 MG/1
TABLET ORAL
Qty: 90 TABLET | Refills: 1 | Status: SHIPPED | OUTPATIENT
Start: 2022-06-28

## 2022-06-29 ENCOUNTER — TELEPHONE (OUTPATIENT)
Dept: INTERNAL MEDICINE CLINIC | Facility: CLINIC | Age: 77
End: 2022-06-29

## 2022-06-29 NOTE — TELEPHONE ENCOUNTER
Is the [facility] canceled for this Fri July 1? It hasn't been approved  I got a Fax from [facility] with [facility]  It can go to the next level of review-which might require a peer to peer, order a different test; coronary CTA or withdraw the request    What does Karma Parent want to do?   Is the pt aware of no approval?

## 2022-06-30 ENCOUNTER — OFFICE VISIT (OUTPATIENT)
Dept: CARDIOLOGY CLINIC | Facility: CLINIC | Age: 77
End: 2022-06-30
Payer: COMMERCIAL

## 2022-06-30 VITALS
SYSTOLIC BLOOD PRESSURE: 120 MMHG | RESPIRATION RATE: 16 BRPM | DIASTOLIC BLOOD PRESSURE: 78 MMHG | WEIGHT: 170 LBS | OXYGEN SATURATION: 96 % | BODY MASS INDEX: 25.18 KG/M2 | HEART RATE: 63 BPM | HEIGHT: 69 IN

## 2022-06-30 DIAGNOSIS — I42.9 CARDIOMYOPATHY, UNSPECIFIED TYPE (HCC): ICD-10-CM

## 2022-06-30 DIAGNOSIS — I48.20 CHRONIC ATRIAL FIBRILLATION (HCC): ICD-10-CM

## 2022-06-30 DIAGNOSIS — R06.02 SHORTNESS OF BREATH ON EXERTION: Primary | ICD-10-CM

## 2022-06-30 DIAGNOSIS — Z95.2 HX OF MITRAL VALVE REPLACEMENT WITH MECHANICAL VALVE: ICD-10-CM

## 2022-06-30 DIAGNOSIS — Z79.01 CHRONIC ANTICOAGULATION: ICD-10-CM

## 2022-06-30 DIAGNOSIS — I10 ESSENTIAL HYPERTENSION: ICD-10-CM

## 2022-06-30 DIAGNOSIS — E78.2 MIXED HYPERLIPIDEMIA: ICD-10-CM

## 2022-06-30 PROCEDURE — 93000 ELECTROCARDIOGRAM COMPLETE: CPT | Performed by: INTERNAL MEDICINE

## 2022-06-30 PROCEDURE — 99214 OFFICE O/P EST MOD 30 MIN: CPT | Performed by: INTERNAL MEDICINE

## 2022-06-30 PROCEDURE — 3078F DIAST BP <80 MM HG: CPT | Performed by: INTERNAL MEDICINE

## 2022-06-30 PROCEDURE — 3074F SYST BP LT 130 MM HG: CPT | Performed by: INTERNAL MEDICINE

## 2022-06-30 PROCEDURE — 3008F BODY MASS INDEX DOCD: CPT | Performed by: INTERNAL MEDICINE

## 2022-06-30 PROCEDURE — 1160F RVW MEDS BY RX/DR IN RCRD: CPT | Performed by: INTERNAL MEDICINE

## 2022-06-30 PROCEDURE — 1036F TOBACCO NON-USER: CPT | Performed by: INTERNAL MEDICINE

## 2022-06-30 NOTE — PROGRESS NOTES
CARDIOLOGY OFFICE VISIT  [facility]  [address], [address], [address]  Tel: [phone]      NAME: Pool Peralta  AGE: 78 y o  SEX: female  : 1945  MRN: [id number]      Chief Complaint:  Chief Complaint   Patient presents with    Follow-up       History of Present Illness:   Patient of Dr Lina Sánchez who comes for an urgent visit  States she has been feeling short of breath with activity such as climbing one flight of steps  This is new for her  Denies associated chest pain/pressure, palpitations, nausea, diaphoresis  Also denies lightheadedness, syncope, swelling feet, orthopnea, PND, claudication      This is a 74-year-old female with chronic atrial fibrillation on anticoagulation, hypertension, hyperlipidemia, s/p mechanical mitral valve replacement who had a recent 2D echocardiogram which showed a new cardiomyopathy with EF 45-50% with her previous EF being 55% and above      Past Medical History:  Past Medical History:   Diagnosis Date    Acquired deformity of rib 2014    Atrial fibrillation (HCC)     Hashimoto's thyroiditis     Hyperlipidemia     Hypertension     IBS (irritable bowel syndrome)     Kidney stone     Microhematuria     Migraine     Mitral valve disorder     Nasal congestion     Non-toxic uninodular goiter     Nosebleed     Raynaud disease          Past Surgical History:  Past Surgical History:   Procedure Laterality Date    APPENDECTOMY      BREAST CYST EXCISION Right 1977    CARDIAC SURGERY      COLONOSCOPY  2011    TONSILLECTOMY      VALVE REPLACEMENT  2017    mitral valve replacement, 14         Family History:  Family History   Problem Relation Age of Onset    Hypertension Mother     Coronary artery disease Father     Migraines Father     Leukemia Brother     Migraines Brother     Hypertension Brother     Coronary artery disease Paternal Grandfather     Leukemia Maternal Aunt     Multiple myeloma Maternal Aunt     Thyroid disease Other     No Known Problems Maternal Aunt     No Known Problems Maternal Aunt     No Known Problems Paternal Aunt     No Known Problems Paternal Aunt     No Known Problems Paternal Aunt     No Known Problems Paternal Aunt     Breast cancer Neg Hx          Social History:  Social History     Socioeconomic History    Marital status: Single     Spouse name: None    Number of children: None    Years of education: None    Highest education level: None   Occupational History    Occupation: Retired   Tobacco Use    Smoking status: Never Smoker    Smokeless tobacco: Never Used   Vaping Use    Vaping Use: Never used   Substance and Sexual Activity    Alcohol use: Yes     Comment: Occassionally--wine    Drug use: No    Sexual activity: Not Currently   Other Topics Concern    None   Social History Narrative    None     Social Determinants of Health     Financial Resource Strain: Not on file   Food Insecurity: Not on file   Transportation Needs: Not on file   Physical Activity: Not on file   Stress: Not on file   Social Connections: Not on file   Intimate Partner Violence: Not on file   Housing Stability: Not on file         Active Problems:  Patient Active Problem List   Diagnosis    Atrial fibrillation ([facility] [address] )    Hyperlipidemia    History of heart valve replacement    Benign hypertension    Bradycardia    Essential tremor    Chronic anticoagulation    Amaurosis fugax, both eyes    Carotid artery stenosis    Mitral valve disorder    Osteoporosis    Peripheral neuropathy    Chronic right-sided low back pain without sciatica    Asymptomatic stenosis of right vertebral artery    Subclinical hypothyroidism    Irritable bowel syndrome with constipation    Inflammatory polyneuropathy, unspecified (HCC)         The following portions of the patient's history were reviewed and updated as appropriate: past medical history, past surgical history, past family history,  past social history, current medications, allergies and problem list       Review of Systems:  Constitutional: Denies fever, chills  Eyes: Denies eye redness, eye discharge  ENT: Denies sneezing, nasal discharge, sore throat   Respiratory: Denies cough, expectoration  +shortness of breath  Cardiovascular: Denies chest pain, palpitations, lower extremity swelling  Gastrointestinal: Denies abdominal pain, nausea, vomiting, diarrhea  Genito-Urinary: Denies dysuria, incontinence  Musculoskeletal: Denies back pain  Neurologic: Denies lightheadedness, syncope, headache  Endocrine: Denies polydipsia, temperature intolerance  Allergy and Immunology: Denies hives, insect bite sensitivity  Hematological and Lymphatic: Denies bleeding problems, swollen glands   Psychological: Denies depression, suicidal ideation, anxiety, panic  Dermatological: Denies pruritus, rash, skin lesion changes      Vitals:  Vitals:    06/30/22 1131   BP: 120/78   Pulse: 63   Resp: 16   SpO2: 96%       Body mass index is 25 1 kg/m²  Weight (last 2 days)     Date/Time Weight    06/30/22 1131 77 1 (170)            Physical Examination:  General: Patient is not in acute distress  Awake, alert, oriented in time, place and person  Responding to commands  Head: Normocephalic  Atraumatic  Eyes: Both pupils normal sized, round and reactive to light  Nonicteric  ENT: Normal external ear canals  Neck: Supple  JVP not raised  Trachea central  No thyromegaly  Lungs: Bilateral bronchovascular breath sounds with no crackles or rhonchi  Chest wall: No tenderness  Cardiovascular: Irregular  S1 and S2 normal  Crisp prosthetic valve sound+  Gastrointestinal: Abdomen soft, nontender  No guarding or rigidity  Liver and spleen not palpable  Bowel sounds present  Neurologic: Patient is awake, alert, oriented in time, place and person  Responding to commands   Moving all extremities  Integumentary:  No skin rash  Lymphatic: No cervical lymphadenopathy  Back: Symmetric  No CVA tenderness  Extremities: No clubbing, cyanosis or edema      Laboratory Results:  CBC with diff:   Lab Results   Component Value Date    WBC 5 60 04/18/2022    RBC 3 99 04/18/2022    HGB 12 5 04/18/2022    HCT 38 5 04/18/2022    MCV 97 04/18/2022    MCH 31 3 04/18/2022    RDW 13 6 04/18/2022     04/18/2022       CMP:  Lab Results   Component Value Date    CREATININE 0 89 04/18/2022    BUN 14 04/18/2022    K 4 3 04/18/2022     (H) 04/18/2022    CO2 29 04/18/2022    ALKPHOS 75 04/18/2022    ALT 26 04/18/2022    AST 28 04/18/2022    BILIDIR 0 12 09/11/2020       Lab Results   Component Value Date    HGBA1C 5 4 04/18/2022    MG 1 9 09/12/2020       Lab Results   Component Value Date    TROPONINI <0 02 09/11/2020    TROPONINI <0 02 09/11/2020    TROPONINI <0 02 09/11/2020       Cardiac testing:   Results for orders placed during the hospital encounter of 06/13/22    Echo complete w/ contrast if indicated    Interpretation Summary    Left Ventricle: Left ventricular cavity size is normal  Wall thickness is mildly increased  Systolic function is mildly reduced (45-50%)  There is mild global hypokinesis  Although no diagnostic regional wall motion abnormality was identified, this possibility cannot be completely excluded on the basis of this study  Diastolic function is moderately abnormal, consistent with grade II (pseudonormal) relaxation    Right Ventricle: Right ventricular cavity size is dilated  Systolic function is normal     Left Atrium: The atrium is severely dilated    Right Atrium: The atrium is moderately dilated    Mitral Valve: There is a mechanical mitral valve prosthesis  The prosthetic valve appears to be functioning normally  There is trace regurgitation    Tricuspid Valve: There is mild regurgitation  The right ventricular systolic pressure is mildly elevated (44 mm Hg)    Pulmonic Valve:  There is trace regurgitation  Results for orders placed during the hospital encounter of 20    NM myocardial perfusion spect (rx stress and/or rest)    Narrative  [address] [facility]  [phone]    Rest/Stress Gated SPECT Myocardial Perfusion Imaging After Regadenoson    Patient: Ryan Petty  MR number: [id number]  Account number: [de-identified]  : 1945  Age: 69 years  Gender: Female  Status: Outpatient  [facility]: [facility]  Height: 69 in  Weight: 174 lb  BP: 182/ 86 mmHg    Allergies: DARIFENACIN, FENTANYL, HYOSCYAMINE, DICYCLOMINE, HYDROMORPHONE, MIDAZOLAM, SULFAMETHOXAZOLE-TRIMETHOPRIM, VENLAFAXINE, CODEINE POLT-CHLORPHEN POLT ER, TRAMADOL-ACETAMINOPHEN    Diagnosis: R07 9 - Chest pain, unspecified    Primary Physician:  Loly Bacon MD  Referring Physician:  Loly Bacon MD  Technician:  Eric Cruz BS, BA, AART(N)  Group:  [facility]  Other:  Mayur Lees MS, CCT  Interpreting Physician:  Herold Skiff, MD    INDICATIONS: Coronary artery disease  HISTORY: The patient is a 69year old  female  Chest pain status: chest pain  Coronary artery disease risk factors: dyslipidemia and hypertension  Cardiovascular history: mitral valve disease, tricuspid valve disease, and  arrhythmia  Prior cardiovascular procedures: mitral valve replacement  Medications: a beta blocker, a calcium channel blocker, an ACE inhibitor/ARB, aspirin, and a lipid lowering agent  Previous test results: hyperlipidemia  PHYSICAL EXAM: Baseline physical exam screening: normal     REST ECG: Atrial fibrillation with PVCs vs aberrancy    PROCEDURE: The study was performed in the [address]  A regadenoson infusion pharmacologic stress test was performed  Gated SPECT myocardial perfusion imaging was performed after stress and at rest  Systolic blood  pressure was 182 mmHg, at the start of the study  Diastolic blood pressure was 86 mmHg, at the start of the study  The heart rate was 64 bpm, at the start of the study  Regadenoson protocol:  HR bpm SBP mmHg DBP mmHg Symptoms Rhythm/conduct  Baseline 64 182 86 none A-fib, ventricular couplets  Immediate 78 144 96 mild chest discomfort A-fib, non-sustained V tach  1 min 77 -- -- subsiding A-fib, frequent PVC's  2 min 77 154 86 none A-fib, frequent PVC's  3 min 64 -- -- none A-fib, frequent PVC's    STRESS SUMMARY: Duration of pharmacologic stress was 3 min  Maximal heart rate during stress was 78 bpm  The rate-pressure product for the peak heart rate and blood pressure was 19282  The patient experienced chest pain during stress; pain  resolved spontaneously  The stress test was terminated due to protocol completion  Arrhythmia during stress: isolated premature ventricular beats and pairs of premature ventricular beats  The stress ECG was non-diagnostic  ISOTOPE ADMINISTRATION:  Resting isotope administration Stress isotope administration  Agent Tetrofosmin Tetrofosmin  Dose 10 43 mCi 32 2 mCi  Date 09/17/2020 09/17/2020  Injection-image interval 230 min 45 min    The radiopharmaceutical was injected at the peak effect of pharmacologic stress  MYOCARDIAL PERFUSION IMAGING:  The image quality was good  Rotating projection images reveal mild breast attenuation and mild subdiaphragmatic activity  Left ventricular size was normal  The TID ratio was 0 95  PERFUSION DEFECTS:  -  There was a moderate-sized, fixed myocardial perfusion defect of the anterior wall  GATED SPECT:  The calculated left ventricular ejection fraction was 64 %  Left ventricular ejection fraction was within normal limits by visual estimate  There was mildly reduced myocardial thickening and motion of the anterior wall of the left  ventricle  SUMMARY:  -  Rest ECG: Atrial fibrillation with PVCs vs aberrancy  -  Stress results:  The patient experienced chest pain during stress; pain resolved spontaneously  -  ECG conclusions: Arrhythmia during stress: isolated premature ventricular beats and pairs of premature ventricular beats  The stress ECG was non-diagnostic   -  Perfusion imaging: There was a moderate-sized, fixed myocardial perfusion defect of the anterior wall  -  Gated SPECT: The calculated left ventricular ejection fraction was 64 %  Left ventricular ejection fraction was within normal limits by visual estimate  There was mildly reduced myocardial thickening and motion of the anterior wall of  the left ventricle  IMPRESSIONS: Abnormal study after pharmacologic vasodilation  There was a moderate-sized infarct in the anterior region  There was no diagnostic evidence of ischemia  Overall left ventricular systolic function was preserved, but there were  regional wall motion abnormalities  Prepared and signed by    Van Potter MD  Signed 09/17/2020 16:39:34    No results found for this or any previous visit  No results found for this or any previous visit  Results for orders placed during the hospital encounter of 10/13/21    Holter monitor    Interpretation Summary  INDICATIONS:  Atrial fibrillation    FINDINGS:  Rhythm was atrial fibrillation  Heart rate varied from 32 bpm to 118 bpm   The patients average heart rate was 60  bpm   The patient had a holter monitor tracing done for 23 hours and 59 minutes  The patient had 4894 PVCs, 1890 ventricular triplets, 5086 ventricular couplets, 157 episodes of ventricular bigeminy and 231 episodes of ventricular trigeminy     No cardiovascular symptoms reported  Ranjeet Blunt MD,[facility]  EKG:  Reviewed by me  6/30/2022  Atrial fibrillation with premature aberrantly conducted complexes    Nonspecific ST and T-wave abnormality      Medications:    Current Outpatient Medications:     amLODIPine (NORVASC) 5 mg tablet, TAKE 1 TABLET BY MOUTH EVERY DAY IN THE MORNING, Disp: 90 tablet, Rfl: 3    aspirin 81 mg chewable tablet, Chew daily, Disp: , Rfl:     butalbital-acetaminophen-caffeine (FIORICET,ESGIC) -40 mg per tablet, TAKE 1 TABLET BY MOUTH EVERY 4 (FOUR) HOURS AS NEEDED FOR HEADACHES, Disp: 30 tablet, Rfl: 0    Calcium Carbonate 1500 (600 Ca) MG TABS, Take by mouth daily , Disp: , Rfl:     conjugated estrogens (Premarin) vaginal cream, Insert 1 g into the vagina 3 (three) times a week, Disp: 30 g, Rfl: 0    dicyclomine (BENTYL) 10 mg capsule, Take 1 capsule (10 mg total) by mouth 3 (three) times a day before meals (Patient taking differently: Take 10 mg by mouth as needed), Disp: 60 capsule, Rfl: 3    gabapentin (NEURONTIN) 100 mg capsule, TAKE 1 CAPSULE BY MOUTH TWICE A DAY, Disp: 180 capsule, Rfl: 3    gabapentin (NEURONTIN) 300 mg capsule, TAKE A 300MG + 100MG AT BEDTIME, Disp: 90 capsule, Rfl: 3    lactobacillus acidophilus-bulgaricus (LACTINEX) chewable tablet, Chew 1 tablet 3 (three) times a day with meals (Patient taking differently: Chew 1 tablet if needed Has not picked up yet), Disp: 90 tablet, Rfl: 3    loratadine (CLARITIN) 10 mg tablet, Take 10 mg by mouth as needed  , Disp: , Rfl:     losartan (COZAAR) 100 MG tablet, TAKE 1 TABLET BY MOUTH EVERY DAY, Disp: 90 tablet, Rfl: 3    Misc Natural Products (OSTEO BI-FLEX TRIPLE STRENGTH) TABS, Take by mouth daily , Disp: , Rfl:     oxybutynin (DITROPAN-XL) 10 MG 24 hr tablet, TAKE 1 TABLET BY MOUTH EVERYDAY AT BEDTIME, Disp: 90 tablet, Rfl: 1    propranolol (INDERAL LA) 60 mg 24 hr capsule, TAKE 1 CAPSULE BY MOUTH EVERY DAY, Disp: 90 capsule, Rfl: 1    simvastatin (ZOCOR) 10 mg tablet, TAKE 1 TABLET BY MOUTH EVERY DAY, Disp: 90 tablet, Rfl: 3    warfarin (COUMADIN) 1 mg tablet, TAKE 1 TABLET BY MOUTH EVERY DAY, Disp: 90 tablet, Rfl: 3    warfarin (COUMADIN) 2 mg tablet, TAKE 1 TABLET BY MOUTH EVERY DAY, Disp: 90 tablet, Rfl: 1    warfarin (COUMADIN) 3 mg tablet, TAKE 1 TABLET BY MOUTH EVERY DAY, Disp: 90 tablet, Rfl: 1    Current Facility-Administered Medications:     cyanocobalamin injection 1,000 mcg, 1,000 mcg, Intramuscular, Q30 Days, Denis Lowe, CRNP, 1,000 mcg at 07/09/21 1504    cyanocobalamin injection 1,000 mcg, 1,000 mcg, Intramuscular, Q30 Days, Denis Lowe, CRNP, 1,000 mcg at 10/19/21 1046      Allergies: Allergies   Allergen Reactions    Enablex [Darifenacin] Other (See Comments)     Sweating, HA, urinary hesitancy, flushing of face    Fentanyl Nausea Only and Vomiting    Hyoscyamine Palpitations    Dicyclomine Other (See Comments)     Jittery, disorientation, light HAs    Hydromorphone Other (See Comments)     Per pt does not remember the type or severity level    Midazolam Other (See Comments)     Per pt does not remember the type or severity level    Sulfamethoxazole-Trimethoprim Nausea Only    Venlafaxine Other (See Comments)     Urinary urgency, polyuria    Codeine Polt-Chlorphen Polt Er Headache    Ultracet [Tramadol-Acetaminophen] Nausea Only and Vomiting         Assessment and Plan:  1  Shortness of breath on exertion  EKG done in the clinic reviewed with the patient  Pharmacological nuclear stress test ordered for evaluation as the shortness of breath could be an anginal equivalent  Also to see if her new cardiomyopathy is possibly ischemic versus tachycardia induced    2  Cardiomyopathy, unspecified type ([address] )  Continue beta-blocker  Plan to change amlodipine to ACE-inhibitor/ARB    3  Chronic atrial fibrillation (HCC)  New Coumadin for anticoagulation and beta-blocker for rate control    4  Chronic anticoagulation  Continue Coumadin    5  Essential hypertension  BP stable  Continue current medication with possible changes as noted above    6  Mixed hyperlipidemia  Continue statin and diet control  Her PCP closely monitor the blood work    7  Hx of mitral valve replacement with mechanical valve  On Coumadin    Aware of need for antibiotic prophylaxis prior to dental work    Recommend aggressive risk factor modification and therapeutic lifestyle changes  Low-salt, low-calorie, low-fat, low-cholesterol diet with regular exercise and to optimize weight  I will defer the ordering and monitoring of necessity lab studies to you, but I am available and happy to review and manage any of the data at your request in the future  Discussed concepts of atherosclerosis, including signs and symptoms of cardiac disease  Previous studies were reviewed  Safety measures were reviewed  Questions were entertained and answered  Patient was advised to report any problems requiring medical attention  Follow-up with PCP and appropriate specialist and lab work as discussed  Return for follow up visit as scheduled or earlier, if needed  Thank you for allowing me to participate in the care and evaluation of your patient  Should you have any questions, please feel free to contact me        Ayesha Payne MD  6/30/2022,12:23 PM

## 2022-07-06 ENCOUNTER — APPOINTMENT (OUTPATIENT)
Dept: LAB | Facility: CLINIC | Age: 77
End: 2022-07-06
Payer: COMMERCIAL

## 2022-07-07 ENCOUNTER — TELEPHONE (OUTPATIENT)
Dept: INTERNAL MEDICINE CLINIC | Facility: CLINIC | Age: 77
End: 2022-07-07

## 2022-07-07 ENCOUNTER — ANTICOAG VISIT (OUTPATIENT)
Dept: CARDIOLOGY CLINIC | Facility: CLINIC | Age: 77
End: 2022-07-07

## 2022-07-07 DIAGNOSIS — Z95.2 HISTORY OF HEART VALVE REPLACEMENT: Primary | ICD-10-CM

## 2022-07-07 NOTE — TELEPHONE ENCOUNTER
Received a call from [facility] for notes from visit to approve  NM myocardial perfusion stress test  Faxed to [phone] Attn: Cesilia See # [phone]

## 2022-07-08 ENCOUNTER — TELEPHONE (OUTPATIENT)
Dept: CARDIOLOGY CLINIC | Facility: CLINIC | Age: 77
End: 2022-07-08

## 2022-07-08 NOTE — TELEPHONE ENCOUNTER
Patient Joleen Vazquez [phone] contacting office inquiring status of the authorization of her stress test

## 2022-07-15 ENCOUNTER — HOSPITAL ENCOUNTER (OUTPATIENT)
Dept: NON INVASIVE DIAGNOSTICS | Facility: CLINIC | Age: 77
Discharge: HOME | End: 2022-07-15
Payer: COMMERCIAL

## 2022-07-15 VITALS
OXYGEN SATURATION: 97 % | HEIGHT: 69 IN | BODY MASS INDEX: 25.18 KG/M2 | WEIGHT: 170 LBS | DIASTOLIC BLOOD PRESSURE: 98 MMHG | SYSTOLIC BLOOD PRESSURE: 152 MMHG | HEART RATE: 66 BPM

## 2022-07-15 DIAGNOSIS — I42.9 CARDIOMYOPATHY, UNSPECIFIED TYPE (HCC): ICD-10-CM

## 2022-07-15 DIAGNOSIS — R06.02 SHORTNESS OF BREATH ON EXERTION: ICD-10-CM

## 2022-07-15 LAB
NUC STRESS EJECTION FRACTION: 52 %
RATE PRESSURE PRODUCT: NORMAL
SL CV REST NUCLEAR ISOTOPE DOSE: 10.9 MCI
SL CV STRESS NUCLEAR ISOTOPE DOSE: 32.5 MCI
SL CV STRESS RECOVERY BP: NORMAL MMHG
SL CV STRESS RECOVERY HR: 71 BPM
STRESS ANGINA INDEX: 0
STRESS BASELINE BP: NORMAL MMHG
STRESS BASELINE HR: 66 BPM
STRESS O2 SAT REST: 97 %
STRESS PEAK HR: 107 BPM
STRESS POST O2 SAT PEAK: 98 %
STRESS POST PEAK BP: 162 MMHG

## 2022-07-15 PROCEDURE — 78452 HT MUSCLE IMAGE SPECT MULT: CPT

## 2022-07-15 PROCEDURE — 93018 CV STRESS TEST I&R ONLY: CPT | Performed by: INTERNAL MEDICINE

## 2022-07-15 PROCEDURE — 93017 CV STRESS TEST TRACING ONLY: CPT

## 2022-07-15 PROCEDURE — A9502 TC99M TETROFOSMIN: HCPCS

## 2022-07-15 PROCEDURE — 93016 CV STRESS TEST SUPVJ ONLY: CPT | Performed by: INTERNAL MEDICINE

## 2022-07-15 PROCEDURE — 78452 HT MUSCLE IMAGE SPECT MULT: CPT | Performed by: INTERNAL MEDICINE

## 2022-07-15 PROCEDURE — G1004 CDSM NDSC: HCPCS

## 2022-07-18 ENCOUNTER — TELEPHONE (OUTPATIENT)
Dept: INTERNAL MEDICINE CLINIC | Facility: CLINIC | Age: 77
End: 2022-07-18

## 2022-07-18 NOTE — TELEPHONE ENCOUNTER
Pt received letter from [facility] over the weekend about diabetic screening in the mail    Includes: Diabetic A1C and diabetic nethropathy    She's not a diabetic  It was coded from Dr Deshpande's office    Why did she receive this?

## 2022-07-18 NOTE — TELEPHONE ENCOUNTER
----- Message from Riana 54 sent at 7/18/2022  8:07 AM EDT -----  Let pt know her stress test was ok, she had an irregular rhythm during her test, im going to see what Dr Ellie Howard has to say about it

## 2022-07-18 NOTE — TELEPHONE ENCOUNTER
Spoke to patient we never coded her diabetic we coded impaired fasting glucose    she was instructed to get letter over to me  She will do that

## 2022-07-20 DIAGNOSIS — I10 BENIGN HYPERTENSION: ICD-10-CM

## 2022-07-20 DIAGNOSIS — G25.0 ESSENTIAL TREMOR: ICD-10-CM

## 2022-07-20 DIAGNOSIS — R51.9 NONINTRACTABLE HEADACHE, UNSPECIFIED CHRONICITY PATTERN, UNSPECIFIED HEADACHE TYPE: ICD-10-CM

## 2022-07-20 LAB
MAX DIASTOLIC BP: 102 MMHG
MAX HEART RATE: 107 BPM
MAX PREDICTED HEART RATE: 146 BPM
MAX. SYSTOLIC BP: 162 MMHG
PROTOCOL NAME: NORMAL
REASON FOR TERMINATION: NORMAL
TARGET HR FORMULA: NORMAL
TEST INDICATION: NORMAL
TIME IN EXERCISE PHASE: NORMAL

## 2022-07-20 RX ORDER — BUTALBITAL, ACETAMINOPHEN AND CAFFEINE 50; 325; 40 MG/1; MG/1; MG/1
1 TABLET ORAL EVERY 4 HOURS PRN
Qty: 30 TABLET | Refills: 1 | Status: SHIPPED | OUTPATIENT
Start: 2022-07-20

## 2022-07-20 RX ORDER — PROPRANOLOL HCL 60 MG
CAPSULE, EXTENDED RELEASE 24HR ORAL
Qty: 90 CAPSULE | Refills: 1 | Status: SHIPPED | OUTPATIENT
Start: 2022-07-20 | End: 2022-09-23 | Stop reason: ALTCHOICE

## 2022-07-20 NOTE — ASSESSMENT & PLAN NOTE
· ADONIS score 2-3, acute onset chest palpitations and chest discomfort  · Patient states that she had some chest tightness but not so much chest pain  · No known history of cardiac disease, does have mechanical mitral valve in 1996 secondary to mitral regurgitation, initial troponin negative  · Trend troponin x 2, if negative, consider discharge in AM  · Depending on telemetry, and troponin x 2, consider cardiology consult if warranted heart disease

## 2022-07-27 ENCOUNTER — OFFICE VISIT (OUTPATIENT)
Dept: CARDIOLOGY CLINIC | Facility: CLINIC | Age: 77
End: 2022-07-27
Payer: COMMERCIAL

## 2022-07-27 ENCOUNTER — APPOINTMENT (OUTPATIENT)
Dept: LAB | Facility: CLINIC | Age: 77
End: 2022-07-27
Payer: COMMERCIAL

## 2022-07-27 ENCOUNTER — TELEPHONE (OUTPATIENT)
Dept: FAMILY MEDICINE CLINIC | Facility: CLINIC | Age: 77
End: 2022-07-27

## 2022-07-27 VITALS
SYSTOLIC BLOOD PRESSURE: 150 MMHG | WEIGHT: 173.3 LBS | OXYGEN SATURATION: 97 % | BODY MASS INDEX: 25.67 KG/M2 | HEART RATE: 50 BPM | DIASTOLIC BLOOD PRESSURE: 88 MMHG | HEIGHT: 69 IN

## 2022-07-27 DIAGNOSIS — I48.20 CHRONIC ATRIAL FIBRILLATION (HCC): Primary | ICD-10-CM

## 2022-07-27 DIAGNOSIS — Z23 ENCOUNTER FOR IMMUNIZATION: Primary | ICD-10-CM

## 2022-07-27 DIAGNOSIS — I05.9 MITRAL VALVE DISORDER: ICD-10-CM

## 2022-07-27 DIAGNOSIS — I10 ESSENTIAL HYPERTENSION: ICD-10-CM

## 2022-07-27 DIAGNOSIS — Z79.01 CHRONIC ANTICOAGULATION: ICD-10-CM

## 2022-07-27 PROCEDURE — 3079F DIAST BP 80-89 MM HG: CPT | Performed by: INTERNAL MEDICINE

## 2022-07-27 PROCEDURE — 99214 OFFICE O/P EST MOD 30 MIN: CPT | Performed by: INTERNAL MEDICINE

## 2022-07-27 PROCEDURE — 3077F SYST BP >= 140 MM HG: CPT | Performed by: INTERNAL MEDICINE

## 2022-07-27 PROCEDURE — 1160F RVW MEDS BY RX/DR IN RCRD: CPT | Performed by: INTERNAL MEDICINE

## 2022-07-27 RX ORDER — TRIAMCINOLONE ACETONIDE 1 MG/G
CREAM TOPICAL
COMMUNITY
Start: 2022-06-01

## 2022-07-27 NOTE — PROGRESS NOTES
[facility] [place]  [address] [facility] [address] [id number]  Cardiology Follow Up    Nahid Landin  1945  [id number]      1  Chronic atrial fibrillation ([address] )     2  Chronic anticoagulation     3  Essential hypertension     4  Mitral valve disorder         Chief Complaint   Patient presents with    Follow-up     Follow up after testing       Interval History:  Patient presents for follow-up visit  Patient does have history of chronic atrial fibrillation , prosthetic mechanical mitral valve replacement on warfarin  Patient had symptoms of shortness of breath  Patient had an echocardiogram through primary care physician  Echocardiogram showed mildly reduced ejection fraction of 45 to 50%  This was new  Prosthetic mechanical valve was functioning normally  Patient subsequently underwent pharmacological stress test which showed no evidence of ischemia with ejection fraction of 52%  Patient denies any exertional chest pain  Patient does have some shortness of breath with exertion  No history of dizziness lightheadedness  She states that she has been compliant all her  present medications  No bleeding issues      Patient Active Problem List   Diagnosis    Atrial fibrillation ([facility] [address] )    Hyperlipidemia    History of heart valve replacement    Benign hypertension    Bradycardia    Essential tremor    Chronic anticoagulation    Amaurosis fugax, both eyes    Carotid artery stenosis    Mitral valve disorder    Osteoporosis    Peripheral neuropathy    Chronic right-sided low back pain without sciatica    Asymptomatic stenosis of right vertebral artery    Subclinical hypothyroidism    Irritable bowel syndrome with constipation    Inflammatory polyneuropathy, unspecified (HCC)     Past Medical History:   Diagnosis Date    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    Atrial fibrillation (HCC)     Hashimoto's thyroiditis     Hyperlipidemia     Hypertension     IBS (irritable bowel syndrome)     Kidney stone     Microhematuria     Migraine     Mitral valve disorder     Nasal congestion     Non-toxic uninodular goiter     Nosebleed     Raynaud disease      Social History     Socioeconomic History    Marital status: Single     Spouse name: Not on file    Number of children: Not on file    Years of education: Not on file    Highest education level: Not on file   Occupational History    Occupation: Retired   Tobacco Use    Smoking status: Never Smoker    Smokeless tobacco: Never Used   Vaping Use    Vaping Use: Never used   Substance and Sexual Activity    Alcohol use: Yes     Comment: Occassionally--wine    Drug use: No    Sexual activity: Not Currently   Other Topics Concern    Not on file   Social History Narrative    Not on file     Social Determinants of Health     Financial Resource Strain: Not on file   Food Insecurity: Not on file   Transportation Needs: Not on file   Physical Activity: Not on file   Stress: Not on file   Social Connections: Not on file   Intimate Partner Violence: Not on file   Housing Stability: Not on file      Family History   Problem Relation Age of Onset    Hypertension Mother     Coronary artery disease Father     Migraines Father     Leukemia Brother     Migraines Brother     Hypertension Brother     Coronary artery disease Paternal Grandfather     Leukemia Maternal Aunt     Multiple myeloma Maternal Aunt     Thyroid disease Other     No Known Problems Maternal Aunt     No Known Problems Maternal Aunt     No Known Problems Paternal Aunt     No Known Problems Paternal Aunt     No Known Problems Paternal Aunt     No Known Problems Paternal Aunt     Breast cancer Neg Hx      Past Surgical History:   Procedure Laterality Date    APPENDECTOMY      BREAST CYST EXCISION Right 01/01/1977    CARDIAC SURGERY      COLONOSCOPY  08/08/2011    TONSILLECTOMY      VALVE REPLACEMENT  01/04/2017    mitral valve replacement, 6/5/14       Current Outpatient Medications:     amLODIPine (NORVASC) 5 mg tablet, TAKE 1 TABLET BY MOUTH EVERY DAY IN THE MORNING, Disp: 90 tablet, Rfl: 3    aspirin 81 mg chewable tablet, Chew daily, Disp: , Rfl:     butalbital-acetaminophen-caffeine (FIORICET,ESGIC) -40 mg per tablet, TAKE 1 TABLET BY MOUTH EVERY 4 (FOUR) HOURS AS NEEDED FOR HEADACHES, Disp: 30 tablet, Rfl: 1    Calcium Carbonate 1500 (600 Ca) MG TABS, Take by mouth daily , Disp: , Rfl:     conjugated estrogens (Premarin) vaginal cream, Insert 1 g into the vagina 3 (three) times a week (Patient taking differently: Insert 1 g into the vagina once a week), Disp: 30 g, Rfl: 0    dicyclomine (BENTYL) 10 mg capsule, Take 1 capsule (10 mg total) by mouth 3 (three) times a day before meals (Patient taking differently: Take 10 mg by mouth as needed), Disp: 60 capsule, Rfl: 3    gabapentin (NEURONTIN) 100 mg capsule, TAKE 1 CAPSULE BY MOUTH TWICE A DAY, Disp: 180 capsule, Rfl: 3    gabapentin (NEURONTIN) 300 mg capsule, TAKE A 300MG + 100MG AT BEDTIME, Disp: 90 capsule, Rfl: 3    lactobacillus acidophilus-bulgaricus (LACTINEX) chewable tablet, Chew 1 tablet 3 (three) times a day with meals (Patient taking differently: Chew 1 tablet if needed Has not picked up yet), Disp: 90 tablet, Rfl: 3    loratadine (CLARITIN) 10 mg tablet, Take 10 mg by mouth as needed  , Disp: , Rfl:     losartan (COZAAR) 100 MG tablet, TAKE 1 TABLET BY MOUTH EVERY DAY, Disp: 90 tablet, Rfl: 3    Misc Natural Products (OSTEO BI-FLEX TRIPLE STRENGTH) TABS, Take by mouth daily , Disp: , Rfl:     oxybutynin (DITROPAN-XL) 10 MG 24 hr tablet, TAKE 1 TABLET BY MOUTH EVERYDAY AT BEDTIME, Disp: 90 tablet, Rfl: 1    propranolol (INDERAL LA) 60 mg 24 hr capsule, TAKE 1 CAPSULE BY MOUTH EVERY DAY, Disp: 90 capsule, Rfl: 1    simvastatin (ZOCOR) 10 mg tablet, TAKE 1 TABLET BY MOUTH EVERY DAY, Disp: 90 tablet, Rfl: 3    triamcinolone (KENALOG) 0 1 % cream, APPLY TO AFFECTED AREA ON FEET ONCE DAILY, Disp: , Rfl:     warfarin (COUMADIN) 2 mg tablet, TAKE 1 TABLET BY MOUTH EVERY DAY, Disp: 90 tablet, Rfl: 1    warfarin (COUMADIN) 3 mg tablet, TAKE 1 TABLET BY MOUTH EVERY DAY, Disp: 90 tablet, Rfl: 1    warfarin (COUMADIN) 1 mg tablet, TAKE 1 TABLET BY MOUTH EVERY DAY (Patient not taking: Reported on 7/27/2022), Disp: 90 tablet, Rfl: 3    Current Facility-Administered Medications:     cyanocobalamin injection 1,000 mcg, 1,000 mcg, Intramuscular, Q30 Days, Yarely Needy, CRNP, 1,000 mcg at 07/09/21 1504    cyanocobalamin injection 1,000 mcg, 1,000 mcg, Intramuscular, Q30 Days, Soso Needy, CRNP, 1,000 mcg at 10/19/21 1046  Allergies   Allergen Reactions    Enablex [Darifenacin] Other (See Comments)     Sweating, HA, urinary hesitancy, flushing of face    Fentanyl Nausea Only and Vomiting    Hyoscyamine Palpitations    Dicyclomine Other (See Comments)     Jittery, disorientation, light HAs    Hydromorphone Other (See Comments)     Per pt does not remember the type or severity level    Midazolam Other (See Comments)     Per pt does not remember the type or severity level    Sulfamethoxazole-Trimethoprim Nausea Only    Venlafaxine Other (See Comments)     Urinary urgency, polyuria    Codeine Polt-Chlorphen Polt Er Headache    Ultracet [Tramadol-Acetaminophen] Nausea Only and Vomiting       Labs:  Hospital Outpatient Visit on 07/15/2022   Component Date Value    Baseline HR 07/15/2022 66     Baseline BP 07/15/2022 152/98     O2 sat rest 07/15/2022 97     Stress peak HR 07/15/2022 107     Post peak BP 07/15/2022 162     Rate Pressure Product 07/15/2022 17,334 0     O2 sat peak 07/15/2022 98     Recovery HR 07/15/2022 71     Recovery BP 07/15/2022 156/88     Angina Index 07/15/2022 0     EF (%) 07/15/2022 52     Rest Nuclear Isotope Dose 07/15/2022 10 90     Stress Nuclear Isotope D* 07/15/2022 32 50     Protocol Name 07/15/2022 JANINA- WALK     Time In Exercise Phase 07/15/2022 00:03:00     MAX  SYSTOLIC BP 18/88/2866 140     Max Diastolic Bp 15/76/7580 198     Max Heart Rate 07/15/2022 107     Max Predicted Heart Rate 07/15/2022 146     Reason for Termination 07/15/2022 Protocol Complete     Test Indication 07/15/2022 ABNORMAL ECHO     Target Hr Formular 07/15/2022 (220 - Age)*85%    Ancillary Orders on 07/08/2022   Component Date Value    Protime 07/27/2022 31 4 (A)    INR 07/27/2022 3 00 (A)   Ancillary Orders on 06/24/2022   Component Date Value    Protime 07/06/2022 31 0 (A)    INR 07/06/2022 3 19 (A)   Hospital Outpatient Visit on 06/13/2022   Component Date Value    LA size 06/13/2022 7 4     Triscuspid Valve Regurgi* 06/13/2022 36 0     Tricuspid valve peak reg* 06/13/2022 2 98     LVPWd 06/13/2022 1 10     Left Atrium Area-systoli* 06/13/2022 45 1     Left Atrium Area-systoli* 06/13/2022 51 3     TR Peak Darion 06/13/2022 3 0     IVSd 06/13/2022 6 59     LV DIASTOLIC VOLUME (MOD* 79/62/4995 124     LEFT VENTRICLE SYSTOLIC * 57/54/8568 65     Left ventricular stroke * 06/13/2022 59 00     A4C EF 06/13/2022 65     LA length (A2C) 06/13/2022 8 80     LVIDd 06/13/2022 5 10     IVS 06/13/2022 1 1     LVIDS 06/13/2022 3 90     FS 06/13/2022 24     Ao root 06/13/2022 3 10     RVID d 06/13/2022 4 6     AV LVOT peak gradient 06/13/2022 2     AV peak gradient 06/13/2022 4     BHANU A4C 06/13/2022 64 6     RAA A4C 06/13/2022 33 3     LVSV, 2D 06/13/2022 59    Ancillary Orders on 06/10/2022   Component Date Value    Protime 06/13/2022 29 1 (A)    INR 06/13/2022 2 93 (A)     Imaging: Stress strip    Result Date: 7/20/2022  Narrative: Confirmed by Doc Cardenas ([id number]),  Yuan Lackey ([id number]) on 7/20/2022 8:53:38 AM    NM myocardial perfusion spect (rx stress and/or rest)    Result Date: 7/15/2022  Narrative: 1  Resting EKG shows atrial fibrillation, intraventricular conduction delay and PVCs    After Lexiscan brief runs of nonsustained ventricular tachycardia (3 in a row) were seen 2  No chest discomfort 3  There is a small mild intensity fixed antral basal defect most likely related to tissue attenuation  4  No reversible defects 5  Normal wall motion  Calculated ejection fraction was 52%        Review of Systems:  Review of Systems   REVIEW OF SYSTEMS:  Constitutional:  Denies fever or chills   Eyes:  Denies change in visual acuity   HENT:  Denies nasal congestion or sore throat   Respiratory:   shortness of breath   Cardiovascular:  Denies chest pain or edema   GI:  Denies abdominal pain, nausea, vomiting, bloody stools or diarrhea   :  Denies dysuria, frequency, difficulty in micturition and nocturia  Musculoskeletal:  Denies back pain or joint pain   Neurologic:  Denies headache, focal weakness or sensory changes   Endocrine:  Denies polyuria or polydipsia   Lymphatic:  Denies swollen glands   Psychiatric:  Denies depression or anxiety     Physical Exam:    /88 (BP Location: Right arm, Patient Position: Sitting, Cuff Size: Standard)   Pulse (!) 50   Ht 5' 9" (1 753 m)   Wt 78 6 kg (173 lb 4 8 oz)   LMP  (LMP Unknown)   SpO2 97%   BMI 25 59 kg/m²     Physical Exam   PHYSICAL EXAM:  General:  Patient is not in acute distress   Head: Normocephalic, Atraumatic  HEENT:  Both pupils normal-size atraumatic, normocephalic, nonicteric  Neck:  JVP not raised  Trachea central  No carotid bruit  Respiratory:  normal breath sounds no crackles  no rhonchi  Cardiovascular:  Irregularly irregular with heart sounds consistent with prosthetic mechanical valve   GI:  Abdomen soft nontender  No organomegaly  Lymphatic:  No cervical or inguinal lymphadenopathy  Neurologic:  Patient is awake alert, oriented   Grossly nonfocal  Extremities no edema    Discussion/Summary:  Patient with multiple medical problems who seems to be doing reasonably well from cardiac standpoint  Previous studies reviewed with patient   Medications reviewed and possible side effects discussed  concepts of cardiovascular disease , signs and symptoms of heart disease  Dietary and risk factor modification reinforced  All questions answered  Safety measures reviewed  Patient advised to report any problems prompting medical attention  Patient understands the risks and benefits of anticoagulation to prevent thrombotic risk from prosthetic mechanical mitral valve  Results of echocardiogram as well as pharmacological stress test discussed at length with patient  Patient has mildly reduced LV systolic function which could very well be related to mild cardiomyopathy from atrial fibrillation  No evidence of ischemia on stress test   Sensitivity and specificity of stress test discussed at length with patient  Patient had atrial fibrillation for the past 25 years and is likely not a candidate for AFib ablation  Patient does know that she may need consideration for pacemaker down the line if she has symptomatic bradycardia or tachy-darwin syndrome  Follow-up in 3 to 4 months or earlier as needed

## 2022-07-28 ENCOUNTER — ANTICOAG VISIT (OUTPATIENT)
Dept: CARDIOLOGY CLINIC | Facility: CLINIC | Age: 77
End: 2022-07-28

## 2022-07-28 DIAGNOSIS — Z95.2 HISTORY OF HEART VALVE REPLACEMENT: Primary | ICD-10-CM

## 2022-07-29 ENCOUNTER — IMMUNIZATIONS (OUTPATIENT)
Dept: FAMILY MEDICINE CLINIC | Facility: CLINIC | Age: 77
End: 2022-07-29
Payer: COMMERCIAL

## 2022-07-29 PROCEDURE — 0054A PR IMM ADMN SARSCOV2 30MCG/0.3ML TRIS-SUCROSE BST: CPT

## 2022-07-29 PROCEDURE — 91305 PR SARSCOV2 VACCINE 30MCG/0.3ML TRIS-SUCROSE IM USE: CPT

## 2022-08-24 ENCOUNTER — APPOINTMENT (OUTPATIENT)
Dept: LAB | Facility: CLINIC | Age: 77
End: 2022-08-24
Payer: COMMERCIAL

## 2022-08-25 ENCOUNTER — TELEPHONE (OUTPATIENT)
Dept: CARDIOLOGY CLINIC | Facility: CLINIC | Age: 77
End: 2022-08-25

## 2022-08-25 ENCOUNTER — ANTICOAG VISIT (OUTPATIENT)
Dept: CARDIOLOGY CLINIC | Facility: CLINIC | Age: 77
End: 2022-08-25

## 2022-08-25 DIAGNOSIS — Z95.2 HISTORY OF HEART VALVE REPLACEMENT: Primary | ICD-10-CM

## 2022-08-25 NOTE — PROGRESS NOTES
Result from 8/24 rec'd 8/25  S/w pt   Advised to hold tonight then take 2mg M/F, 4mg the rest and retest in 2 weeks

## 2022-09-02 ENCOUNTER — HOSPITAL ENCOUNTER (OUTPATIENT)
Dept: ULTRASOUND IMAGING | Facility: HOSPITAL | Age: 77
End: 2022-09-02
Payer: COMMERCIAL

## 2022-09-02 DIAGNOSIS — E04.1 NONTOXIC UNINODULAR GOITER: ICD-10-CM

## 2022-09-02 DIAGNOSIS — R59.0 VIRCHOW'S NODE: ICD-10-CM

## 2022-09-02 PROCEDURE — 76536 US EXAM OF HEAD AND NECK: CPT

## 2022-09-07 ENCOUNTER — APPOINTMENT (OUTPATIENT)
Dept: LAB | Facility: CLINIC | Age: 77
End: 2022-09-07
Payer: COMMERCIAL

## 2022-09-08 ENCOUNTER — ANTICOAG VISIT (OUTPATIENT)
Dept: CARDIOLOGY CLINIC | Facility: CLINIC | Age: 77
End: 2022-09-08

## 2022-09-08 DIAGNOSIS — I48.91 ATRIAL FIBRILLATION, UNSPECIFIED TYPE (HCC): ICD-10-CM

## 2022-09-08 DIAGNOSIS — Z95.2 HISTORY OF HEART VALVE REPLACEMENT: Primary | ICD-10-CM

## 2022-09-13 ENCOUNTER — TELEPHONE (OUTPATIENT)
Dept: CARDIOLOGY CLINIC | Facility: CLINIC | Age: 77
End: 2022-09-13

## 2022-09-13 DIAGNOSIS — I48.20 CHRONIC ATRIAL FIBRILLATION (HCC): Primary | ICD-10-CM

## 2022-09-13 NOTE — TELEPHONE ENCOUNTER
Patient Berenice Luna contacting office inquiring if she needs a Holter monitor prior to coming back in for her f/u with Dr Yesy Magdaleno      Patient was last seen 7/27/2022 I did not see in her note that a Holter monitor was ordered  Please advise

## 2022-09-16 ENCOUNTER — TELEPHONE (OUTPATIENT)
Dept: INTERNAL MEDICINE CLINIC | Facility: CLINIC | Age: 77
End: 2022-09-16

## 2022-09-16 ENCOUNTER — HOSPITAL ENCOUNTER (OUTPATIENT)
Dept: NON INVASIVE DIAGNOSTICS | Facility: CLINIC | Age: 77
Discharge: HOME | End: 2022-09-16
Payer: COMMERCIAL

## 2022-09-16 DIAGNOSIS — Z23 ENCOUNTER FOR IMMUNIZATION: Primary | ICD-10-CM

## 2022-09-16 DIAGNOSIS — I48.20 CHRONIC ATRIAL FIBRILLATION (HCC): ICD-10-CM

## 2022-09-16 PROCEDURE — 93226 XTRNL ECG REC<48 HR SCAN A/R: CPT

## 2022-09-16 PROCEDURE — 93225 XTRNL ECG REC<48 HRS REC: CPT

## 2022-09-16 NOTE — TELEPHONE ENCOUNTER
Wants to get her covid booster  She needs an order put from Dr Radha Bustillo so she can get it  Please call when order is in [phone]

## 2022-09-16 NOTE — TELEPHONE ENCOUNTER
CALLED PT   AND WAS NOTIFIED AND STATED HER LAST BOOSTER WAS 7/29/22 WILL IT BE OK TO GET THE OMICRON ONE

## 2022-09-22 PROCEDURE — 93227 XTRNL ECG REC<48 HR R&I: CPT | Performed by: INTERNAL MEDICINE

## 2022-09-23 DIAGNOSIS — I10 BENIGN HYPERTENSION: ICD-10-CM

## 2022-09-23 DIAGNOSIS — I48.20 CHRONIC ATRIAL FIBRILLATION (HCC): Primary | ICD-10-CM

## 2022-09-23 DIAGNOSIS — G25.0 ESSENTIAL TREMOR: ICD-10-CM

## 2022-09-23 RX ORDER — PROPRANOLOL HYDROCHLORIDE 20 MG/1
20 TABLET ORAL EVERY 12 HOURS SCHEDULED
Qty: 60 TABLET | Refills: 5 | Status: SHIPPED | OUTPATIENT
Start: 2022-09-23 | End: 2022-10-17

## 2022-09-24 ENCOUNTER — TELEPHONE (OUTPATIENT)
Dept: CARDIOLOGY CLINIC | Facility: CLINIC | Age: 77
End: 2022-09-24

## 2022-09-24 NOTE — TELEPHONE ENCOUNTER
Pt is requesting a call back in regards to her holter monitor  Pt stated that she received a call 09/23/2022 but was not clear on her instructions  Please call pt to follow up

## 2022-09-26 ENCOUNTER — TELEPHONE (OUTPATIENT)
Dept: CARDIOLOGY CLINIC | Facility: CLINIC | Age: 77
End: 2022-09-26

## 2022-09-28 ENCOUNTER — APPOINTMENT (OUTPATIENT)
Dept: LAB | Facility: CLINIC | Age: 77
End: 2022-09-28
Payer: COMMERCIAL

## 2022-09-29 ENCOUNTER — ANTICOAG VISIT (OUTPATIENT)
Dept: CARDIOLOGY CLINIC | Facility: CLINIC | Age: 77
End: 2022-09-29

## 2022-09-29 DIAGNOSIS — I48.91 ATRIAL FIBRILLATION, UNSPECIFIED TYPE (HCC): ICD-10-CM

## 2022-09-29 DIAGNOSIS — Z95.2 HISTORY OF HEART VALVE REPLACEMENT: Primary | ICD-10-CM

## 2022-10-05 ENCOUNTER — APPOINTMENT (OUTPATIENT)
Dept: LAB | Facility: CLINIC | Age: 77
End: 2022-10-05
Payer: COMMERCIAL

## 2022-10-05 DIAGNOSIS — Z79.01 LONG TERM (CURRENT) USE OF ANTICOAGULANTS: Primary | ICD-10-CM

## 2022-10-05 DIAGNOSIS — I48.0 PAROXYSMAL ATRIAL FIBRILLATION (HCC): ICD-10-CM

## 2022-10-05 LAB
INR PPP: 3.35 (ref 0.84–1.19)
PROTHROMBIN TIME: 34.2 SECONDS (ref 11.6–14.5)

## 2022-10-05 PROCEDURE — 85610 PROTHROMBIN TIME: CPT

## 2022-10-05 PROCEDURE — 36415 COLL VENOUS BLD VENIPUNCTURE: CPT

## 2022-10-06 ENCOUNTER — ANTICOAG VISIT (OUTPATIENT)
Dept: CARDIOLOGY CLINIC | Facility: CLINIC | Age: 77
End: 2022-10-06

## 2022-10-06 DIAGNOSIS — Z95.2 HISTORY OF HEART VALVE REPLACEMENT: Primary | ICD-10-CM

## 2022-10-16 DIAGNOSIS — I48.20 CHRONIC ATRIAL FIBRILLATION (HCC): ICD-10-CM

## 2022-10-16 DIAGNOSIS — G25.0 ESSENTIAL TREMOR: ICD-10-CM

## 2022-10-17 RX ORDER — PROPRANOLOL HYDROCHLORIDE 20 MG/1
TABLET ORAL
Qty: 180 TABLET | Refills: 2 | Status: SHIPPED | OUTPATIENT
Start: 2022-10-17

## 2022-10-26 ENCOUNTER — ANTICOAG VISIT (OUTPATIENT)
Dept: CARDIOLOGY CLINIC | Facility: CLINIC | Age: 77
End: 2022-10-26

## 2022-10-26 ENCOUNTER — APPOINTMENT (OUTPATIENT)
Dept: LAB | Facility: CLINIC | Age: 77
End: 2022-10-26

## 2022-10-26 DIAGNOSIS — I48.91 ATRIAL FIBRILLATION, UNSPECIFIED TYPE (HCC): ICD-10-CM

## 2022-10-26 DIAGNOSIS — E78.2 MIXED HYPERLIPIDEMIA: ICD-10-CM

## 2022-10-26 DIAGNOSIS — Z95.2 HISTORY OF HEART VALVE REPLACEMENT: Primary | ICD-10-CM

## 2022-10-26 DIAGNOSIS — R73.01 IFG (IMPAIRED FASTING GLUCOSE): ICD-10-CM

## 2022-10-26 DIAGNOSIS — I48.0 PAROXYSMAL ATRIAL FIBRILLATION (HCC): ICD-10-CM

## 2022-10-26 DIAGNOSIS — E03.8 SUBCLINICAL HYPOTHYROIDISM: ICD-10-CM

## 2022-10-26 LAB
ALBUMIN SERPL BCP-MCNC: 3.8 G/DL (ref 3.5–5)
ALP SERPL-CCNC: 63 U/L (ref 46–116)
ALT SERPL W P-5'-P-CCNC: 24 U/L (ref 12–78)
ANION GAP SERPL CALCULATED.3IONS-SCNC: 3 MMOL/L (ref 4–13)
AST SERPL W P-5'-P-CCNC: 25 U/L (ref 5–45)
BASOPHILS # BLD AUTO: 0.03 THOUSANDS/ÂΜL (ref 0–0.1)
BASOPHILS NFR BLD AUTO: 1 % (ref 0–1)
BILIRUB SERPL-MCNC: 0.57 MG/DL (ref 0.2–1)
BUN SERPL-MCNC: 18 MG/DL (ref 5–25)
CALCIUM SERPL-MCNC: 9.2 MG/DL (ref 8.3–10.1)
CHLORIDE SERPL-SCNC: 108 MMOL/L (ref 96–108)
CHOLEST SERPL-MCNC: 146 MG/DL
CO2 SERPL-SCNC: 28 MMOL/L (ref 21–32)
CREAT SERPL-MCNC: 0.8 MG/DL (ref 0.6–1.3)
EOSINOPHIL # BLD AUTO: 0.21 THOUSAND/ÂΜL (ref 0–0.61)
EOSINOPHIL NFR BLD AUTO: 5 % (ref 0–6)
ERYTHROCYTE [DISTWIDTH] IN BLOOD BY AUTOMATED COUNT: 12.7 % (ref 11.6–15.1)
EST. AVERAGE GLUCOSE BLD GHB EST-MCNC: 108 MG/DL
GFR SERPL CREATININE-BSD FRML MDRD: 72 ML/MIN/1.73SQ M
GLUCOSE P FAST SERPL-MCNC: 90 MG/DL (ref 65–99)
HBA1C MFR BLD: 5.4 %
HCT VFR BLD AUTO: 38.6 % (ref 34.8–46.1)
HDLC SERPL-MCNC: 63 MG/DL
HGB BLD-MCNC: 12 G/DL (ref 11.5–15.4)
IMM GRANULOCYTES # BLD AUTO: 0.01 THOUSAND/UL (ref 0–0.2)
IMM GRANULOCYTES NFR BLD AUTO: 0 % (ref 0–2)
INR PPP: 2.58 (ref 0.84–1.19)
LDLC SERPL CALC-MCNC: 66 MG/DL (ref 0–100)
LYMPHOCYTES # BLD AUTO: 1.13 THOUSANDS/ÂΜL (ref 0.6–4.47)
LYMPHOCYTES NFR BLD AUTO: 25 % (ref 14–44)
MCH RBC QN AUTO: 31.8 PG (ref 26.8–34.3)
MCHC RBC AUTO-ENTMCNC: 31.1 G/DL (ref 31.4–37.4)
MCV RBC AUTO: 102 FL (ref 82–98)
MONOCYTES # BLD AUTO: 0.48 THOUSAND/ÂΜL (ref 0.17–1.22)
MONOCYTES NFR BLD AUTO: 11 % (ref 4–12)
NEUTROPHILS # BLD AUTO: 2.69 THOUSANDS/ÂΜL (ref 1.85–7.62)
NEUTS SEG NFR BLD AUTO: 58 % (ref 43–75)
NONHDLC SERPL-MCNC: 83 MG/DL
NRBC BLD AUTO-RTO: 0 /100 WBCS
PLATELET # BLD AUTO: 210 THOUSANDS/UL (ref 149–390)
PMV BLD AUTO: 9.9 FL (ref 8.9–12.7)
POTASSIUM SERPL-SCNC: 4.2 MMOL/L (ref 3.5–5.3)
PROT SERPL-MCNC: 7.4 G/DL (ref 6.4–8.4)
PROTHROMBIN TIME: 27.9 SECONDS (ref 11.6–14.5)
RBC # BLD AUTO: 3.77 MILLION/UL (ref 3.81–5.12)
SODIUM SERPL-SCNC: 139 MMOL/L (ref 135–147)
TRIGL SERPL-MCNC: 87 MG/DL
TSH SERPL DL<=0.05 MIU/L-ACNC: 3.38 UIU/ML (ref 0.45–4.5)
WBC # BLD AUTO: 4.55 THOUSAND/UL (ref 4.31–10.16)

## 2022-11-03 ENCOUNTER — TELEPHONE (OUTPATIENT)
Dept: INTERNAL MEDICINE CLINIC | Facility: CLINIC | Age: 77
End: 2022-11-03

## 2022-11-03 ENCOUNTER — OFFICE VISIT (OUTPATIENT)
Dept: INTERNAL MEDICINE CLINIC | Facility: CLINIC | Age: 77
End: 2022-11-03

## 2022-11-03 VITALS
HEART RATE: 88 BPM | BODY MASS INDEX: 26.14 KG/M2 | SYSTOLIC BLOOD PRESSURE: 124 MMHG | DIASTOLIC BLOOD PRESSURE: 82 MMHG | WEIGHT: 176.5 LBS | RESPIRATION RATE: 18 BRPM | HEIGHT: 69 IN

## 2022-11-03 DIAGNOSIS — E03.8 SUBCLINICAL HYPOTHYROIDISM: ICD-10-CM

## 2022-11-03 DIAGNOSIS — Z23 ENCOUNTER FOR IMMUNIZATION: Primary | ICD-10-CM

## 2022-11-03 DIAGNOSIS — I10 BENIGN HYPERTENSION: ICD-10-CM

## 2022-11-03 DIAGNOSIS — R73.01 IFG (IMPAIRED FASTING GLUCOSE): ICD-10-CM

## 2022-11-03 DIAGNOSIS — E78.2 MIXED HYPERLIPIDEMIA: ICD-10-CM

## 2022-11-03 RX ADMIN — CYANOCOBALAMIN 1000 MCG: 1000 INJECTION, SOLUTION INTRAMUSCULAR; SUBCUTANEOUS at 12:16

## 2022-11-03 NOTE — TELEPHONE ENCOUNTER
Advise her to Waldem[facility] Wiggins, no order needed    If she wants to go to [facility], I understand they are scheduling out beyond January

## 2022-11-03 NOTE — PROGRESS NOTES
Assessment and Plan:   Hypertension stable on current medication     hyperlipidemia LDL at goal on statin continue same     essential tremor improved on propanolol     valve replacement/AFib continue Coumadin     history of amaurosis fugax in 2017 concerned for Coumadin failure placed since inr had been theraputic at that time placed  on baby aspirin has been on this since then  No evidence of bleeding  Continue to modify risk factors  She was noted to have small vertebrals all narrowing no stroke no significant stenosis  Was noted to have mild atherosclerotic disease to the carotid arteries, we will follow this up with a carotid ultrasound     osteopenia recommend calcium vitamin-D    Problem List Items Addressed This Visit    None     Visit Diagnoses     Encounter for immunization    -  Primary          Depression Screening and Follow-up Plan: Patient was screened for depression during today's encounter  They screened negative with a PHQ-2 score of 0  Preventive health issues were discussed with patient, and age appropriate screening tests were ordered as noted in patient's After Visit Summary  Personalized health advice and appropriate referrals for health education or preventive services given if needed, as noted in patient's After Visit Summary  History of Present Illness:     Patient presents for a Medicare Wellness Visit     Patient is here for routine follow-up and review labs   taking blood pressure medication no home blood pressures   headaches have been stable with as needed Fioricet   following with cardiology for history of valve and AFib   Patient is feeling well,    she tries to stay active she is trying to watch what she eats, occasional home blood pressure is stable, no evidence of bleeding         Neuropathy is stable on current dose     Patient Care Team:  Shelia Palafox MD as PCP - General (Internal Medicine)  Shelia Palafox MD as PCP - [address] [place] [address] (RT)  Shelia Palafox MD as PCP - [facility] ([facility]E)  Norris Morrison PA-C as Physician Assistant (Physician Assistant)     Review of Systems:     Review of Systems   Constitutional: Negative for appetite change, chills, diaphoresis, fatigue, fever and unexpected weight change  HENT: Negative for postnasal drip and sneezing  Eyes: Negative for visual disturbance  Respiratory: Negative for chest tightness and shortness of breath  Cardiovascular: Negative for chest pain, palpitations and leg swelling  Gastrointestinal: Negative for abdominal pain and blood in stool  Endocrine: Negative for cold intolerance, heat intolerance, polydipsia, polyphagia and polyuria  Genitourinary: Negative for difficulty urinating, dysuria, frequency and urgency  Musculoskeletal: Negative for arthralgias and myalgias  Skin: Negative for rash and wound  Neurological: Negative for dizziness, weakness, light-headedness and headaches  Hematological: Negative for adenopathy  Psychiatric/Behavioral: Negative for confusion, dysphoric mood and sleep disturbance  The patient is not nervous/anxious           Problem List:     Patient Active Problem List   Diagnosis   • Atrial fibrillation ([facility] )   • Hyperlipidemia   • History of heart valve replacement   • Benign hypertension   • Bradycardia   • Essential tremor   • Chronic anticoagulation   • Amaurosis fugax, both eyes   • Carotid artery stenosis   • Mitral valve disorder   • Osteoporosis   • Peripheral neuropathy   • Chronic right-sided low back pain without sciatica   • Asymptomatic stenosis of right vertebral artery   • Subclinical hypothyroidism   • Irritable bowel syndrome with constipation   • Inflammatory polyneuropathy, unspecified ([facility] )      Past Medical and Surgical History:     Past Medical History:   Diagnosis Date   • 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   • Atrial fibrillation (HCC)    • Hashimoto's thyroiditis    • Hyperlipidemia    • Hypertension    • IBS (irritable bowel syndrome)    • Kidney stone    • Microhematuria    • Migraine    • Mitral valve disorder    • Nasal congestion    • Non-toxic uninodular goiter    • Nosebleed    • Raynaud disease      Past Surgical History:   Procedure Laterality Date   • APPENDECTOMY     • BREAST CYST EXCISION Right 01/01/1977   • CARDIAC SURGERY     • COLONOSCOPY  08/08/2011   • TONSILLECTOMY     • VALVE REPLACEMENT  01/04/2017    mitral valve replacement, 6/5/14      Family History:     Family History   Problem Relation Age of Onset   • Hypertension Mother    • Coronary artery disease Father    • Migraines Father    • Leukemia Brother    • Migraines Brother    • Hypertension Brother    • Coronary artery disease Paternal Grandfather    • Leukemia Maternal Aunt    • Multiple myeloma Maternal Aunt    • Thyroid disease Other    • No Known Problems Maternal Aunt    • No Known Problems Maternal Aunt    • No Known Problems Paternal Aunt    • No Known Problems Paternal Aunt    • No Known Problems Paternal Aunt    • No Known Problems Paternal Aunt    • Breast cancer Neg Hx       Social History:     Social History     Socioeconomic History   • Marital status: Single     Spouse name: None   • Number of children: None   • Years of education: None   • Highest education level: None   Occupational History   • Occupation: Retired   Tobacco Use   • Smoking status: Never Smoker   • Smokeless tobacco: Never Used   Vaping Use   • Vaping Use: Never used   Substance and Sexual Activity   • Alcohol use: Yes     Comment: Occassionally--wine   • Drug use: No   • Sexual activity: Not Currently   Other Topics Concern   • None   Social History Narrative   • None     Social Determinants of Health     Financial Resource Strain: Medium Risk   • Difficulty of Paying Living Expenses: Somewhat hard   Food Insecurity: Not on file   Transportation Needs: No Transportation Needs   • Lack of Transportation (Medical): No   • Lack of Transportation (Non-Medical):  No   Physical Activity: Not on file   Stress: Not on file   Social Connections: Not on file   Intimate Partner Violence: Not on file   Housing Stability: Not on file      Medications and Allergies:     Current Outpatient Medications   Medication Sig Dispense Refill   • amLODIPine (NORVASC) 5 mg tablet TAKE 1 TABLET BY MOUTH EVERY DAY IN THE MORNING 90 tablet 3   • aspirin 81 mg chewable tablet Chew daily     • butalbital-acetaminophen-caffeine (FIORICET,ESGIC) -40 mg per tablet TAKE 1 TABLET BY MOUTH EVERY 4 (FOUR) HOURS AS NEEDED FOR HEADACHES 30 tablet 1   • Calcium Carbonate 1500 (600 Ca) MG TABS Take by mouth daily      • conjugated estrogens (Premarin) vaginal cream Insert 1 g into the vagina 3 (three) times a week (Patient taking differently: Insert 1 g into the vagina once a week) 30 g 0   • dicyclomine (BENTYL) 10 mg capsule Take 1 capsule (10 mg total) by mouth 3 (three) times a day before meals (Patient taking differently: Take 10 mg by mouth as needed) 60 capsule 3   • gabapentin (NEURONTIN) 300 mg capsule TAKE A 300MG + 100MG AT BEDTIME 90 capsule 3   • loratadine (CLARITIN) 10 mg tablet Take 10 mg by mouth as needed       • losartan (COZAAR) 100 MG tablet TAKE 1 TABLET BY MOUTH EVERY DAY 90 tablet 3   • Misc Natural Products (OSTEO BI-FLEX TRIPLE STRENGTH) TABS Take by mouth daily      • oxybutynin (DITROPAN-XL) 10 MG 24 hr tablet TAKE 1 TABLET BY MOUTH EVERYDAY AT BEDTIME 90 tablet 1   • propranolol (INDERAL) 20 mg tablet TAKE 1 TABLET BY MOUTH EVERY 12 HOURS  180 tablet 2   • simvastatin (ZOCOR) 10 mg tablet TAKE 1 TABLET BY MOUTH EVERY DAY 90 tablet 3   • triamcinolone (KENALOG) 0 1 % cream APPLY TO AFFECTED AREA ON FEET ONCE DAILY     • warfarin (COUMADIN) 2 mg tablet TAKE 1 TABLET BY MOUTH EVERY DAY 90 tablet 1   • gabapentin (NEURONTIN) 100 mg capsule TAKE 1 CAPSULE BY MOUTH TWICE A DAY (Patient not taking: Reported on 11/3/2022) 180 capsule 3   • lactobacillus acidophilus-bulgaricus (LACTINEX) chewable tablet Chew 1 tablet 3 (three) times a day with meals (Patient not taking: Reported on 11/3/2022) 90 tablet 3   • warfarin (COUMADIN) 1 mg tablet TAKE 1 TABLET BY MOUTH EVERY DAY (Patient not taking: No sig reported) 90 tablet 3   • warfarin (COUMADIN) 3 mg tablet TAKE 1 TABLET BY MOUTH EVERY DAY (Patient not taking: Reported on 11/3/2022) 90 tablet 1     Current Facility-Administered Medications   Medication Dose Route Frequency Provider Last Rate Last Admin   • cyanocobalamin injection 1,000 mcg  1,000 mcg Intramuscular Q30 Days Perez Points, CRNP   1,000 mcg at 07/09/21 1504   • cyanocobalamin injection 1,000 mcg  1,000 mcg Intramuscular Q30 Days Perez Points, CRNP   1,000 mcg at 10/19/21 1046     Allergies   Allergen Reactions   • Enablex [Darifenacin] Other (See Comments)     Sweating, HA, urinary hesitancy, flushing of face   • Fentanyl Nausea Only and Vomiting   • Hyoscyamine Palpitations   • Dicyclomine Other (See Comments)     Jittery, disorientation, light HAs   • Hydromorphone Other (See Comments)     Per pt does not remember the type or severity level   • Midazolam Other (See Comments)     Per pt does not remember the type or severity level   • Sulfamethoxazole-Trimethoprim Nausea Only   • Venlafaxine Other (See Comments)     Urinary urgency, polyuria   • Codeine Polt-Chlorphen Polt Er Headache   • Ultracet [Tramadol-Acetaminophen] Nausea Only and Vomiting      Immunizations:     Immunization History   Administered Date(s) Administered   • COVID-19 PFIZER VACCINE 0 3 ML IM 03/25/2021, 04/16/2021, 11/05/2021   • COVID-19 Pfizer vac (Dragan-sucrose, gray cap) 12 yr+ IM 07/29/2022, 07/29/2022   • INFLUENZA 09/30/2014, 10/26/2015, 11/10/2016   • Influenza Split High Dose Preservative Free IM 09/30/2014, 10/26/2015, 11/10/2016, 09/21/2017   • Influenza, high dose seasonal 0 7 mL 09/26/2018, 09/13/2019, 09/22/2020, 10/19/2021   • Influenza, seasonal, injectable 10/15/2007, 10/03/2012, 10/28/2013   • Pneumococcal Conjugate 13-Valent 02/23/2018   • Pneumococcal Polysaccharide PPV23 10/29/2010, 04/13/2015   • Tdap 10/26/2015, 06/23/2017   • Zoster 08/09/2011   • Zoster Vaccine Recombinant 07/07/2020, 10/09/2020      Health Maintenance:         Topic Date Due   • DXA SCAN  04/12/2023   • Breast Cancer Screening: Mammogram  06/09/2023   • Colorectal Cancer Screening  11/29/2026   • Hepatitis C Screening  Completed         Topic Date Due   • Influenza Vaccine (1) 09/01/2022      Medicare Screening Tests and Risk Assessments: Carlos Eduardo Islas is here for her Subsequent Wellness visit  Health Risk Assessment:   Patient rates overall health as good  Patient feels that their physical health rating is same  Patient is satisfied with their life  Eyesight was rated as same  Hearing was rated as same  Patient feels that their emotional and mental health rating is same  Patients states they are never, rarely angry  Patient states they are never, rarely unusually tired/fatigued  Pain experienced in the last 7 days has been none  Patient states that she has experienced no weight loss or gain in last 6 months  Depression Screening:   PHQ-2 Score: 0      Fall Risk Screening: In the past year, patient has experienced: no history of falling in past year      Urinary Incontinence Screening:   Patient has not leaked urine accidently in the last six months  Home Safety:  Patient does not have trouble with stairs inside or outside of their home  Patient has working smoke alarms and has working carbon monoxide detector  Home safety hazards include: none  Nutrition:   Current diet is Regular  Medications:   Patient is currently taking over-the-counter supplements  OTC medications include: see medication list  Patient is able to manage medications       Activities of Daily Living (ADLs)/Instrumental Activities of Daily Living (IADLs):   Walk and transfer into and out of bed and chair?: Yes  Dress and groom yourself?: Yes Bathe or shower yourself?: Yes    Feed yourself? Yes  Do your laundry/housekeeping?: Yes  Manage your money, pay your bills and track your expenses?: Yes  Make your own meals?: Yes    Do your own shopping?: Yes    Previous Hospitalizations:   Any hospitalizations or ED visits within the last 12 months?: No      Advance Care Planning:   Living will: Yes    Advanced directive: Yes    Five wishes given: Yes      Cognitive Screening:   Provider or family/friend/caregiver concerned regarding cognition?: No    PREVENTIVE SCREENINGS      Cardiovascular Screening:    General: Screening Not Indicated and History Lipid Disorder      Diabetes Screening:     General: Screening Not Indicated and History Diabetes      Colorectal Cancer Screening:     General: Screening Current      Breast Cancer Screening:     General: Screening Current      Cervical Cancer Screening:    General: Screening Not Indicated      Osteoporosis Screening:    General: Screening Not Indicated and History Osteoporosis      Abdominal Aortic Aneurysm (AAA) Screening:        General: Screening Not Indicated      Lung Cancer Screening:     General: Screening Not Indicated      Hepatitis C Screening:    General: Screening Current    Screening, Brief Intervention, and Referral to Treatment (SBIRT)    Screening  Typical number of drinks in a day: 0    Single Item Drug Screening:  How often have you used an illegal drug (including marijuana) or a prescription medication for non-medical reasons in the past year? never    Single Item Drug Screen Score: 0  Interpretation: Negative screen for possible drug use disorder    No exam data present     Physical Exam:     Ht 5' 9" (1 753 m)   Wt 80 1 kg (176 lb 8 oz)   LMP  (LMP Unknown)   BMI 26 06 kg/m²     Physical Exam  Constitutional:       Appearance: She is well-developed  HENT:      Head: Normocephalic and atraumatic  Eyes:      Pupils: Pupils are equal, round, and reactive to light     Neck:      Thyroid: No thyromegaly  Cardiovascular:      Rate and Rhythm: Normal rate and regular rhythm  Heart sounds: No murmur heard  Pulmonary:      Effort: Pulmonary effort is normal       Breath sounds: Normal breath sounds  Abdominal:      General: Bowel sounds are normal       Palpations: Abdomen is soft  Musculoskeletal:         General: Normal range of motion  Cervical back: Normal range of motion and neck supple  Lymphadenopathy:      Cervical: No cervical adenopathy  Skin:     General: Skin is warm and dry  Neurological:      Mental Status: She is alert and oriented to person, place, and time            AHNNY Graves

## 2022-11-04 LAB
CREAT ?TM UR-SCNC: 41 UMOL/L
EXT MICROALBUMIN URINE RANDOM: 2.1
MICROALBUMIN/CREAT UR: 51 MG/G{CREAT}

## 2022-11-05 DIAGNOSIS — G62.9 NEUROPATHY: ICD-10-CM

## 2022-11-05 DIAGNOSIS — I10 HYPERTENSION, ESSENTIAL: ICD-10-CM

## 2022-11-05 RX ORDER — GABAPENTIN 300 MG/1
CAPSULE ORAL
Qty: 90 CAPSULE | Refills: 3 | Status: SHIPPED | OUTPATIENT
Start: 2022-11-05

## 2022-11-07 RX ORDER — AMLODIPINE BESYLATE 5 MG/1
TABLET ORAL
Qty: 90 TABLET | Refills: 3 | Status: SHIPPED | OUTPATIENT
Start: 2022-11-07

## 2022-11-10 ENCOUNTER — TELEPHONE (OUTPATIENT)
Dept: INTERNAL MEDICINE CLINIC | Facility: CLINIC | Age: 77
End: 2022-11-10

## 2022-11-10 NOTE — TELEPHONE ENCOUNTER
We are very worried when is minimally elevated  This has to do with age and blood pressure  The treatment for it is the blood pressure medication losartan which she is already taking   We just continue to make sure her sugars are good which they are and that her blood pressure is stable

## 2022-11-10 NOTE — TELEPHONE ENCOUNTER
Called pt   And was notified and she is questions on external micro alb/ creat ratio was told should be below 30 and hers is 46

## 2022-11-10 NOTE — TELEPHONE ENCOUNTER
Would like interpretation of report from [address] (UA) 11/4/22  Final in chart    Please Call [phone]

## 2022-11-15 ENCOUNTER — LAB (OUTPATIENT)
Dept: LAB | Facility: CLINIC | Age: 77
End: 2022-11-15

## 2022-11-15 DIAGNOSIS — I48.0 PAROXYSMAL ATRIAL FIBRILLATION (HCC): ICD-10-CM

## 2022-11-15 LAB
INR PPP: 2.66 (ref 0.84–1.19)
PROTHROMBIN TIME: 28.6 SECONDS (ref 11.6–14.5)

## 2022-11-16 ENCOUNTER — ANTICOAG VISIT (OUTPATIENT)
Dept: CARDIOLOGY CLINIC | Facility: CLINIC | Age: 77
End: 2022-11-16

## 2022-11-16 DIAGNOSIS — Z95.2 HISTORY OF HEART VALVE REPLACEMENT: Primary | ICD-10-CM

## 2022-11-22 DIAGNOSIS — E78.2 COMBINED HYPERLIPIDEMIA: ICD-10-CM

## 2022-11-22 RX ORDER — SIMVASTATIN 10 MG
TABLET ORAL
Qty: 90 TABLET | Refills: 3 | Status: SHIPPED | OUTPATIENT
Start: 2022-11-22

## 2022-11-25 ENCOUNTER — IMMUNIZATIONS (OUTPATIENT)
Dept: FAMILY MEDICINE CLINIC | Facility: CLINIC | Age: 77
End: 2022-11-25

## 2022-11-25 DIAGNOSIS — Z23 NEED FOR COVID-19 VACCINE: Primary | ICD-10-CM

## 2022-12-14 ENCOUNTER — LAB (OUTPATIENT)
Dept: LAB | Facility: CLINIC | Age: 77
End: 2022-12-14

## 2022-12-14 DIAGNOSIS — I48.0 PAROXYSMAL ATRIAL FIBRILLATION (HCC): ICD-10-CM

## 2022-12-14 LAB
INR PPP: 2.92 (ref 0.84–1.19)
PROTHROMBIN TIME: 30.8 SECONDS (ref 11.6–14.5)

## 2022-12-15 ENCOUNTER — ANTICOAG VISIT (OUTPATIENT)
Dept: CARDIOLOGY CLINIC | Facility: CLINIC | Age: 77
End: 2022-12-15

## 2022-12-15 DIAGNOSIS — Z95.2 HISTORY OF HEART VALVE REPLACEMENT: Primary | ICD-10-CM

## 2022-12-24 DIAGNOSIS — Z95.2 HISTORY OF HEART VALVE REPLACEMENT: ICD-10-CM

## 2022-12-27 RX ORDER — WARFARIN SODIUM 2 MG/1
TABLET ORAL
Qty: 90 TABLET | Refills: 1 | Status: SHIPPED | OUTPATIENT
Start: 2022-12-27

## 2023-01-08 DIAGNOSIS — N32.81 OAB (OVERACTIVE BLADDER): ICD-10-CM

## 2023-01-09 RX ORDER — OXYBUTYNIN CHLORIDE 10 MG/1
TABLET, EXTENDED RELEASE ORAL
Qty: 90 TABLET | Refills: 1 | Status: SHIPPED | OUTPATIENT
Start: 2023-01-09 | End: 2023-01-20

## 2023-01-11 ENCOUNTER — LAB (OUTPATIENT)
Dept: LAB | Facility: CLINIC | Age: 78
End: 2023-01-11

## 2023-01-11 DIAGNOSIS — I48.0 PAROXYSMAL ATRIAL FIBRILLATION (HCC): ICD-10-CM

## 2023-01-11 LAB
INR PPP: 4.18 (ref 0.84–1.19)
PROTHROMBIN TIME: 40.6 SECONDS (ref 11.6–14.5)

## 2023-01-12 ENCOUNTER — ANTICOAG VISIT (OUTPATIENT)
Dept: CARDIOLOGY CLINIC | Facility: CLINIC | Age: 78
End: 2023-01-12

## 2023-01-12 DIAGNOSIS — Z95.2 HISTORY OF HEART VALVE REPLACEMENT: Primary | ICD-10-CM

## 2023-01-12 NOTE — RESULT ENCOUNTER NOTE
S/w pt  She states she did not do anything different  No new meds or diet change  Advised to hold x 2 days then resume reg dose and retest in 2 weeks  If still high will change weekly regimen

## 2023-01-19 NOTE — PROGRESS NOTES
1/20/2023      Chief Complaint   Patient presents with   • Follow-up   • Nocturia         Assessment and Plan    78 y o  female     1  Nocturia  2  Atrophic vaginitis  - UA today not performed  - PVR today 120 mL  - Discussed conservative measures with adequate hydration, avoidance of bladder irritants, constipation  - Discussed limiting fluids prior to bed, double voiding, timed voiding  - Discussed proper use of topical estrogen cream 3 times weekly  - Discontinue oxybutynin, trial of VESIcare  - Discussed US kidney and bladder if no benefit  - Follow-up in 3 to 4 months for symptom reassessment  - Call for new questions or concerns in the meantime  - All questions answered; patient understands and agrees with plan        History of Present Illness  Shawn Mcmahon is a 78 y o  female patient with history of nocturia, atrophic vaginitis, overactive bladder here for follow up  Patient currently using oxybutynin 10 mg daily as well as topical estrogen  Denies side effects of medication  States she does have continued nocturia x2  Denies gross hematuria, dysuria, flank pain, suprapubic pressure, fever, chills, nausea, vomiting  Review of Systems   Constitutional: Negative for activity change, appetite change, chills and fever  HENT: Negative for congestion and trouble swallowing  Respiratory: Negative for cough and shortness of breath  Cardiovascular: Negative for chest pain, palpitations and leg swelling  Gastrointestinal: Negative for abdominal pain, constipation, diarrhea, nausea and vomiting  Genitourinary: Negative for difficulty urinating, dysuria, flank pain, frequency, hematuria and urgency  Musculoskeletal: Negative for back pain and gait problem  Skin: Negative for wound  Allergic/Immunologic: Negative for immunocompromised state  Neurological: Negative for dizziness and syncope  Hematological: Does not bruise/bleed easily  Psychiatric/Behavioral: Negative for confusion  All other systems reviewed and are negative  Vitals  Vitals:    01/20/23 0955   BP: 122/82   Pulse: 72   SpO2: 98%   Weight: 79 8 kg (176 lb)   Height: 5' 9" (1 753 m)       Physical Exam  Constitutional:       General: She is not in acute distress  Appearance: Normal appearance  She is not ill-appearing, toxic-appearing or diaphoretic  HENT:      Head: Normocephalic  Nose: No congestion  Eyes:      General: No scleral icterus  Right eye: No discharge  Left eye: No discharge  Conjunctiva/sclera: Conjunctivae normal       Pupils: Pupils are equal, round, and reactive to light  Pulmonary:      Effort: Pulmonary effort is normal    Musculoskeletal:      Cervical back: Normal range of motion  Skin:     General: Skin is warm and dry  Coloration: Skin is not jaundiced or pale  Findings: No bruising, erythema, lesion or rash  Neurological:      General: No focal deficit present  Mental Status: She is alert and oriented to person, place, and time  Mental status is at baseline  Gait: Gait normal    Psychiatric:         Mood and Affect: Mood normal          Behavior: Behavior normal          Thought Content:  Thought content normal          Judgment: Judgment normal            Past History  Past Medical History:   Diagnosis Date   • 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   • Atrial fibrillation (HCC)    • Hashimoto's thyroiditis    • Hyperlipidemia    • Hypertension    • IBS (irritable bowel syndrome)    • Kidney stone    • Microhematuria    • Migraine    • Mitral valve disorder    • Nasal congestion    • Non-toxic uninodular goiter    • Nosebleed    • Raynaud disease      Social History     Socioeconomic History   • Marital status: Single     Spouse name: None   • Number of children: None   • Years of education: None   • Highest education level: None   Occupational History   • Occupation: Retired   Tobacco Use   • Smoking status: Never   • Smokeless tobacco: Never   Vaping Use   • Vaping Use: Never used   Substance and Sexual Activity   • Alcohol use: Yes     Comment: Occassionally--wine   • Drug use: No   • Sexual activity: Not Currently   Other Topics Concern   • None   Social History Narrative   • None     Social Determinants of Health     Financial Resource Strain: Low Risk    • Difficulty of Paying Living Expenses: Not hard at all   Food Insecurity: Not on file   Transportation Needs: No Transportation Needs   • Lack of Transportation (Medical): No   • Lack of Transportation (Non-Medical):  No   Physical Activity: Not on file   Stress: Not on file   Social Connections: Not on file   Intimate Partner Violence: Not on file   Housing Stability: Not on file     Social History     Tobacco Use   Smoking Status Never   Smokeless Tobacco Never     Family History   Problem Relation Age of Onset   • Hypertension Mother    • Coronary artery disease Father    • Migraines Father    • Leukemia Brother    • Migraines Brother    • Hypertension Brother    • Coronary artery disease Paternal Grandfather    • Leukemia Maternal Aunt    • Multiple myeloma Maternal Aunt    • Thyroid disease Other    • No Known Problems Maternal Aunt    • No Known Problems Maternal Aunt    • No Known Problems Paternal Aunt    • No Known Problems Paternal Aunt    • No Known Problems Paternal Aunt    • No Known Problems Paternal Aunt    • Breast cancer Neg Hx        The following portions of the patient's history were reviewed and updated as appropriate: allergies, current medications, past medical history, past social history, past surgical history and problem list     Results  Recent Results (from the past 1 hour(s))   POCT Measure PVR    Collection Time: 01/20/23  9:59 AM   Result Value Ref Range    POST-VOID RESIDUAL VOLUME, ML  mL   ]  No results found for: PSA  Lab Results   Component Value Date    CALCIUM 9 2 10/26/2022    K 4 2 10/26/2022    CO2 28 10/26/2022     10/26/2022    BUN 18 10/26/2022    CREATININE 0 80 10/26/2022     Lab Results   Component Value Date    WBC 4 55 10/26/2022    HGB 12 0 10/26/2022    HCT 38 6 10/26/2022     (H) 10/26/2022     10/26/2022       Jeremiah Long PA-C

## 2023-01-20 ENCOUNTER — OFFICE VISIT (OUTPATIENT)
Dept: UROLOGY | Facility: CLINIC | Age: 78
End: 2023-01-20

## 2023-01-20 VITALS
DIASTOLIC BLOOD PRESSURE: 82 MMHG | WEIGHT: 176 LBS | SYSTOLIC BLOOD PRESSURE: 122 MMHG | HEIGHT: 69 IN | OXYGEN SATURATION: 98 % | HEART RATE: 72 BPM | BODY MASS INDEX: 26.07 KG/M2

## 2023-01-20 DIAGNOSIS — R35.1 NOCTURIA: Primary | ICD-10-CM

## 2023-01-20 LAB — POST-VOID RESIDUAL VOLUME, ML POC: 120 ML

## 2023-01-20 RX ORDER — SOLIFENACIN SUCCINATE 10 MG/1
10 TABLET, FILM COATED ORAL DAILY
Qty: 90 TABLET | Refills: 3 | Status: SHIPPED | OUTPATIENT
Start: 2023-01-20

## 2023-01-24 ENCOUNTER — APPOINTMENT (OUTPATIENT)
Dept: LAB | Facility: CLINIC | Age: 78
End: 2023-01-24

## 2023-01-24 ENCOUNTER — ANTICOAG VISIT (OUTPATIENT)
Dept: CARDIOLOGY CLINIC | Facility: CLINIC | Age: 78
End: 2023-01-24

## 2023-01-24 DIAGNOSIS — I48.0 PAROXYSMAL ATRIAL FIBRILLATION (HCC): ICD-10-CM

## 2023-01-24 DIAGNOSIS — Z95.2 HISTORY OF HEART VALVE REPLACEMENT: Primary | ICD-10-CM

## 2023-01-24 LAB
INR PPP: 3.81 (ref 0.84–1.19)
PROTHROMBIN TIME: 37.8 SECONDS (ref 11.6–14.5)

## 2023-02-01 ENCOUNTER — ANTICOAG VISIT (OUTPATIENT)
Dept: CARDIOLOGY CLINIC | Facility: CLINIC | Age: 78
End: 2023-02-01

## 2023-02-01 ENCOUNTER — LAB (OUTPATIENT)
Dept: LAB | Facility: CLINIC | Age: 78
End: 2023-02-01

## 2023-02-01 DIAGNOSIS — Z95.2 HISTORY OF HEART VALVE REPLACEMENT: Primary | ICD-10-CM

## 2023-02-01 DIAGNOSIS — I48.0 PAROXYSMAL ATRIAL FIBRILLATION (HCC): ICD-10-CM

## 2023-02-01 LAB
INR PPP: 3.25 (ref 0.84–1.19)
PROTHROMBIN TIME: 33.4 SECONDS (ref 11.6–14.5)

## 2023-02-09 DIAGNOSIS — Z95.2 HISTORY OF HEART VALVE REPLACEMENT: ICD-10-CM

## 2023-02-10 RX ORDER — WARFARIN SODIUM 3 MG/1
TABLET ORAL
Qty: 90 TABLET | Refills: 1 | Status: SHIPPED | OUTPATIENT
Start: 2023-02-10

## 2023-02-15 ENCOUNTER — LAB (OUTPATIENT)
Dept: LAB | Facility: CLINIC | Age: 78
End: 2023-02-15

## 2023-02-15 DIAGNOSIS — I48.0 PAROXYSMAL ATRIAL FIBRILLATION (HCC): ICD-10-CM

## 2023-02-15 LAB
INR PPP: 3.09 (ref 0.84–1.19)
PROTHROMBIN TIME: 32.1 SECONDS (ref 11.6–14.5)

## 2023-02-16 ENCOUNTER — ANTICOAG VISIT (OUTPATIENT)
Dept: CARDIOLOGY CLINIC | Facility: CLINIC | Age: 78
End: 2023-02-16

## 2023-02-16 DIAGNOSIS — Z95.2 HISTORY OF HEART VALVE REPLACEMENT: Primary | ICD-10-CM

## 2023-02-24 ENCOUNTER — OFFICE VISIT (OUTPATIENT)
Dept: OBGYN CLINIC | Facility: CLINIC | Age: 78
End: 2023-02-24

## 2023-02-24 VITALS
DIASTOLIC BLOOD PRESSURE: 89 MMHG | BODY MASS INDEX: 25.92 KG/M2 | SYSTOLIC BLOOD PRESSURE: 153 MMHG | HEART RATE: 93 BPM | HEIGHT: 69 IN | WEIGHT: 175 LBS

## 2023-02-24 DIAGNOSIS — M19.041 ARTHRITIS OF BOTH HANDS: ICD-10-CM

## 2023-02-24 DIAGNOSIS — M19.041 ARTHRITIS OF FINGER OF RIGHT HAND: ICD-10-CM

## 2023-02-24 DIAGNOSIS — M18.11 ARTHRITIS OF CARPOMETACARPAL (CMC) JOINT OF RIGHT THUMB: Primary | ICD-10-CM

## 2023-02-24 DIAGNOSIS — M19.042 ARTHRITIS OF BOTH HANDS: ICD-10-CM

## 2023-02-24 RX ORDER — TRIAMCINOLONE ACETONIDE 40 MG/ML
20 INJECTION, SUSPENSION INTRA-ARTICULAR; INTRAMUSCULAR
Status: COMPLETED | OUTPATIENT
Start: 2023-02-24 | End: 2023-02-24

## 2023-02-24 RX ORDER — BUPIVACAINE HYDROCHLORIDE 2.5 MG/ML
0.5 INJECTION, SOLUTION INFILTRATION; PERINEURAL
Status: COMPLETED | OUTPATIENT
Start: 2023-02-24 | End: 2023-02-24

## 2023-02-24 RX ADMIN — TRIAMCINOLONE ACETONIDE 20 MG: 40 INJECTION, SUSPENSION INTRA-ARTICULAR; INTRAMUSCULAR at 12:37

## 2023-02-24 RX ADMIN — BUPIVACAINE HYDROCHLORIDE 0.5 ML: 2.5 INJECTION, SOLUTION INFILTRATION; PERINEURAL at 12:38

## 2023-02-24 RX ADMIN — TRIAMCINOLONE ACETONIDE 20 MG: 40 INJECTION, SUSPENSION INTRA-ARTICULAR; INTRAMUSCULAR at 12:38

## 2023-02-24 RX ADMIN — BUPIVACAINE HYDROCHLORIDE 0.5 ML: 2.5 INJECTION, SOLUTION INFILTRATION; PERINEURAL at 12:37

## 2023-02-24 NOTE — PROGRESS NOTES
Assessment/Plan:  Assessment/Plan   Diagnoses and all orders for this visit:    Arthritis of carpometacarpal (CMC) joint of right thumb  -     Small joint arthrocentesis: R thumb CMC    Arthritis of finger of right hand  -     Small joint arthrocentesis: R small PIP    Arthritis of both hands  -     Diclofenac Sodium (VOLTAREN) 1 %; Apply 2 g topically 3 (three) times a day      79-year-old right-hand-dominant female with arthritis both hands with pain in both hands, right worse than left, several years duration  Clinical impression is that she has symptoms from degenerative changes  I offered patient repeat steroid injections which she agreed  I administered mixture 0 5 cc 0 25% bupivacaine and 0 5 cc Kenalog to the right thumb CMC joint without complication  I administered one half the mixture of 0 5 cc 0 25% bupivacaine and 0 5 cc Kenalog to the right small PIP joint without complication  She may apply topical diclofenac gel 3 times a day as needed  She will follow-up as needed  Subjective:   Patient ID: Indu Reis is a 78 y o  female  Chief Complaint   Patient presents with   • Right Hand - Follow-up, Pain   • Left Hand - Follow-up, Pain       79-year-old right-hand-dominant female following up for pain in both hands, right worse than left, several years duration  She was last seen by Dr Cary Limon 1 to 5 years ago at which point she was status post right thumb MCP and left radiocarpal steroid injections  She states injection has provided significant improvement lasted until about 1 month ago  Pain in the right hand has been most bothersome  She has pain described localized to the base of the thumb at the [facility] joint, radiating to the thenar eminence and MCP joint, achy and throbbing, worse with gripping activities, and improved with resting  Pain of the small finger described localized to the PIP joint, none radiating, associated with swelling    She sometimes takes Tylenol to help with symptoms  Hand Pain  This is a chronic problem  The current episode started more than 1 year ago  The problem occurs daily  The problem has been gradually worsening  Associated symptoms include arthralgias and joint swelling  Pertinent negatives include no numbness or weakness  Exacerbated by: Gripping  She has tried rest and acetaminophen for the symptoms  The treatment provided mild relief  The following portions of the patient's history were reviewed and updated as appropriate: She  has a past medical history of Acquired deformity of rib (03/28/2014), Atrial fibrillation ([address] ), Hashimoto's thyroiditis, Hyperlipidemia, Hypertension, IBS (irritable bowel syndrome), Kidney stone, Microhematuria, Migraine, Mitral valve disorder, Nasal congestion, Non-toxic uninodular goiter, Nosebleed, and Raynaud disease  She  has a past surgical history that includes Appendectomy; Colonoscopy (08/08/2011); Valve replacement (01/04/2017); Breast cyst excision (Right, 01/01/1977); Tonsillectomy; and Cardiac surgery  Her family history includes Coronary artery disease in her father and paternal grandfather; Hypertension in her brother and mother; Leukemia in her brother and maternal aunt; Migraines in her brother and father; Multiple myeloma in her maternal aunt; No Known Problems in her maternal aunt, maternal aunt, paternal aunt, paternal aunt, paternal aunt, and paternal aunt; Thyroid disease in her other  She  reports that she has never smoked  She has never used smokeless tobacco  She reports current alcohol use  She reports that she does not use drugs  She is allergic to enablex [darifenacin], fentanyl, hyoscyamine, dicyclomine, hydromorphone, midazolam, sulfamethoxazole-trimethoprim, venlafaxine, codeine polt-chlorphen polt er, and ultracet [tramadol-acetaminophen]       Review of Systems   Musculoskeletal: Positive for arthralgias and joint swelling  Neurological: Negative for weakness and numbness  Objective:  Vitals:    02/24/23 1004   BP: 153/89   Pulse: 93   Weight: 79 4 kg (175 lb)   Height: 5' 9" (1 753 m)     Right Hand Exam     Tenderness   Right hand tenderness location: Right thumb CMC  Fifth digit PIP  Physical Exam  Vitals and nursing note reviewed  Constitutional:       General: She is not in acute distress  Appearance: She is well-developed  She is not ill-appearing or diaphoretic  HENT:      Head: Normocephalic  Right Ear: External ear normal       Left Ear: External ear normal    Eyes:      Conjunctiva/sclera: Conjunctivae normal    Neck:      Trachea: No tracheal deviation  Cardiovascular:      Rate and Rhythm: Normal rate  Pulmonary:      Effort: Pulmonary effort is normal  No respiratory distress  Abdominal:      General: There is no distension  Musculoskeletal:         General: Swelling and tenderness present  Skin:     General: Skin is warm and dry  Coloration: Skin is not jaundiced or pale  Neurological:      Mental Status: She is alert and oriented to person, place, and time  Psychiatric:         Mood and Affect: Mood normal          Behavior: Behavior normal          Thought Content: Thought content normal          Judgment: Judgment normal          Small joint arthrocentesis: R thumb CMC  [place] Protocol:  Consent: Verbal consent obtained  Risks and benefits: risks, benefits and alternatives were discussed  Consent given by: patient  Time out: Immediately prior to procedure a "time out" was called to verify the correct patient, procedure, equipment, support staff and site/side marked as required    Patient understanding: patient states understanding of the procedure being performed  Patient consent: the patient's understanding of the procedure matches consent given  Procedure consent: procedure consent matches procedure scheduled  Relevant documents: relevant documents present and verified  Test results: test results available and properly labeled  Site marked: the operative site was marked  Radiology Images displayed and confirmed  If images not available, report reviewed: imaging studies available  Required items: required blood products, implants, devices, and special equipment available  Patient identity confirmed: verbally with patient    Supporting Documentation  Indications: pain   Procedure Details  Location: thumb - R thumb CMC  Preparation: Patient was prepped and draped in the usual sterile fashion  Ultrasound guidance: no  Approach: dorsal  Medications administered: 0 5 mL bupivacaine 0 25 %; 20 mg triamcinolone acetonide 40 mg/mL    Patient tolerance: patient tolerated the procedure well with no immediate complications  Dressing:  Sterile dressing applied    Small joint arthrocentesis: R small PIP  Universal Protocol:  Consent: Verbal consent obtained  Risks and benefits: risks, benefits and alternatives were discussed  Consent given by: patient  Time out: Immediately prior to procedure a "time out" was called to verify the correct patient, procedure, equipment, support staff and site/side marked as required  Patient understanding: patient states understanding of the procedure being performed  Patient consent: the patient's understanding of the procedure matches consent given  Procedure consent: procedure consent matches procedure scheduled  Relevant documents: relevant documents present and verified  Test results: test results available and properly labeled  Site marked: the operative site was marked  Radiology Images displayed and confirmed   If images not available, report reviewed: imaging studies available  Required items: required blood products, implants, devices, and special equipment available  Patient identity confirmed: verbally with patient    Supporting Documentation  Indications: pain   Procedure Details  Location: small finger - R small PIP  Preparation: Patient was prepped and draped in the usual sterile fashion  Needle size: 27 G  Ultrasound guidance: no  Approach: dorsal  Medications administered: 0 5 mL bupivacaine 0 25 %; 20 mg triamcinolone acetonide 40 mg/mL    Patient tolerance: patient tolerated the procedure well with no immediate complications  Dressing:  Sterile dressing applied

## 2023-03-06 ENCOUNTER — TELEPHONE (OUTPATIENT)
Dept: OBGYN CLINIC | Facility: CLINIC | Age: 78
End: 2023-03-06

## 2023-03-06 ENCOUNTER — OFFICE VISIT (OUTPATIENT)
Dept: OBGYN CLINIC | Facility: CLINIC | Age: 78
End: 2023-03-06

## 2023-03-06 VITALS
HEART RATE: 90 BPM | HEIGHT: 69 IN | BODY MASS INDEX: 25.92 KG/M2 | DIASTOLIC BLOOD PRESSURE: 78 MMHG | SYSTOLIC BLOOD PRESSURE: 150 MMHG | WEIGHT: 175 LBS

## 2023-03-06 DIAGNOSIS — M19.041 ARTHRITIS OF RIGHT HAND: Primary | ICD-10-CM

## 2023-03-06 DIAGNOSIS — I10 ESSENTIAL HYPERTENSION: ICD-10-CM

## 2023-03-06 RX ORDER — LOSARTAN POTASSIUM 100 MG/1
TABLET ORAL
Qty: 90 TABLET | Refills: 3 | Status: SHIPPED | OUTPATIENT
Start: 2023-03-06

## 2023-03-06 NOTE — TELEPHONE ENCOUNTER
Spoke to patient and she is not able to send clinical pictures  She states she has no pain, no swelling, no tenderness to touch, no fever at this time  She is red in the area on pinky at the joint that was injection  Thumb injection site has no redness  Patient states she is on blood thinners but does not think this is a bruise  Please advise

## 2023-03-06 NOTE — TELEPHONE ENCOUNTER
Caller: Patient    Doctor:Manuel    Reason for call: Patient had a cortisone injection in her right pinky on 2/24  She states that it is very red    Is this normal?    Call back#:[phone]

## 2023-03-06 NOTE — PROGRESS NOTES
Assessment/Plan:  Assessment/Plan   Diagnoses and all orders for this visit:    Arthritis of right hand      42-year-old right-hand-dominant female with arthritis of the right hand with right hand pain and swelling several years duration  Right little finger noted for PIP joint hyperemia and swelling  There is blanching  There is no tenderness  She has extension limited to -10 degrees and flexion to 70 degrees  Clinical impression is a hyperemia likely due to adverse effect of steroid injection  There is no pain and there is no concern for infection  I advised patient that she may alternate between ice and heat and to apply topical diclofenac gel to help with swelling  She will follow needed  Subjective:   Patient ID: Sue Medeiros is a 78 y o  female  Chief Complaint   Patient presents with   • Right Little Finger - Follow-up     42-year-old right-hand-dominant female with arthritis of right hand following up for right hand pain and swelling several years duration  She was last seen by me 2 weeks ago at which point she was given an injection to the right fifth PIP joint  She reports no longer having any pain of the finger  She states a few days ago she started experiencing coloration with redness at the PIP joint  She denies pain, however has become concerned  Hand Pain  This is a chronic problem  The current episode started more than 1 year ago  Associated symptoms include joint swelling  Pertinent negatives include no arthralgias, numbness or weakness  Nothing aggravates the symptoms  Treatments tried: Steroid injection  The treatment provided significant relief  Review of Systems   Musculoskeletal: Positive for joint swelling  Negative for arthralgias  Neurological: Negative for weakness and numbness         Objective:  Vitals:    03/06/23 1515   BP: 150/78   Pulse: 90   Weight: 79 4 kg (175 lb)   Height: 5' 9" (1 753 m)     Right Hand Exam     Comments:  Right little finger noted for PIP joint hyperemia and swelling  There is blanching  There is no tenderness  She has extension limited to -10 degrees and flexion to 70 degrees            Physical Exam  Vitals and nursing note reviewed  Constitutional:       General: She is not in acute distress  Appearance: She is well-developed  She is not ill-appearing or diaphoretic  HENT:      Head: Normocephalic  Right Ear: External ear normal       Left Ear: External ear normal    Eyes:      Conjunctiva/sclera: Conjunctivae normal    Neck:      Trachea: No tracheal deviation  Cardiovascular:      Rate and Rhythm: Normal rate  Pulmonary:      Effort: Pulmonary effort is normal  No respiratory distress  Abdominal:      General: There is no distension  Skin:     General: Skin is warm and dry  Coloration: Skin is not jaundiced or pale  Neurological:      Mental Status: She is alert and oriented to person, place, and time  Psychiatric:         Mood and Affect: Mood normal          Behavior: Behavior normal          Thought Content:  Thought content normal          Judgment: Judgment normal

## 2023-03-13 ENCOUNTER — APPOINTMENT (OUTPATIENT)
Dept: LAB | Facility: CLINIC | Age: 78
End: 2023-03-13

## 2023-03-13 DIAGNOSIS — I48.0 PAROXYSMAL ATRIAL FIBRILLATION (HCC): ICD-10-CM

## 2023-03-13 LAB
INR PPP: 2.58 (ref 0.84–1.19)
PROTHROMBIN TIME: 27.9 SECONDS (ref 11.6–14.5)

## 2023-03-14 ENCOUNTER — TELEPHONE (OUTPATIENT)
Dept: OBGYN CLINIC | Facility: CLINIC | Age: 78
End: 2023-03-14

## 2023-03-14 ENCOUNTER — ANTICOAG VISIT (OUTPATIENT)
Dept: CARDIOLOGY CLINIC | Facility: CLINIC | Age: 78
End: 2023-03-14

## 2023-03-14 DIAGNOSIS — Z95.2 HISTORY OF HEART VALVE REPLACEMENT: Primary | ICD-10-CM

## 2023-03-20 ENCOUNTER — OFFICE VISIT (OUTPATIENT)
Dept: INTERNAL MEDICINE CLINIC | Facility: CLINIC | Age: 78
End: 2023-03-20

## 2023-03-20 VITALS
BODY MASS INDEX: 26.51 KG/M2 | DIASTOLIC BLOOD PRESSURE: 76 MMHG | TEMPERATURE: 97.3 F | HEART RATE: 58 BPM | SYSTOLIC BLOOD PRESSURE: 122 MMHG | WEIGHT: 179 LBS | HEIGHT: 69 IN | RESPIRATION RATE: 18 BRPM | OXYGEN SATURATION: 97 %

## 2023-03-20 DIAGNOSIS — I73.00 RAYNAUD'S DISEASE WITHOUT GANGRENE: Primary | ICD-10-CM

## 2023-03-20 DIAGNOSIS — L84 CALLUS OF FOOT: ICD-10-CM

## 2023-03-20 DIAGNOSIS — B35.1 ONYCHOMYCOSIS: ICD-10-CM

## 2023-03-20 PROBLEM — I42.9 CARDIOMYOPATHY, UNSPECIFIED TYPE (HCC): Status: ACTIVE | Noted: 2023-03-20

## 2023-03-20 NOTE — PROGRESS NOTES
Name: Diana Nuno      : 1945      MRN: [id number]  Encounter Provider: HANNY England  Encounter Date: 3/20/2023   Enc[facility]   [address]     1  Raynaud's disease without gangrene  Comments:  She is on aspirin    2  Callus of foot  Comments:  Painful callus left foot-patient concerned this was an infection  No redness, warmth, or drainage  Encouraged podiatry follow-up      3  Onychomycosis  Comments:  She is established with podiatry, encourage follow-up  Alternatively, use over-the-counter antifungal cream on affected areas  Follow-up at next scheduled appointment or earlier if needed    Subjective      Frank is here to check a lesion on the left plantar surface  The lesion is between fourth and fifth toe, it is painful, she is concerned for an infection  Patient has a history of Raynaud's disease  With chronic vascular insufficiency  Also sees podiatry for callus removal periodically  Review of Systems   Constitutional: Negative for chills, fatigue and fever  Respiratory: Negative  Cardiovascular: Negative  Skin: Positive for color change         Current Outpatient Medications on File Prior to Visit   Medication Sig   • amLODIPine (NORVASC) 5 mg tablet TAKE 1 TABLET BY MOUTH EVERY DAY IN THE MORNING   • aspirin 81 mg chewable tablet Chew daily   • butalbital-acetaminophen-caffeine (FIORICET,ESGIC) -40 mg per tablet TAKE 1 TABLET BY MOUTH EVERY 4 (FOUR) HOURS AS NEEDED FOR HEADACHES   • Calcium Carbonate 1500 (600 Ca) MG TABS Take by mouth daily    • conjugated estrogens (Premarin) vaginal cream Insert 1 g into the vagina 3 (three) times a week   • dicyclomine (BENTYL) 10 mg capsule Take 1 capsule (10 mg total) by mouth 3 (three) times a day before meals   • fluticasone (FLONASE) 50 mcg/act nasal spray SPRAY 1 SPRAY INTO EACH NOSTRIL TWICE A DAY   • gabapentin (NEURONTIN) 100 mg capsule TAKE 1 CAPSULE BY MOUTH TWICE A DAY   • gabapentin (NEURONTIN) 300 mg capsule TAKE A 300MG + 100MG AT BEDTIME   • loratadine (CLARITIN) 10 mg tablet Take 10 mg by mouth as needed     • losartan (COZAAR) 100 MG tablet TAKE 1 TABLET BY MOUTH EVERY DAY   • Misc Natural Products (OSTEO BI-FLEX TRIPLE STRENGTH) TABS Take by mouth daily    • propranolol (INDERAL) 20 mg tablet TAKE 1 TABLET BY MOUTH EVERY 12 HOURS  • simvastatin (ZOCOR) 10 mg tablet TAKE 1 TABLET BY MOUTH EVERY DAY   • solifenacin (VESICARE) 10 MG tablet Take 1 tablet (10 mg total) by mouth daily   • warfarin (COUMADIN) 1 mg tablet TAKE 1 TABLET BY MOUTH EVERY DAY   • warfarin (COUMADIN) 2 mg tablet TAKE 1 TABLET BY MOUTH EVERY DAY   • warfarin (COUMADIN) 3 mg tablet TAKE 1 TABLET BY MOUTH EVERY DAY   • Diclofenac Sodium (VOLTAREN) 1 % Apply 2 g topically 3 (three) times a day (Patient not taking: Reported on 3/20/2023)   • lactobacillus acidophilus-bulgaricus (LACTINEX) chewable tablet Chew 1 tablet 3 (three) times a day with meals (Patient not taking: Reported on 11/3/2022)   • [DISCONTINUED] triamcinolone (KENALOG) 0 1 % cream APPLY TO AFFECTED AREA ON FEET ONCE DAILY       Objective     /76 (BP Location: Left arm, Patient Position: Sitting, Cuff Size: Standard)   Pulse 58   Temp (!) 97 3 °F (36 3 °C) (Temporal)   Resp 18   Ht 5' 9" (1 753 m)   Wt 81 2 kg (179 lb) Comment: with shoes on  LMP  (LMP Unknown)   SpO2 97%   BMI 26 43 kg/m²     Physical Exam  Constitutional:       Appearance: Normal appearance  Cardiovascular:      Rate and Rhythm: Normal rate  Rhythm irregular  Pulses: Normal pulses  Heart sounds: Normal heart sounds  Comments: Prostatic valve click  Pulmonary:      Effort: Pulmonary effort is normal       Breath sounds: Normal breath sounds  Musculoskeletal:        Feet:       Comments: Foot drop- left   Skin:     Findings: Lesion (Callus) present        Comments: Bluish-purple-history of Raynaud's disease   Neurological: General: No focal deficit present  Mental Status: She is alert and oriented to person, place, and time         Piero Abraham, [address] [place] [address] St

## 2023-04-25 ENCOUNTER — OFFICE VISIT (OUTPATIENT)
Dept: CARDIOLOGY CLINIC | Facility: CLINIC | Age: 78
End: 2023-04-25

## 2023-04-25 VITALS
HEIGHT: 69 IN | OXYGEN SATURATION: 98 % | WEIGHT: 175 LBS | SYSTOLIC BLOOD PRESSURE: 118 MMHG | DIASTOLIC BLOOD PRESSURE: 80 MMHG | BODY MASS INDEX: 25.92 KG/M2 | RESPIRATION RATE: 16 BRPM | HEART RATE: 72 BPM

## 2023-04-25 DIAGNOSIS — I05.9 MITRAL VALVE DISORDER: ICD-10-CM

## 2023-04-25 DIAGNOSIS — I48.20 CHRONIC ATRIAL FIBRILLATION (HCC): Primary | ICD-10-CM

## 2023-04-25 DIAGNOSIS — Z79.01 CHRONIC ANTICOAGULATION: ICD-10-CM

## 2023-04-25 DIAGNOSIS — I10 ESSENTIAL HYPERTENSION: ICD-10-CM

## 2023-04-25 NOTE — PROGRESS NOTES
[facility]  [address]  [address] [facility] [address] [id number]  Cardiology Follow Up    Roro White  1945  [id number]      1  Chronic atrial fibrillation ([address] )        2  Chronic anticoagulation        3  Mitral valve disorder        4  Essential hypertension            Chief Complaint   Patient presents with   • Follow-up       Interval History: Patient presents for follow-up visit  Patient does have some shortness of breath with exertion which is stable  No history of leg edema orthopnea PND  No history of palpitations or dizziness  She states that she has been doing well on her present medical therapy  Patient denies any bleeding issues  Patient followed by primary care physician      Patient Active Problem List   Diagnosis   • Atrial fibrillation ([facility] [address] )   • Hyperlipidemia   • History of heart valve replacement   • Benign hypertension   • Bradycardia   • Essential tremor   • Chronic anticoagulation   • Amaurosis fugax, both eyes   • Carotid artery stenosis   • Mitral valve disorder   • Osteoporosis   • Peripheral neuropathy   • Chronic right-sided low back pain without sciatica   • Asymptomatic stenosis of right vertebral artery   • Subclinical hypothyroidism   • Irritable bowel syndrome with constipation   • Inflammatory polyneuropathy, unspecified ([facility] [address] )   • Cardiomyopathy, unspecified type ([facility] )     Past Medical History:   Diagnosis Date   • 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   • Atrial fibrillation (HCC)    • Hashimoto's thyroiditis    • Hyperlipidemia    • Hypertension    • IBS (irritable bowel syndrome)    • Kidney stone    • Microhematuria    • Migraine    • Mitral valve disorder    • Nasal congestion    • Non-toxic uninodular goiter    • Nosebleed    • Raynaud disease      Social History     Socioeconomic History   • Marital status: Single     Spouse name: Not on file   • Number of children: Not on file   • Years of education: Not on file   • Highest education level: Not on file   Occupational History   • Occupation: Retired   Tobacco Use   • Smoking status: Never     Passive exposure: Past   • Smokeless tobacco: Never   Vaping Use   • Vaping Use: Never used   Substance and Sexual Activity   • Alcohol use: Yes     Comment: Occassionally--wine   • Drug use: No   • Sexual activity: Not Currently   Other Topics Concern   • Not on file   Social History Narrative   • Not on file     Social Determinants of Health     Financial Resource Strain: Low Risk    • Difficulty of Paying Living Expenses: Not hard at all   Food Insecurity: Not on file   Transportation Needs: No Transportation Needs   • Lack of Transportation (Medical): No   • Lack of Transportation (Non-Medical):  No   Physical Activity: Not on file   Stress: No Stress Concern Present   • Feeling of Stress : Not at all   Social Connections: Not on file   Intimate Partner Violence: Not on file   Housing Stability: Not on file      Family History   Problem Relation Age of Onset   • Hypertension Mother    • Coronary artery disease Father    • Migraines Father    • Leukemia Brother    • Migraines Brother    • Hypertension Brother    • Coronary artery disease Paternal Grandfather    • Leukemia Maternal Aunt    • Multiple myeloma Maternal Aunt    • Thyroid disease Other    • No Known Problems Maternal Aunt    • No Known Problems Maternal Aunt    • No Known Problems Paternal Aunt    • No Known Problems Paternal Aunt    • No Known Problems Paternal Aunt    • No Known Problems Paternal Aunt    • Breast cancer Neg Hx      Past Surgical History:   Procedure Laterality Date   • APPENDECTOMY     • BREAST CYST EXCISION Right 01/01/1977   • CARDIAC SURGERY     • COLONOSCOPY  08/08/2011   • TONSILLECTOMY     • VALVE REPLACEMENT  01/04/2017    mitral valve replacement, 6/5/14       Current Outpatient Medications:   •  amLODIPine (NORVASC) 5 mg tablet, TAKE 1 TABLET BY MOUTH EVERY DAY IN THE MORNING, Disp: 90 tablet, Rfl: 3  •  aspirin 81 mg chewable tablet, Chew daily, Disp: , Rfl:   •  butalbital-acetaminophen-caffeine (FIORICET,ESGIC) -40 mg per tablet, TAKE 1 TABLET BY MOUTH EVERY 4 (FOUR) HOURS AS NEEDED FOR HEADACHES, Disp: 30 tablet, Rfl: 1  •  Calcium Carbonate 1500 (600 Ca) MG TABS, Take by mouth daily , Disp: , Rfl:   •  Diclofenac Sodium (VOLTAREN) 1 %, Apply 2 g topically 3 (three) times a day (Patient taking differently: Apply 2 g topically 3 (three) times a day PT takes as needed), Disp: 350 g, Rfl: 1  •  dicyclomine (BENTYL) 10 mg capsule, TAKE 1 CAPSULE BY MOUTH 3 TIMES A DAY BEFORE MEALS, Disp: 60 capsule, Rfl: 3  •  estrogens, conjugated (Premarin) vaginal cream, Insert 1 g into the vagina 3 (three) times a week, Disp: 30 g, Rfl: 3  •  fluticasone (FLONASE) 50 mcg/act nasal spray, SPRAY 1 SPRAY INTO EACH NOSTRIL TWICE A DAY, Disp: , Rfl:   •  gabapentin (NEURONTIN) 100 mg capsule, TAKE 1 CAPSULE BY MOUTH TWICE A DAY, Disp: 180 capsule, Rfl: 3  •  gabapentin (NEURONTIN) 300 mg capsule, TAKE A 300MG + 100MG AT BEDTIME, Disp: 90 capsule, Rfl: 3  •  loratadine (CLARITIN) 10 mg tablet, Take 10 mg by mouth as needed  , Disp: , Rfl:   •  losartan (COZAAR) 100 MG tablet, TAKE 1 TABLET BY MOUTH EVERY DAY, Disp: 90 tablet, Rfl: 3  •  Misc Natural Products (OSTEO BI-FLEX TRIPLE STRENGTH) TABS, Take by mouth daily , Disp: , Rfl:   •  propranolol (INDERAL) 20 mg tablet, TAKE 1 TABLET BY MOUTH EVERY 12 HOURS , Disp: 180 tablet, Rfl: 2  •  simvastatin (ZOCOR) 10 mg tablet, TAKE 1 TABLET BY MOUTH EVERY DAY, Disp: 90 tablet, Rfl: 3  •  solifenacin (VESICARE) 10 MG tablet, Take 1 tablet (10 mg total) by mouth daily, Disp: 90 tablet, Rfl: 3  •  warfarin (COUMADIN) 1 mg tablet, TAKE 1 TABLET BY MOUTH EVERY DAY, Disp: 90 tablet, Rfl: 3  •  warfarin (COUMADIN) 2 mg tablet, TAKE 1 TABLET BY MOUTH EVERY DAY, Disp: 90 tablet, Rfl: 1  •  warfarin (COUMADIN) 3 mg tablet, TAKE 1 TABLET BY MOUTH EVERY DAY, Disp: 90 tablet, Rfl: 1    Current Facility-Administered Medications:   •  cyanocobalamin injection 1,000 mcg, 1,000 mcg, Intramuscular, Q30 Days, HANNY Marcelino, 1,000 mcg at 11/03/22 1216  •  cyanocobalamin injection 1,000 mcg, 1,000 mcg, Intramuscular, Q30 Days, HANNY Marcelino, 1,000 mcg at 10/19/21 1046  Allergies   Allergen Reactions   • Enablex [Darifenacin] Other (See Comments)     Sweating, HA, urinary hesitancy, flushing of face   • Fentanyl Nausea Only and Vomiting   • Hyoscyamine Palpitations   • Dicyclomine Other (See Comments)     Jittery, disorientation, light HAs   • Hydromorphone Other (See Comments)     Per pt does not remember the type or severity level   • Midazolam Other (See Comments)     Per pt does not remember the type or severity level   • Sulfamethoxazole-Trimethoprim Nausea Only   • Venlafaxine Other (See Comments)     Urinary urgency, polyuria   • Codeine Polt-Chlorphen Polt Er Headache   • Ultracet [Tramadol-Acetaminophen] Nausea Only and Vomiting       Labs:  Office Visit on 04/20/2023   Component Date Value   • POST-VOID RESIDUAL VOLUM* 04/20/2023 20    Lab on 04/10/2023   Component Date Value   • Protime 04/10/2023 23 1 (H)    • INR 04/10/2023 2 02 (H)    Appointment on 03/13/2023   Component Date Value   • Protime 03/13/2023 27 9 (H)    • INR 03/13/2023 2 58 (H)      Imaging: No results found      Review of Systems:  Review of Systems   REVIEW OF SYSTEMS:  Constitutional:  Denies fever or chills   Eyes:  Denies change in visual acuity   HENT:  Denies nasal congestion or sore throat   Respiratory:   shortness of breath   Cardiovascular:  Denies chest pain or edema   GI:  Denies abdominal pain, nausea, vomiting, bloody stools or diarrhea   :  Denies dysuria, frequency, difficulty in micturition and nocturia  Musculoskeletal:  Denies back pain or joint pain   Neurologic:  Denies headache, focal weakness or sensory changes   Endocrine:  Denies polyuria or polydipsia   Lymphatic:  Denies swollen glands "  Psychiatric:  Denies depression or anxiety      Physical Exam:    /80 (BP Location: Left arm, Patient Position: Sitting, Cuff Size: Standard)   Pulse 72   Resp 16   Ht 5' 9\" (1 753 m)   Wt 79 4 kg (175 lb)   LMP  (LMP Unknown)   SpO2 98%   BMI 25 84 kg/m²     Physical Exam PHYSICAL EXAM:  General:  Patient is not in acute distress   Head: Normocephalic, Atraumatic  HEENT:  Both pupils normal-size atraumatic, normocephalic, nonicteric  Neck:  JVP not raised  Trachea central  No carotid bruit  Respiratory:  normal breath sounds no crackles  no rhonchi  Cardiovascular: Irregularly irregular with heart sounds consistent with prosthetic mechanical valve  GI:  Abdomen soft nontender  No organomegaly  Lymphatic:  No cervical or inguinal lymphadenopathy  Neurologic:  Patient is awake alert, oriented   Grossly nonfocal     Extremities no edema    Discussion/Summary:    Patient with multiple medical problems who seems to be doing reasonably well from cardiac standpoint  Previous studies reviewed with patient  Medications reviewed and possible side effects discussed  concepts of cardiovascular disease , signs and symptoms of heart disease  Dietary and risk factor modification reinforced  All questions answered  Safety measures reviewed  Patient advised to report any problems prompting medical attention  Patient understands risks and benefits of anticoagulation to prevent thrombotic risk from prosthetic mechanical valve  Patient to report any bleeding issues  Patient does have a tendency for bradycardia and pauses and hence the dosage of propranolol was further decreased  Follow-up echocardiogram to reassess prosthetic mechanical mitral valve in summer of this year  Continue antibiotic prophylaxis  Symptoms to watch out from cardiac standpoint which would indicate the need for further cardiac evaluation also discussed  Patient had a few questions which were answered    Follow-up in 6 months or " earlier as needed  Patient is agreeable with the plan of care

## 2023-04-27 ENCOUNTER — ANTICOAG VISIT (OUTPATIENT)
Dept: CARDIOLOGY CLINIC | Facility: CLINIC | Age: 78
End: 2023-04-27

## 2023-04-27 ENCOUNTER — APPOINTMENT (OUTPATIENT)
Dept: LAB | Facility: HOSPITAL | Age: 78
End: 2023-04-27
Attending: INTERNAL MEDICINE

## 2023-04-27 DIAGNOSIS — I48.0 PAROXYSMAL ATRIAL FIBRILLATION (HCC): ICD-10-CM

## 2023-04-27 DIAGNOSIS — Z95.2 HISTORY OF HEART VALVE REPLACEMENT: Primary | ICD-10-CM

## 2023-04-27 LAB
INR PPP: 2.74 (ref 0.84–1.19)
PROTHROMBIN TIME: 28.4 SECONDS (ref 11.6–14.5)

## 2023-04-27 NOTE — PROGRESS NOTES
Spoke to pt  Advised of current INR results   Advised pt to remain on same dose and retest in 2 5 weeks

## 2023-05-10 ENCOUNTER — ANTICOAG VISIT (OUTPATIENT)
Dept: CARDIOLOGY CLINIC | Facility: CLINIC | Age: 78
End: 2023-05-10

## 2023-05-10 ENCOUNTER — LAB (OUTPATIENT)
Dept: LAB | Facility: HOSPITAL | Age: 78
End: 2023-05-10
Attending: INTERNAL MEDICINE

## 2023-05-10 DIAGNOSIS — Z95.2 HISTORY OF HEART VALVE REPLACEMENT: Primary | ICD-10-CM

## 2023-05-10 DIAGNOSIS — E78.2 MIXED HYPERLIPIDEMIA: ICD-10-CM

## 2023-05-10 DIAGNOSIS — R73.01 IFG (IMPAIRED FASTING GLUCOSE): ICD-10-CM

## 2023-05-10 DIAGNOSIS — I10 BENIGN HYPERTENSION: ICD-10-CM

## 2023-05-10 DIAGNOSIS — I48.0 PAROXYSMAL ATRIAL FIBRILLATION (HCC): ICD-10-CM

## 2023-05-10 DIAGNOSIS — E03.8 SUBCLINICAL HYPOTHYROIDISM: ICD-10-CM

## 2023-05-10 LAB
ALBUMIN SERPL BCP-MCNC: 4.2 G/DL (ref 3.5–5)
ALP SERPL-CCNC: 58 U/L (ref 34–104)
ALT SERPL W P-5'-P-CCNC: 17 U/L (ref 7–52)
ANION GAP SERPL CALCULATED.3IONS-SCNC: 4 MMOL/L (ref 4–13)
AST SERPL W P-5'-P-CCNC: 24 U/L (ref 13–39)
BASOPHILS # BLD AUTO: 0.04 THOUSANDS/ÂΜL (ref 0–0.1)
BASOPHILS NFR BLD AUTO: 1 % (ref 0–1)
BILIRUB SERPL-MCNC: 0.77 MG/DL (ref 0.2–1)
BUN SERPL-MCNC: 18 MG/DL (ref 5–25)
CALCIUM SERPL-MCNC: 9.4 MG/DL (ref 8.4–10.2)
CHLORIDE SERPL-SCNC: 106 MMOL/L (ref 96–108)
CHOLEST SERPL-MCNC: 155 MG/DL
CO2 SERPL-SCNC: 31 MMOL/L (ref 21–32)
CREAT SERPL-MCNC: 0.9 MG/DL (ref 0.6–1.3)
EOSINOPHIL # BLD AUTO: 0.35 THOUSAND/ÂΜL (ref 0–0.61)
EOSINOPHIL NFR BLD AUTO: 6 % (ref 0–6)
ERYTHROCYTE [DISTWIDTH] IN BLOOD BY AUTOMATED COUNT: 13.1 % (ref 11.6–15.1)
EST. AVERAGE GLUCOSE BLD GHB EST-MCNC: 103 MG/DL
GFR SERPL CREATININE-BSD FRML MDRD: 62 ML/MIN/1.73SQ M
GLUCOSE P FAST SERPL-MCNC: 92 MG/DL (ref 65–99)
HBA1C MFR BLD: 5.2 %
HCT VFR BLD AUTO: 37.9 % (ref 34.8–46.1)
HDLC SERPL-MCNC: 58 MG/DL
HGB BLD-MCNC: 12.4 G/DL (ref 11.5–15.4)
IMM GRANULOCYTES # BLD AUTO: 0.01 THOUSAND/UL (ref 0–0.2)
IMM GRANULOCYTES NFR BLD AUTO: 0 % (ref 0–2)
INR PPP: 2.71 (ref 0.84–1.19)
LDLC SERPL CALC-MCNC: 81 MG/DL (ref 0–100)
LYMPHOCYTES # BLD AUTO: 0.86 THOUSANDS/ÂΜL (ref 0.6–4.47)
LYMPHOCYTES NFR BLD AUTO: 15 % (ref 14–44)
MCH RBC QN AUTO: 33 PG (ref 26.8–34.3)
MCHC RBC AUTO-ENTMCNC: 32.7 G/DL (ref 31.4–37.4)
MCV RBC AUTO: 101 FL (ref 82–98)
MONOCYTES # BLD AUTO: 0.6 THOUSAND/ÂΜL (ref 0.17–1.22)
MONOCYTES NFR BLD AUTO: 11 % (ref 4–12)
NEUTROPHILS # BLD AUTO: 3.84 THOUSANDS/ÂΜL (ref 1.85–7.62)
NEUTS SEG NFR BLD AUTO: 67 % (ref 43–75)
NONHDLC SERPL-MCNC: 97 MG/DL
NRBC BLD AUTO-RTO: 0 /100 WBCS
PLATELET # BLD AUTO: 195 THOUSANDS/UL (ref 149–390)
PMV BLD AUTO: 9.5 FL (ref 8.9–12.7)
POTASSIUM SERPL-SCNC: 4.6 MMOL/L (ref 3.5–5.3)
PROT SERPL-MCNC: 7.3 G/DL (ref 6.4–8.4)
PROTHROMBIN TIME: 28.2 SECONDS (ref 11.6–14.5)
RBC # BLD AUTO: 3.76 MILLION/UL (ref 3.81–5.12)
SODIUM SERPL-SCNC: 141 MMOL/L (ref 135–147)
TRIGL SERPL-MCNC: 80 MG/DL
TSH SERPL DL<=0.05 MIU/L-ACNC: 2.76 UIU/ML (ref 0.45–4.5)
WBC # BLD AUTO: 5.7 THOUSAND/UL (ref 4.31–10.16)

## 2023-05-15 DIAGNOSIS — R51.9 NONINTRACTABLE HEADACHE, UNSPECIFIED CHRONICITY PATTERN, UNSPECIFIED HEADACHE TYPE: ICD-10-CM

## 2023-05-15 RX ORDER — BUTALBITAL, ACETAMINOPHEN AND CAFFEINE 50; 325; 40 MG/1; MG/1; MG/1
TABLET ORAL
Qty: 30 TABLET | Refills: 0 | Status: SHIPPED | OUTPATIENT
Start: 2023-05-15 | End: 2023-05-17 | Stop reason: SDUPTHER

## 2023-05-16 ENCOUNTER — RA CDI HCC (OUTPATIENT)
Dept: OTHER | Facility: HOSPITAL | Age: 78
End: 2023-05-16

## 2023-05-17 ENCOUNTER — OFFICE VISIT (OUTPATIENT)
Age: 78
End: 2023-05-17
Payer: COMMERCIAL

## 2023-05-17 VITALS
WEIGHT: 179.4 LBS | BODY MASS INDEX: 26.57 KG/M2 | TEMPERATURE: 98.1 F | DIASTOLIC BLOOD PRESSURE: 66 MMHG | OXYGEN SATURATION: 97 % | HEART RATE: 72 BPM | HEIGHT: 69 IN | SYSTOLIC BLOOD PRESSURE: 126 MMHG

## 2023-05-17 DIAGNOSIS — K58.1 IRRITABLE BOWEL SYNDROME WITH CONSTIPATION: Primary | ICD-10-CM

## 2023-05-17 DIAGNOSIS — I48.91 ATRIAL FIBRILLATION, UNSPECIFIED TYPE (HCC): ICD-10-CM

## 2023-05-17 DIAGNOSIS — R51.9 NONINTRACTABLE HEADACHE, UNSPECIFIED CHRONICITY PATTERN, UNSPECIFIED HEADACHE TYPE: ICD-10-CM

## 2023-05-17 DIAGNOSIS — I65.29 STENOSIS OF CAROTID ARTERY, UNSPECIFIED LATERALITY: ICD-10-CM

## 2023-05-17 DIAGNOSIS — Z78.0 MENOPAUSE: ICD-10-CM

## 2023-05-17 DIAGNOSIS — E03.8 SUBCLINICAL HYPOTHYROIDISM: ICD-10-CM

## 2023-05-17 DIAGNOSIS — I10 BENIGN HYPERTENSION: ICD-10-CM

## 2023-05-17 DIAGNOSIS — E53.8 B12 DEFICIENCY: ICD-10-CM

## 2023-05-17 DIAGNOSIS — G61.9 INFLAMMATORY POLYNEUROPATHY, UNSPECIFIED (HCC): ICD-10-CM

## 2023-05-17 DIAGNOSIS — I42.9 CARDIOMYOPATHY, UNSPECIFIED TYPE (HCC): ICD-10-CM

## 2023-05-17 DIAGNOSIS — I05.9 MITRAL VALVE DISORDER: ICD-10-CM

## 2023-05-17 PROCEDURE — 99214 OFFICE O/P EST MOD 30 MIN: CPT | Performed by: INTERNAL MEDICINE

## 2023-05-17 PROCEDURE — 96372 THER/PROPH/DIAG INJ SC/IM: CPT | Performed by: INTERNAL MEDICINE

## 2023-05-17 RX ORDER — CYANOCOBALAMIN 1000 UG/ML
1000 INJECTION, SOLUTION INTRAMUSCULAR; SUBCUTANEOUS
Status: SHIPPED | OUTPATIENT
Start: 2023-05-17

## 2023-05-17 RX ORDER — BUTALBITAL, ACETAMINOPHEN AND CAFFEINE 50; 325; 40 MG/1; MG/1; MG/1
1 TABLET ORAL EVERY 4 HOURS PRN
Qty: 30 TABLET | Refills: 0 | Status: SHIPPED | OUTPATIENT
Start: 2023-05-17

## 2023-05-17 RX ADMIN — CYANOCOBALAMIN 1000 MCG: 1000 INJECTION, SOLUTION INTRAMUSCULAR; SUBCUTANEOUS at 13:03

## 2023-05-17 RX ADMIN — CYANOCOBALAMIN 1000 MCG: 1000 INJECTION, SOLUTION INTRAMUSCULAR; SUBCUTANEOUS at 13:05

## 2023-05-17 NOTE — PROGRESS NOTES
Assessment/Plan:    Diagnoses and all orders for this visit:    Irritable bowel syndrome with constipation    Subclinical hypothyroidism  -     TSH, 3rd generation with Free T4 reflex; Future    Mitral valve disorder    Stenosis of carotid artery, unspecified laterality    Cardiomyopathy, unspecified type ([facility] [address] )    Benign hypertension    Atrial fibrillation, unspecified type (HCC)  -     CBC and differential; Future  -     Comprehensive metabolic panel; Future  -     Lipid panel; Future  -     Hemoglobin A1C; Future    Inflammatory polyneuropathy, unspecified (HCC)    Nonintractable headache, unspecified chronicity pattern, unspecified headache type  -     butalbital-acetaminophen-caffeine (FIORICET,ESGIC) -40 mg per tablet; Take 1 tablet by mouth every 4 (four) hours as needed for headaches    Menopause  -     DXA bone density spine hip and pelvis; Future    B12 deficiency  -     cyanocobalamin injection 1,000 mcg  -     Vitamin B12; Future              Patient Instructions   Right lab data reviewed in detail and compared to prior    Hypertension hyperlipidemia well-controlled on present regimen    Overactive bladder stable with Vesicare and Estrace following with urology    IBS stable with as needed Bentyl    Cardiomyopathy with chronic A-fib status post mechanical mitral valve replacement following with cardiology, echo scheduled for next month    Migraines-only interaction I can see with butalbital is with Coumadin and INR has been stable therefore I see no reason she cannot have this refill and will discuss with pharmacy if needed      Fatigue and elevated MCV with history of B12 deficiency-B12 injection today, follow-up B12 levels at follow-up    [address] otherwise up-to-date    Routine follow-up after labs in 6 months, sooner as needed        Subjective:      Patient ID: Vladimir Urbina is a 78 y o  female    F/u MMP and review labs  Feeling ok, fatigued  Afib s/p MVR f/bcardiology, no bleeding on coumadin since epistaxis in March, no palps  Cardiomyopathy-no pnd/orthopnea  HTN/HPL-taking rx as directed, no home bp's  OAB fb urology on estrace and vessicare, 1 x nocturia  IBS-stable using bentyl sporadically a few x per week  MHA-rarely needs butalbitol, but pharmacy wouldn't fill d/t interaction  Last 30 from 7/22  Exercising most days w/ walk in park            Current Outpatient Medications:   •  amLODIPine (NORVASC) 5 mg tablet, TAKE 1 TABLET BY MOUTH EVERY DAY IN THE MORNING, Disp: 90 tablet, Rfl: 3  •  aspirin 81 mg chewable tablet, Chew daily, Disp: , Rfl:   •  butalbital-acetaminophen-caffeine (FIORICET,ESGIC) -40 mg per tablet, Take 1 tablet by mouth every 4 (four) hours as needed for headaches, Disp: 30 tablet, Rfl: 0  •  Calcium Carbonate 1500 (600 Ca) MG TABS, Take by mouth daily , Disp: , Rfl:   •  Diclofenac Sodium (VOLTAREN) 1 %, Apply 2 g topically 3 (three) times a day (Patient taking differently: Apply 2 g topically 3 (three) times a day PT takes as needed), Disp: 350 g, Rfl: 1  •  dicyclomine (BENTYL) 10 mg capsule, TAKE 1 CAPSULE BY MOUTH 3 TIMES A DAY BEFORE MEALS, Disp: 60 capsule, Rfl: 3  •  estrogens, conjugated (Premarin) vaginal cream, Insert 1 g into the vagina 3 (three) times a week, Disp: 30 g, Rfl: 3  •  fluticasone (FLONASE) 50 mcg/act nasal spray, SPRAY 1 SPRAY INTO EACH NOSTRIL TWICE A DAY, Disp: , Rfl:   •  gabapentin (NEURONTIN) 100 mg capsule, TAKE 1 CAPSULE BY MOUTH TWICE A DAY, Disp: 180 capsule, Rfl: 3  •  gabapentin (NEURONTIN) 300 mg capsule, TAKE A 300MG + 100MG AT BEDTIME, Disp: 90 capsule, Rfl: 3  •  loratadine (CLARITIN) 10 mg tablet, Take 10 mg by mouth as needed  , Disp: , Rfl:   •  losartan (COZAAR) 100 MG tablet, TAKE 1 TABLET BY MOUTH EVERY DAY, Disp: 90 tablet, Rfl: 3  •  Misc Natural Products (OSTEO BI-FLEX TRIPLE STRENGTH) TABS, Take by mouth daily , Disp: , Rfl:   •  propranolol (INDERAL) 20 mg tablet, TAKE 1 TABLET BY MOUTH EVERY 12 HOURS , Disp: 180 tablet, Rfl: 2  •  simvastatin (ZOCOR) 10 mg tablet, TAKE 1 TABLET BY MOUTH EVERY DAY, Disp: 90 tablet, Rfl: 3  •  solifenacin (VESICARE) 10 MG tablet, Take 1 tablet (10 mg total) by mouth daily, Disp: 90 tablet, Rfl: 3  •  warfarin (COUMADIN) 1 mg tablet, TAKE 1 TABLET BY MOUTH EVERY DAY, Disp: 90 tablet, Rfl: 3  •  warfarin (COUMADIN) 2 mg tablet, TAKE 1 TABLET BY MOUTH EVERY DAY, Disp: 90 tablet, Rfl: 1  •  warfarin (COUMADIN) 3 mg tablet, TAKE 1 TABLET BY MOUTH EVERY DAY, Disp: 90 tablet, Rfl: 1    Current Facility-Administered Medications:   •  cyanocobalamin injection 1,000 mcg, 1,000 mcg, Intramuscular, Q30 Days, Naomia Clock, CRNP, 1,000 mcg at 05/17/23 1305  •  cyanocobalamin injection 1,000 mcg, 1,000 mcg, Intramuscular, Q30 Days, Naomia Clock, CRNP, 1,000 mcg at 10/19/21 1046  •  cyanocobalamin injection 1,000 mcg, 1,000 mcg, Intramuscular, Q30 Days, Luis Bhakta MD    Recent Results (from the past 1008 hour(s))   Protime-INR    Collection Time: 04/27/23  9:43 AM   Result Value Ref Range    Protime 28 4 (H) 11 6 - 14 5 seconds    INR 2 74 (H) 0 84 - 1 19   Protime-INR    Collection Time: 05/10/23  8:56 AM   Result Value Ref Range    Protime 28 2 (H) 11 6 - 14 5 seconds    INR 2 71 (H) 0 84 - 1 19   CBC and differential    Collection Time: 05/10/23  8:56 AM   Result Value Ref Range    WBC 5 70 4 31 - 10 16 Thousand/uL    RBC 3 76 (L) 3 81 - 5 12 Million/uL    Hemoglobin 12 4 11 5 - 15 4 g/dL    Hematocrit 37 9 34 8 - 46 1 %     (H) 82 - 98 fL    MCH 33 0 26 8 - 34 3 pg    MCHC 32 7 31 4 - 37 4 g/dL    RDW 13 1 11 6 - 15 1 %    MPV 9 5 8 9 - 12 7 fL    Platelets 775 256 - 773 Thousands/uL    nRBC 0 /100 WBCs    Neutrophils Relative 67 43 - 75 %    Immat GRANS % 0 0 - 2 %    Lymphocytes Relative 15 14 - 44 %    Monocytes Relative 11 4 - 12 %    Eosinophils Relative 6 0 - 6 %    Basophils Relative 1 0 - 1 %    Neutrophils Absolute 3 84 1 85 - 7 62 Thousands/µL    Immature Grans Absolute 0 01 0 00 - 0 20 Thousand/uL    Lymphocytes Absolute 0 86 0 60 - 4 47 Thousands/µL    Monocytes Absolute 0 60 0 17 - 1 22 Thousand/µL    Eosinophils Absolute 0 35 0 00 - 0 61 Thousand/µL    Basophils Absolute 0 04 0 00 - 0 10 Thousands/µL   Comprehensive metabolic panel    Collection Time: 05/10/23  8:56 AM   Result Value Ref Range    Sodium 141 135 - 147 mmol/L    Potassium 4 6 3 5 - 5 3 mmol/L    Chloride 106 96 - 108 mmol/L    CO2 31 21 - 32 mmol/L    ANION GAP 4 4 - 13 mmol/L    BUN 18 5 - 25 mg/dL    Creatinine 0 90 0 60 - 1 30 mg/dL    Glucose, Fasting 92 65 - 99 mg/dL    Calcium 9 4 8 4 - 10 2 mg/dL    AST 24 13 - 39 U/L    ALT 17 7 - 52 U/L    Alkaline Phosphatase 58 34 - 104 U/L    Total Protein 7 3 6 4 - 8 4 g/dL    Albumin 4 2 3 5 - 5 0 g/dL    Total Bilirubin 0 77 0 20 - 1 00 mg/dL    eGFR 62 ml/min/1 73sq m   Lipid panel    Collection Time: 05/10/23  8:56 AM   Result Value Ref Range    Cholesterol 155 See Comment mg/dL    Triglycerides 80 See Comment mg/dL    HDL, Direct 58 >=50 mg/dL    LDL Calculated 81 0 - 100 mg/dL    Non-HDL-Chol (CHOL-HDL) 97 mg/dl   Hemoglobin A1C    Collection Time: 05/10/23  8:56 AM   Result Value Ref Range    Hemoglobin A1C 5 2 Normal 3 8-5 6%; PreDiabetic 5 7-6 4%; Diabetic >=6 5%; Glycemic control for adults with diabetes <7 0% %     mg/dl   TSH, 3rd generation with Free T4 reflex    Collection Time: 05/10/23  8:56 AM   Result Value Ref Range    TSH 3RD GENERATON 2 760 0 450 - 4 500 uIU/mL       The following portions of the patient's history were reviewed and updated as appropriate: allergies, current medications, past family history, past medical history, past social history, past surgical history and problem list      Review of Systems   Constitutional: Negative for appetite change, chills, diaphoresis, fatigue, fever and unexpected weight change  HENT: Negative for congestion, hearing loss and rhinorrhea  Eyes: Negative for visual disturbance  Respiratory: Negative for cough, chest tightness, shortness of breath and wheezing  Cardiovascular: Negative for chest pain, palpitations and leg swelling  Gastrointestinal: Negative for abdominal pain and blood in stool  Endocrine: Negative for cold intolerance, heat intolerance, polydipsia and polyuria  Genitourinary: Negative for difficulty urinating, dysuria, frequency and urgency  Musculoskeletal: Negative for arthralgias and myalgias  Skin: Negative for rash  Neurological: Negative for dizziness, weakness, light-headedness and headaches  Hematological: Does not bruise/bleed easily  Psychiatric/Behavioral: Negative for dysphoric mood and sleep disturbance  Objective:      Vitals:    05/17/23 1222   BP: 126/66   Pulse: 72   Temp: 98 1 °F (36 7 °C)   SpO2: 97%          Physical Exam  Constitutional:       Appearance: She is well-developed  HENT:      Head: Normocephalic and atraumatic  Nose: Nose normal    Eyes:      General: No scleral icterus  Conjunctiva/sclera: Conjunctivae normal       Pupils: Pupils are equal, round, and reactive to light  Neck:      Thyroid: No thyromegaly  Vascular: No JVD  Trachea: No tracheal deviation  Cardiovascular:      Rate and Rhythm: Normal rate  Rhythm irregular  Heart sounds: No murmur heard  No friction rub  No gallop  Comments: mech MV crisp  Pulmonary:      Effort: Pulmonary effort is normal  No respiratory distress  Breath sounds: Normal breath sounds  No wheezing or rales  Abdominal:      General: Bowel sounds are normal  There is no distension  Palpations: Abdomen is soft  There is no mass  Tenderness: There is no abdominal tenderness  There is no guarding or rebound  Musculoskeletal:         General: No tenderness  Cervical back: Normal range of motion and neck supple  Lymphadenopathy:      Cervical: No cervical adenopathy  Skin:     General: Skin is warm and dry  Findings: No erythema or rash  Neurological:      Mental Status: She is alert and oriented to person, place, and time  Cranial Nerves: No cranial nerve deficit  Psychiatric:         Behavior: Behavior normal          Thought Content:  Thought content normal          Judgment: Judgment normal

## 2023-05-17 NOTE — PROGRESS NOTES
[facility]  coding opportunities       Chart reviewed, no opportunity found: CHART REVIEWED, Westley Hernandez [id number]     Patients Insurance     Medicare Insurance: [employer]

## 2023-05-17 NOTE — PATIENT INSTRUCTIONS
Right lab data reviewed in detail and compared to prior    Hypertension hyperlipidemia well-controlled on present regimen    Overactive bladder stable with Vesicare and Estrace following with urology    IBS stable with as needed Bentyl    Cardiomyopathy with chronic A-fib status post mechanical mitral valve replacement following with cardiology, echo scheduled for next month    Migraines-only interaction I can see with butalbital is with Coumadin and INR has been stable therefore I see no reason she cannot have this refill and will discuss with pharmacy if needed      Fatigue and elevated MCV with history of B12 deficiency-B12 injection today, follow-up B12 levels at follow-up    [address] otherwise up-to-date    Routine follow-up after labs in 6 months, sooner as needed

## 2023-06-09 ENCOUNTER — APPOINTMENT (OUTPATIENT)
Dept: LAB | Facility: HOSPITAL | Age: 78
End: 2023-06-09
Attending: INTERNAL MEDICINE
Payer: COMMERCIAL

## 2023-06-09 ENCOUNTER — ANTICOAG VISIT (OUTPATIENT)
Dept: CARDIOLOGY CLINIC | Facility: CLINIC | Age: 78
End: 2023-06-09

## 2023-06-09 DIAGNOSIS — Z95.2 HISTORY OF HEART VALVE REPLACEMENT: Primary | ICD-10-CM

## 2023-06-09 DIAGNOSIS — I48.0 PAROXYSMAL ATRIAL FIBRILLATION (HCC): ICD-10-CM

## 2023-06-09 LAB
INR PPP: 3.57 (ref 0.84–1.19)
PROTHROMBIN TIME: 34.9 SECONDS (ref 11.6–14.5)

## 2023-06-09 PROCEDURE — 36415 COLL VENOUS BLD VENIPUNCTURE: CPT

## 2023-06-09 PROCEDURE — 85610 PROTHROMBIN TIME: CPT

## 2023-06-09 NOTE — PROGRESS NOTES
S/w pt  Advised to stay on same dose and retest again in 2 weeks  Ask pt to retest two week instead of 1 month to make sure she is not trending up   Since she is at the highest of her range

## 2023-06-12 ENCOUNTER — HOSPITAL ENCOUNTER (OUTPATIENT)
Dept: MAMMOGRAPHY | Facility: CLINIC | Age: 78
Discharge: HOME | End: 2023-06-12
Payer: COMMERCIAL

## 2023-06-12 VITALS — HEIGHT: 69 IN | WEIGHT: 179 LBS | BODY MASS INDEX: 26.51 KG/M2

## 2023-06-12 DIAGNOSIS — Z12.31 VISIT FOR SCREENING MAMMOGRAM: ICD-10-CM

## 2023-06-12 PROCEDURE — 77063 BREAST TOMOSYNTHESIS BI: CPT

## 2023-06-12 PROCEDURE — 77067 SCR MAMMO BI INCL CAD: CPT

## 2023-06-13 DIAGNOSIS — G62.9 NEUROPATHY: ICD-10-CM

## 2023-06-13 RX ORDER — GABAPENTIN 100 MG/1
CAPSULE ORAL
Qty: 180 CAPSULE | Refills: 3 | Status: SHIPPED | OUTPATIENT
Start: 2023-06-13

## 2023-06-16 ENCOUNTER — HOSPITAL ENCOUNTER (OUTPATIENT)
Dept: NON INVASIVE DIAGNOSTICS | Facility: CLINIC | Age: 78
Discharge: HOME | End: 2023-06-16
Payer: COMMERCIAL

## 2023-06-16 ENCOUNTER — TELEPHONE (OUTPATIENT)
Dept: CARDIOLOGY CLINIC | Facility: CLINIC | Age: 78
End: 2023-06-16

## 2023-06-16 DIAGNOSIS — I48.20 CHRONIC ATRIAL FIBRILLATION (HCC): ICD-10-CM

## 2023-06-16 DIAGNOSIS — I05.9 MITRAL VALVE DISORDER: ICD-10-CM

## 2023-06-16 LAB
AORTIC ROOT: 3.2 CM
APICAL FOUR CHAMBER EJECTION FRACTION: 40 %
FRACTIONAL SHORTENING: 21 % (ref 28–44)
INTERVENTRICULAR SEPTUM IN DIASTOLE (PARASTERNAL SHORT AXIS VIEW): 1.2 CM
INTERVENTRICULAR SEPTUM: 1.2 CM (ref 0.6–1.1)
LAAS-AP2: 52.7 CM2
LAAS-AP4: 59.2 CM2
LEFT ATRIUM AREA SYSTOLE SINGLE PLANE A4C: 60.4 CM2
LEFT ATRIUM SIZE: 7.7 CM
LEFT INTERNAL DIMENSION IN SYSTOLE: 4.9 CM (ref 2.1–4)
LEFT VENTRICULAR INTERNAL DIMENSION IN DIASTOLE: 6.2 CM (ref 3.5–6)
LEFT VENTRICULAR POSTERIOR WALL IN END DIASTOLE: 1.1 CM
LEFT VENTRICULAR STROKE VOLUME: 83 ML
LVSV (TEICH): 83 ML
RIGHT ATRIUM AREA SYSTOLE A4C: 41.1 CM2
RIGHT VENTRICLE ID DIMENSION: 4.6 CM
SL CV LEFT ATRIUM LENGTH A2C: 8.7 CM
SL CV LV EF: 45
SL CV PED ECHO LEFT VENTRICLE DIASTOLIC VOLUME (MOD BIPLANE) 2D: 194 ML
SL CV PED ECHO LEFT VENTRICLE SYSTOLIC VOLUME (MOD BIPLANE) 2D: 112 ML
TR MAX PG: 46 MMHG
TR PEAK VELOCITY: 3.4 M/S
TRICUSPID ANNULAR PLANE SYSTOLIC EXCURSION: 2.1 CM
TRICUSPID VALVE PEAK REGURGITATION VELOCITY: 3.38 M/S

## 2023-06-16 PROCEDURE — 93306 TTE W/DOPPLER COMPLETE: CPT

## 2023-06-16 PROCEDURE — 93306 TTE W/DOPPLER COMPLETE: CPT | Performed by: INTERNAL MEDICINE

## 2023-06-16 NOTE — TELEPHONE ENCOUNTER
----- Message from Gustavo Zarate MD sent at 6/16/2023  3:20 PM EDT -----  Please call the patient  Echocardiogram shows ejection fraction of 45% which is mildly reduced  Mechanical mitral valve prosthesis functioning normally    Overall no major change from previous echocardiogram

## 2023-06-23 RX ADMIN — CYANOCOBALAMIN 1000 MCG: 1000 INJECTION, SOLUTION INTRAMUSCULAR; SUBCUTANEOUS at 13:05

## 2023-06-29 ENCOUNTER — LAB (OUTPATIENT)
Dept: LAB | Facility: HOSPITAL | Age: 78
End: 2023-06-29
Payer: COMMERCIAL

## 2023-06-29 ENCOUNTER — ANTICOAG VISIT (OUTPATIENT)
Dept: CARDIOLOGY CLINIC | Facility: CLINIC | Age: 78
End: 2023-06-29

## 2023-06-29 DIAGNOSIS — I48.0 PAROXYSMAL ATRIAL FIBRILLATION (HCC): ICD-10-CM

## 2023-06-29 DIAGNOSIS — Z95.2 HISTORY OF HEART VALVE REPLACEMENT: Primary | ICD-10-CM

## 2023-06-29 LAB
INR PPP: 4.01 (ref 0.84–1.19)
PROTHROMBIN TIME: 38.1 SECONDS (ref 11.6–14.5)

## 2023-06-29 PROCEDURE — 85610 PROTHROMBIN TIME: CPT

## 2023-06-29 PROCEDURE — 36415 COLL VENOUS BLD VENIPUNCTURE: CPT

## 2023-06-29 NOTE — PROGRESS NOTES
S/w pt  Advised to  hold today, then take 2mg M/W/F, 4mg the rest and retest in 1 week  She was using 1,2, and 3mg tablets   I suggested she use the 2 mg only instead the combo and hopes that helps with the fluctuating INR's

## 2023-07-06 ENCOUNTER — LAB (OUTPATIENT)
Dept: LAB | Facility: HOSPITAL | Age: 78
End: 2023-07-06
Payer: COMMERCIAL

## 2023-07-06 ENCOUNTER — ANTICOAG VISIT (OUTPATIENT)
Dept: CARDIOLOGY CLINIC | Facility: CLINIC | Age: 78
End: 2023-07-06
Payer: COMMERCIAL

## 2023-07-06 DIAGNOSIS — Z95.2 HISTORY OF HEART VALVE REPLACEMENT: Primary | ICD-10-CM

## 2023-07-06 DIAGNOSIS — I48.20 CHRONIC ATRIAL FIBRILLATION (HCC): ICD-10-CM

## 2023-07-06 DIAGNOSIS — I48.0 PAROXYSMAL ATRIAL FIBRILLATION (HCC): ICD-10-CM

## 2023-07-06 LAB
INR PPP: 2.55 (ref 0.84–1.19)
PROTHROMBIN TIME: 26.8 SECONDS (ref 11.6–14.5)

## 2023-07-06 PROCEDURE — 36415 COLL VENOUS BLD VENIPUNCTURE: CPT

## 2023-07-06 PROCEDURE — 93793 ANTICOAG MGMT PT WARFARIN: CPT | Performed by: INTERNAL MEDICINE

## 2023-07-06 PROCEDURE — 85610 PROTHROMBIN TIME: CPT

## 2023-07-07 DIAGNOSIS — G25.0 ESSENTIAL TREMOR: ICD-10-CM

## 2023-07-07 DIAGNOSIS — I48.20 CHRONIC ATRIAL FIBRILLATION (HCC): ICD-10-CM

## 2023-07-07 RX ORDER — PROPRANOLOL HYDROCHLORIDE 20 MG/1
20 TABLET ORAL EVERY 12 HOURS
Qty: 180 TABLET | Refills: 3 | Status: SHIPPED | OUTPATIENT
Start: 2023-07-07

## 2023-07-20 ENCOUNTER — TELEPHONE (OUTPATIENT)
Dept: CARDIOLOGY CLINIC | Facility: CLINIC | Age: 78
End: 2023-07-20

## 2023-07-20 ENCOUNTER — ANTICOAG VISIT (OUTPATIENT)
Dept: CARDIOLOGY CLINIC | Facility: CLINIC | Age: 78
End: 2023-07-20

## 2023-07-20 ENCOUNTER — APPOINTMENT (OUTPATIENT)
Dept: LAB | Facility: HOSPITAL | Age: 78
End: 2023-07-20
Payer: COMMERCIAL

## 2023-07-20 DIAGNOSIS — I48.0 PAROXYSMAL ATRIAL FIBRILLATION (HCC): ICD-10-CM

## 2023-07-20 DIAGNOSIS — Z95.2 HISTORY OF HEART VALVE REPLACEMENT: Primary | ICD-10-CM

## 2023-07-20 LAB
INR PPP: 2.07 (ref 0.84–1.19)
PROTHROMBIN TIME: 22.8 SECONDS (ref 11.6–14.5)

## 2023-07-20 PROCEDURE — 36415 COLL VENOUS BLD VENIPUNCTURE: CPT

## 2023-07-20 PROCEDURE — 85610 PROTHROMBIN TIME: CPT

## 2023-07-20 NOTE — PROGRESS NOTES
Pt to take 8mg tonight then resume reg dose. Retest one week. Tried calling pt phone keeps hanging up. Will keep calling .

## 2023-07-20 NOTE — TELEPHONE ENCOUNTER
----- Message from Manny Shannon sent at 7/20/2023  3:35 PM EDT -----  Regarding: inr results  Contact: [phone]  Hector Sweeney, I called your office this morning and told them I was having problems with my phone. The lady said she would attach a note on my chart for any message to be placed in my chart. Osman.   Manny Shannon

## 2023-07-28 ENCOUNTER — LAB (OUTPATIENT)
Dept: LAB | Facility: HOSPITAL | Age: 78
End: 2023-07-28
Payer: COMMERCIAL

## 2023-07-28 ENCOUNTER — ANTICOAG VISIT (OUTPATIENT)
Dept: CARDIOLOGY CLINIC | Facility: CLINIC | Age: 78
End: 2023-07-28

## 2023-07-28 DIAGNOSIS — I48.0 PAROXYSMAL ATRIAL FIBRILLATION (HCC): ICD-10-CM

## 2023-07-28 DIAGNOSIS — Z95.2 HISTORY OF HEART VALVE REPLACEMENT: Primary | ICD-10-CM

## 2023-07-28 LAB
INR PPP: 3.24 (ref 0.84–1.19)
PROTHROMBIN TIME: 32.4 SECONDS (ref 11.6–14.5)

## 2023-07-28 PROCEDURE — 85610 PROTHROMBIN TIME: CPT

## 2023-07-28 PROCEDURE — 36415 COLL VENOUS BLD VENIPUNCTURE: CPT

## 2023-08-03 DIAGNOSIS — Z95.2 HISTORY OF HEART VALVE REPLACEMENT: ICD-10-CM

## 2023-08-04 RX ORDER — WARFARIN SODIUM 1 MG/1
TABLET ORAL
Qty: 90 TABLET | Refills: 3 | Status: SHIPPED | OUTPATIENT
Start: 2023-08-04

## 2023-08-11 ENCOUNTER — LAB (OUTPATIENT)
Dept: LAB | Facility: HOSPITAL | Age: 78
End: 2023-08-11
Payer: COMMERCIAL

## 2023-08-11 ENCOUNTER — ANTICOAG VISIT (OUTPATIENT)
Dept: CARDIOLOGY CLINIC | Facility: CLINIC | Age: 78
End: 2023-08-11

## 2023-08-11 DIAGNOSIS — I48.0 PAROXYSMAL ATRIAL FIBRILLATION (HCC): ICD-10-CM

## 2023-08-11 DIAGNOSIS — Z95.2 HISTORY OF HEART VALVE REPLACEMENT: Primary | ICD-10-CM

## 2023-08-11 LAB
INR PPP: 2.84 (ref 0.84–1.19)
PROTHROMBIN TIME: 29.2 SECONDS (ref 11.6–14.5)

## 2023-08-11 PROCEDURE — 85610 PROTHROMBIN TIME: CPT

## 2023-08-11 PROCEDURE — 36415 COLL VENOUS BLD VENIPUNCTURE: CPT

## 2023-08-14 ENCOUNTER — HOSPITAL ENCOUNTER (EMERGENCY)
Facility: HOSPITAL | Age: 78
Discharge: HOME | End: 2023-08-14
Admitting: EMERGENCY MEDICINE
Payer: COMMERCIAL

## 2023-08-14 VITALS
DIASTOLIC BLOOD PRESSURE: 83 MMHG | SYSTOLIC BLOOD PRESSURE: 121 MMHG | OXYGEN SATURATION: 96 % | HEART RATE: 64 BPM | RESPIRATION RATE: 18 BRPM | TEMPERATURE: 98 F

## 2023-08-14 DIAGNOSIS — I87.2 VENOUS STASIS DERMATITIS OF BOTH LOWER EXTREMITIES: Primary | ICD-10-CM

## 2023-08-14 LAB
ANION GAP SERPL CALCULATED.3IONS-SCNC: 4 MMOL/L
APTT PPP: 40 SECONDS (ref 23–37)
BASOPHILS # BLD AUTO: 0.04 THOUSANDS/ÂΜL (ref 0–0.1)
BASOPHILS NFR BLD AUTO: 1 % (ref 0–1)
BUN SERPL-MCNC: 21 MG/DL (ref 5–25)
CALCIUM SERPL-MCNC: 9.4 MG/DL (ref 8.4–10.2)
CHLORIDE SERPL-SCNC: 108 MMOL/L (ref 96–108)
CO2 SERPL-SCNC: 27 MMOL/L (ref 21–32)
CREAT SERPL-MCNC: 0.84 MG/DL (ref 0.6–1.3)
EOSINOPHIL # BLD AUTO: 0.25 THOUSAND/ÂΜL (ref 0–0.61)
EOSINOPHIL NFR BLD AUTO: 4 % (ref 0–6)
ERYTHROCYTE [DISTWIDTH] IN BLOOD BY AUTOMATED COUNT: 12.8 % (ref 11.6–15.1)
GFR SERPL CREATININE-BSD FRML MDRD: 68 ML/MIN/1.73SQ M
GLUCOSE SERPL-MCNC: 93 MG/DL (ref 65–140)
HCT VFR BLD AUTO: 40 % (ref 34.8–46.1)
HGB BLD-MCNC: 13 G/DL (ref 11.5–15.4)
IMM GRANULOCYTES # BLD AUTO: 0.02 THOUSAND/UL (ref 0–0.2)
IMM GRANULOCYTES NFR BLD AUTO: 0 % (ref 0–2)
INR PPP: 2.72 (ref 0.84–1.19)
LYMPHOCYTES # BLD AUTO: 0.87 THOUSANDS/ÂΜL (ref 0.6–4.47)
LYMPHOCYTES NFR BLD AUTO: 13 % (ref 14–44)
MCH RBC QN AUTO: 32.5 PG (ref 26.8–34.3)
MCHC RBC AUTO-ENTMCNC: 32.5 G/DL (ref 31.4–37.4)
MCV RBC AUTO: 100 FL (ref 82–98)
MONOCYTES # BLD AUTO: 0.69 THOUSAND/ÂΜL (ref 0.17–1.22)
MONOCYTES NFR BLD AUTO: 10 % (ref 4–12)
NEUTROPHILS # BLD AUTO: 4.8 THOUSANDS/ÂΜL (ref 1.85–7.62)
NEUTS SEG NFR BLD AUTO: 72 % (ref 43–75)
NRBC BLD AUTO-RTO: 0 /100 WBCS
PLATELET # BLD AUTO: 195 THOUSANDS/UL (ref 149–390)
PMV BLD AUTO: 9.2 FL (ref 8.9–12.7)
POTASSIUM SERPL-SCNC: 4.2 MMOL/L (ref 3.5–5.3)
PROTHROMBIN TIME: 28.2 SECONDS (ref 11.6–14.5)
RBC # BLD AUTO: 4 MILLION/UL (ref 3.81–5.12)
SODIUM SERPL-SCNC: 139 MMOL/L (ref 135–147)
WBC # BLD AUTO: 6.67 THOUSAND/UL (ref 4.31–10.16)

## 2023-08-14 PROCEDURE — 99284 EMERGENCY DEPT VISIT MOD MDM: CPT | Performed by: PHYSICIAN ASSISTANT

## 2023-08-14 PROCEDURE — 85730 THROMBOPLASTIN TIME PARTIAL: CPT | Performed by: PHYSICIAN ASSISTANT

## 2023-08-14 PROCEDURE — 36415 COLL VENOUS BLD VENIPUNCTURE: CPT | Performed by: PHYSICIAN ASSISTANT

## 2023-08-14 PROCEDURE — 80048 BASIC METABOLIC PNL TOTAL CA: CPT | Performed by: PHYSICIAN ASSISTANT

## 2023-08-14 PROCEDURE — 85610 PROTHROMBIN TIME: CPT | Performed by: PHYSICIAN ASSISTANT

## 2023-08-14 PROCEDURE — NC001 PR NO CHARGE: Performed by: PHYSICIAN ASSISTANT

## 2023-08-14 PROCEDURE — 99284 EMERGENCY DEPT VISIT MOD MDM: CPT

## 2023-08-14 PROCEDURE — 85025 COMPLETE CBC W/AUTO DIFF WBC: CPT | Performed by: PHYSICIAN ASSISTANT

## 2023-08-14 RX ORDER — FUROSEMIDE 20 MG/1
20 TABLET ORAL DAILY
Qty: 3 TABLET | Refills: 0 | Status: SHIPPED | OUTPATIENT
Start: 2023-08-14 | End: 2023-08-18

## 2023-08-14 RX ORDER — TRIAMCINOLONE ACETONIDE 1 MG/G
CREAM TOPICAL 2 TIMES DAILY
Qty: 45 G | Refills: 0 | Status: SHIPPED | OUTPATIENT
Start: 2023-08-14

## 2023-08-14 NOTE — ED PROVIDER NOTES
History  Chief Complaint   Patient presents with   • Foot Swelling     Pt reports b/l foot swelling and redness x 1 week. 82-year-old female with past medical history significant for atrial fibrillation on chronic anticoagulation with warfarin, history of heart valve replacement, hypertension, and peripheral neuropathy presents emergency department with chief complaint of rash and swelling to bilateral ankles. Patient reports symptoms started 1 week ago. She reports rashes located to bilateral ankles. Quality is reported as slightly itchy area of redness to both ankles. Severity reported as mild. Associated symptoms: Denies epistaxis, chest pain, shortness of breath or syncope. Positive for mild lower extremity swelling. Denies black or blood per rectum, hemoptysis, hematemesis or hematochezia. Modifying factors: No known aggravating or alleviating factors. Patient reports no new environmental exposures or medications. She does take warfarin. Last INR check was 1 week ago at 2.8. States she developed a similar episode a number of years ago, was evaluated and told that it would go away on its own which she did. History provided by:  Patient   used: No        Prior to Admission Medications   Prescriptions Last Dose Informant Patient Reported? Taking?    Calcium Carbonate 1500 (600 Ca) MG TABS  Self Yes No   Sig: Take by mouth daily    Diclofenac Sodium (VOLTAREN) 1 %  Self No No   Sig: Apply 2 g topically 3 (three) times a day   Patient taking differently: Apply 2 g topically 3 (three) times a day PT takes as needed   Misc Natural Products (OSTEO BI-FLEX TRIPLE STRENGTH) TABS  Self Yes No   Sig: Take by mouth daily    amLODIPine (NORVASC) 5 mg tablet  Self No No   Sig: TAKE 1 TABLET BY MOUTH EVERY DAY IN THE MORNING   aspirin 81 mg chewable tablet  Self Yes No   Sig: Chew daily   butalbital-acetaminophen-caffeine (FIORICET,ESGIC) -40 mg per tablet   No No   Sig: Take 1 tablet by mouth every 4 (four) hours as needed for headaches   dicyclomine (BENTYL) 10 mg capsule  Self No No   Sig: TAKE 1 CAPSULE BY MOUTH 3 TIMES A DAY BEFORE MEALS   estrogens, conjugated (Premarin) vaginal cream  Self No No   Sig: Insert 1 g into the vagina 3 (three) times a week   fluticasone (FLONASE) 50 mcg/act nasal spray  Self Yes No   Sig: SPRAY 1 SPRAY INTO EACH NOSTRIL TWICE A DAY   gabapentin (NEURONTIN) 100 mg capsule   No No   Sig: TAKE 1 CAPSULE BY MOUTH TWICE A DAY   gabapentin (NEURONTIN) 300 mg capsule  Self No No   Sig: TAKE A 300MG + 100MG AT BEDTIME   loratadine (CLARITIN) 10 mg tablet  Self Yes No   Sig: Take 10 mg by mouth as needed     losartan (COZAAR) 100 MG tablet  Self No No   Sig: TAKE 1 TABLET BY MOUTH EVERY DAY   propranolol (INDERAL) 20 mg tablet   No No   Sig: Take 1 tablet (20 mg total) by mouth every 12 (twelve) hours   simvastatin (ZOCOR) 10 mg tablet  Self No No   Sig: TAKE 1 TABLET BY MOUTH EVERY DAY   solifenacin (VESICARE) 10 MG tablet  Self No No   Sig: Take 1 tablet (10 mg total) by mouth daily   warfarin (COUMADIN) 1 mg tablet   No No   Sig: TAKE 1 TABLET BY MOUTH EVERY DAY   warfarin (COUMADIN) 2 mg tablet  Self No No   Sig: TAKE 1 TABLET BY MOUTH EVERY DAY   warfarin (COUMADIN) 3 mg tablet  Self No No   Sig: TAKE 1 TABLET BY MOUTH EVERY DAY      Facility-Administered Medications Last Administration Doses Remaining   cyanocobalamin injection 1,000 mcg 5/17/2023  1:05 PM    cyanocobalamin injection 1,000 mcg 5/17/2023  1:03 PM    cyanocobalamin injection 1,000 mcg 6/23/2023  1:05 PM           Past Medical History:   Diagnosis Date   • 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   • Atrial fibrillation (HCC)    • Hashimoto's thyroiditis    • Hyperlipidemia    • Hypertension    • IBS (irritable bowel syndrome)    • Kidney stone    • Microhematuria    • Migraine    • Mitral valve disorder    • Nasal congestion    • Non-toxic uninodular goiter    • Nosebleed    • Raynaud disease Past Surgical History:   Procedure Laterality Date   • APPENDECTOMY     • BREAST CYST EXCISION Right 01/01/1977   • CARDIAC SURGERY     • COLONOSCOPY  08/08/2011   • TONSILLECTOMY     • VALVE REPLACEMENT  01/04/2017    mitral valve replacement, 6/5/14       Family History   Problem Relation Age of Onset   • Hypertension Mother    • Coronary artery disease Father    • Migraines Father    • Leukemia Brother    • Migraines Brother    • Hypertension Brother    • Coronary artery disease Paternal Grandfather    • Leukemia Maternal Aunt    • Multiple myeloma Maternal Aunt    • Thyroid disease Other    • No Known Problems Maternal Aunt    • No Known Problems Maternal Aunt    • No Known Problems Paternal Aunt    • No Known Problems Paternal Aunt    • No Known Problems Paternal Aunt    • No Known Problems Paternal Aunt    • Breast cancer Neg Hx      I have reviewed and agree with the history as documented. E-Cigarette/Vaping   • E-Cigarette Use Never User      E-Cigarette/Vaping Substances   • Nicotine No    • THC No    • CBD No    • Flavoring No    • Other No    • Unknown No      Social History     Tobacco Use   • Smoking status: Never     Passive exposure: Past   • Smokeless tobacco: Never   Vaping Use   • Vaping Use: Never used   Substance Use Topics   • Alcohol use: Yes     Comment: Occassionally--wine   • Drug use: No       Review of Systems   Constitutional: Negative for fever. Respiratory: Negative for cough, chest tightness, shortness of breath and stridor. Cardiovascular: Positive for leg swelling. Negative for chest pain. Musculoskeletal: Negative for gait problem. Skin: Positive for rash. Neurological: Negative for seizures, syncope, facial asymmetry, weakness and numbness. All other systems reviewed and are negative. Physical Exam  Physical Exam  Vitals and nursing note reviewed. Constitutional:       General: She is not in acute distress. Appearance: Normal appearance. Comments: /83 (BP Location: Left arm)   Pulse 64   Temp 98 °F (36.7 °C) (Oral)   Resp 18   LMP  (LMP Unknown)   SpO2 96%      HENT:      Head: Normocephalic and atraumatic. Right Ear: External ear normal.      Left Ear: External ear normal.      Nose: Nose normal.   Eyes:      General: No scleral icterus. Right eye: No discharge. Left eye: No discharge. Cardiovascular:      Rate and Rhythm: Normal rate. Pulses: Normal pulses. Pulmonary:      Effort: Pulmonary effort is normal.      Breath sounds: Normal breath sounds. Musculoskeletal:         General: No tenderness, deformity or signs of injury. Normal range of motion. Cervical back: Normal range of motion and neck supple. Skin:     General: Skin is dry. Coloration: Skin is not jaundiced. Findings: Erythema and rash present. Comments: There are multiple small circular macular lesions that are clustered to bilateral lower legs/ankles. erythematous, scaling, patches present, mildly edematous bilateral lower legs,   No pustules or vesicles. No purpura. No warmth    Neurological:      General: No focal deficit present. Mental Status: She is alert and oriented to person, place, and time. Mental status is at baseline. Motor: No weakness. Gait: Gait normal.   Psychiatric:         Mood and Affect: Mood normal.         Behavior: Behavior normal.         Thought Content:  Thought content normal.         Vital Signs  ED Triage Vitals [08/14/23 0913]   Temperature Pulse Respirations Blood Pressure SpO2   98 °F (36.7 °C) 64 18 121/83 96 %      Temp Source Heart Rate Source Patient Position - Orthostatic VS BP Location FiO2 (%)   Oral Monitor Sitting Left arm --      Pain Score       --           Vitals:    08/14/23 0913   BP: 121/83   Pulse: 64   Patient Position - Orthostatic VS: Sitting         Visual Acuity      ED Medications  Medications - No data to display    Diagnostic Studies  Results Reviewed     Procedure Component Value Units Date/Time    Basic metabolic panel [[id number]] Collected: 08/14/23 1045    Lab Status: Final result Specimen: Blood from Arm, Left Updated: 08/14/23 1121     Sodium 139 mmol/L      Potassium 4.2 mmol/L      Chloride 108 mmol/L      CO2 27 mmol/L      ANION GAP 4 mmol/L      BUN 21 mg/dL      Creatinine 0.84 mg/dL      Glucose 93 mg/dL      Calcium 9.4 mg/dL      eGFR 68 ml/min/1.73sq m     Narrative:      [facility] guidelines for [facility] Kid[facility] Disease (CKD):   •  Stage 1 with normal or high GFR (GFR > 90 mL/min/1.73 square meters)  •  Stage 2 Mild CKD (GFR = 60-89 mL/min/1.73 square meters)  •  Stage 3A Moderate CKD (GFR = 45-59 mL/min/1.73 square meters)  •  Stage 3B Moderate CKD (GFR = 30-44 mL/min/1.73 square meters)  •  Stage 4 Severe CKD (GFR = 15-29 mL/min/1.73 square meters)  •  Stage 5 End Stage CKD (GFR <15 mL/min/1.73 square meters)  Note: GFR calculation is accurate only with a steady state creatinine    Protime-INR [[id number]]  (Abnormal) Collected: 08/14/23 1045    Lab Status: Final result Specimen: Blood from Arm, Left Updated: 08/14/23 1119     Protime 28.2 seconds      INR 2.72    APTT [[id number]]  (Abnormal) Collected: 08/14/23 1045    Lab Status: Final result Specimen: Blood from Arm, Left Updated: 08/14/23 1119     PTT 40 seconds     CBC and differential [[id number]]  (Abnormal) Collected: 08/14/23 1045    Lab Status: Final result Specimen: Blood from Arm, Left Updated: 08/14/23 1101     WBC 6.67 Thousand/uL      RBC 4.00 Million/uL      Hemoglobin 13.0 g/dL      Hematocrit 40.0 %       fL      MCH 32.5 pg      MCHC 32.5 g/dL      RDW 12.8 %      MPV 9.2 fL      Platelets 018 Thousands/uL      nRBC 0 /100 WBCs      Neutrophils Relative 72 %      Immat GRANS % 0 %      Lymphocytes Relative 13 %      Monocytes Relative 10 %      Eosinophils Relative 4 %      Basophils Relative 1 %      Neutrophils Absolute 4.80 Thousands/µL Immature Grans Absolute 0.02 Thousand/uL      Lymphocytes Absolute 0.87 Thousands/µL      Monocytes Absolute 0.69 Thousand/µL      Eosinophils Absolute 0.25 Thousand/µL      Basophils Absolute 0.04 Thousands/µL                  No orders to display              Procedures  Procedures         ED Course                                             Medical Decision Making  ED Coarse: 78year old female with history of atrial fibrillation on warfarin presents to ED with chief complaint of rash to bilateral ankles that started 1 week ago. DDX:  ddx includes but is not limited to venous stasis dermatitis, thrombocytopenia, anemia, warfarin toxicity, scabies, atopic dermatitis, strep infection, herpes zoster/shingles, fungal infection, allergic reaction, contact dermatitis, psoriasis, eczema, lice, flea bites, impetigo, pityriasis, seborrheic dermatitis, consider but doubt porphyria        Initial ED Plan: check CBC for thrombocytopenia, INR for warfarin toxicity, and BMP for renal function. MDM:  I have reviewed the patient's vital signs, nursing notes, and other relevant ancillary testing/information. I have had a detailed discussion with the patient regarding the historical points, examination findings, and any diagnostic results    Results:  Reviewed at bedside: normal platelets. inr therapeutic. No renal failure or anemia. Symptoms inconsistent with cellulitis. Bilateral LE DVT unlikely given therapeutic INR. No thrombocytopenia. ED Final Assessment 1) bilateral lower extremity venous statis dermatitis. 2) chronic anticoagulation on coumadin- therapeutic    Stable for discharge/outpatinet treatmnet  No thrombocytopenia, anemia or warfarin toxicity  Elevate legs  Compression stockings for edema  trimacinolone steroids cream to affected areas BID x 2 weeks  Lasix 20 mg PO daily x 3 days for LE edema  F/u pcp and cardiology in 3-5 days as needed.    Return to ED precautions given    I discussed diagnosis and treatment plan with patient at bedside. Extended discussion with patient regarding the diagnosis, pathophysiology, expectant coarse and treatment plan. Instructed to follow up with pcp and recommended specialist in 3-5 days. Reviewed reasons to return to ed. Patient verbalized understanding of diagnosis and agreement with discharge plan of care as well as understanding of reasons to return to ed. Amount and/or Complexity of Data Reviewed  Labs: ordered. Risk  Prescription drug management. Disposition  Final diagnoses:   Venous stasis dermatitis of both lower extremities     Time reflects when diagnosis was documented in both MDM as applicable and the Disposition within this note     Time User Action Codes Description Comment    8/14/2023 11:28 AM Giovanna De La O Add [I87.2] Venous stasis dermatitis of both lower extremities       ED Disposition     ED Disposition   Discharge    Condition   Stable    Date/Time   Mon Aug 14, 2023 11:28 AM    Comment   Pamgenie Plaster discharge to home/self care.                Follow-up Information     Follow up With Specialties Details Why Contact Info Additional Information    Marisela Bradford MD Internal Medicine, Hospice Services, Palliative Care Call in 3 days for further evaluation of symptoms [id number] Emergency Department Emergency Medicine Go to  If symptoms worsen [address] Emergency Department, [place], [place], [address]          Discharge Medication List as of 8/14/2023 11:29 AM      START taking these medications    Details   furosemide (LASIX) 20 mg tablet Take 1 tablet (20 mg total) by mouth daily for 3 days, Starting Mon 8/14/2023, Until Thu 8/17/2023, Normal      triamcinolone (KENALOG) 0.1 % cream Apply topically 2 (two) times a day Apply to affected areas twice a day x 2 weeks. , Starting Mon 8/14/2023, Normal         CONTINUE these medications which have NOT CHANGED    Details   amLODIPine (NORVASC) 5 mg tablet TAKE 1 TABLET BY MOUTH EVERY DAY IN THE MORNING, Normal      aspirin 81 mg chewable tablet Chew daily, Starting Thu 8/3/2017, Historical Med      butalbital-acetaminophen-caffeine (FIORICET,ESGIC) -40 mg per tablet Take 1 tablet by mouth every 4 (four) hours as needed for headaches, Starting Wed 5/17/2023, Normal      Calcium Carbonate 1500 (600 Ca) MG TABS Take by mouth daily , Historical Med      Diclofenac Sodium (VOLTAREN) 1 % Apply 2 g topically 3 (three) times a day, Starting Fri 2/24/2023, Normal      dicyclomine (BENTYL) 10 mg capsule TAKE 1 CAPSULE BY MOUTH 3 TIMES A DAY BEFORE MEALS, Normal      estrogens, conjugated (Premarin) vaginal cream Insert 1 g into the vagina 3 (three) times a week, Starting Fri 4/21/2023, Normal      fluticasone (FLONASE) 50 mcg/act nasal spray SPRAY 1 SPRAY INTO EACH NOSTRIL TWICE A DAY, Historical Med      !! gabapentin (NEURONTIN) 100 mg capsule TAKE 1 CAPSULE BY MOUTH TWICE A DAY, Normal      !! gabapentin (NEURONTIN) 300 mg capsule TAKE A 300MG + 100MG AT BEDTIME, Normal      loratadine (CLARITIN) 10 mg tablet Take 10 mg by mouth as needed  , Starting Fri 5/14/2021, Historical Med      losartan (COZAAR) 100 MG tablet TAKE 1 TABLET BY MOUTH EVERY DAY, Normal      Misc Natural Products (OSTEO BI-FLEX TRIPLE STRENGTH) TABS Take by mouth daily , Historical Med      propranolol (INDERAL) 20 mg tablet Take 1 tablet (20 mg total) by mouth every 12 (twelve) hours, Starting Fri 7/7/2023, Normal      simvastatin (ZOCOR) 10 mg tablet TAKE 1 TABLET BY MOUTH EVERY DAY, Normal      solifenacin (VESICARE) 10 MG tablet Take 1 tablet (10 mg total) by mouth daily, Starting Fri 1/20/2023, Normal      !! warfarin (COUMADIN) 1 mg tablet TAKE 1 TABLET BY MOUTH EVERY DAY, Normal      !! warfarin (COUMADIN) 2 mg tablet TAKE 1 TABLET BY MOUTH EVERY DAY, Normal      !! warfarin (COUMADIN) 3 mg tablet TAKE 1 TABLET BY MOUTH EVERY DAY, Normal       !! - Potential duplicate medications found. Please discuss with provider. No discharge procedures on file.     PDMP Review       Value Time User    PDMP Reviewed  Yes 5/17/2023 12:43 PM Magdalena Moody MD          ED Provider  Electronically Signed by           Leobardo Khan PA-C  08/14/23 IVONNE Cedeno  08/14/23 2290

## 2023-08-17 RX ORDER — PANTOPRAZOLE SODIUM 40 MG/1
TABLET, DELAYED RELEASE ORAL
COMMUNITY

## 2023-08-17 RX ORDER — OXYBUTYNIN CHLORIDE 10 MG/1
TABLET, EXTENDED RELEASE ORAL
COMMUNITY

## 2023-08-18 ENCOUNTER — OFFICE VISIT (OUTPATIENT)
Age: 78
End: 2023-08-18
Payer: COMMERCIAL

## 2023-08-18 VITALS
WEIGHT: 179 LBS | BODY MASS INDEX: 26.51 KG/M2 | HEIGHT: 69 IN | SYSTOLIC BLOOD PRESSURE: 124 MMHG | HEART RATE: 65 BPM | OXYGEN SATURATION: 97 % | DIASTOLIC BLOOD PRESSURE: 75 MMHG

## 2023-08-18 DIAGNOSIS — I87.2 VENOUS STASIS DERMATITIS OF BOTH LOWER EXTREMITIES: Primary | ICD-10-CM

## 2023-08-18 PROCEDURE — 99213 OFFICE O/P EST LOW 20 MIN: CPT

## 2023-08-18 NOTE — PATIENT INSTRUCTIONS
Continue with leg compression (use ace bandages instead of compression stockings), elevation, and triamcinolone cream. Referral to vascular for additional recommendations.

## 2023-08-18 NOTE — PROGRESS NOTES
Assessment & Plan     1. Venous stasis dermatitis of both lower extremities  Comments:  Continue with leg compression, elevation, and triamcinolone cream. Referral to vascular for additional recommendations. Orders:  -     Ambulatory Referral to Vascular Surgery; Future         Subjective     Transitional Care Management Review: Taylor Coleman is a 78 y.o. female here for TCM follow up. She presented to the ER 8/14/2023 with bilateral rashes and leg swelling that had begun over a week ago. She noted that this occurred years ago to one foot, but it was treated with compression stockings. She denies eating any salty meals prior to this starting. She denies a history of smoking. She has been using compression stockings but says that they are too hard to get on. She uses the triamcinolone daily. She is finished with the Lasix that was prescribed in the ER. She denies any CP or SOB. During the TCM phone call patient stated:  TCM Call     None      TCM Call     None        HPI  Review of Systems   Constitutional: Negative for chills, fatigue and fever. Respiratory: Negative for shortness of breath and wheezing. Cardiovascular: Positive for leg swelling. Negative for chest pain and palpitations. Gastrointestinal: Negative for abdominal pain, nausea and vomiting. Musculoskeletal: Negative for arthralgias, joint swelling and myalgias. Skin: Negative for color change. Neurological: Negative for dizziness, weakness, light-headedness, numbness and headaches. Objective     /75 (BP Location: Left arm, Patient Position: Sitting, Cuff Size: Standard)   Pulse 65   Ht 5' 9" (1.753 m)   Wt 81.2 kg (179 lb)   LMP  (LMP Unknown)   SpO2 97%   BMI 26.43 kg/m²      Physical Exam  Vitals and nursing note reviewed. Constitutional:       General: She is awake. She is not in acute distress. Appearance: Normal appearance. She is well-developed and well-groomed.    HENT:      Head: Normocephalic and atraumatic. Right Ear: Hearing and external ear normal.      Left Ear: Hearing and external ear normal.      Nose: Nose normal.      Mouth/Throat:      Lips: Pink. Mouth: Mucous membranes are moist.   Eyes:      General: Lids are normal. Gaze aligned appropriately. Conjunctiva/sclera: Conjunctivae normal.   Neck:      Trachea: Trachea and phonation normal.   Cardiovascular:      Rate and Rhythm: Normal rate and regular rhythm. Pulses: Normal pulses. Heart sounds: Normal heart sounds, S1 normal and S2 normal. No murmur heard. No friction rub. No gallop. Pulmonary:      Effort: Pulmonary effort is normal. No respiratory distress. Breath sounds: Normal breath sounds and air entry. No decreased breath sounds, wheezing, rhonchi or rales. Abdominal:      General: Abdomen is flat. Palpations: Abdomen is soft. Musculoskeletal:         General: No swelling. Cervical back: Neck supple. Right lower le+ Edema present. Left lower le+ Edema present. Lymphadenopathy:      Cervical: No cervical adenopathy. Skin:     General: Skin is warm and dry. Capillary Refill: Capillary refill takes less than 2 seconds. Neurological:      Mental Status: She is alert. Psychiatric:         Attention and Perception: Attention and perception normal.         Mood and Affect: Affect normal. Mood is anxious. Speech: Speech normal.         Behavior: Behavior normal. Behavior is cooperative. Thought Content:  Thought content normal.         Cognition and Memory: Cognition and memory normal.         Judgment: Judgment normal.       Medications have been reviewed by provider in current encounter    Jordi Rae PA-C

## 2023-08-21 ENCOUNTER — TELEPHONE (OUTPATIENT)
Age: 78
End: 2023-08-21

## 2023-08-21 NOTE — TELEPHONE ENCOUNTER
Tell her that date is okay. Continue with the triamcinolone cream and ace bandages. If she begins to experience any fevers or sign of infection, to give the office a call and make an appointment.

## 2023-09-06 ENCOUNTER — LAB (OUTPATIENT)
Dept: LAB | Facility: HOSPITAL | Age: 78
End: 2023-09-06
Attending: INTERNAL MEDICINE
Payer: COMMERCIAL

## 2023-09-06 ENCOUNTER — ANTICOAG VISIT (OUTPATIENT)
Dept: CARDIOLOGY CLINIC | Facility: CLINIC | Age: 78
End: 2023-09-06

## 2023-09-06 DIAGNOSIS — Z95.2 HISTORY OF HEART VALVE REPLACEMENT: Primary | ICD-10-CM

## 2023-09-06 DIAGNOSIS — I48.0 PAROXYSMAL ATRIAL FIBRILLATION (HCC): ICD-10-CM

## 2023-09-06 LAB
INR PPP: 2.13 (ref 0.84–1.19)
PROTHROMBIN TIME: 24.7 SECONDS (ref 11.6–14.5)

## 2023-09-06 PROCEDURE — 85610 PROTHROMBIN TIME: CPT

## 2023-09-06 PROCEDURE — 36415 COLL VENOUS BLD VENIPUNCTURE: CPT

## 2023-09-06 NOTE — RESULT ENCOUNTER NOTE
S/w pt. Denies missing any pills or increasing vit k intake.  Advised to take 2mg M/F only, 4mg the rest and retest in 2 weeks

## 2023-09-07 NOTE — PROGRESS NOTES
Assessment/Plan:    Venous insufficiency of both lower extremities  81-year-old female with A-fib (warfarin), HTN, HLD, peripheral neuropathy, asymptomatic bilateral carotid artery stenosis, cardiomyopathy, h/o mitral valve, venous insufficiency and h/o L foot drop (polio as a child) presents to establish care with complaints of BLE edema    -Patient reports BLE edema x "a few weeks" with associated BLE erythema.  -Patient denies pain. Asymptomatic.    -No erythema at this time. Bilateral foot petechiae type rash. Dry itching skin resolved  -BLE edema R>L  (mild)  -Patient has started wearing Jobst 15-20 knee-high compression  -Palpable distal DP pulses bilaterally    Plan:  -Conservative medical management with daily use of medical grade compression stockings. On in a.m., off in p.m.  -Frequent ambulation   -Leg elevation at rest   -Moisturizer and diligent skin care to maintain skin integrity and prevent skin breakdown  -Return to vascular as needed         Diagnoses and all orders for this visit:    Venous insufficiency of both lower extremities  -     Compression Stocking    Venous stasis dermatitis of both lower extremities  Comments:  Continue with leg compression, elevation, and triamcinolone cream. Referral to vascular for additional recommendations. Orders:  -     Ambulatory Referral to Vascular Surgery  -     Compression Stocking          Subjective:      Patient ID: Natalie Alejo is a 78 y.o. female. New patient, referred for venous stasis dermatitis and presents today for evaluation. HPI  See assessment and plan      The following portions of the patient's history were reviewed and updated as appropriate: allergies, current medications, past family history, past medical history, past social history, past surgical history and problem list.    Review of Systems   Constitutional: Negative. HENT: Negative. Eyes: Negative. Respiratory: Negative.     Cardiovascular: Positive for leg swelling. Gastrointestinal: Negative. Endocrine: Negative. Genitourinary: Negative. Musculoskeletal: Positive for gait problem. Skin: Positive for color change. Allergic/Immunologic: Negative. Hematological: Negative. Psychiatric/Behavioral: Negative. I have reviewed and made appropriate changes to the review of systems input by the medical assistant. Objective:      /84 (BP Location: Left arm, Patient Position: Sitting)   Pulse 74   Ht 5' 9" (1.753 m)   Wt 81.6 kg (180 lb)   LMP  (LMP Unknown)   BMI 26.58 kg/m²          Physical Exam  Vitals reviewed. Constitutional:       General: She is not in acute distress. Cardiovascular:      Rate and Rhythm: Normal rate. Rhythm irregular. Pulses: Normal pulses. Dorsalis pedis pulses are 2+ on the right side and 2+ on the left side. Pulmonary:      Effort: Pulmonary effort is normal. No respiratory distress. Musculoskeletal:      Right lower leg: Edema present. Left lower leg: Edema present. Comments: RLE +1 edema, LLE trace edema  L foot drop, wears brace   Skin:     General: Skin is warm and dry. Capillary Refill: Capillary refill takes less than 2 seconds. Findings: Rash present. No erythema. Comments: B/l foot petechiae type rash   Neurological:      Mental Status: She is alert and oriented to person, place, and time. Sensory: No sensory deficit. Motor: No weakness. Comments: Baseline left foot drop   Psychiatric:         Behavior: Behavior normal.         I have spent a total time of 30 minutes on 09/11/23 in caring for this patient including Risks and benefits of tx options, Instructions for management, Patient and family education, Importance of tx compliance, Risk factor reductions, Impressions, Counseling / Coordination of care, Documenting in the medical record, Reviewing / ordering tests, medicine, procedures   and Obtaining or reviewing history  .       Vitals: 09/11/23 1152   BP: 124/84   BP Location: Left arm   Patient Position: Sitting   Pulse: 74   Weight: 81.6 kg (180 lb)   Height: 5' 9" (1.753 m)       Patient Active Problem List   Diagnosis   • Atrial fibrillation (HCC)   • Hyperlipidemia   • History of heart valve replacement   • Benign hypertension   • Bradycardia   • Essential tremor   • Chronic anticoagulation   • Amaurosis fugax, both eyes   • Mitral valve disorder   • Osteoporosis   • Peripheral neuropathy   • Chronic right-sided low back pain without sciatica   • Asymptomatic stenosis of right vertebral artery   • Subclinical hypothyroidism   • Irritable bowel syndrome with constipation   • Inflammatory polyneuropathy, unspecified (HCC)   • Cardiomyopathy, unspecified type ([address])   • Venous insufficiency of both lower extremities   • Venous stasis dermatitis of both lower extremities       Past Surgical History:   Procedure Laterality Date   • APPENDECTOMY     • BREAST CYST EXCISION Right 01/01/1977   • CARDIAC SURGERY     • COLONOSCOPY  08/08/2011   • TONSILLECTOMY     • VALVE REPLACEMENT  01/04/2017    mitral valve replacement, 6/5/14       Family History   Problem Relation Age of Onset   • Hypertension Mother    • Coronary artery disease Father    • Migraines Father    • Leukemia Brother    • Migraines Brother    • Hypertension Brother    • Coronary artery disease Paternal Grandfather    • Leukemia Maternal Aunt    • Multiple myeloma Maternal Aunt    • Thyroid disease Other    • No Known Problems Maternal Aunt    • No Known Problems Maternal Aunt    • No Known Problems Paternal Aunt    • No Known Problems Paternal Aunt    • No Known Problems Paternal Aunt    • No Known Problems Paternal Aunt    • Breast cancer Neg Hx        Social History     Socioeconomic History   • Marital status: Single     Spouse name: Not on file   • Number of children: Not on file   • Years of education: Not on file   • Highest education level: Not on file   Occupational History • Occupation: Retired   Tobacco Use   • Smoking status: Never     Passive exposure: Past   • Smokeless tobacco: Never   Vaping Use   • Vaping Use: Never used   Substance and Sexual Activity   • Alcohol use: Yes     Comment: Occassionally--wine   • Drug use: No   • Sexual activity: Not Currently   Other Topics Concern   • Not on file   Social History Narrative   • Not on file     Social Determinants of Health     Financial Resource Strain: Low Risk  (11/3/2022)    Overall Financial Resource Strain (CARDIA)    • Difficulty of Paying Living Expenses: Not hard at all   Recent Concern: Financial Resource Strain - Medium Risk (11/2/2022)    Overall Financial Resource Strain (CARDIA)    • Difficulty of Paying Living Expenses: Somewhat hard   Food Insecurity: Not on file   Transportation Needs: No Transportation Needs (11/3/2022)    PRAPARE - Transportation    • Lack of Transportation (Medical): No    • Lack of Transportation (Non-Medical):  No   Physical Activity: Insufficiently Active (12/31/2020)    Exercise Vital Sign    • Days of Exercise per Week: 4 days    • Minutes of Exercise per Session: 30 min   Stress: No Stress Concern Present (3/20/2023)    [address] [place] [address]    • Feeling of Stress : Not at all   Social Connections: Not on file   Intimate Partner Violence: Not on file   Housing Stability: Not on file       Allergies   Allergen Reactions   • Enablex [Darifenacin] Other (See Comments)     Sweating, HA, urinary hesitancy, flushing of face   • Fentanyl Nausea Only and Vomiting   • Hyoscyamine Palpitations   • Dicyclomine Other (See Comments)     Jittery, disorientation, light HAs   • Hydromorphone Other (See Comments)     Per pt does not remember the type or severity level   • Midazolam Other (See Comments)     Per pt does not remember the type or severity level   • Sulfamethoxazole-Trimethoprim Nausea Only   • Venlafaxine Other (See Comments)     Urinary urgency, polyuria   • Codeine Polt-Chlorphen Polt Er Headache   • Ultracet [Tramadol-Acetaminophen] Nausea Only and Vomiting         Current Outpatient Medications:   •  amLODIPine (NORVASC) 5 mg tablet, TAKE 1 TABLET BY MOUTH EVERY DAY IN THE MORNING, Disp: 90 tablet, Rfl: 3  •  aspirin 81 mg chewable tablet, Chew daily, Disp: , Rfl:   •  butalbital-acetaminophen-caffeine (FIORICET,ESGIC) -40 mg per tablet, Take 1 tablet by mouth every 4 (four) hours as needed for headaches, Disp: 30 tablet, Rfl: 0  •  Calcium Carbonate 1500 (600 Ca) MG TABS, Take by mouth daily , Disp: , Rfl:   •  Diclofenac Sodium (VOLTAREN) 1 %, Apply 2 g topically 3 (three) times a day (Patient taking differently: Apply 2 g topically 3 (three) times a day PT takes as needed), Disp: 350 g, Rfl: 1  •  dicyclomine (BENTYL) 10 mg capsule, TAKE 1 CAPSULE BY MOUTH 3 TIMES A DAY BEFORE MEALS, Disp: 60 capsule, Rfl: 3  •  estrogens, conjugated (Premarin) vaginal cream, Insert 1 g into the vagina 3 (three) times a week, Disp: 30 g, Rfl: 3  •  fluticasone (FLONASE) 50 mcg/act nasal spray, SPRAY 1 SPRAY INTO EACH NOSTRIL TWICE A DAY, Disp: , Rfl:   •  furosemide (LASIX) 20 mg tablet, Take 1 tablet (20 mg total) by mouth daily for 3 days, Disp: 3 tablet, Rfl: 0  •  gabapentin (NEURONTIN) 100 mg capsule, TAKE 1 CAPSULE BY MOUTH TWICE A DAY, Disp: 180 capsule, Rfl: 3  •  gabapentin (NEURONTIN) 300 mg capsule, TAKE A 300MG + 100MG AT BEDTIME, Disp: 90 capsule, Rfl: 3  •  loratadine (CLARITIN) 10 mg tablet, Take 10 mg by mouth as needed  , Disp: , Rfl:   •  losartan (COZAAR) 100 MG tablet, TAKE 1 TABLET BY MOUTH EVERY DAY, Disp: 90 tablet, Rfl: 3  •  Misc Natural Products (OSTEO BI-FLEX TRIPLE STRENGTH) TABS, Take by mouth daily , Disp: , Rfl:   •  oxybutynin (DITROPAN-XL) 10 MG 24 hr tablet, , Disp: , Rfl:   •  pantoprazole (PROTONIX) 40 mg tablet, , Disp: , Rfl:   •  propranolol (INDERAL) 20 mg tablet, Take 1 tablet (20 mg total) by mouth every 12 (twelve) hours, Disp: 180 tablet, Rfl: 3  •  simvastatin (ZOCOR) 10 mg tablet, TAKE 1 TABLET BY MOUTH EVERY DAY, Disp: 90 tablet, Rfl: 3  •  solifenacin (VESICARE) 10 MG tablet, Take 1 tablet (10 mg total) by mouth daily, Disp: 90 tablet, Rfl: 3  •  triamcinolone (KENALOG) 0.1 % cream, Apply topically 2 (two) times a day Apply to affected areas twice a day x 2 weeks. , Disp: 45 g, Rfl: 0  •  warfarin (COUMADIN) 1 mg tablet, TAKE 1 TABLET BY MOUTH EVERY DAY, Disp: 90 tablet, Rfl: 3  •  warfarin (COUMADIN) 2 mg tablet, TAKE 1 TABLET BY MOUTH EVERY DAY, Disp: 90 tablet, Rfl: 1  •  warfarin (COUMADIN) 3 mg tablet, TAKE 1 TABLET BY MOUTH EVERY DAY, Disp: 90 tablet, Rfl: 1    Current Facility-Administered Medications:   •  cyanocobalamin injection 1,000 mcg, 1,000 mcg, Intramuscular, Q30 Days, HANNY Anne, 1,000 mcg at 05/17/23 1305  •  cyanocobalamin injection 1,000 mcg, 1,000 mcg, Intramuscular, Q30 Days, HANNY Anne, 1,000 mcg at 05/17/23 1303  •  cyanocobalamin injection 1,000 mcg, 1,000 mcg, Intramuscular, Q30 Days, Katy Watkins MD, 1,000 mcg at 06/23/23 1305

## 2023-09-11 ENCOUNTER — CONSULT (OUTPATIENT)
Dept: VASCULAR SURGERY | Facility: CLINIC | Age: 78
End: 2023-09-11
Payer: COMMERCIAL

## 2023-09-11 VITALS
BODY MASS INDEX: 26.66 KG/M2 | HEART RATE: 74 BPM | SYSTOLIC BLOOD PRESSURE: 124 MMHG | DIASTOLIC BLOOD PRESSURE: 84 MMHG | WEIGHT: 180 LBS | HEIGHT: 69 IN

## 2023-09-11 DIAGNOSIS — I87.2 VENOUS INSUFFICIENCY OF BOTH LOWER EXTREMITIES: Primary | ICD-10-CM

## 2023-09-11 DIAGNOSIS — I87.2 VENOUS STASIS DERMATITIS OF BOTH LOWER EXTREMITIES: ICD-10-CM

## 2023-09-11 PROCEDURE — 99203 OFFICE O/P NEW LOW 30 MIN: CPT | Performed by: NURSE PRACTITIONER

## 2023-09-11 NOTE — PATIENT INSTRUCTIONS
Conservative medical management with daily use of medical grade compression stockings. On a.m., off in p.m. Frequent ambulation   Leg elevation at rest   Moisturizer and diligent skin care to maintain skin integrity and prevent skin breakdown    Continue 15-20mmHg will give script for 20-30mmHG for future use     Return as PRN    Venous Insufficiency   AMBULATORY CARE:   Venous insufficiency  is a condition that prevents blood from flowing out of your legs and back to your heart. Veins contain valves that help blood flow in one direction. Venous insufficiency means the valves do not close correctly or fully. Blood flows back and pools in your leg. This can cause problems such as varicose veins. Venous insufficiency may also be called chronic venous insufficiency or venous stasis. Common signs and symptoms:   Visible veins on your legs that may be small and red or large, thick, and blue         Swelling in your ankles or calves         Changes in skin color, such as dark or purple skin    An ulcer (open sore) on your leg    Leg pain that is worse when you are menstruating (women) or when you stand, and better when you elevate your legs    Burning or itching    Cramps that happen at night    Thick, hard skin on your legs and ankles    Feeling of heaviness in your legs    Seek care immediately if:   You have a wound that does not heal or is infected. You have an injury that has broken your skin and caused your varicose veins to bleed. Your leg is swollen and hard. You have pain in your leg that does not go away or gets worse. Your legs or feet are turning blue or black. Your leg feels warm, tender, and painful. It may look swollen and red. Call your doctor if:   You have a fever. You have varicose veins and they are painful. You have new or worsening leg pain, swelling, or redness. You have new or worsening ulcers or other sores on your leg.     You have questions or concerns about your condition or care. Treatment  may include any of the following:  Medicine  may be given to improve blood flow. The medicines may thin your blood or reduce swelling to help blood flow. You may also need medicine to treat a bacterial infection. Ablation  is a procedure used to close varicose veins. A catheter is guided until it is near the vein. A device will then be guided to the area. The device may produce energy through radiofrequency or a laser. The energy creates heat that will close the blood vessel. Sclerotherapy  is a procedure used to fade visible veins. Your healthcare provider will inject a liquid into a spider vein or varicose vein. The liquid causes irritation in the vein. The vein swells and sticks together. Your body will then absorb the vein. Surgery  may be needed if other treatments do not work. Surgery may be used to repair a leg vein valve or to clip or tie off a vein so blood cannot flow through it. You may need to have a veins removed during surgery called stripping. Surgery may be used to bypass (go around) the damaged vein. Blood will flow through a vein transplanted from another part of your body. Manage your symptoms:   Wear pressure stockings as directed. Pressure stockings help keep blood from pooling in your leg veins. Your healthcare provider can prescribe stockings that are right for you. Do not buy over-the-counter pressure stockings unless your healthcare provider says it is okay. They may not fit correctly or may have elastic that cuts off your circulation. Ask your healthcare provider when to start wearing pressure stockings and how long to wear them each day. Do not sit or stand for long periods of time. If you have to sit for a long time, flex and extend your legs, feet, and ankles. Do this about 10 times every 30 minutes to help keep blood flowing. If you have to stand for a long time, take breaks and sit with your legs elevated. Elevate your legs. Elevate your legs above the level of your heart to reduce swelling. Your healthcare provider may recommend that you keep your legs elevated for 30 minutes at a time. You may need to do this 3 to 4 times per day, or more if your healthcare provider recommends. Do not smoke. Nicotine and other chemicals in cigarettes and cigars can cause blood vessel damage. Ask your healthcare provider for information if you currently smoke and need help to quit. E-cigarettes or smokeless tobacco still contain nicotine. Talk to your healthcare provider before you use these products. Reach or maintain a healthy weight. Extra weight can make venous insufficiency worse. Ask your healthcare provider what a healthy weight is for you. He or she can help you create a weight loss plan if you need to lose weight. Exercise as directed. Walking can help increase blood flow in your calves. Ask your healthcare provider how much exercise you need each day and which exercises are best for you. Care for your skin. Keep your skin clean. Do not use any soaps or lotions that may dry your skin. For example, do not use products that contain fragrance or alcohol. If you have a skin ulcer, your healthcare provider may recommend a wet-to-dry bandage. To do this, apply a wet bandage to your wound and allow it to dry. This will help remove drainage from your wound each time you change the bandage. Your healthcare provider will tell you how often to change your bandage and which kind of bandage to use. Check your wound for signs of infection, such as swelling or pus. Go to physical therapy (PT) as directed. A physical therapist can help you increase movement and range of motion in your legs. Follow up with your doctor as directed:  Write down your questions so you remember to ask them during your visits.   © Copyright [employer]let[employer] 2022 Information is for End User's use only and may not be sold, redistributed or otherwise used for commercial purposes. The above information is an  only. It is not intended as medical advice for individual conditions or treatments. Talk to your doctor, nurse or pharmacist before following any medical regimen to see if it is safe and effective for you.

## 2023-09-11 NOTE — ASSESSMENT & PLAN NOTE
79-year-old female with A-fib (warfarin), HTN, HLD, peripheral neuropathy, asymptomatic bilateral carotid artery stenosis, cardiomyopathy, h/o mitral valve, venous insufficiency and h/o L foot drop (polio as a child) presents to establish care with complaints of BLE edema    -Patient reports BLE edema x "a few weeks" with associated BLE erythema.  -Patient denies pain. Asymptomatic.    -No erythema at this time. Bilateral foot petechiae type rash. Dry itching skin resolved  -BLE edema R>L  (mild)  -Patient has started wearing Jobst 15-20 knee-high compression  -Palpable distal DP pulses bilaterally    Plan:  -Conservative medical management with daily use of medical grade compression stockings.  On in a.m., off in p.m.  -Frequent ambulation   -Leg elevation at rest   -Moisturizer and diligent skin care to maintain skin integrity and prevent skin breakdown  -Return to vascular as needed

## 2023-09-18 ENCOUNTER — OFFICE VISIT (OUTPATIENT)
Age: 78
End: 2023-09-18
Payer: COMMERCIAL

## 2023-09-18 VITALS
OXYGEN SATURATION: 95 % | RESPIRATION RATE: 16 BRPM | DIASTOLIC BLOOD PRESSURE: 80 MMHG | HEIGHT: 69 IN | BODY MASS INDEX: 26.58 KG/M2 | HEART RATE: 41 BPM | SYSTOLIC BLOOD PRESSURE: 126 MMHG

## 2023-09-18 DIAGNOSIS — E03.8 SUBCLINICAL HYPOTHYROIDISM: ICD-10-CM

## 2023-09-18 DIAGNOSIS — Z00.00 ENCOUNTER FOR SUBSEQUENT ANNUAL WELLNESS VISIT IN MEDICARE PATIENT: Primary | ICD-10-CM

## 2023-09-18 DIAGNOSIS — I87.2 VENOUS INSUFFICIENCY OF BOTH LOWER EXTREMITIES: ICD-10-CM

## 2023-09-18 DIAGNOSIS — I10 BENIGN HYPERTENSION: ICD-10-CM

## 2023-09-18 DIAGNOSIS — G61.9 INFLAMMATORY POLYNEUROPATHY, UNSPECIFIED (HCC): ICD-10-CM

## 2023-09-18 DIAGNOSIS — I05.9 MITRAL VALVE DISORDER: ICD-10-CM

## 2023-09-18 DIAGNOSIS — I87.2 VENOUS STASIS DERMATITIS OF BOTH LOWER EXTREMITIES: ICD-10-CM

## 2023-09-18 DIAGNOSIS — I42.9 CARDIOMYOPATHY, UNSPECIFIED TYPE (HCC): ICD-10-CM

## 2023-09-18 DIAGNOSIS — L82.1 SEBORRHEIC KERATOSIS: ICD-10-CM

## 2023-09-18 DIAGNOSIS — E53.8 B12 DEFICIENCY: ICD-10-CM

## 2023-09-18 DIAGNOSIS — R73.01 IMPAIRED FASTING GLUCOSE: ICD-10-CM

## 2023-09-18 DIAGNOSIS — I48.91 ATRIAL FIBRILLATION, UNSPECIFIED TYPE (HCC): ICD-10-CM

## 2023-09-18 DIAGNOSIS — K58.1 IRRITABLE BOWEL SYNDROME WITH CONSTIPATION: ICD-10-CM

## 2023-09-18 DIAGNOSIS — E78.2 MIXED HYPERLIPIDEMIA: ICD-10-CM

## 2023-09-18 DIAGNOSIS — R00.1 BRADYCARDIA: ICD-10-CM

## 2023-09-18 PROCEDURE — G0439 PPPS, SUBSEQ VISIT: HCPCS

## 2023-09-18 NOTE — ASSESSMENT & PLAN NOTE
Saw vascular and she took their recommendations. Follows as needed with them. Currently using triamcinolone cream and compression stockings.

## 2023-09-18 NOTE — ASSESSMENT & PLAN NOTE
Discussed healthy diet. Walks in the park a few times a week when the weather is nice. Denies tobacco use. Drinks wine occasionally.

## 2023-09-18 NOTE — PATIENT INSTRUCTIONS
COVID and flu due mid October. Labs and dexa due in November. Referrals for podiatry and dermatology ordered. Medicare Preventive Visit Patient Instructions  Thank you for completing your Welcome to Medicare Visit or Medicare Annual Wellness Visit today. Your next wellness visit will be due in one year (9/18/2024). The screening/preventive services that you may require over the next 5-10 years are detailed below. Some tests may not apply to you based off risk factors and/or age. Screening tests ordered at today's visit but not completed yet may show as past due. Also, please note that scanned in results may not display below. Preventive Screenings:  Service Recommendations Previous Testing/Comments   Colorectal Cancer Screening  * Colonoscopy    * Fecal Occult Blood Test (FOBT)/Fecal Immunochemical Test (FIT)  * Fecal DNA/Cologuard Test  * Flexible Sigmoidoscopy Age: 43-73 years old   Colonoscopy: every 10 years (may be performed more frequently if at higher risk)  OR  FOBT/FIT: every 1 year  OR  Cologuard: every 3 years  OR  Sigmoidoscopy: every 5 years  Screening may be recommended earlier than age 39 if at higher risk for colorectal cancer. Also, an individualized decision between you and your healthcare provider will decide whether screening between the ages of 77-80 would be appropriate. Colonoscopy: 11/30/2021  FOBT/FIT: Not on file  Cologuard: 04/29/2021  Sigmoidoscopy: Not on file    Screening Current     Breast Cancer Screening Age: 36 years old  Frequency: every 1-2 years  Not required if history of left and right mastectomy Mammogram: 06/12/2023    Screening Current   Cervical Cancer Screening Between the ages of 21-29, pap smear recommended once every 3 years. Between the ages of 32-69, can perform pap smear with HPV co-testing every 5 years.    Recommendations may differ for women with a history of total hysterectomy, cervical cancer, or abnormal pap smears in past. Pap Smear: 10/09/2017    Screening Not Indicated   Hepatitis C Screening Once for adults born between 1945 and 1965  More frequently in patients at high risk for Hepatitis C Hep C Antibody: 06/12/2017    Screening Current   Diabetes Screening 1-2 times per year if you're at risk for diabetes or have pre-diabetes Fasting glucose: 92 mg/dL (5/10/2023)  A1C: 5.2 % (5/10/2023)  Screening Current   Cholesterol Screening Once every 5 years if you don't have a lipid disorder. May order more often based on risk factors. Lipid panel: 05/10/2023    Screening Not Indicated  History Lipid Disorder     Other Preventive Screenings Covered by Medicare:  Abdominal Aortic Aneurysm (AAA) Screening: covered once if your at risk. You're considered to be at risk if you have a family history of AAA. Lung Cancer Screening: covers low dose CT scan once per year if you meet all of the following conditions: (1) Age 48-67; (2) No signs or symptoms of lung cancer; (3) Current smoker or have quit smoking within the last 15 years; (4) You have a tobacco smoking history of at least 20 pack years (packs per day multiplied by number of years you smoked); (5) You get a written order from a healthcare provider. Glaucoma Screening: covered annually if you're considered high risk: (1) You have diabetes OR (2) Family history of glaucoma OR (3)  aged 48 and older OR (3)  American aged 72 and older  Osteoporosis Screening: covered every 2 years if you meet one of the following conditions: (1) You're estrogen deficient and at risk for osteoporosis based off medical history and other findings; (2) Have a vertebral abnormality; (3) On glucocorticoid therapy for more than 3 months; (4) Have primary hyperparathyroidism; (5) On osteoporosis medications and need to assess response to drug therapy. Last bone density test (DXA Scan): 04/12/2021. HIV Screening: covered annually if you're between the age of 14-79.  Also covered annually if you are younger than 13 and older than 72 with risk factors for HIV infection. For pregnant patients, it is covered up to 3 times per pregnancy. Immunizations:  Immunization Recommendations   Influenza Vaccine Annual influenza vaccination during flu season is recommended for all persons aged >= 6 months who do not have contraindications   Pneumococcal Vaccine   * Pneumococcal conjugate vaccine = PCV13 (Prevnar 13), PCV15 (Vaxneuvance), PCV20 (Prevnar 20)  * Pneumococcal polysaccharide vaccine = PPSV23 (Pneumovax) Adults 20-63 years old: 1-3 doses may be recommended based on certain risk factors  Adults 72 years old: 1-2 doses may be recommended based off what pneumonia vaccine you previously received   Hepatitis B Vaccine 3 dose series if at intermediate or high risk (ex: diabetes, end stage renal disease, liver disease)   Tetanus (Td) Vaccine - COST NOT COVERED BY MEDICARE PART B Following completion of primary series, a booster dose should be given every 10 years to maintain immunity against tetanus. Td may also be given as tetanus wound prophylaxis. Tdap Vaccine - COST NOT COVERED BY MEDICARE PART B Recommended at least once for all adults. For pregnant patients, recommended with each pregnancy. Shingles Vaccine (Shingrix) - COST NOT COVERED BY MEDICARE PART B  2 shot series recommended in those aged 48 and above     Health Maintenance Due:      Topic Date Due    DXA SCAN  04/12/2023    Breast Cancer Screening: Mammogram  06/12/2024    Colorectal Cancer Screening  11/29/2026    Hepatitis C Screening  Completed     Immunizations Due:      Topic Date Due    COVID-19 Vaccine (6 - Pfizer series) 03/25/2023    Influenza Vaccine (1) 09/01/2023     Advance Directives   What are advance directives? Advance directives are legal documents that state your wishes and plans for medical care. These plans are made ahead of time in case you lose your ability to make decisions for yourself.  Advance directives can apply to any medical decision, such as the treatments you want, and if you want to donate organs. What are the types of advance directives? There are many types of advance directives, and each state has rules about how to use them. You may choose a combination of any of the following:  Living will: This is a written record of the treatment you want. You can also choose which treatments you do not want, which to limit, and which to stop at a certain time. This includes surgery, medicine, IV fluid, and tube feedings. Durable power of  for [facility]): This is a written record that states who you want to make healthcare choices for you when you are unable to make them for yourself. This person, called a proxy, is usually a family member or a friend. You may choose more than 1 proxy. Do not resuscitate (DNR) order:  A DNR order is used in case your heart stops beating or you stop breathing. It is a request not to have certain forms of treatment, such as CPR. A DNR order may be included in other types of advance directives. Medical directive: This covers the care that you want if you are in a coma, near death, or unable to make decisions for yourself. You can list the treatments you want for each condition. Treatment may include pain medicine, surgery, blood transfusions, dialysis, IV or tube feedings, and a ventilator (breathing machine). Values history: This document has questions about your views, beliefs, and how you feel and think about life. This information can help others choose the care that you would choose. Why are advance directives important? An advance directive helps you control your care. Although spoken wishes may be used, it is better to have your wishes written down. Spoken wishes can be misunderstood, or not followed. Treatments may be given even if you do not want them. An advance directive may make it easier for your family to make difficult choices about your care.    Weight Management   Why it is important to manage your weight:  Being overweight increases your risk of health conditions such as heart disease, high blood pressure, type 2 diabetes, and certain types of cancer. It can also increase your risk for osteoarthritis, sleep apnea, and other respiratory problems. Aim for a slow, steady weight loss. Even a small amount of weight loss can lower your risk of health problems. How to lose weight safely:  A safe and healthy way to lose weight is to eat fewer calories and get regular exercise. You can lose up about 1 pound a week by decreasing the number of calories you eat by 500 calories each day. Healthy meal plan for weight management:  A healthy meal plan includes a variety of foods, contains fewer calories, and helps you stay healthy. A healthy meal plan includes the following:  Eat whole-grain foods more often. A healthy meal plan should contain fiber. Fiber is the part of grains, fruits, and vegetables that is not broken down by your body. Whole-grain foods are healthy and provide extra fiber in your diet. Some examples of whole-grain foods are whole-wheat breads and pastas, oatmeal, brown rice, and bulgur. Eat a variety of vegetables every day. Include dark, leafy greens such as spinach, kale, jonelle greens, and mustard greens. Eat yellow and orange vegetables such as carrots, sweet potatoes, and winter squash. Eat a variety of fruits every day. Choose fresh or canned fruit (canned in its own juice or light syrup) instead of juice. Fruit juice has very little or no fiber. Eat low-fat dairy foods. Drink fat-free (skim) milk or 1% milk. Eat fat-free yogurt and low-fat cottage cheese. Try low-fat cheeses such as mozzarella and other reduced-fat cheeses. Choose meat and other protein foods that are low in fat. Choose beans or other legumes such as split peas or lentils. Choose fish, skinless poultry (chicken or turkey), or lean cuts of red meat (beef or pork).  Before you cook meat or poultry, cut off any visible fat. Use less fat and oil. Try baking foods instead of frying them. Add less fat, such as margarine, sour cream, regular salad dressing and mayonnaise to foods. Eat fewer high-fat foods. Some examples of high-fat foods include french fries, doughnuts, ice cream, and cakes. Eat fewer sweets. Limit foods and drinks that are high in sugar. This includes candy, cookies, regular soda, and sweetened drinks. Exercise:  Exercise at least 30 minutes per day on most days of the week. Some examples of exercise include walking, biking, dancing, and swimming. You can also fit in more physical activity by taking the stairs instead of the elevator or parking farther away from stores. Ask your healthcare provider about the best exercise plan for you. © Copyright [employer] 2018 Information is for End User's use only and may not be sold, redistributed or otherwise used for commercial purposes.  All illustrations and images included in CareNotes® are the copyrighted property of [employer] or  [employer]

## 2023-09-18 NOTE — PROGRESS NOTES
Assessment and Plan:     Problem List Items Addressed This Visit        Digestive    Irritable bowel syndrome with constipation     Notes no changes in BM, just abdominal bloating and pain. Uses bentyl as needed. Endocrine    Subclinical hypothyroidism    Relevant Orders    TSH, 3rd generation with Free T4 reflex    Impaired fasting glucose    Relevant Orders    Comprehensive metabolic panel    HEMOGLOBIN A1C W/ EAG ESTIMATION       Cardiovascular and Mediastinum    Atrial fibrillation ([facility])     Follows closely with cardiology. Denies any CP, SOB. Benign hypertension     Doesn't check BP at home. 126/80 in office today. Limits salt in diet. Stable. Mitral valve disorder     Follows with cardiology q 6 months. Cardiomyopathy, unspecified type [facility])     Follows with cardiology q 6 months. Venous insufficiency of both lower extremities     Saw vascular and she took their recommendations. Follows as needed with them. Currently using triamcinolone cream and compression stockings. Relevant Orders    Ambulatory Referral to Podiatry       Nervous and Auditory    Inflammatory polyneuropathy, unspecified ([facility])     Stable. Gabapentin daily. Musculoskeletal and Integument    Venous stasis dermatitis of both lower extremities     Uses triamcinolone cream and compression stockings. Stable. Seborrheic keratosis     Never had this previously. On left dorsal wrist. No pain or itchiness. Would like referral to dermatology to potentially have it removed. Relevant Orders    Ambulatory Referral to Dermatology       Other    Hyperlipidemia     Lipid panel from May 2023 showed stable LDL in the 80s. Eats relatively healthy meals- 3 meals a day. Relevant Orders    Lipid panel    Bradycardia     Pulse was 41 in office. Follows closely with cardiology. On propanolol 20 mg. Denies any dizziness or syncope.          Encounter for subsequent annual wellness visit in Medicare patient - Primary     Discussed healthy diet. Walks in the park a few times a week when the weather is nice. Denies tobacco use. Drinks wine occasionally. B12 deficiency     Was previously B12 deficient and received B12 shots. Rechecking B12 in November. Relevant Orders    CBC and differential    Vitamin B12     BMI Counseling: Body mass index is 26.58 kg/m². The BMI is above normal. Nutrition recommendations include decreasing portion sizes, encouraging healthy choices of fruits and vegetables, decreasing fast food intake, consuming healthier snacks, limiting drinks that contain sugar, moderation in carbohydrate intake, increasing intake of lean protein, reducing intake of saturated and trans fat and reducing intake of cholesterol. Exercise recommendations include moderate physical activity 150 minutes/week and exercising 3-5 times per week. No pharmacotherapy was ordered. Patient referred to PCP. Rationale for BMI follow-up plan is due to patient being overweight or obese. Depression Screening and Follow-up Plan: Patient was screened for depression during today's encounter. They screened negative with a PHQ-2 score of 0. Preventive health issues were discussed with patient, and age appropriate screening tests were ordered as noted in patient's After Visit Summary. Personalized health advice and appropriate referrals for health education or preventive services given if needed, as noted in patient's After Visit Summary. History of Present Illness:     Patient presents for a Medicare Wellness Visit. She has multiple questions today regarding vaccinations. She also notes a new mole on her left wrist on the dorsal side. She denies any pain or itchiness. She isn't sure how long it had been there. She previously followed with Dr. Chey Garza and would like a new referral today.     Health Maintenance:  Due for labs and DEXA in November  Flu and COVID booster in October    HPI Patient Care Team:  Robby Wright MD as PCP - General (Internal Medicine)  Robby Wright MD as PCP - [facility] JULIANO Potts [facility] ([facility])  [facility]  Darion Jones PA-C as Physician Assistant (Physician Assistant)     Review of Systems:     Review of Systems   Constitutional: Negative for chills, fever and unexpected weight change. HENT: Positive for ear pain (L ear pain). Negative for ear discharge, rhinorrhea, sinus pressure, sinus pain, sore throat and trouble swallowing. Eyes: Negative for pain and itching. Respiratory: Positive for shortness of breath (When going up the stairs). Negative for cough and wheezing. Cardiovascular: Positive for leg swelling. Negative for chest pain. Gastrointestinal: Negative for abdominal pain, constipation, diarrhea, nausea and vomiting. Genitourinary: Negative for difficulty urinating and dysuria. Skin: Positive for rash. Neurological: Negative for dizziness, light-headedness and headaches.         Problem List:     Patient Active Problem List   Diagnosis   • Atrial fibrillation ([address])   • Hyperlipidemia   • History of heart valve replacement   • Benign hypertension   • Bradycardia   • Essential tremor   • Chronic anticoagulation   • Amaurosis fugax, both eyes   • Mitral valve disorder   • Osteoporosis   • Peripheral neuropathy   • Chronic right-sided low back pain without sciatica   • Asymptomatic stenosis of right vertebral artery   • Subclinical hypothyroidism   • Irritable bowel syndrome with constipation   • Inflammatory polyneuropathy, unspecified ([address])   • Cardiomyopathy, unspecified type ([address])   • Venous insufficiency of both lower extremities   • Venous stasis dermatitis of both lower extremities   • Encounter for subsequent annual wellness visit in Medicare patient   • Impaired fasting glucose   • B12 deficiency   • Seborrheic keratosis      Past Medical and Surgical History:     Past Medical History:   Diagnosis Date   • 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   • Atrial fibrillation ([address])    • Coronary artery disease 1996   • Hashimoto's thyroiditis    • Hyperlipidemia    • Hypertension    • IBS (irritable bowel syndrome)    • Kidney stone    • Microhematuria    • Migraine    • Mitral valve disorder    • Nasal congestion    • Non-toxic uninodular goiter    • Nosebleed    • Raynaud disease      Past Surgical History:   Procedure Laterality Date   • APPENDECTOMY     • BREAST CYST EXCISION Right 01/01/1977   • CARDIAC SURGERY     • CARDIAC VALVE REPLACEMENT  1996   • COLONOSCOPY  08/08/2011   • TONSILLECTOMY     • VALVE REPLACEMENT  01/04/2017    mitral valve replacement, 6/5/14      Family History:     Family History   Problem Relation Age of Onset   • Hypertension Mother    • Arthritis Mother    • Coronary artery disease Father    • Migraines Father    • Leukemia Brother    • Migraines Brother    • Hypertension Brother    • Coronary artery disease Paternal Grandfather    • Leukemia Maternal Aunt    • Multiple myeloma Maternal Aunt    • Thyroid disease Other    • Hypertension Maternal Grandmother    • No Known Problems Maternal Aunt    • No Known Problems Maternal Aunt    • No Known Problems Paternal Aunt    • No Known Problems Paternal Aunt    • No Known Problems Paternal Aunt    • No Known Problems Paternal Aunt    • Breast cancer Neg Hx       Social History:     Social History     Socioeconomic History   • Marital status: Single     Spouse name: None   • Number of children: None   • Years of education: None   • Highest education level: None   Occupational History   • Occupation: Retired   Tobacco Use   • Smoking status: Never     Passive exposure: Past   • Smokeless tobacco: Never   Vaping Use   • Vaping Use: Never used   Substance and Sexual Activity   • Alcohol use: Yes     Comment: Occassionally--wine   • Drug use: No   • Sexual activity: Not Currently   Other Topics Concern   • None   Social History Narrative   • None Social Determinants of Health     Financial Resource Strain: Low Risk  (9/18/2023)    Overall Financial Resource Strain (CARDIA)    • Difficulty of Paying Living Expenses: Not hard at all   Food Insecurity: Not on file   Transportation Needs: No Transportation Needs (9/18/2023)    PRAPARE - Transportation    • Lack of Transportation (Medical): No    • Lack of Transportation (Non-Medical):  No   Physical Activity: Insufficiently Active (12/31/2020)    Exercise Vital Sign    • Days of Exercise per Week: 4 days    • Minutes of Exercise per Session: 30 min   Stress: No Stress Concern Present (3/20/2023)    [facility]    • Feeling of Stress : Not at all   Social Connections: Not on file   Intimate Partner Violence: Not on file   Housing Stability: Not on file      Medications and Allergies:     Current Outpatient Medications   Medication Sig Dispense Refill   • amLODIPine (NORVASC) 5 mg tablet TAKE 1 TABLET BY MOUTH EVERY DAY IN THE MORNING 90 tablet 3   • aspirin 81 mg chewable tablet Chew daily     • butalbital-acetaminophen-caffeine (FIORICET,ESGIC) -40 mg per tablet Take 1 tablet by mouth every 4 (four) hours as needed for headaches 30 tablet 0   • Calcium Carbonate 1500 (600 Ca) MG TABS Take by mouth daily      • Diclofenac Sodium (VOLTAREN) 1 % Apply 2 g topically 3 (three) times a day (Patient taking differently: Apply 2 g topically 3 (three) times a day PT takes as needed) 350 g 1   • dicyclomine (BENTYL) 10 mg capsule TAKE 1 CAPSULE BY MOUTH 3 TIMES A DAY BEFORE MEALS 60 capsule 3   • estrogens, conjugated (Premarin) vaginal cream Insert 1 g into the vagina 3 (three) times a week 30 g 3   • fluticasone (FLONASE) 50 mcg/act nasal spray SPRAY 1 SPRAY INTO EACH NOSTRIL TWICE A DAY     • furosemide (LASIX) 20 mg tablet Take 1 tablet (20 mg total) by mouth daily for 3 days 3 tablet 0   • gabapentin (NEURONTIN) 100 mg capsule TAKE 1 CAPSULE BY MOUTH TWICE A  capsule 3   • gabapentin (NEURONTIN) 300 mg capsule TAKE A 300MG + 100MG AT BEDTIME 90 capsule 3   • loratadine (CLARITIN) 10 mg tablet Take 10 mg by mouth as needed       • losartan (COZAAR) 100 MG tablet TAKE 1 TABLET BY MOUTH EVERY DAY 90 tablet 3   • Misc Natural Products (OSTEO BI-FLEX TRIPLE STRENGTH) TABS Take by mouth daily      • oxybutynin (DITROPAN-XL) 10 MG 24 hr tablet      • pantoprazole (PROTONIX) 40 mg tablet      • propranolol (INDERAL) 20 mg tablet Take 1 tablet (20 mg total) by mouth every 12 (twelve) hours 180 tablet 3   • simvastatin (ZOCOR) 10 mg tablet TAKE 1 TABLET BY MOUTH EVERY DAY 90 tablet 3   • solifenacin (VESICARE) 10 MG tablet Take 1 tablet (10 mg total) by mouth daily 90 tablet 3   • triamcinolone (KENALOG) 0.1 % cream Apply topically 2 (two) times a day Apply to affected areas twice a day x 2 weeks.  45 g 0   • warfarin (COUMADIN) 1 mg tablet TAKE 1 TABLET BY MOUTH EVERY DAY 90 tablet 3   • warfarin (COUMADIN) 2 mg tablet TAKE 1 TABLET BY MOUTH EVERY DAY 90 tablet 1   • warfarin (COUMADIN) 3 mg tablet TAKE 1 TABLET BY MOUTH EVERY DAY 90 tablet 1     Current Facility-Administered Medications   Medication Dose Route Frequency Provider Last Rate Last Admin   • cyanocobalamin injection 1,000 mcg  1,000 mcg Intramuscular Q30 Days HANNY Bender   1,000 mcg at 05/17/23 1305   • cyanocobalamin injection 1,000 mcg  1,000 mcg Intramuscular Q30 Days HANNY Bender   1,000 mcg at 05/17/23 1303   • cyanocobalamin injection 1,000 mcg  1,000 mcg Intramuscular Q30 Days Elizabeth Rosenberg MD   1,000 mcg at 06/23/23 1305     Allergies   Allergen Reactions   • Enablex [Darifenacin] Other (See Comments)     Sweating, HA, urinary hesitancy, flushing of face   • Fentanyl Nausea Only and Vomiting   • Hyoscyamine Palpitations   • Dicyclomine Other (See Comments)     Jittery, disorientation, light HAs   • Hydromorphone Other (See Comments)     Per pt does not remember the type or severity level   • Midazolam Other (See Comments)     Per pt does not remember the type or severity level   • Sulfamethoxazole-Trimethoprim Nausea Only   • Venlafaxine Other (See Comments)     Urinary urgency, polyuria   • Codeine Polt-Chlorphen Polt Er Headache   • Ultracet [Tramadol-Acetaminophen] Nausea Only and Vomiting      Immunizations:     Immunization History   Administered Date(s) Administered   • COVID-19 PFIZER VACCINE 0.3 ML IM 03/25/2021, 04/16/2021, 11/05/2021   • COVID-19 Pfizer Vac BIVALENT Dragan-sucrose 12 Yr+ IM 11/25/2022   • COVID-19 Pfizer vac (Dragan-sucrose, gray cap) 12 yr+ IM 07/29/2022   • INFLUENZA 09/30/2014, 10/26/2015, 11/10/2016, 11/03/2022   • Influenza Split High Dose Preservative Free IM 09/30/2014, 10/26/2015, 11/10/2016, 09/21/2017   • Influenza, high dose seasonal 0.7 mL 09/26/2018, 09/13/2019, 09/22/2020, 10/19/2021, 11/03/2022   • Influenza, seasonal, injectable 10/15/2007, 10/03/2012, 10/28/2013   • Pneumococcal Conjugate 13-Valent 02/23/2018   • Pneumococcal Polysaccharide PPV23 10/29/2010, 04/13/2015   • Tdap 10/26/2015, 06/23/2017   • Zoster 08/09/2011   • Zoster Vaccine Recombinant 07/07/2020, 10/09/2020      Health Maintenance:         Topic Date Due   • DXA SCAN  04/12/2023   • Breast Cancer Screening: Mammogram  06/12/2024   • Colorectal Cancer Screening  11/29/2026   • Hepatitis C Screening  Completed         Topic Date Due   • COVID-19 Vaccine (6 - Pfizer series) 03/25/2023   • Influenza Vaccine (1) 09/01/2023      Medicare Screening Tests and Risk Assessments:     Last Medicare Wellness visit information reviewed, patient interviewed and updates made to the record today. Health Risk Assessment:   Patient rates overall health as good. Patient feels that their physical health rating is same. Patient is very satisfied with their life. Eyesight was rated as same. Hearing was rated as same. Patient feels that their emotional and mental health rating is same. Patients states they are never, rarely angry. Patient states they are never, rarely unusually tired/fatigued. Pain experienced in the last 7 days has been none. Patient states that she has experienced no weight loss or gain in last 6 months. Depression Screening:   PHQ-2 Score: 0      Fall Risk Screening: In the past year, patient has experienced: no history of falling in past year      Urinary Incontinence Screening:   Patient has not leaked urine accidently in the last six months. Home Safety:  Patient has trouble with stairs inside or outside of their home. Patient has working smoke alarms and has working carbon monoxide detector. Nutrition:   Current diet is Regular. Medications:   Patient is currently taking over-the-counter supplements. OTC medications include: see medication list. Patient is able to manage medications. Activities of Daily Living (ADLs)/Instrumental Activities of Daily Living (IADLs):   Walk and transfer into and out of bed and chair?: Yes  Dress and groom yourself?: Yes    Bathe or shower yourself?: Yes    Feed yourself? Yes  Do your laundry/housekeeping?: Yes  Manage your money, pay your bills and track your expenses?: Yes  Make your own meals?: Yes    Do your own shopping?: Yes    Previous Hospitalizations:   Any hospitalizations or ED visits within the last 12 months?: No      Advance Care Planning:   Living will: Yes    Durable POA for healthcare:  Yes    Advanced directive: Yes    Advanced directive counseling given: Yes      PREVENTIVE SCREENINGS      Cardiovascular Screening:    General: Screening Current and History Lipid Disorder      Diabetes Screening:     General: Screening Current      Colorectal Cancer Screening:     General: Screening Current      Breast Cancer Screening:     General: Screening Current      Cervical Cancer Screening:    General: Screening Not Indicated      Osteoporosis Screening:    General: History Osteoporosis and Risks and Benefits Discussed      Abdominal Aortic Aneurysm (AAA) Screening:        General: Screening Not Indicated      Lung Cancer Screening:     General: Screening Not Indicated      Hepatitis C Screening:    General: Screening Current    Screening, Brief Intervention, and Referral to Treatment (SBIRT)    Screening  Typical number of drinks in a day: 0    Single Item Drug Screening:  How often have you used an illegal drug (including marijuana) or a prescription medication for non-medical reasons in the past year? never    Single Item Drug Screen Score: 0  Interpretation: Negative screen for possible drug use disorder    No results found. Physical Exam:     /80 (BP Location: Left arm, Patient Position: Sitting, Cuff Size: Large)   Pulse (!) 41   Resp 16   Ht 5' 9" (1.753 m)   LMP  (LMP Unknown)   SpO2 95%   BMI 26.58 kg/m²     Physical Exam  Vitals and nursing note reviewed. Constitutional:       General: She is awake. She is not in acute distress. Appearance: Normal appearance. She is well-developed, well-groomed and overweight. HENT:      Head: Normocephalic and atraumatic. Right Ear: Hearing and external ear normal.      Left Ear: Hearing and external ear normal.      Nose: Nose normal.      Mouth/Throat:      Lips: Pink. Mouth: Mucous membranes are moist.   Eyes:      General: Lids are normal. Vision grossly intact. Gaze aligned appropriately. Conjunctiva/sclera: Conjunctivae normal.   Neck:      Vascular: No carotid bruit. Trachea: Trachea and phonation normal.   Cardiovascular:      Rate and Rhythm: Bradycardia present. Rhythm regularly irregular. Heart sounds: Normal heart sounds, S1 normal and S2 normal. No murmur heard. No friction rub. No gallop. Comments: Currently in compression socks. Pulmonary:      Effort: Pulmonary effort is normal. No respiratory distress. Breath sounds: Normal breath sounds and air entry.  No decreased breath sounds, wheezing, rhonchi or rales. Abdominal:      General: Abdomen is flat. Bowel sounds are normal.   Musculoskeletal:         General: No swelling. Cervical back: Neck supple. Right lower leg: No edema. Left lower leg: No edema. Skin:     General: Skin is warm. Capillary Refill: Capillary refill takes less than 2 seconds. Comments: Left dorsal wrist seborrheic keratosis. Neurological:      Mental Status: She is alert. Psychiatric:         Attention and Perception: Attention and perception normal.         Mood and Affect: Mood and affect normal.         Speech: Speech normal.         Behavior: Behavior normal. Behavior is cooperative. Thought Content:  Thought content normal.         Cognition and Memory: Cognition and memory normal.         Judgment: Judgment normal.          Nithya Davison PA-C

## 2023-09-18 NOTE — ASSESSMENT & PLAN NOTE
Lipid panel from May 2023 showed stable LDL in the 80s. Eats relatively healthy meals- 3 meals a day.

## 2023-09-18 NOTE — ASSESSMENT & PLAN NOTE
Pulse was 41 in office. Follows closely with cardiology. On propanolol 20 mg. Denies any dizziness or syncope.

## 2023-09-18 NOTE — ASSESSMENT & PLAN NOTE
Never had this previously. On left dorsal wrist. No pain or itchiness. Would like referral to dermatology to potentially have it removed.

## 2023-09-21 ENCOUNTER — ANTICOAG VISIT (OUTPATIENT)
Dept: CARDIOLOGY CLINIC | Facility: CLINIC | Age: 78
End: 2023-09-21

## 2023-09-21 ENCOUNTER — LAB (OUTPATIENT)
Dept: LAB | Facility: HOSPITAL | Age: 78
End: 2023-09-21
Attending: INTERNAL MEDICINE
Payer: COMMERCIAL

## 2023-09-21 DIAGNOSIS — I48.0 PAROXYSMAL ATRIAL FIBRILLATION (HCC): ICD-10-CM

## 2023-09-21 DIAGNOSIS — Z95.2 HISTORY OF HEART VALVE REPLACEMENT: Primary | ICD-10-CM

## 2023-09-21 LAB
INR PPP: 3.29 (ref 0.84–1.19)
PROTHROMBIN TIME: 34.5 SECONDS (ref 11.6–14.5)

## 2023-09-21 PROCEDURE — 36415 COLL VENOUS BLD VENIPUNCTURE: CPT

## 2023-09-21 PROCEDURE — 85610 PROTHROMBIN TIME: CPT

## 2023-09-25 ENCOUNTER — TELEPHONE (OUTPATIENT)
Age: 78
End: 2023-09-25

## 2023-09-25 NOTE — TELEPHONE ENCOUNTER
Rec'd call from patient requesting [facility] for FULL BODY. Explained wait/cancellation list processes and MyChart notification. Understanding was verbalized. Created wait/cancellation list for patient.

## 2023-10-11 ENCOUNTER — TELEPHONE (OUTPATIENT)
Dept: CARDIOLOGY CLINIC | Facility: CLINIC | Age: 78
End: 2023-10-11

## 2023-10-11 ENCOUNTER — ANTICOAG VISIT (OUTPATIENT)
Dept: CARDIOLOGY CLINIC | Facility: CLINIC | Age: 78
End: 2023-10-11

## 2023-10-11 ENCOUNTER — APPOINTMENT (OUTPATIENT)
Dept: LAB | Facility: HOSPITAL | Age: 78
End: 2023-10-11
Attending: INTERNAL MEDICINE
Payer: COMMERCIAL

## 2023-10-11 DIAGNOSIS — Z79.01 CHRONIC ANTICOAGULATION: ICD-10-CM

## 2023-10-11 DIAGNOSIS — Z95.2 HISTORY OF HEART VALVE REPLACEMENT: Primary | ICD-10-CM

## 2023-10-11 LAB
INR PPP: 2.7 (ref 0.84–1.19)
PROTHROMBIN TIME: 29.6 SECONDS (ref 11.6–14.5)

## 2023-10-11 PROCEDURE — 36415 COLL VENOUS BLD VENIPUNCTURE: CPT

## 2023-10-11 PROCEDURE — 85610 PROTHROMBIN TIME: CPT

## 2023-10-24 ENCOUNTER — CLINICAL SUPPORT (OUTPATIENT)
Age: 78
End: 2023-10-24
Payer: COMMERCIAL

## 2023-10-24 ENCOUNTER — ANTICOAG VISIT (OUTPATIENT)
Dept: CARDIOLOGY CLINIC | Facility: CLINIC | Age: 78
End: 2023-10-24

## 2023-10-24 ENCOUNTER — TELEPHONE (OUTPATIENT)
Age: 78
End: 2023-10-24

## 2023-10-24 ENCOUNTER — APPOINTMENT (OUTPATIENT)
Dept: LAB | Facility: HOSPITAL | Age: 78
End: 2023-10-24
Attending: INTERNAL MEDICINE
Payer: COMMERCIAL

## 2023-10-24 DIAGNOSIS — I48.91 ATRIAL FIBRILLATION, UNSPECIFIED TYPE (HCC): ICD-10-CM

## 2023-10-24 DIAGNOSIS — Z23 ENCOUNTER FOR IMMUNIZATION: Primary | ICD-10-CM

## 2023-10-24 DIAGNOSIS — Z95.2 HISTORY OF HEART VALVE REPLACEMENT: Primary | ICD-10-CM

## 2023-10-24 PROCEDURE — G0008 ADMIN INFLUENZA VIRUS VAC: HCPCS

## 2023-10-24 PROCEDURE — 96372 THER/PROPH/DIAG INJ SC/IM: CPT

## 2023-10-24 PROCEDURE — 90662 IIV NO PRSV INCREASED AG IM: CPT

## 2023-10-24 RX ADMIN — CYANOCOBALAMIN 1000 MCG: 1000 INJECTION, SOLUTION INTRAMUSCULAR; SUBCUTANEOUS at 10:59

## 2023-11-07 ENCOUNTER — TELEPHONE (OUTPATIENT)
Dept: DERMATOLOGY | Facility: CLINIC | Age: 78
End: 2023-11-07

## 2023-11-07 NOTE — TELEPHONE ENCOUNTER
Patient returned the call. She is already scheduled for 1/4/24 but was looking for a sooner apt as she has a SOC. I advised I would leave her on the wait list and advise the office of this.

## 2023-11-07 NOTE — TELEPHONE ENCOUNTER
Lvm to pt in regards of rescheduling their appt with Dr. Dakota Chanel. Pt can be scheduled for soonest available in 2024, included cb number in message.

## 2023-11-08 ENCOUNTER — OFFICE VISIT (OUTPATIENT)
Dept: CARDIOLOGY CLINIC | Facility: CLINIC | Age: 78
End: 2023-11-08
Payer: COMMERCIAL

## 2023-11-08 VITALS
HEART RATE: 68 BPM | SYSTOLIC BLOOD PRESSURE: 132 MMHG | WEIGHT: 180 LBS | BODY MASS INDEX: 26.66 KG/M2 | OXYGEN SATURATION: 95 % | HEIGHT: 69 IN | RESPIRATION RATE: 16 BRPM | DIASTOLIC BLOOD PRESSURE: 80 MMHG

## 2023-11-08 DIAGNOSIS — Z95.2 HISTORY OF HEART VALVE REPLACEMENT: ICD-10-CM

## 2023-11-08 DIAGNOSIS — I10 ESSENTIAL HYPERTENSION: ICD-10-CM

## 2023-11-08 DIAGNOSIS — I48.20 CHRONIC ATRIAL FIBRILLATION (HCC): Primary | ICD-10-CM

## 2023-11-08 DIAGNOSIS — Z79.01 CHRONIC ANTICOAGULATION: ICD-10-CM

## 2023-11-08 DIAGNOSIS — I05.9 MITRAL VALVE DISORDER: ICD-10-CM

## 2023-11-08 PROCEDURE — 99214 OFFICE O/P EST MOD 30 MIN: CPT | Performed by: INTERNAL MEDICINE

## 2023-11-08 RX ORDER — WARFARIN SODIUM 2 MG/1
TABLET ORAL
Qty: 90 TABLET | Refills: 3 | Status: SHIPPED | OUTPATIENT
Start: 2023-11-08

## 2023-11-08 NOTE — TELEPHONE ENCOUNTER
Name from pharmacy: WARFARIN SODIUM 2 MG TABLET         Will file in chart as: warfarin (COUMADIN) 2 mg tablet    Sig: TAKE 1 TABLET BY MOUTH EVERY DAY    Disp: 90 tablet    Refills: 1    Start: 11/8/2023    Class: Normal    Non-formulary For: History of heart valve replacement    To pharmacy: DX Code Needed  . Last ordered: 10 months ago (12/27/2022) by Cassius Rhodes MD    Last refill: 8/14/2023    Rx #: [id number]    Hematology:  Anticoagulants - warfarin [id number]/2023 09:53 AM   Protocol Details INR within 30 days    Valid encounter within last 6 months      To be filled at: [facility]/pharmacy #[id number] - [address] [place] [address]      Please review and confirm dosage. Thanks!

## 2023-11-08 NOTE — PROGRESS NOTES
[facility] [place] [id number] [id number]  Cardiology Follow Up    Briana Castillo  1945  [id number]      1. Chronic atrial fibrillation ([address])        2. Chronic anticoagulation        3. Essential hypertension        4. Mitral valve disorder            Chief Complaint   Patient presents with    Follow-up       Interval History: Patient presents for follow-up visit. Patient does have history of prosthetic mechanical mitral valve replacement on anticoagulation. Patient also has history of chronic atrial fibrillation on rate control. Patient denies any chest pain. Patient does have some shortness of breath with exertion. She denies any history of orthopnea PND. She states that she has been compliant with all her present medications. Patient recently had B12 as well as flu shot through her primary care physician. Patient denies any bleeding issues.     Patient Active Problem List   Diagnosis    Atrial fibrillation ([address])    Hyperlipidemia    History of heart valve replacement    Benign hypertension    Bradycardia    Essential tremor    Chronic anticoagulation    Amaurosis fugax, both eyes    Mitral valve disorder    Osteoporosis    Peripheral neuropathy    Chronic right-sided low back pain without sciatica    Asymptomatic stenosis of right vertebral artery    Subclinical hypothyroidism    Irritable bowel syndrome with constipation    Inflammatory polyneuropathy, unspecified (HCC)    Cardiomyopathy, unspecified type ([address])    Venous insufficiency of both lower extremities    Venous stasis dermatitis of both lower extremities    Encounter for subsequent annual wellness visit in Medicare patient    Impaired fasting glucose    B12 deficiency    Seborrheic keratosis     Past Medical History:   Diagnosis Date    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    Atrial fibrillation ([address])     Coronary artery disease 1996    Hashimoto's thyroiditis     Hyperlipidemia Hypertension     IBS (irritable bowel syndrome)     Kidney stone     Microhematuria     Migraine     Mitral valve disorder     Nasal congestion     Non-toxic uninodular goiter     Nosebleed     Raynaud disease      Social History     Socioeconomic History    Marital status: Single     Spouse name: Not on file    Number of children: Not on file    Years of education: Not on file    Highest education level: Not on file   Occupational History    Occupation: Retired   Tobacco Use    Smoking status: Never     Passive exposure: Past    Smokeless tobacco: Never   Vaping Use    Vaping Use: Never used   Substance and Sexual Activity    Alcohol use: Yes     Comment: Occassionally--wine    Drug use: No    Sexual activity: Not Currently   Other Topics Concern    Not on file   Social History Narrative    Not on file     Social Determinants of Health     Financial Resource Strain: Low Risk  (9/18/2023)    Overall Financial Resource Strain (CARDIA)     Difficulty of Paying Living Expenses: Not hard at all   Food Insecurity: Not on file   Transportation Needs: No Transportation Needs (9/18/2023)    PRAPARE - Transportation     Lack of Transportation (Medical): No     Lack of Transportation (Non-Medical):  No   Physical Activity: Insufficiently Active (12/31/2020)    Exercise Vital Sign     Days of Exercise per Week: 4 days     Minutes of Exercise per Session: 30 min   Stress: No Stress Concern Present (3/20/2023)    [address] [facility]     Feeling of Stress : Not at all   Social Connections: Not on file   Intimate Partner Violence: Not on file   Housing Stability: Not on file      Family History   Problem Relation Age of Onset    Hypertension Mother     Arthritis Mother     Coronary artery disease Father     Migraines Father     Leukemia Brother     Migraines Brother     Hypertension Brother     Coronary artery disease Paternal Grandfather     Leukemia Maternal Aunt Multiple myeloma Maternal Aunt     Thyroid disease Other     Hypertension Maternal Grandmother     No Known Problems Maternal Aunt     No Known Problems Maternal Aunt     No Known Problems Paternal Aunt     No Known Problems Paternal Aunt     No Known Problems Paternal Aunt     No Known Problems Paternal Aunt     Breast cancer Neg Hx      Past Surgical History:   Procedure Laterality Date    APPENDECTOMY      BREAST CYST EXCISION Right 01/01/1977    CARDIAC SURGERY      CARDIAC VALVE REPLACEMENT  1996    COLONOSCOPY  08/08/2011    TONSILLECTOMY      VALVE REPLACEMENT  01/04/2017    mitral valve replacement, 6/5/14       Current Outpatient Medications:     amLODIPine (NORVASC) 5 mg tablet, TAKE 1 TABLET BY MOUTH EVERY DAY IN THE MORNING, Disp: 90 tablet, Rfl: 3    aspirin 81 mg chewable tablet, Chew daily, Disp: , Rfl:     butalbital-acetaminophen-caffeine (FIORICET,ESGIC) -40 mg per tablet, Take 1 tablet by mouth every 4 (four) hours as needed for headaches, Disp: 30 tablet, Rfl: 0    Calcium Carbonate 1500 (600 Ca) MG TABS, Take by mouth daily , Disp: , Rfl:     dicyclomine (BENTYL) 10 mg capsule, TAKE 1 CAPSULE BY MOUTH 3 TIMES A DAY BEFORE MEALS, Disp: 60 capsule, Rfl: 3    estrogens, conjugated (Premarin) vaginal cream, Insert 1 g into the vagina 3 (three) times a week, Disp: 30 g, Rfl: 3    fluticasone (FLONASE) 50 mcg/act nasal spray, SPRAY 1 SPRAY INTO EACH NOSTRIL TWICE A DAY, Disp: , Rfl:     gabapentin (NEURONTIN) 100 mg capsule, TAKE 1 CAPSULE BY MOUTH TWICE A DAY, Disp: 180 capsule, Rfl: 3    gabapentin (NEURONTIN) 300 mg capsule, TAKE A 300MG + 100MG AT BEDTIME, Disp: 90 capsule, Rfl: 3    loratadine (CLARITIN) 10 mg tablet, Take 10 mg by mouth as needed  , Disp: , Rfl:     losartan (COZAAR) 100 MG tablet, TAKE 1 TABLET BY MOUTH EVERY DAY, Disp: 90 tablet, Rfl: 3    Misc Natural Products (OSTEO BI-FLEX TRIPLE STRENGTH) TABS, Take by mouth daily , Disp: , Rfl:     propranolol (INDERAL) 20 mg tablet, Take 1 tablet (20 mg total) by mouth every 12 (twelve) hours, Disp: 180 tablet, Rfl: 3    simvastatin (ZOCOR) 10 mg tablet, TAKE 1 TABLET BY MOUTH EVERY DAY, Disp: 90 tablet, Rfl: 3    solifenacin (VESICARE) 10 MG tablet, Take 1 tablet (10 mg total) by mouth daily, Disp: 90 tablet, Rfl: 3    warfarin (COUMADIN) 1 mg tablet, TAKE 1 TABLET BY MOUTH EVERY DAY, Disp: 90 tablet, Rfl: 3    warfarin (COUMADIN) 2 mg tablet, Take 1 tablet daily as directed, Disp: 90 tablet, Rfl: 3    warfarin (COUMADIN) 3 mg tablet, TAKE 1 TABLET BY MOUTH EVERY DAY, Disp: 90 tablet, Rfl: 1    Current Facility-Administered Medications:     cyanocobalamin injection 1,000 mcg, 1,000 mcg, Intramuscular, Q30 Days, HANNY Anne, 1,000 mcg at 05/17/23 1305    cyanocobalamin injection 1,000 mcg, 1,000 mcg, Intramuscular, Q30 Days, HANNY Anne, 1,000 mcg at 05/17/23 1303    cyanocobalamin injection 1,000 mcg, 1,000 mcg, Intramuscular, Q30 Days, Katy Watkins MD, 1,000 mcg at 10/24/23 1059  Allergies   Allergen Reactions    Enablex [Darifenacin] Other (See Comments)     Sweating, HA, urinary hesitancy, flushing of face    Fentanyl Nausea Only and Vomiting    Hyoscyamine Palpitations    Dicyclomine Other (See Comments)     Jittery, disorientation, light HAs    Hydromorphone Other (See Comments)     Per pt does not remember the type or severity level    Midazolam Other (See Comments)     Per pt does not remember the type or severity level    Sulfamethoxazole-Trimethoprim Nausea Only    Venlafaxine Other (See Comments)     Urinary urgency, polyuria    Codeine Polt-Chlorphen Polt Er Headache    Ultracet [Tramadol-Acetaminophen] Nausea Only and Vomiting       Labs:   Ancillary Orders on 10/20/2023   Component Date Value    Protime 10/24/2023 29.8 (H)     INR 10/24/2023 2.72 (H)    Telephone on 10/11/2023   Component Date Value    Protime 10/11/2023 29.6 (H)     INR 10/11/2023 2.70 (H)    Lab on 09/21/2023   Component Date Value Protime 09/21/2023 34.5 (H)     INR 09/21/2023 3.29 (H)      Imaging: No results found. Review of Systems:  Review of Systems  REVIEW OF SYSTEMS:  Constitutional:  Denies fever or chills   Eyes:  Denies change in visual acuity   HENT:  Denies nasal congestion or sore throat   Respiratory:  shortness of breath   Cardiovascular:  Denies chest pain or edema   GI:  Denies abdominal pain, nausea, vomiting, bloody stools or diarrhea   :  Denies dysuria, frequency, difficulty in micturition and nocturia  Musculoskeletal:  Denies back pain or joint pain   Neurologic:  Denies headache, focal weakness or sensory changes   Endocrine:  Denies polyuria or polydipsia   Lymphatic:  Denies swollen glands   Psychiatric:  Denies depression or anxiety    Physical Exam:    /80 (BP Location: Left arm, Patient Position: Sitting, Cuff Size: Standard)   Pulse 68   Resp 16   Ht 5' 9" (1.753 m)   Wt 81.6 kg (180 lb)   LMP  (LMP Unknown)   SpO2 95%   BMI 26.58 kg/m²     Physical Exam  PHYSICAL EXAM:  General:  Patient is not in acute distress   Head: Normocephalic, Atraumatic. HEENT:  Both pupils normal-size atraumatic, normocephalic, nonicteric  Neck:  JVP not raised. Trachea central. No carotid bruit  Respiratory:  normal breath sounds no crackles. no rhonchi  Cardiovascular: Irregularly irregular with heart sounds consistent with prosthetic mechanical valve  GI:  Abdomen soft nontender. No organomegaly. Lymphatic:  No cervical or inguinal lymphadenopathy  Neurologic:  Patient is awake alert, oriented . Grossly nonfocal  Extremities trace edema. Echocardiogram this year      Left Ventricle: Left ventricular cavity size is normal. Wall thickness is normal. The left ventricular ejection fraction is 45%. Overall no major changes compared to previous echocardiogram. There is mild global hypokinesis. Right Ventricle: Right ventricular cavity size is mildly dilated. Left Atrium: The atrium is severely dilated. Right Atrium: The atrium is moderately dilated. Mitral Valve: There is a mechanical mitral valve prosthesis. It appears to be functioning normally. Tricuspid Valve: There is moderate regurgitation. Estimated RVSP 45 mm Hg. Discussion/Summary:    Patient with multiple medical problems who seems to be doing reasonably well from cardiac standpoint. Previous studies reviewed with patient. Medications reviewed and possible side effects discussed. concepts of cardiovascular disease , signs and symptoms of heart disease. Dietary and risk factor modification reinforced. All questions answered. Safety measures reviewed. Patient advised to report any problems prompting medical attention. Patient understands risks and benefits of anticoagulation to prevent thrombotic risk from prosthetic mechanical mitral valve. Target INR 2.5-3.5. Results of echocardiogram reviewed at length with patient. Patient does have mild cardiomyopathy with ejection fraction of 45%. Pharmacological stress test did not show any evidence of ischemia. Patient will be scheduled for Holter monitor to reassess heart rate response with A-fib. Symptoms to watch out from cardiac standpoint which would indicate the need for further cardiac evaluation also discussed with patient. Patient had a few questions which were answered. Follow-up in 6 months or earlier as needed. Patient is agreeable with the plan of care.

## 2023-11-14 ENCOUNTER — APPOINTMENT (OUTPATIENT)
Dept: LAB | Facility: HOSPITAL | Age: 78
End: 2023-11-14
Attending: INTERNAL MEDICINE
Payer: COMMERCIAL

## 2023-11-14 ENCOUNTER — ANTICOAG VISIT (OUTPATIENT)
Dept: CARDIOLOGY CLINIC | Facility: CLINIC | Age: 78
End: 2023-11-14

## 2023-11-14 DIAGNOSIS — Z95.2 HISTORY OF HEART VALVE REPLACEMENT: Primary | ICD-10-CM

## 2023-11-17 PROBLEM — Z00.00 ENCOUNTER FOR SUBSEQUENT ANNUAL WELLNESS VISIT IN MEDICARE PATIENT: Status: RESOLVED | Noted: 2023-09-18 | Resolved: 2023-11-17

## 2023-11-22 DIAGNOSIS — G62.9 NEUROPATHY: ICD-10-CM

## 2023-11-22 RX ORDER — GABAPENTIN 300 MG/1
CAPSULE ORAL
Qty: 90 CAPSULE | Refills: 3 | Status: SHIPPED | OUTPATIENT
Start: 2023-11-22

## 2023-11-23 DIAGNOSIS — E78.2 COMBINED HYPERLIPIDEMIA: ICD-10-CM

## 2023-11-24 RX ORDER — SIMVASTATIN 10 MG
TABLET ORAL
Qty: 90 TABLET | Refills: 3 | Status: SHIPPED | OUTPATIENT
Start: 2023-11-24

## 2023-12-01 ENCOUNTER — HOSPITAL ENCOUNTER (OUTPATIENT)
Dept: ULTRASOUND IMAGING | Facility: HOSPITAL | Age: 78
End: 2023-12-01
Payer: COMMERCIAL

## 2023-12-01 DIAGNOSIS — E04.1 LEFT THYROID NODULE: ICD-10-CM

## 2023-12-01 PROCEDURE — 76536 US EXAM OF HEAD AND NECK: CPT

## 2023-12-04 ENCOUNTER — HOSPITAL ENCOUNTER (OUTPATIENT)
Dept: NON INVASIVE DIAGNOSTICS | Facility: CLINIC | Age: 78
Discharge: HOME | End: 2023-12-04
Payer: COMMERCIAL

## 2023-12-04 DIAGNOSIS — I48.20 CHRONIC ATRIAL FIBRILLATION (HCC): ICD-10-CM

## 2023-12-04 PROCEDURE — 93226 XTRNL ECG REC<48 HR SCAN A/R: CPT

## 2023-12-04 PROCEDURE — 93225 XTRNL ECG REC<48 HRS REC: CPT

## 2023-12-11 PROCEDURE — 93227 XTRNL ECG REC<48 HR R&I: CPT | Performed by: INTERNAL MEDICINE

## 2023-12-12 ENCOUNTER — TELEPHONE (OUTPATIENT)
Dept: CARDIOLOGY CLINIC | Facility: CLINIC | Age: 78
End: 2023-12-12

## 2023-12-12 DIAGNOSIS — I48.20 CHRONIC ATRIAL FIBRILLATION (HCC): ICD-10-CM

## 2023-12-12 DIAGNOSIS — G25.0 ESSENTIAL TREMOR: ICD-10-CM

## 2023-12-12 RX ORDER — PROPRANOLOL HYDROCHLORIDE 10 MG/1
10 TABLET ORAL EVERY 12 HOURS
Qty: 180 TABLET | Refills: 3 | Status: SHIPPED | OUTPATIENT
Start: 2023-12-12

## 2023-12-12 NOTE — TELEPHONE ENCOUNTER
----- Message from Cathi Frausto MD sent at 12/12/2023  8:52 AM EST -----  Please call the patient. Holter monitor showed atrial fibrillation predominantly with periods  of slow heart rate. Patient should decrease the dosage of propranolol further to 10 mg twice a day. She may need a new prescription. Thank you.

## 2023-12-12 NOTE — TELEPHONE ENCOUNTER
S/w pt.  Advised of holter monitor results and medication change per Dr. Alayna Esparza. Pt verbalized understanding

## 2023-12-12 NOTE — TELEPHONE ENCOUNTER
----- Message from Kelvin Merchant MD sent at 12/12/2023  8:52 AM EST -----  Please call the patient. Holter monitor showed atrial fibrillation predominantly with periods  of slow heart rate. Patient should decrease the dosage of propranolol further to 10 mg twice a day. She may need a new prescription. Thank you.

## 2023-12-14 ENCOUNTER — ANTICOAG VISIT (OUTPATIENT)
Dept: CARDIOLOGY CLINIC | Facility: CLINIC | Age: 78
End: 2023-12-14

## 2023-12-14 ENCOUNTER — APPOINTMENT (OUTPATIENT)
Dept: LAB | Facility: HOSPITAL | Age: 78
End: 2023-12-14
Payer: COMMERCIAL

## 2023-12-14 DIAGNOSIS — Z95.2 HISTORY OF HEART VALVE REPLACEMENT: Primary | ICD-10-CM

## 2023-12-18 NOTE — PROGRESS NOTES
S/w pt  Instructed to stay on the same dose and retest in one month  Pt verbally understood  That inpatient services furnished since the previous certification or recertification were, and continue to be required: for treatment that could reasonably be expected to improve the patient's condition; or, for diagnostic study;    That the hospital records show that the services furnished were: intensive treatment services; admission and related services necessary for diagnostic study or equivalent services;    That the patient continues to need, on a daily basis active inpatient treatment furnished directly by or requiring the supervision of inpatient psychiatric facility personnel.

## 2024-01-02 DIAGNOSIS — I10 HYPERTENSION, ESSENTIAL: ICD-10-CM

## 2024-01-02 RX ORDER — AMLODIPINE BESYLATE 5 MG/1
5 TABLET ORAL EVERY MORNING
Qty: 90 TABLET | Refills: 3 | Status: SHIPPED | OUTPATIENT
Start: 2024-01-02

## 2024-01-04 ENCOUNTER — OFFICE VISIT (OUTPATIENT)
Dept: DERMATOLOGY | Facility: CLINIC | Age: 79
End: 2024-01-04
Payer: COMMERCIAL

## 2024-01-04 VITALS — BODY MASS INDEX: 26.66 KG/M2 | HEIGHT: 69 IN | WEIGHT: 180 LBS | TEMPERATURE: 98.3 F

## 2024-01-04 DIAGNOSIS — L85.3 XEROSIS OF SKIN: ICD-10-CM

## 2024-01-04 DIAGNOSIS — D18.01 CHERRY ANGIOMA: ICD-10-CM

## 2024-01-04 DIAGNOSIS — D22.9 MULTIPLE MELANOCYTIC NEVI: Primary | ICD-10-CM

## 2024-01-04 DIAGNOSIS — L81.4 LENTIGO: ICD-10-CM

## 2024-01-04 DIAGNOSIS — L82.0 SEBORRHEIC KERATOSIS, INFLAMED: ICD-10-CM

## 2024-01-04 PROCEDURE — 17110 DESTRUCTION B9 LES UP TO 14: CPT | Performed by: DERMATOLOGY

## 2024-01-04 PROCEDURE — 99214 OFFICE O/P EST MOD 30 MIN: CPT | Performed by: DERMATOLOGY

## 2024-01-04 NOTE — PROGRESS NOTES
"[facility] Note     Patient Name: Maria M Linda  Encounter Date: 01/04/2024    Have you been cared for by a [facility] Dermatologist in the last 3 years and, if so, which description applies to you?    Yes.  I have been here within the last 3 years, and my medical history has NOT changed since that time.  I am FEMALE/of child-bearing potential.    REVIEW OF SYSTEMS:  Have you recently had or currently have any of the following? No changes in my recent health.   PAST MEDICAL HISTORY:  Have you personally ever had or currently have any of the following?  If \"YES,\" then please provide more detail. No changes in my medical history.   HISTORY OF IMMUNOSUPPRESSION: Do you have a history of any of the following:  Systemic Immunosuppression such as Diabetes, Biologic or Immunotherapy, Chemotherapy, Organ Transplantation, Bone Marrow Transplantation?  No     Answering \"YES\" requires the addition of the dotphrase \"IMMUNOSUPPRESSED\" as the first diagnosis of the patient's visit.   FAMILY HISTORY:  Any \"first degree relatives\" (parent, brother, sister, or child) with the following?    No changes in my family's known health.   PATIENT EXPERIENCE:    Do you want the Dermatologist to perform a COMPLETE skin exam today including a clinical examination under the \"bra and underwear\" areas?  NO breast, feet, groin , and buttocks not examined.   If necessary, do we have your permission to call and leave a detailed message on your Preferred Phone number that includes your specific medical information?  Yes      Allergies   Allergen Reactions    Enablex [Darifenacin] Other (See Comments)     Sweating, HA, urinary hesitancy, flushing of face    Fentanyl Nausea Only and Vomiting    Hyoscyamine Palpitations    Dicyclomine Other (See Comments)     Jittery, disorientation, light HAs    Hydromorphone Other (See Comments)     Per pt does not remember the type or severity level    Midazolam Other (See Comments)     Per pt " does not remember the type or severity level    Sulfamethoxazole-Trimethoprim Nausea Only    Venlafaxine Other (See Comments)     Urinary urgency, polyuria    Codeine Polt-Chlorphen Polt Er Headache    Ultracet [Tramadol-Acetaminophen] Nausea Only and Vomiting      Current Outpatient Medications:     amLODIPine (NORVASC) 5 mg tablet, Take 1 tablet (5 mg total) by mouth every morning, Disp: 90 tablet, Rfl: 3    aspirin 81 mg chewable tablet, Chew daily, Disp: , Rfl:     butalbital-acetaminophen-caffeine (FIORICET,ESGIC) -40 mg per tablet, Take 1 tablet by mouth every 4 (four) hours as needed for headaches, Disp: 30 tablet, Rfl: 0    Calcium Carbonate 1500 (600 Ca) MG TABS, Take by mouth daily , Disp: , Rfl:     dicyclomine (BENTYL) 10 mg capsule, TAKE 1 CAPSULE BY MOUTH 3 TIMES A DAY BEFORE MEALS, Disp: 60 capsule, Rfl: 3    estrogens, conjugated (Premarin) vaginal cream, Insert 1 g into the vagina 3 (three) times a week, Disp: 30 g, Rfl: 3    gabapentin (NEURONTIN) 100 mg capsule, TAKE 1 CAPSULE BY MOUTH TWICE A DAY, Disp: 180 capsule, Rfl: 3    gabapentin (NEURONTIN) 300 mg capsule, Take 300 mg capsule +100 mg capsule to equal 400 mg at bedtime by mouth, Disp: 90 capsule, Rfl: 3    loratadine (CLARITIN) 10 mg tablet, Take 10 mg by mouth as needed  , Disp: , Rfl:     losartan (COZAAR) 100 MG tablet, TAKE 1 TABLET BY MOUTH EVERY DAY, Disp: 90 tablet, Rfl: 3    Misc Natural Products (OSTEO BI-FLEX TRIPLE STRENGTH) TABS, Take by mouth daily , Disp: , Rfl:     propranolol (INDERAL) 10 mg tablet, Take 1 tablet (10 mg total) by mouth every 12 (twelve) hours, Disp: 180 tablet, Rfl: 3    simvastatin (ZOCOR) 10 mg tablet, TAKE 1 TABLET BY MOUTH EVERY DAY, Disp: 90 tablet, Rfl: 3    solifenacin (VESICARE) 10 MG tablet, Take 1 tablet (10 mg total) by mouth daily, Disp: 90 tablet, Rfl: 3    warfarin (COUMADIN) 1 mg tablet, TAKE 1 TABLET BY MOUTH EVERY DAY, Disp: 90 tablet, Rfl: 3    warfarin (COUMADIN) 2 mg tablet, Take 1  "tablet daily as directed, Disp: 90 tablet, Rfl: 3    warfarin (COUMADIN) 3 mg tablet, TAKE 1 TABLET BY MOUTH EVERY DAY, Disp: 90 tablet, Rfl: 1    fluticasone (FLONASE) 50 mcg/act nasal spray, SPRAY 1 SPRAY INTO EACH NOSTRIL TWICE A DAY, Disp: , Rfl:     Current Facility-Administered Medications:     cyanocobalamin injection 1,000 mcg, 1,000 mcg, Intramuscular, Q30 Days, AMANDA GonzalezNP, 1,000 mcg at 05/17/23 1305    cyanocobalamin injection 1,000 mcg, 1,000 mcg, Intramuscular, Q30 Days, Kamala Erica CRNP, 1,000 mcg at 05/17/23 1303    cyanocobalamin injection 1,000 mcg, 1,000 mcg, Intramuscular, Q30 Days, Chase Deshpande MD, 1,000 mcg at 10/24/23 1059          Whom besides the patient is providing clinical information about today's encounter?   NO ADDITIONAL HISTORIAN (patient alone provided history)    Physical Exam and Assessment/Plan by Diagnosis:    Patient here for skin exam, patient has spot of concern on left hand.     MELANOCYTIC NEVI (\"Moles\")    Physical Exam:  Anatomic Location Affected:   Mostly on sun-exposed areas of the trunk and extremities  Morphological Description:  Scattered, 1-4mm round to ovoid, symmetrical-appearing, even bordered, skin colored to dark brown macules/papules, mostly in sun-exposed areas  Pertinent Positives:  Pertinent Negatives:    Additional History of Present Condition:      Assessment and Plan:  Based on a thorough discussion of this condition and the management approach to it (including a comprehensive discussion of the known risks, side effects and potential benefits of treatment), the patient (family) agrees to implement the following specific plan:  When outside we recommend using a wide brim hat, sunglasses, long sleeve and pants, sunscreen with SPF 30+ with reapplication every 2 hours, or SPF specific clothing   Benign, reassured  Annual skin check     Melanocytic Nevi  Melanocytic nevi (\"moles\") are tan or brown, raised or flat areas of the skin which have an " "increased number of melanocytes. Melanocytes are the cells in our body which make pigment and account for skin color.    Some moles are present at birth (I.e., \"congenital nevi\"), while others come up later in life (i.e., \"acquired nevi\").  The sun can stimulate the body to make more moles.  Sunburns are not the only thing that triggers more moles.  Chronic sun exposure can do it too.     Clinically distinguishing a healthy mole from melanoma may be difficult, even for experienced dermatologists. The \"ABCDE's\" of moles have been suggested as a means of helping to alert a person to a suspicious mole and the possible increased risk of melanoma.  The suggestions for raising alert are as follows:    Asymmetry: Healthy moles tend to be symmetric, while melanomas are often asymmetric.  Asymmetry means if you draw a line through the mole, the two halves do not match in color, size, shape, or surface texture. Asymmetry can be a result of rapid enlargement of a mole, the development of a raised area on a previously flat lesion, scaling, ulceration, bleeding or scabbing within the mole.  Any mole that starts to demonstrate \"asymmetry\" should be examined promptly by a board certified dermatologist.     Border: Healthy moles tend to have discrete, even borders.  The border of a melanoma often blends into the normal skin and does not sharply delineate the mole from normal skin.  Any mole that starts to demonstrate \"uneven borders\" should be examined promptly by a board certified dermatologist.     Color: Healthy moles tend to be one color throughout.  Melanomas tend to be made up of different colors ranging from dark black, blue, white, or red.  Any mole that demonstrates a color change should be examined promptly by a board certified dermatologist.     Diameter: Healthy moles tend to be smaller than 0.6 cm in size; an exception are \"congenital nevi\" that can be larger.  Melanomas tend to grow and can often be greater than 0.6 " "cm (1/4 of an inch, or the size of a pencil eraser). This is only a guideline, and many normal moles may be larger than 0.6 cm without being unhealthy.  Any mole that starts to change in size (small to bigger or bigger to smaller) should be examined promptly by a board certified dermatologist.     Evolving: Healthy moles tend to \"stay the same.\"  Melanomas may often show signs of change or evolution such as a change in size, shape, color, or elevation.  Any mole that starts to itch, bleed, crust, burn, hurt, or ulcerate or demonstrate a change or evolution should be examined promptly by a board certified dermatologist.        LENTIGO    Physical Exam:  Anatomic Location Affected:  trunk, arms  Morphological Description:  Light brown macules  Pertinent Positives:  Pertinent Negatives:    Additional History of Present Condition:      Assessment and Plan:  Based on a thorough discussion of this condition and the management approach to it (including a comprehensive discussion of the known risks, side effects and potential benefits of treatment), the patient (family) agrees to implement the following specific plan:  When outside we recommend using a wide brim hat, sunglasses, long sleeve and pants, sunscreen with SPF 30+ with reapplication every 2 hours, or SPF specific clothing       What is a lentigo?  A lentigo is a pigmented flat or slightly raised lesion with a clearly defined edge. Unlike an ephelis (freckle), it does not fade in the winter months. There are several kinds of lentigo.  The name lentigo originally referred to its appearance resembling a small lentil. The plural of lentigo is lentigines, although “lentigos” is also in common use.    Who gets lentigines?  Lentigines can affect males and females of all ages and races. Solar lentigines are especially prevalent in fair skinned adults. Lentigines associated with syndromes are present at birth or arise during childhood.    What causes lentigines?  Common " "forms of lentigo are due to exposure to ultraviolet radiation:  Sun damage including sunburn   Indoor tanning   Phototherapy, especially photochemotherapy (PUVA)    Ionizing radiation, eg radiation therapy, can also cause lentigines.  Several familial syndromes associated with widespread lentigines originate from mutations in Brett-MAP kinase, mTOR signaling and PTEN pathways.    What is the treatment for lentigines?  Most lentigines are left alone. Attempts to lighten them may not be successful. The following approaches are used:  SPF 50+ broad-spectrum sunscreen   Hydroquinone bleaching cream   Alpha hydroxy acids   Vitamin C   Retinoids   Azelaic acid   Chemical peels  Individual lesions can be permanently removed using:  Cryotherapy   Intense pulsed light   Pigment lasers    How can lentigines be prevented?  Lentigines associated with exposure ultraviolet radiation can be prevented by very careful sun protection. Clothing is more successful at preventing new lentigines than are sunscreens.    What is the outlook for lentigines?  Lentigines usually persist. They may increase in number with age and sun exposure. Some in sun-protected sites may fade and disappear.    GOETZ ANGIOMAS    Physical Exam:  Anatomic Location Affected:  trunk  Morphological Description:  Scattered cherry red, 1-4 mm papules.  Pertinent Positives:  Pertinent Negatives:    Additional History of Present Condition:      Assessment and Plan:  Based on a thorough discussion of this condition and the management approach to it (including a comprehensive discussion of the known risks, side effects and potential benefits of treatment), the patient (family) agrees to implement the following specific plan:  Monitor for changes  Benign, reassured      Assessment and Plan:    Cherry angioma, also known as Krishnamurthy de Carmelo spots, are benign vascular skin lesions. A \"cherry angioma\" is a firm red, blue or purple papule, 0.1-1 cm in diameter. When " "thrombosed, they can appear black in colour until evaluated with a dermatoscope when the red or purple colour is more easily seen. Cherry angioma may develop on any part of the body but most often appear on the scalp, face, lips and trunk.  An angioma is due to proliferating endothelial cells; these are the cells that line the inside of a blood vessel.    Angiomas can arise in early life or later in life; the most common type of angioma is a cherry angioma.  Cherry angiomas are very common in males and females of any age or race. They are more noticeable in white skin than in skin of colour. They markedly increase in number from about the age of 40. There may be a family history of similar lesions. Eruptive cherry angiomas have been rarely reported to be associated with internal malignancy. The cause of angiomas is unknown. Genetic analysis of cherry angiomas has shown that they frequently carry specific somatic missense mutations in the GNAQ and GNA11 (Q209H) genes, which are involved in other vascular and melanocytic proliferations.      SEBORRHEIC KERATOSIS; INFLAMED    Physical Exam:  Anatomic Location Affected:  trunk, underneath breast   Morphological Description:  Flat and raised, waxy, smooth to warty textured, yellow to brownish-grey to dark brown to blackish, discrete, \"stuck-on\" appearing papules.  Pertinent Positives:  Pertinent Negatives:    Additional History of Present Condition:  Patient states lesions under the bra become very irritating due to rubbing against bra.     Assessment and Plan:  Based on a thorough discussion of this condition and the management approach to it (including a comprehensive discussion of the known risks, side effects and potential benefits of treatment), the patient (family) agrees to implement the following specific plan:  Monitor for changes  Benign, reassured      Seborrheic Keratosis  A seborrheic keratosis is a harmless warty spot that appears during adult life as a " "common sign of skin aging.  Seborrheic keratoses can arise on any area of skin, covered or uncovered, with the usual exception of the palms and soles. They do not arise from mucous membranes. Seborrheic keratoses can have highly variable appearance.      Seborrheic keratoses are extremely common. It has been estimated that over 90% of adults over the age of 60 years have one or more of them. They occur in males and females of all races, typically beginning to erupt in the 30s or 40s. They are uncommon under the age of 20 years.  The precise cause of seborrhoeic keratoses is not known.  Seborrhoeic keratoses are considered degenerative in nature. As time goes by, seborrheic keratoses tend to become more numerous. Some people inherit a tendency to develop a very large number of them; some people may have hundreds of them.      There is no easy way to remove multiple lesions on a single occasion.  Unless a specific lesion is \"inflamed\" and is causing pain or stinging/burning or is bleeding, most insurance companies do not authorize treatment.    PROCEDURE:  DESTRUCTION OF BENIGN LESIONS WITH CRYOTHERAPY  After a thorough discussion of treatment options and risk/benefits/alternatives (including but not limited to local pain, scarring, dyspigmentation, blistering, and possible superinfection), verbal and written consent were obtained and the aforementioned lesions were treated on with cryotherapy using liquid nitrogen x 1 cycle for 5-10 seconds.    TOTAL NUMBER of 6 lesions were treated today on the ANATOMIC LOCATION: underneath breast .    The patient tolerated the procedure well, and after-care instructions were provided.       XEROSIS (\"DRY SKIN\")    Physical Exam:  Anatomic Location Affected:  diffuse  Morphological Description:  xerosis  Pertinent Positives:  Pertinent Negatives:    Additional History of Present Condition:      Assessment and Plan:  Based on a thorough discussion of this condition and the management " approach to it (including a comprehensive discussion of the known risks, side effects and potential benefits of treatment), the patient (family) agrees to implement the following specific plan:  Use moisturizer like Eucerin,Cerave or Aveeno Cream 3 times a day for the dry skin            Dry skin refers to skin that feels dry to touch. Dry skin has a dull surface with a rough, scaly quality. The skin is less pliable and cracked. When dryness is severe, the skin may become inflamed and fissured.  Although any body site can be dry, dry skin tends to affect the shins more than any other site.    Dry skin is lacking moisture in the outer horny cell layer (stratum corneum) and this results in cracks in the skin surface.  Dry skin is also called xerosis, xeroderma or asteatosis (lack of fat).  It can affect males and females of all ages. There is some racial variability in water and lipid content of the skin.  Dry skin that starts in early childhood may be one of about 20 types of ichthyosis (fish-scale skin). There is often a family history of dry skin.   Dry skin is commonly seen in people with atopic dermatitis.  Nearly everyone > 60 years has dry skin.    Dry skin that begins later may be seen in people with certain diseases and conditions.  Postmenopausal women  Hypothyroidism  Chronic renal disease   Malnutrition and weight loss   Subclinical dermatitis   Treatment with certain drugs such as oral retinoids, diuretics and epidermal growth factor receptor inhibitors      What is the treatment for dry skin?  The mainstay of treatment of dry skin and ichthyosis is moisturisers/emollients. They should be applied liberally and often enough to:  Reduce itch   Improve the barrier function   Prevent entry of irritants, bacteria   Reduce transepidermal water loss.      How can dry skin be prevented?  Eliminate aggravating factors:  Reduce the frequency of bathing.   A humidifier in winter and air conditioner in summer    Compare having a short shower with a prolonged soak in a bath.   Use lukewarm, not hot, water.   Replace standard soap with a substitute such as a synthetic detergent cleanser, water-miscible emollient, bath oil, anti-pruritic tar oil, colloidal oatmeal etc.   Apply an emollient liberally and often, particularly shortly after bathing, and when itchy. The drier the skin, the thicker this should be, especially on the hands.    What is the outlook for dry skin?  A tendency to dry skin may persist life-long, or it may improve once contributing factors are controlled.  Scribe Attestation      I,:  Bhavya Alva MA am acting as a scribe while in the presence of the attending physician.:       I,:  Diane Bhagat MD personally performed the services described in this documentation    as scribed in my presence.:

## 2024-01-04 NOTE — PATIENT INSTRUCTIONS
"MELANOCYTIC NEVI (\"Moles\")        Assessment and Plan:  Based on a thorough discussion of this condition and the management approach to it (including a comprehensive discussion of the known risks, side effects and potential benefits of treatment), the patient (family) agrees to implement the following specific plan:  When outside we recommend using a wide brim hat, sunglasses, long sleeve and pants, sunscreen with SPF 30+ with reapplication every 2 hours, or SPF specific clothing   Benign, reassured  Annual skin check     Melanocytic Nevi  Melanocytic nevi (\"moles\") are tan or brown, raised or flat areas of the skin which have an increased number of melanocytes. Melanocytes are the cells in our body which make pigment and account for skin color.    Some moles are present at birth (I.e., \"congenital nevi\"), while others come up later in life (i.e., \"acquired nevi\").  The sun can stimulate the body to make more moles.  Sunburns are not the only thing that triggers more moles.  Chronic sun exposure can do it too.     Clinically distinguishing a healthy mole from melanoma may be difficult, even for experienced dermatologists. The \"ABCDE's\" of moles have been suggested as a means of helping to alert a person to a suspicious mole and the possible increased risk of melanoma.  The suggestions for raising alert are as follows:    Asymmetry: Healthy moles tend to be symmetric, while melanomas are often asymmetric.  Asymmetry means if you draw a line through the mole, the two halves do not match in color, size, shape, or surface texture. Asymmetry can be a result of rapid enlargement of a mole, the development of a raised area on a previously flat lesion, scaling, ulceration, bleeding or scabbing within the mole.  Any mole that starts to demonstrate \"asymmetry\" should be examined promptly by a board certified dermatologist.     Border: Healthy moles tend to have discrete, even borders.  The border of a melanoma often blends " "into the normal skin and does not sharply delineate the mole from normal skin.  Any mole that starts to demonstrate \"uneven borders\" should be examined promptly by a board certified dermatologist.     Color: Healthy moles tend to be one color throughout.  Melanomas tend to be made up of different colors ranging from dark black, blue, white, or red.  Any mole that demonstrates a color change should be examined promptly by a board certified dermatologist.     Diameter: Healthy moles tend to be smaller than 0.6 cm in size; an exception are \"congenital nevi\" that can be larger.  Melanomas tend to grow and can often be greater than 0.6 cm (1/4 of an inch, or the size of a pencil eraser). This is only a guideline, and many normal moles may be larger than 0.6 cm without being unhealthy.  Any mole that starts to change in size (small to bigger or bigger to smaller) should be examined promptly by a board certified dermatologist.     Evolving: Healthy moles tend to \"stay the same.\"  Melanomas may often show signs of change or evolution such as a change in size, shape, color, or elevation.  Any mole that starts to itch, bleed, crust, burn, hurt, or ulcerate or demonstrate a change or evolution should be examined promptly by a board certified dermatologist.        LENTIGO        Assessment and Plan:  Based on a thorough discussion of this condition and the management approach to it (including a comprehensive discussion of the known risks, side effects and potential benefits of treatment), the patient (family) agrees to implement the following specific plan:  When outside we recommend using a wide brim hat, sunglasses, long sleeve and pants, sunscreen with SPF 30+ with reapplication every 2 hours, or SPF specific clothing       What is a lentigo?  A lentigo is a pigmented flat or slightly raised lesion with a clearly defined edge. Unlike an ephelis (freckle), it does not fade in the winter months. There are several kinds of " lentigo.  The name lentigo originally referred to its appearance resembling a small lentil. The plural of lentigo is lentigines, although “lentigos” is also in common use.    Who gets lentigines?  Lentigines can affect males and females of all ages and races. Solar lentigines are especially prevalent in fair skinned adults. Lentigines associated with syndromes are present at birth or arise during childhood.    What causes lentigines?  Common forms of lentigo are due to exposure to ultraviolet radiation:  Sun damage including sunburn   Indoor tanning   Phototherapy, especially photochemotherapy (PUVA)    Ionizing radiation, eg radiation therapy, can also cause lentigines.  Several familial syndromes associated with widespread lentigines originate from mutations in Brett-MAP kinase, mTOR signaling and PTEN pathways.    What is the treatment for lentigines?  Most lentigines are left alone. Attempts to lighten them may not be successful. The following approaches are used:  SPF 50+ broad-spectrum sunscreen   Hydroquinone bleaching cream   Alpha hydroxy acids   Vitamin C   Retinoids   Azelaic acid   Chemical peels  Individual lesions can be permanently removed using:  Cryotherapy   Intense pulsed light   Pigment lasers    How can lentigines be prevented?  Lentigines associated with exposure ultraviolet radiation can be prevented by very careful sun protection. Clothing is more successful at preventing new lentigines than are sunscreens.    What is the outlook for lentigines?  Lentigines usually persist. They may increase in number with age and sun exposure. Some in sun-protected sites may fade and disappear.    GOETZ ANGIOMAS      Assessment and Plan:  Based on a thorough discussion of this condition and the management approach to it (including a comprehensive discussion of the known risks, side effects and potential benefits of treatment), the patient (family) agrees to implement the following specific plan:  Monitor for  "changes  Benign, reassured      Assessment and Plan:    Cherry angioma, also known as Krishnamurthy de Carmelo spots, are benign vascular skin lesions. A \"cherry angioma\" is a firm red, blue or purple papule, 0.1-1 cm in diameter. When thrombosed, they can appear black in colour until evaluated with a dermatoscope when the red or purple colour is more easily seen. Cherry angioma may develop on any part of the body but most often appear on the scalp, face, lips and trunk.  An angioma is due to proliferating endothelial cells; these are the cells that line the inside of a blood vessel.    Angiomas can arise in early life or later in life; the most common type of angioma is a cherry angioma.  Cherry angiomas are very common in males and females of any age or race. They are more noticeable in white skin than in skin of colour. They markedly increase in number from about the age of 40. There may be a family history of similar lesions. Eruptive cherry angiomas have been rarely reported to be associated with internal malignancy. The cause of angiomas is unknown. Genetic analysis of cherry angiomas has shown that they frequently carry specific somatic missense mutations in the GNAQ and GNA11 (Q209H) genes, which are involved in other vascular and melanocytic proliferations.      SEBORRHEIC KERATOSIS; NON-INFLAMED        Assessment and Plan:  Based on a thorough discussion of this condition and the management approach to it (including a comprehensive discussion of the known risks, side effects and potential benefits of treatment), the patient (family) agrees to implement the following specific plan:  Monitor for changes  Benign, reassured      Seborrheic Keratosis  A seborrheic keratosis is a harmless warty spot that appears during adult life as a common sign of skin aging.  Seborrheic keratoses can arise on any area of skin, covered or uncovered, with the usual exception of the palms and soles. They do not arise from mucous " "membranes. Seborrheic keratoses can have highly variable appearance.      Seborrheic keratoses are extremely common. It has been estimated that over 90% of adults over the age of 60 years have one or more of them. They occur in males and females of all races, typically beginning to erupt in the 30s or 40s. They are uncommon under the age of 20 years.  The precise cause of seborrhoeic keratoses is not known.  Seborrhoeic keratoses are considered degenerative in nature. As time goes by, seborrheic keratoses tend to become more numerous. Some people inherit a tendency to develop a very large number of them; some people may have hundreds of them.      There is no easy way to remove multiple lesions on a single occasion.  Unless a specific lesion is \"inflamed\" and is causing pain or stinging/burning or is bleeding, most insurance companies do not authorize treatment.    XEROSIS (\"DRY SKIN\")        Assessment and Plan:  Based on a thorough discussion of this condition and the management approach to it (including a comprehensive discussion of the known risks, side effects and potential benefits of treatment), the patient (family) agrees to implement the following specific plan:  Use moisturizer like Eucerin,Cerave or Aveeno Cream 3 times a day for the dry skin            Dry skin refers to skin that feels dry to touch. Dry skin has a dull surface with a rough, scaly quality. The skin is less pliable and cracked. When dryness is severe, the skin may become inflamed and fissured.  Although any body site can be dry, dry skin tends to affect the shins more than any other site.    Dry skin is lacking moisture in the outer horny cell layer (stratum corneum) and this results in cracks in the skin surface.  Dry skin is also called xerosis, xeroderma or asteatosis (lack of fat).  It can affect males and females of all ages. There is some racial variability in water and lipid content of the skin.  Dry skin that starts in early " childhood may be one of about 20 types of ichthyosis (fish-scale skin). There is often a family history of dry skin.   Dry skin is commonly seen in people with atopic dermatitis.  Nearly everyone > 60 years has dry skin.    Dry skin that begins later may be seen in people with certain diseases and conditions.  Postmenopausal women  Hypothyroidism  Chronic renal disease   Malnutrition and weight loss   Subclinical dermatitis   Treatment with certain drugs such as oral retinoids, diuretics and epidermal growth factor receptor inhibitors      What is the treatment for dry skin?  The mainstay of treatment of dry skin and ichthyosis is moisturisers/emollients. They should be applied liberally and often enough to:  Reduce itch   Improve the barrier function   Prevent entry of irritants, bacteria   Reduce transepidermal water loss.      How can dry skin be prevented?  Eliminate aggravating factors:  Reduce the frequency of bathing.   A humidifier in winter and air conditioner in summer   Compare having a short shower with a prolonged soak in a bath.   Use lukewarm, not hot, water.   Replace standard soap with a substitute such as a synthetic detergent cleanser, water-miscible emollient, bath oil, anti-pruritic tar oil, colloidal oatmeal etc.   Apply an emollient liberally and often, particularly shortly after bathing, and when itchy. The drier the skin, the thicker this should be, especially on the hands.    What is the outlook for dry skin?  A tendency to dry skin may persist life-long, or it may improve once contributing factors are controlled.

## 2024-01-11 ENCOUNTER — ANTICOAG VISIT (OUTPATIENT)
Dept: CARDIOLOGY CLINIC | Facility: CLINIC | Age: 79
End: 2024-01-11

## 2024-01-11 ENCOUNTER — APPOINTMENT (OUTPATIENT)
Dept: LAB | Facility: HOSPITAL | Age: 79
End: 2024-01-11
Payer: COMMERCIAL

## 2024-01-11 DIAGNOSIS — I48.91 ATRIAL FIBRILLATION, UNSPECIFIED TYPE (HCC): ICD-10-CM

## 2024-01-11 DIAGNOSIS — E53.8 B12 DEFICIENCY: ICD-10-CM

## 2024-01-11 DIAGNOSIS — Z95.2 HISTORY OF HEART VALVE REPLACEMENT: Primary | ICD-10-CM

## 2024-01-11 DIAGNOSIS — E03.8 SUBCLINICAL HYPOTHYROIDISM: ICD-10-CM

## 2024-01-23 DIAGNOSIS — I10 ESSENTIAL HYPERTENSION: ICD-10-CM

## 2024-01-24 RX ORDER — LOSARTAN POTASSIUM 100 MG/1
TABLET ORAL
Qty: 90 TABLET | Refills: 3 | Status: SHIPPED | OUTPATIENT
Start: 2024-01-24

## 2024-01-25 DIAGNOSIS — R35.1 NOCTURIA: ICD-10-CM

## 2024-01-25 RX ORDER — SOLIFENACIN SUCCINATE 10 MG/1
10 TABLET, FILM COATED ORAL DAILY
Qty: 90 TABLET | Refills: 1 | Status: SHIPPED | OUTPATIENT
Start: 2024-01-25

## 2024-01-25 NOTE — TELEPHONE ENCOUNTER
Reason for call:   [x] Refill   [] Prior Auth  [] Other:     Office:   [] PCP/Provider -   [x] Specialty/Provider - Uro    Medication: Vesicare    Dose/Frequency: 10 mg     Quantity: #90    Pharmacy: [facility]    Does the patient have enough for 3 days?   [] Yes   [x] No - Send as HP to POD

## 2024-01-29 ENCOUNTER — TELEPHONE (OUTPATIENT)
Dept: CARDIOLOGY CLINIC | Facility: CLINIC | Age: 79
End: 2024-01-29

## 2024-01-29 DIAGNOSIS — G25.0 ESSENTIAL TREMOR: ICD-10-CM

## 2024-01-29 DIAGNOSIS — I48.20 CHRONIC ATRIAL FIBRILLATION (HCC): ICD-10-CM

## 2024-01-29 RX ORDER — PROPRANOLOL HYDROCHLORIDE 20 MG/1
20 TABLET ORAL DAILY
COMMUNITY
End: 2024-01-29 | Stop reason: SDUPTHER

## 2024-01-29 RX ORDER — PROPRANOLOL HYDROCHLORIDE 20 MG/1
20 TABLET ORAL DAILY
Qty: 90 TABLET | Refills: 3 | Status: SHIPPED | OUTPATIENT
Start: 2024-01-29

## 2024-01-29 RX ORDER — PROPRANOLOL HYDROCHLORIDE 10 MG/1
10 TABLET ORAL
Qty: 90 TABLET | Refills: 3 | Status: SHIPPED | OUTPATIENT
Start: 2024-01-29

## 2024-01-29 NOTE — TELEPHONE ENCOUNTER
Spoke with patient and she states after her     Holter monitor result  she was told to     decrease the dosage of propranolol further     to 10 mg twice a day.    Patient states on this dosage she shaking     and feel like a living vibrator . Is there     something else we can do because she     not doing good on this dosage changes .      Please advise

## 2024-01-29 NOTE — TELEPHONE ENCOUNTER
Patient called & stated that she is having problems with her medications and would like a call back.       PT can be reached at [phone]

## 2024-01-29 NOTE — TELEPHONE ENCOUNTER
Patient can take propranolol 20 mg in the morning and 10 mg at night    And see how she does with this.  Please update the med list to reflect this present dosage.

## 2024-01-30 NOTE — TELEPHONE ENCOUNTER
Spoke with pt relayed  advised, patient verbally understood.     Patient is aware a new Rx was sent to her pharmacy for the Propranolol 20 mg and 10 mg.

## 2024-02-05 ENCOUNTER — ANTICOAG VISIT (OUTPATIENT)
Dept: CARDIOLOGY CLINIC | Facility: CLINIC | Age: 79
End: 2024-02-05

## 2024-02-05 ENCOUNTER — APPOINTMENT (OUTPATIENT)
Dept: LAB | Facility: HOSPITAL | Age: 79
End: 2024-02-05
Payer: COMMERCIAL

## 2024-02-05 DIAGNOSIS — Z95.2 HISTORY OF HEART VALVE REPLACEMENT: Primary | ICD-10-CM

## 2024-02-05 NOTE — TELEPHONE ENCOUNTER
"S/w pt. States with the new dose of Propranolol she is no longer feeling like a \"vibrator\". States she is felling fine and improved.   "

## 2024-03-01 ENCOUNTER — OFFICE VISIT (OUTPATIENT)
Dept: CARDIOLOGY CLINIC | Facility: CLINIC | Age: 79
End: 2024-03-01
Payer: COMMERCIAL

## 2024-03-01 VITALS
SYSTOLIC BLOOD PRESSURE: 136 MMHG | OXYGEN SATURATION: 96 % | RESPIRATION RATE: 16 BRPM | DIASTOLIC BLOOD PRESSURE: 76 MMHG | HEART RATE: 74 BPM | WEIGHT: 176 LBS | BODY MASS INDEX: 26.07 KG/M2 | HEIGHT: 69 IN

## 2024-03-01 DIAGNOSIS — I48.20 CHRONIC A-FIB (HCC): ICD-10-CM

## 2024-03-01 DIAGNOSIS — I48.20 CHRONIC ATRIAL FIBRILLATION (HCC): ICD-10-CM

## 2024-03-01 DIAGNOSIS — I05.9 MITRAL VALVE DISORDER: Primary | ICD-10-CM

## 2024-03-01 DIAGNOSIS — I10 ESSENTIAL HYPERTENSION: ICD-10-CM

## 2024-03-01 DIAGNOSIS — I42.9 CARDIOMYOPATHY, UNSPECIFIED TYPE (HCC): ICD-10-CM

## 2024-03-01 PROCEDURE — 99214 OFFICE O/P EST MOD 30 MIN: CPT | Performed by: INTERNAL MEDICINE

## 2024-03-01 RX ORDER — PROPRANOLOL HYDROCHLORIDE 20 MG/1
TABLET ORAL
Start: 2024-03-01

## 2024-03-01 NOTE — PROGRESS NOTES
[facility] [place]  [address]  [place] [address]  Cardiology Follow Up    Maria M Linda  1945  [id number]      1. Mitral valve disorder  Echo complete w/ contrast if indicated      2. Chronic a-fib (HCC)        3. Cardiomyopathy, unspecified type (HCC)        4. Chronic atrial fibrillation (HCC)  propranolol (INDERAL) 20 mg tablet      5. Essential hypertension            Chief Complaint   Patient presents with    Follow-up       Interval History: This patient presents for follow-up visit.  Patient denies any chest pain.  No shortness of breath out of the ordinary.  No history of leg edema orthopnea PND.  No history of presyncope syncope.  She states that she has been compliant with all her present medications.  No bleeding issues.  Patient does have history of chronic atrial fibrillation for the past almost 30 years.  Patient also has history of mechanical mitral valve replacement on warfarin.    Patient Active Problem List   Diagnosis    Chronic a-fib (HCC)    Hyperlipidemia    History of heart valve replacement    Benign hypertension    Bradycardia    Essential tremor    Chronic anticoagulation    Amaurosis fugax, both eyes    Mitral valve disorder    Osteoporosis    Peripheral neuropathy    Chronic right-sided low back pain without sciatica    Asymptomatic stenosis of right vertebral artery    Subclinical hypothyroidism    Irritable bowel syndrome with constipation    Inflammatory polyneuropathy, unspecified (HCC)    Cardiomyopathy, unspecified type (HCC)    Venous insufficiency of both lower extremities    Venous stasis dermatitis of both lower extremities    Impaired fasting glucose    B12 deficiency    Seborrheic keratosis     Past Medical History:   Diagnosis Date    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    Atrial fibrillation (HCC)     Coronary artery disease 1996    Hashimoto's thyroiditis     Hyperlipidemia     Hypertension     IBS (irritable bowel syndrome)      Kidney stone     Microhematuria     Migraine     Mitral valve disorder     Nasal congestion     Non-toxic uninodular goiter     Nosebleed     Raynaud disease      Social History     Socioeconomic History    Marital status: Single     Spouse name: Not on file    Number of children: Not on file    Years of education: Not on file    Highest education level: Not on file   Occupational History    Occupation: Retired   Tobacco Use    Smoking status: Never     Passive exposure: Past    Smokeless tobacco: Never   Vaping Use    Vaping status: Never Used   Substance and Sexual Activity    Alcohol use: Yes     Comment: Occassionally--wine    Drug use: No    Sexual activity: Not Currently   Other Topics Concern    Not on file   Social History Narrative    Not on file     Social Determinants of Health     Financial Resource Strain: Low Risk  (9/18/2023)    Overall Financial Resource Strain (CARDIA)     Difficulty of Paying Living Expenses: Not hard at all   Food Insecurity: Not on file   Transportation Needs: No Transportation Needs (9/18/2023)    PRAPARE - Transportation     Lack of Transportation (Medical): No     Lack of Transportation (Non-Medical): No   Physical Activity: Insufficiently Active (12/31/2020)    Exercise Vital Sign     Days of Exercise per Week: 4 days     Minutes of Exercise per Session: 30 min   Stress: No Stress Concern Present (3/20/2023)    [employer] - Occupational Stress Questionnaire     Feeling of Stress : Not at all   Social Connections: Not on file   Intimate Partner Violence: Not on file   Housing Stability: Not on file      Family History   Problem Relation Age of Onset    Hypertension Mother     Arthritis Mother     Coronary artery disease Father     Migraines Father     Leukemia Brother     Migraines Brother     Hypertension Brother     Coronary artery disease Paternal Grandfather     Leukemia Maternal Aunt     Multiple myeloma Maternal Aunt     Thyroid disease  Other     Hypertension Maternal Grandmother     No Known Problems Maternal Aunt     No Known Problems Maternal Aunt     No Known Problems Paternal Aunt     No Known Problems Paternal Aunt     No Known Problems Paternal Aunt     No Known Problems Paternal Aunt     Breast cancer Neg Hx      Past Surgical History:   Procedure Laterality Date    APPENDECTOMY      BREAST CYST EXCISION Right 01/01/1977    CARDIAC SURGERY      CARDIAC VALVE REPLACEMENT  1996    COLONOSCOPY  08/08/2011    TONSILLECTOMY      VALVE REPLACEMENT  01/04/2017    mitral valve replacement, 6/5/14       Current Outpatient Medications:     amLODIPine (NORVASC) 5 mg tablet, Take 1 tablet (5 mg total) by mouth every morning, Disp: 90 tablet, Rfl: 3    aspirin 81 mg chewable tablet, Chew daily, Disp: , Rfl:     butalbital-acetaminophen-caffeine (FIORICET,ESGIC) -40 mg per tablet, Take 1 tablet by mouth every 4 (four) hours as needed for headaches, Disp: 30 tablet, Rfl: 0    Calcium Carbonate 1500 (600 Ca) MG TABS, Take by mouth daily , Disp: , Rfl:     dicyclomine (BENTYL) 10 mg capsule, TAKE 1 CAPSULE BY MOUTH 3 TIMES A DAY BEFORE MEALS, Disp: 60 capsule, Rfl: 3    estrogens, conjugated (Premarin) vaginal cream, Insert 1 g into the vagina 3 (three) times a week, Disp: 30 g, Rfl: 3    gabapentin (NEURONTIN) 100 mg capsule, TAKE 1 CAPSULE BY MOUTH TWICE A DAY, Disp: 180 capsule, Rfl: 3    gabapentin (NEURONTIN) 300 mg capsule, Take 300 mg capsule +100 mg capsule to equal 400 mg at bedtime by mouth, Disp: 90 capsule, Rfl: 3    loratadine (CLARITIN) 10 mg tablet, Take 10 mg by mouth as needed  , Disp: , Rfl:     losartan (COZAAR) 100 MG tablet, TAKE 1 TABLET BY MOUTH EVERY DAY, Disp: 90 tablet, Rfl: 3    Misc Natural Products (OSTEO BI-FLEX TRIPLE STRENGTH) TABS, Take by mouth daily , Disp: , Rfl:     propranolol (INDERAL) 20 mg tablet, 1 tab AM and 1/2 tab PM, Disp: , Rfl:     simvastatin (ZOCOR) 10 mg tablet, TAKE 1 TABLET BY MOUTH EVERY DAY, Disp:  90 tablet, Rfl: 3    solifenacin (VESICARE) 10 MG tablet, Take 1 tablet (10 mg total) by mouth daily, Disp: 90 tablet, Rfl: 1    warfarin (COUMADIN) 1 mg tablet, TAKE 1 TABLET BY MOUTH EVERY DAY, Disp: 90 tablet, Rfl: 3    warfarin (COUMADIN) 2 mg tablet, Take 1 tablet daily as directed, Disp: 90 tablet, Rfl: 3    warfarin (COUMADIN) 3 mg tablet, TAKE 1 TABLET BY MOUTH EVERY DAY, Disp: 90 tablet, Rfl: 1    Current Facility-Administered Medications:     cyanocobalamin injection 1,000 mcg, 1,000 mcg, Intramuscular, Q30 Days, Kamala Erica, CRNP, 1,000 mcg at 05/17/23 1305    cyanocobalamin injection 1,000 mcg, 1,000 mcg, Intramuscular, Q30 Days, Kamala Hinds, CRNP, 1,000 mcg at 05/17/23 1303    cyanocobalamin injection 1,000 mcg, 1,000 mcg, Intramuscular, Q30 Days, Chase Deshpande MD, 1,000 mcg at 10/24/23 1059  Allergies   Allergen Reactions    Enablex [Darifenacin] Other (See Comments)     Sweating, HA, urinary hesitancy, flushing of face    Fentanyl Nausea Only and Vomiting    Hyoscyamine Palpitations    Dicyclomine Other (See Comments)     Jittery, disorientation, light HAs    Hydromorphone Other (See Comments)     Per pt does not remember the type or severity level    Midazolam Other (See Comments)     Per pt does not remember the type or severity level    Sulfamethoxazole-Trimethoprim Nausea Only    Venlafaxine Other (See Comments)     Urinary urgency, polyuria    Codeine Polt-Chlorphen Polt Er Headache    Ultracet [Tramadol-Acetaminophen] Nausea Only and Vomiting       Labs:  Ancillary Orders on 01/12/2024   Component Date Value    Protime 02/05/2024 33.2 (H)     INR 02/05/2024 3.13 (H)      Imaging: No results found.    Review of Systems:  Review of Systems  REVIEW OF SYSTEMS:  Constitutional:  Denies fever or chills   Eyes:  Denies change in visual acuity   HENT:  Denies nasal congestion or sore throat   Respiratory:  shortness of breath   Cardiovascular:  Denies chest pain or edema   GI:  Denies abdominal  "pain, nausea, vomiting, bloody stools or diarrhea   :  Denies dysuria, frequency, difficulty in micturition and nocturia  Musculoskeletal:  Denies back pain or joint pain   Neurologic:  Denies headache, focal weakness or sensory changes   Endocrine:  Denies polyuria or polydipsia   Lymphatic:  Denies swollen glands   Psychiatric:  Denies depression or anxiety    Physical Exam:    /76 (BP Location: Left arm, Patient Position: Sitting, Cuff Size: Standard)   Pulse 74   Resp 16   Ht 5' 9\" (1.753 m)   Wt 79.8 kg (176 lb)   LMP  (LMP Unknown)   SpO2 96%   BMI 25.99 kg/m²     Physical Exam  PHYSICAL EXAM:  General:  Patient is not in acute distress   Head: Normocephalic, Atraumatic.  HEENT:  Both pupils normal-size atraumatic, normocephalic, nonicteric  Neck:  JVP not raised. Trachea central. No carotid bruit  Respiratory:  normal breath sounds no crackles. no rhonchi  Cardiovascular: Irregularly irregular with heart sounds consistent with prosthetic mechanical valve  GI:  Abdomen soft nontender. No organomegaly.   Lymphatic:  No cervical or inguinal lymphadenopathy  Neurologic:  Patient is awake alert, oriented . Grossly nonfocal  Extremities trace edema    Discussion/Summary:    Patient with multiple medical problems who seems to be doing reasonably well from cardiac standpoint. Previous studies reviewed with patient. Medications reviewed and possible side effects discussed. concepts of cardiovascular disease , signs and symptoms of heart disease. Dietary and risk factor modification reinforced. All questions answered.  Safety measures reviewed. Patient advised to report any problems prompting medical attention.    Antibiotic prophylaxis to continue.  Symptoms to watch out from cardiac standpoint which would indicate the need for further cardiac evaluation discussed.  Results of recent Holter monitor discussed.  Follow-up echocardiogram to reassess ejection fraction as well as prosthetic mechanical mitral " valve prior to next visit.    She had a few questions which were answered.  She asked about ablation options for A-fib.  She has chronic A-fib going on for decades and severely enlarged left atrium.  I do not believe she is a candidate for A-fib ablation at this point.    If she has symptomatic bradycardia or tacky bradycardia syndrome, she may need consideration for pacemaker in the future.    Target INR 2.5-3.5.    All questions answered.  Follow-up in 4 months.

## 2024-03-04 ENCOUNTER — OFFICE VISIT (OUTPATIENT)
Dept: OBGYN CLINIC | Facility: CLINIC | Age: 79
End: 2024-03-04
Payer: COMMERCIAL

## 2024-03-04 ENCOUNTER — APPOINTMENT (OUTPATIENT)
Dept: LAB | Facility: HOSPITAL | Age: 79
End: 2024-03-04
Attending: INTERNAL MEDICINE
Payer: COMMERCIAL

## 2024-03-04 ENCOUNTER — ANTICOAG VISIT (OUTPATIENT)
Dept: CARDIOLOGY CLINIC | Facility: CLINIC | Age: 79
End: 2024-03-04

## 2024-03-04 VITALS — WEIGHT: 182 LBS | HEIGHT: 69 IN | BODY MASS INDEX: 26.96 KG/M2

## 2024-03-04 DIAGNOSIS — Z95.2 HISTORY OF HEART VALVE REPLACEMENT: Primary | ICD-10-CM

## 2024-03-04 DIAGNOSIS — M18.0 ARTHRITIS OF CARPOMETACARPAL (CMC) JOINT OF BOTH THUMBS: Primary | ICD-10-CM

## 2024-03-04 DIAGNOSIS — I48.20 CHRONIC A-FIB (HCC): ICD-10-CM

## 2024-03-04 PROCEDURE — 99213 OFFICE O/P EST LOW 20 MIN: CPT | Performed by: FAMILY MEDICINE

## 2024-03-04 PROCEDURE — 20600 DRAIN/INJ JOINT/BURSA W/O US: CPT | Performed by: FAMILY MEDICINE

## 2024-03-04 RX ORDER — BUPIVACAINE HYDROCHLORIDE 2.5 MG/ML
0.5 INJECTION, SOLUTION INFILTRATION; PERINEURAL
Status: COMPLETED | OUTPATIENT
Start: 2024-03-04 | End: 2024-03-04

## 2024-03-04 RX ORDER — METHYLPREDNISOLONE ACETATE 40 MG/ML
0.5 INJECTION, SUSPENSION INTRA-ARTICULAR; INTRALESIONAL; INTRAMUSCULAR; SOFT TISSUE
Status: COMPLETED | OUTPATIENT
Start: 2024-03-04 | End: 2024-03-04

## 2024-03-04 RX ADMIN — METHYLPREDNISOLONE ACETATE 0.5 ML: 40 INJECTION, SUSPENSION INTRA-ARTICULAR; INTRALESIONAL; INTRAMUSCULAR; SOFT TISSUE at 08:30

## 2024-03-04 RX ADMIN — BUPIVACAINE HYDROCHLORIDE 0.5 ML: 2.5 INJECTION, SOLUTION INFILTRATION; PERINEURAL at 08:30

## 2024-03-04 NOTE — PROGRESS NOTES
Assessment/Plan:  Assessment/Plan   Diagnoses and all orders for this visit:    Arthritis of carpometacarpal (CMC) joint of both thumbs  -     CMC brace  -     Small joint arthrocentesis: bilateral thumb CMC      78-year-old right-hand-dominant female with CMC arthritis both thumbs with pain both thumbs more than several years duration.  Clinical impression is that she has symptoms from CMC joint arthritis.  She experienced significant relief lasting nearly 1 year from previous injection so I offered patient repeat steroid injections to which she agreed.  I administered mixtures of 0.5 cc 0.25% bupivacaine and 0.5 cc Depo-Medrol to CMC joint each thumb without complication.  I referred her for CMC braces which she may wear during physical activity.  She will follow-up as needed.           Subjective:   Patient ID: Maria M Linda is a 78 y.o. female.  Chief Complaint   Patient presents with    Left Thumb - Pain    Right Thumb - Pain        78-year-old right-hand-dominant female with CMC arthritis both thumbs following for pain both thumbs more than several years duration.  She was last seen by me 1 year ago at which point she was status post right CMC joint steroid injection.  She reports that following injection she had significant improvement in symptoms and was nearly pain-free for nearly 1 year.  For the past 1 month she has had worsening pain in both thumbs.  She has pain described localized to the base of the thumb at the CMC joint, radiating to the thenar eminence, worse with gripping activities, and improved with resting.  She takes Tylenol to help with symptoms.    Hand Pain  This is a chronic problem. The current episode started more than 1 year ago. The problem occurs daily. The problem has been gradually worsening. Associated symptoms include arthralgias and joint swelling. Pertinent negatives include no numbness or weakness. Exacerbated by: Gripping, hand use. She has tried rest and acetaminophen for  "the symptoms. The treatment provided mild relief.               Review of Systems   Musculoskeletal:  Positive for arthralgias and joint swelling.   Neurological:  Negative for weakness and numbness.       Objective:  Vitals:    03/04/24 0820   Weight: 82.6 kg (182 lb)   Height: 5' 9\" (1.753 m)          Tenderness     Left Wrist/Hand   Tenderness in the CMC joint.     Right Wrist/Hand   Tenderness in the CMC joint.       Physical Exam  Vitals and nursing note reviewed.   Constitutional:       Appearance: Normal appearance. She is well-developed. She is not ill-appearing or diaphoretic.   HENT:      Head: Normocephalic and atraumatic.      Right Ear: External ear normal.      Left Ear: External ear normal.   Eyes:      Conjunctiva/sclera: Conjunctivae normal.   Neck:      Trachea: No tracheal deviation.   Pulmonary:      Effort: Pulmonary effort is normal. No respiratory distress.   Abdominal:      General: There is no distension.   Musculoskeletal:         General: Tenderness present.   Skin:     General: Skin is warm and dry.      Coloration: Skin is not jaundiced or pale.   Neurological:      Mental Status: She is alert and oriented to person, place, and time.   Psychiatric:         Mood and Affect: Mood normal.         Behavior: Behavior normal.         Thought Content: Thought content normal.         Judgment: Judgment normal.         Small joint arthrocentesis: bilateral thumb CMC  Universal Protocol:  Consent: Verbal consent obtained.  Risks and benefits: risks, benefits and alternatives were discussed  Consent given by: patient  Time out: Immediately prior to procedure a \"time out\" was called to verify the correct patient, procedure, equipment, support staff and site/side marked as required.  Patient understanding: patient states understanding of the procedure being performed  Patient consent: the patient's understanding of the procedure matches consent given  Procedure consent: procedure consent matches " procedure scheduled  Relevant documents: relevant documents present and verified  Test results: test results available and properly labeled  Site marked: the operative site was marked  Radiology Images displayed and confirmed. If images not available, report reviewed: imaging studies available  Required items: required blood products, implants, devices, and special equipment available  Patient identity confirmed: verbally with patient  Supporting Documentation  Indications: pain   Procedure Details  Location: thumb - bilateral thumb CMC  Preparation: Patient was prepped and draped in the usual sterile fashion  Needle size: 27 G  Ultrasound guidance: no  Approach: dorsal    Medications (Right): 0.5 mL bupivacaine 0.25 %; 0.5 mL methylPREDNISolone acetate 40 mg/mLMedications (Left): 0.5 mL bupivacaine 0.25 %; 0.5 mL methylPREDNISolone acetate 40 mg/mL   Patient tolerance: patient tolerated the procedure well with no immediate complications  Dressing:  Sterile dressing applied

## 2024-03-05 DIAGNOSIS — I10 HYPERTENSION, ESSENTIAL: ICD-10-CM

## 2024-03-05 RX ORDER — AMLODIPINE BESYLATE 5 MG/1
5 TABLET ORAL EVERY MORNING
Qty: 90 TABLET | Refills: 3 | Status: SHIPPED | OUTPATIENT
Start: 2024-03-05

## 2024-03-11 ENCOUNTER — APPOINTMENT (OUTPATIENT)
Dept: LAB | Facility: HOSPITAL | Age: 79
End: 2024-03-11
Attending: INTERNAL MEDICINE
Payer: COMMERCIAL

## 2024-03-11 ENCOUNTER — ANTICOAG VISIT (OUTPATIENT)
Dept: CARDIOLOGY CLINIC | Facility: CLINIC | Age: 79
End: 2024-03-11

## 2024-03-11 DIAGNOSIS — Z95.2 HISTORY OF HEART VALVE REPLACEMENT: Primary | ICD-10-CM

## 2024-03-11 DIAGNOSIS — E53.8 B12 DEFICIENCY: ICD-10-CM

## 2024-03-11 DIAGNOSIS — R73.01 IMPAIRED FASTING GLUCOSE: ICD-10-CM

## 2024-03-11 DIAGNOSIS — E03.8 SUBCLINICAL HYPOTHYROIDISM: ICD-10-CM

## 2024-03-11 DIAGNOSIS — I48.20 CHRONIC A-FIB (HCC): ICD-10-CM

## 2024-03-11 DIAGNOSIS — E78.2 MIXED HYPERLIPIDEMIA: ICD-10-CM

## 2024-03-11 LAB
ALBUMIN SERPL BCP-MCNC: 4.3 G/DL (ref 3.5–5)
ALP SERPL-CCNC: 58 U/L (ref 34–104)
ALT SERPL W P-5'-P-CCNC: 13 U/L (ref 7–52)
ANION GAP SERPL CALCULATED.3IONS-SCNC: 4 MMOL/L
AST SERPL W P-5'-P-CCNC: 18 U/L (ref 13–39)
BASOPHILS # BLD AUTO: 0.05 THOUSANDS/ÂΜL (ref 0–0.1)
BASOPHILS NFR BLD AUTO: 1 % (ref 0–1)
BILIRUB SERPL-MCNC: 0.81 MG/DL (ref 0.2–1)
BUN SERPL-MCNC: 23 MG/DL (ref 5–25)
CALCIUM SERPL-MCNC: 9.3 MG/DL (ref 8.4–10.2)
CHLORIDE SERPL-SCNC: 105 MMOL/L (ref 96–108)
CHOLEST SERPL-MCNC: 142 MG/DL
CO2 SERPL-SCNC: 31 MMOL/L (ref 21–32)
CREAT SERPL-MCNC: 0.9 MG/DL (ref 0.6–1.3)
EOSINOPHIL # BLD AUTO: 0.28 THOUSAND/ÂΜL (ref 0–0.61)
EOSINOPHIL NFR BLD AUTO: 5 % (ref 0–6)
ERYTHROCYTE [DISTWIDTH] IN BLOOD BY AUTOMATED COUNT: 12.8 % (ref 11.6–15.1)
EST. AVERAGE GLUCOSE BLD GHB EST-MCNC: 117 MG/DL
GFR SERPL CREATININE-BSD FRML MDRD: 62 ML/MIN/1.73SQ M
GLUCOSE P FAST SERPL-MCNC: 91 MG/DL (ref 65–99)
HBA1C MFR BLD: 5.7 %
HCT VFR BLD AUTO: 40.5 % (ref 34.8–46.1)
HDLC SERPL-MCNC: 63 MG/DL
HGB BLD-MCNC: 13.4 G/DL (ref 11.5–15.4)
IMM GRANULOCYTES # BLD AUTO: 0.02 THOUSAND/UL (ref 0–0.2)
IMM GRANULOCYTES NFR BLD AUTO: 0 % (ref 0–2)
LDLC SERPL CALC-MCNC: 65 MG/DL (ref 0–100)
LYMPHOCYTES # BLD AUTO: 1.13 THOUSANDS/ÂΜL (ref 0.6–4.47)
LYMPHOCYTES NFR BLD AUTO: 19 % (ref 14–44)
MCH RBC QN AUTO: 33.1 PG (ref 26.8–34.3)
MCHC RBC AUTO-ENTMCNC: 33.1 G/DL (ref 31.4–37.4)
MCV RBC AUTO: 100 FL (ref 82–98)
MONOCYTES # BLD AUTO: 0.67 THOUSAND/ÂΜL (ref 0.17–1.22)
MONOCYTES NFR BLD AUTO: 12 % (ref 4–12)
NEUTROPHILS # BLD AUTO: 3.68 THOUSANDS/ÂΜL (ref 1.85–7.62)
NEUTS SEG NFR BLD AUTO: 63 % (ref 43–75)
NONHDLC SERPL-MCNC: 79 MG/DL
NRBC BLD AUTO-RTO: 0 /100 WBCS
PLATELET # BLD AUTO: 221 THOUSANDS/UL (ref 149–390)
PMV BLD AUTO: 9.5 FL (ref 8.9–12.7)
POTASSIUM SERPL-SCNC: 4.2 MMOL/L (ref 3.5–5.3)
PROT SERPL-MCNC: 7.3 G/DL (ref 6.4–8.4)
RBC # BLD AUTO: 4.05 MILLION/UL (ref 3.81–5.12)
SODIUM SERPL-SCNC: 140 MMOL/L (ref 135–147)
TRIGL SERPL-MCNC: 70 MG/DL
TSH SERPL DL<=0.05 MIU/L-ACNC: 3.51 UIU/ML (ref 0.45–4.5)
VIT B12 SERPL-MCNC: 1035 PG/ML (ref 180–914)
WBC # BLD AUTO: 5.83 THOUSAND/UL (ref 4.31–10.16)

## 2024-03-14 PROBLEM — M18.9 OSTEOARTHRITIS OF CARPOMETACARPAL (CMC) JOINT OF THUMB: Status: ACTIVE | Noted: 2018-05-04

## 2024-03-14 PROBLEM — M54.50 CHRONIC RIGHT-SIDED LOW BACK PAIN WITHOUT SCIATICA: Status: RESOLVED | Noted: 2019-12-13 | Resolved: 2024-03-14

## 2024-03-14 PROBLEM — G89.29 CHRONIC RIGHT-SIDED LOW BACK PAIN WITHOUT SCIATICA: Status: RESOLVED | Noted: 2019-12-13 | Resolved: 2024-03-14

## 2024-03-14 PROBLEM — R73.01 IMPAIRED FASTING GLUCOSE: Status: RESOLVED | Noted: 2023-09-18 | Resolved: 2024-03-14

## 2024-03-14 PROBLEM — M79.643 HAND PAIN: Status: RESOLVED | Noted: 2024-03-14 | Resolved: 2024-03-14

## 2024-03-14 PROBLEM — M25.539 PAIN IN WRIST: Status: RESOLVED | Noted: 2024-03-14 | Resolved: 2024-03-14

## 2024-03-14 PROBLEM — M25.519 SHOULDER PAIN: Status: RESOLVED | Noted: 2024-03-14 | Resolved: 2024-03-14

## 2024-03-14 PROBLEM — K58.1 IRRITABLE BOWEL SYNDROME WITH CONSTIPATION: Status: RESOLVED | Noted: 2020-12-31 | Resolved: 2024-03-14

## 2024-03-14 PROBLEM — M79.673 FOOT PAIN: Status: RESOLVED | Noted: 2021-12-02 | Resolved: 2024-03-14

## 2024-03-18 ENCOUNTER — OFFICE VISIT (OUTPATIENT)
Age: 79
End: 2024-03-18
Payer: COMMERCIAL

## 2024-03-18 VITALS
BODY MASS INDEX: 26.51 KG/M2 | HEART RATE: 62 BPM | OXYGEN SATURATION: 94 % | WEIGHT: 179 LBS | SYSTOLIC BLOOD PRESSURE: 130 MMHG | RESPIRATION RATE: 18 BRPM | HEIGHT: 69 IN | DIASTOLIC BLOOD PRESSURE: 84 MMHG

## 2024-03-18 DIAGNOSIS — E78.2 MIXED HYPERLIPIDEMIA: ICD-10-CM

## 2024-03-18 DIAGNOSIS — E03.8 SUBCLINICAL HYPOTHYROIDISM: ICD-10-CM

## 2024-03-18 DIAGNOSIS — R53.83 OTHER FATIGUE: ICD-10-CM

## 2024-03-18 DIAGNOSIS — I48.20 CHRONIC A-FIB (HCC): ICD-10-CM

## 2024-03-18 DIAGNOSIS — M81.0 OSTEOPOROSIS, UNSPECIFIED OSTEOPOROSIS TYPE, UNSPECIFIED PATHOLOGICAL FRACTURE PRESENCE: ICD-10-CM

## 2024-03-18 DIAGNOSIS — I10 BENIGN HYPERTENSION: ICD-10-CM

## 2024-03-18 DIAGNOSIS — G61.9 INFLAMMATORY POLYNEUROPATHY, UNSPECIFIED (HCC): ICD-10-CM

## 2024-03-18 DIAGNOSIS — I87.2 VENOUS INSUFFICIENCY OF BOTH LOWER EXTREMITIES: ICD-10-CM

## 2024-03-18 DIAGNOSIS — Z00.00 WELL ADULT EXAM: Primary | ICD-10-CM

## 2024-03-18 DIAGNOSIS — R73.01 IMPAIRED FASTING GLUCOSE: ICD-10-CM

## 2024-03-18 DIAGNOSIS — E53.8 B12 DEFICIENCY: ICD-10-CM

## 2024-03-18 PROCEDURE — G2211 COMPLEX E/M VISIT ADD ON: HCPCS

## 2024-03-18 PROCEDURE — 99214 OFFICE O/P EST MOD 30 MIN: CPT

## 2024-03-18 NOTE — PROGRESS NOTES
INTERNAL MEDICINE FOLLOW-UP VISIT  [facility] - [facility] INTERNAL MEDICINE [address]    NAME: Maria M Linda  AGE: 78 y.o. SEX: female  : 1945     DATE: 3/18/2024     Assessment and Plan:   1. Well adult exam  She eats a well-balanced diet of fruits, vegetables, and lean meats.  She drinks an adequate amount of water, urinating clear to pale yellow every 2-3 hours.  She walks regularly.  She sleeps well. She denies any alcohol, tobacco, or illicit drug use.  She is up-to-date with a dentist and eye doctor.  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    2. Chronic a-fib (HCC)  Currently on Coumadin.  She follows with cardiology.  She denies any chest pain, shortness of breath, syncope.    3. Benign hypertension  BP is 130/84 in office. She checks her BP at home occasionally and it's running around 120-130s/70s. She is limiting her salt intake to <2,000 mg daily.     4. Venous insufficiency of both lower extremities  Stable, she is elevating and using compression socks.    5. Subclinical hypothyroidism  TSH was stable.    6. B12 deficiency  Stable.    7. Osteoporosis, unspecified osteoporosis type, unspecified pathological fracture presence  Stable.  Continue calcium and vitamin D supplements.    8. Mixed hyperlipidemia  Lipid panel is stable.     11. Inflammatory polyneuropathy, unspecified (HCC)  Stable.      Depression Screening and Follow-up Plan: Patient was screened for depression during today's encounter. They screened negative with a PHQ-2 score of 0.       No follow-ups on file.       Chief Complaint:     Chief Complaint   Patient presents with    Follow-up     routine      History of Present Illness:   Patient is a 78-year-old female that presents today for 6-month follow-up.  She has no new complaints today.    Health maintenance:  Up-to-date on vaccinations.    The following portions of the patient's history were reviewed  and updated as appropriate: allergies, current medications, past family history, past medical history, past social history, past surgical history and problem list.     Review of Systems:     Review of Systems   Constitutional:  Negative for chills, fatigue and fever.   HENT:  Negative for ear discharge, ear pain, postnasal drip, rhinorrhea, sinus pressure, sinus pain, sore throat, tinnitus and trouble swallowing.    Eyes:  Negative for pain, discharge and itching.   Respiratory:  Negative for cough, shortness of breath and wheezing.    Cardiovascular:  Negative for chest pain, palpitations and leg swelling.   Gastrointestinal:  Negative for abdominal pain, constipation, diarrhea, nausea and vomiting.   Endocrine: Negative for polydipsia, polyphagia and polyuria.   Genitourinary:  Negative for difficulty urinating, frequency, hematuria and urgency.   Musculoskeletal:  Negative for arthralgias, joint swelling and myalgias.   Skin:  Negative for color change.   Allergic/Immunologic: Negative for environmental allergies.   Neurological:  Negative for dizziness, weakness, light-headedness, numbness and headaches.   Hematological:  Negative for adenopathy.   Psychiatric/Behavioral:  Negative for decreased concentration and sleep disturbance. The patient is not nervous/anxious.         Past Medical History:     Past Medical History:   Diagnosis Date    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    Atrial fibrillation (HCC)     Coronary artery disease 1996    Foot pain 12/02/2021    Hand pain 03/14/2024 01/2019 - no injury    Hashimoto's thyroiditis     Hyperlipidemia     Hypertension     IBS (irritable bowel syndrome)     Kidney stone     Microhematuria     Migraine     Mitral valve disorder     Nasal congestion     Non-toxic uninodular goiter     Nosebleed     Raynaud disease     Shoulder pain 03/14/2024 2014-NO INJURY        Current Medications:     Current Outpatient Medications:     amLODIPine (NORVASC) 5 mg tablet,  TAKE 1 TABLET BY MOUTH EVERY DAY IN THE MORNING, Disp: 90 tablet, Rfl: 3    aspirin 81 mg chewable tablet, Chew daily, Disp: , Rfl:     butalbital-acetaminophen-caffeine (FIORICET,ESGIC) -40 mg per tablet, Take 1 tablet by mouth every 4 (four) hours as needed for headaches, Disp: 30 tablet, Rfl: 0    Calcium Carbonate 1500 (600 Ca) MG TABS, Take by mouth daily , Disp: , Rfl:     dicyclomine (BENTYL) 10 mg capsule, TAKE 1 CAPSULE BY MOUTH 3 TIMES A DAY BEFORE MEALS, Disp: 60 capsule, Rfl: 3    estrogens, conjugated (Premarin) vaginal cream, Insert 1 g into the vagina 3 (three) times a week, Disp: 30 g, Rfl: 3    gabapentin (NEURONTIN) 100 mg capsule, TAKE 1 CAPSULE BY MOUTH TWICE A DAY, Disp: 180 capsule, Rfl: 3    gabapentin (NEURONTIN) 300 mg capsule, Take 300 mg capsule +100 mg capsule to equal 400 mg at bedtime by mouth, Disp: 90 capsule, Rfl: 3    loratadine (CLARITIN) 10 mg tablet, Take 10 mg by mouth as needed  , Disp: , Rfl:     losartan (COZAAR) 100 MG tablet, TAKE 1 TABLET BY MOUTH EVERY DAY, Disp: 90 tablet, Rfl: 3    Misc Natural Products (OSTEO BI-FLEX TRIPLE STRENGTH) TABS, Take by mouth daily , Disp: , Rfl:     propranolol (INDERAL) 20 mg tablet, 1 tab AM and 1/2 tab PM, Disp: , Rfl:     simvastatin (ZOCOR) 10 mg tablet, TAKE 1 TABLET BY MOUTH EVERY DAY, Disp: 90 tablet, Rfl: 3    solifenacin (VESICARE) 10 MG tablet, Take 1 tablet (10 mg total) by mouth daily, Disp: 90 tablet, Rfl: 1    warfarin (COUMADIN) 1 mg tablet, TAKE 1 TABLET BY MOUTH EVERY DAY, Disp: 90 tablet, Rfl: 3    warfarin (COUMADIN) 2 mg tablet, Take 1 tablet daily as directed, Disp: 90 tablet, Rfl: 3    warfarin (COUMADIN) 3 mg tablet, TAKE 1 TABLET BY MOUTH EVERY DAY, Disp: 90 tablet, Rfl: 1    Current Facility-Administered Medications:     cyanocobalamin injection 1,000 mcg, 1,000 mcg, Intramuscular, Q30 Days, HANNY Gonzalez, 1,000 mcg at 05/17/23 1305    cyanocobalamin injection 1,000 mcg, 1,000 mcg, Intramuscular, Q30  "Days, HANNY Gonzalez, 1,000 mcg at 05/17/23 1303    cyanocobalamin injection 1,000 mcg, 1,000 mcg, Intramuscular, Q30 Days, Chase Deshpande MD, 1,000 mcg at 10/24/23 1059     Allergies:     Allergies   Allergen Reactions    Enablex [Darifenacin] Other (See Comments)     Sweating, HA, urinary hesitancy, flushing of face    Fentanyl Nausea Only and Vomiting    Hyoscyamine Palpitations    Dicyclomine Other (See Comments)     Jittery, disorientation, light HAs    Hydromorphone Other (See Comments)     Per pt does not remember the type or severity level    Midazolam Other (See Comments)     Per pt does not remember the type or severity level    Sulfamethoxazole-Trimethoprim Nausea Only    Venlafaxine Other (See Comments)     Urinary urgency, polyuria    Codeine Polt-Chlorphen Polt Er Headache    Ultracet [Tramadol-Acetaminophen] Nausea Only and Vomiting        Physical Exam:     /84 (BP Location: Left arm)   Pulse 62   Resp 18   Ht 5' 9\" (1.753 m)   Wt 81.2 kg (179 lb)   LMP  (LMP Unknown)   SpO2 94%   BMI 26.43 kg/m²     Physical Exam  Vitals and nursing note reviewed.   Constitutional:       General: She is awake. She is not in acute distress.     Appearance: Normal appearance. She is well-developed, well-groomed and normal weight.   HENT:      Head: Normocephalic and atraumatic.      Right Ear: Hearing and external ear normal.      Left Ear: Hearing and external ear normal.      Nose: Nose normal.      Mouth/Throat:      Lips: Pink.      Mouth: Mucous membranes are moist.   Eyes:      General: Lids are normal. Vision grossly intact. Gaze aligned appropriately.      Conjunctiva/sclera: Conjunctivae normal.   Neck:      Vascular: No carotid bruit.      Trachea: Trachea and phonation normal.   Cardiovascular:      Rate and Rhythm: Normal rate. Rhythm irregularly irregular.      Heart sounds: Normal heart sounds, S1 normal and S2 normal. No murmur heard.     No friction rub. No gallop.   Pulmonary:      " Effort: Pulmonary effort is normal. No respiratory distress.      Breath sounds: Normal breath sounds and air entry. No decreased breath sounds, wheezing, rhonchi or rales.   Abdominal:      General: Abdomen is flat.   Musculoskeletal:         General: No swelling.      Cervical back: Neck supple.      Right lower leg: No edema.      Left lower leg: No edema.   Skin:     General: Skin is warm.      Capillary Refill: Capillary refill takes less than 2 seconds.   Neurological:      Mental Status: She is alert.   Psychiatric:         Attention and Perception: Attention and perception normal.         Mood and Affect: Mood and affect normal.         Speech: Speech normal.         Behavior: Behavior normal. Behavior is cooperative.         Thought Content: Thought content normal.         Cognition and Memory: Cognition and memory normal.         Judgment: Judgment normal.           Data:     Laboratory Results: I have personally reviewed the pertinent laboratory results/reports   Radiology/Other Diagnostic Testing Results: I have personally reviewed pertinent reports.      Marisela Liz PA-C  [facility] INTERNAL MEDICINE [address]

## 2024-03-26 ENCOUNTER — ANTICOAG VISIT (OUTPATIENT)
Dept: CARDIOLOGY CLINIC | Facility: CLINIC | Age: 79
End: 2024-03-26

## 2024-03-26 ENCOUNTER — APPOINTMENT (OUTPATIENT)
Dept: LAB | Facility: HOSPITAL | Age: 79
End: 2024-03-26
Attending: INTERNAL MEDICINE
Payer: COMMERCIAL

## 2024-03-26 DIAGNOSIS — Z95.2 HISTORY OF HEART VALVE REPLACEMENT: Primary | ICD-10-CM

## 2024-03-26 DIAGNOSIS — I48.20 CHRONIC A-FIB (HCC): ICD-10-CM

## 2024-03-29 ENCOUNTER — NURSE TRIAGE (OUTPATIENT)
Age: 79
End: 2024-03-29

## 2024-03-29 ENCOUNTER — TELEPHONE (OUTPATIENT)
Age: 79
End: 2024-03-29

## 2024-03-29 NOTE — TELEPHONE ENCOUNTER
"Last ov 3/8/22 MAYUR Sagastume, Procedure combo 11/30/21, Labs 3/11/24, currently scheduled for visit 5/20/24    Patient noting increase in IBS with bloating and generalized abdominal pain which is affecting her ability to get things done. Symptoms seem to have increased over the past two weeks. There are no dietary changes, no lifestyle changes. She is taking the dicyclomine as ordered and using Gas X on a more regular basis along with her probiotic without improvement. Currently only urgent visits available. Please review/advise    Reason for Disposition   MODERATE pain (e.g., interferes with normal activities that comes and goes (cramps) lasts > 24 hours (Exception: pain with Vomiting or Diarrhea - see that Protocol)    Answer Assessment - Initial Assessment Questions  1. LOCATION: \"Where does it hurt?\"       Generalized abdominal pain  2. RADIATION: \"Does the pain shoot anywhere else?\" (e.g., chest, back)      denies  3. ONSET: \"When did the pain begin?\" (e.g., minutes, hours or days ago)       Chronic IBS but symptoms increasing  4. SUDDEN: \"Gradual or sudden onset?\"      chronic  5. PATTERN \"Does the pain come and go, or is it constant?\"     - If constant: \"Is it getting better, staying the same, or worsening?\"       (Note: Constant means the pain never goes away completely; most serious pain is constant and it progresses)      - If intermittent: \"How long does it last?\" \"Do you have pain now?\"      (Note: Intermittent means the pain goes away completely between bouts)      More constant of late per patient  6. SEVERITY: \"How bad is the pain?\"  (e.g., Scale 1-10; mild, moderate, or severe)    - MILD (1-3): doesn't interfere with normal activities, abdomen soft and not tender to touch     - MODERATE (4-7): interferes with normal activities or awakens from sleep, tender to touch     - SEVERE (8-10): excruciating pain, doubled over, unable to do any normal activities       Moderate   7. RECURRENT SYMPTOM: \"Have you " "ever had this type of stomach pain before?\" If Yes, ask: \"When was the last time?\" and \"What happened that time?\"       yes  8. CAUSE: \"What do you think is causing the stomach pain?\"      Attributing to hx of IBS  9. RELIEVING/AGGRAVATING FACTORS: \"What makes it better or worse?\" (e.g., movement, antacids, bowel movement)      Medications not as effective as previous  10. OTHER SYMPTOMS: \"Has there been any vomiting, diarrhea, constipation, or urine problems?\"        no  11. PREGNANCY: \"Is there any chance you are pregnant?\" \"When was your last menstrual period?\"        N/A    Protocols used: Abdominal Pain - Female-ADULT-[place]    "

## 2024-03-29 NOTE — TELEPHONE ENCOUNTER
Regarding: abd pain and bloating for the last 2 weeks  ----- Message from Bessy Jones sent at 3/29/2024 10:14 AM EDT -----  Pt calling stating she has been experiencing abd pain and bloating for the last 2 weeks.

## 2024-03-29 NOTE — TELEPHONE ENCOUNTER
Called & spoke to patient. Gave message to patient as per Charisma. Patient stated that she has a appointment on 5/20/2024 & she is asking if she needs to be seen sooner. Will route message to PA

## 2024-04-01 NOTE — TELEPHONE ENCOUNTER
Routing to [employer] clerical.  As per prakash patient can be fit in this week.  Please advise. Thank you !

## 2024-04-02 ENCOUNTER — ANTICOAG VISIT (OUTPATIENT)
Dept: CARDIOLOGY CLINIC | Facility: CLINIC | Age: 79
End: 2024-04-02

## 2024-04-02 ENCOUNTER — LAB (OUTPATIENT)
Dept: LAB | Facility: HOSPITAL | Age: 79
End: 2024-04-02
Attending: INTERNAL MEDICINE
Payer: COMMERCIAL

## 2024-04-02 DIAGNOSIS — Z95.2 HISTORY OF HEART VALVE REPLACEMENT: ICD-10-CM

## 2024-04-02 DIAGNOSIS — I48.20 CHRONIC A-FIB (HCC): ICD-10-CM

## 2024-04-02 DIAGNOSIS — Z95.2 HISTORY OF HEART VALVE REPLACEMENT: Primary | ICD-10-CM

## 2024-04-02 LAB
INR PPP: 3.32 (ref 0.84–1.19)
PROTHROMBIN TIME: 34.7 SECONDS (ref 11.6–14.5)

## 2024-04-02 PROCEDURE — 85610 PROTHROMBIN TIME: CPT

## 2024-04-02 PROCEDURE — 36415 COLL VENOUS BLD VENIPUNCTURE: CPT

## 2024-04-03 ENCOUNTER — OFFICE VISIT (OUTPATIENT)
Dept: GASTROENTEROLOGY | Facility: CLINIC | Age: 79
End: 2024-04-03
Payer: COMMERCIAL

## 2024-04-03 VITALS
WEIGHT: 179 LBS | OXYGEN SATURATION: 90 % | DIASTOLIC BLOOD PRESSURE: 78 MMHG | HEIGHT: 69 IN | BODY MASS INDEX: 26.51 KG/M2 | SYSTOLIC BLOOD PRESSURE: 118 MMHG | HEART RATE: 78 BPM | TEMPERATURE: 97.8 F

## 2024-04-03 DIAGNOSIS — R10.32 LLQ PAIN: ICD-10-CM

## 2024-04-03 DIAGNOSIS — K58.9 IRRITABLE BOWEL SYNDROME, UNSPECIFIED TYPE: ICD-10-CM

## 2024-04-03 DIAGNOSIS — R14.0 ABDOMINAL BLOATING: Primary | ICD-10-CM

## 2024-04-03 PROCEDURE — 99214 OFFICE O/P EST MOD 30 MIN: CPT | Performed by: PHYSICIAN ASSISTANT

## 2024-04-03 NOTE — PATIENT INSTRUCTIONS
Irritable Bowel Syndrome   WHAT YOU NEED TO KNOW:   Irritable bowel syndrome (IBS) is a condition that prevents food from moving through your intestines normally. The food may move through too slowly or too quickly. This causes abdominal pain, bloating, increased gas, constipation, or diarrhea.  DISCHARGE INSTRUCTIONS:   Return to the emergency department if:   You have severe abdominal pain.    Your bowel movements are dark or have blood in them.    Call your doctor if:   You have a fever.    You have pain in your rectum.    You are losing weight without trying.    Your abdominal pain does not go away, even after treatment.    You have questions or concerns about your condition or care.    Medicines:   Medicines  help you have a bowel movement, soften your bowel movement, or treat diarrhea. You may also need medicine to relax your muscles. This will decrease abdominal pain and muscles spasms.    Take your medicine as directed.  Contact your healthcare provider if you think your medicine is not helping or if you have side effects. Tell your provider if you are allergic to any medicine. Keep a list of the medicines, vitamins, and herbs you take. Include the amounts, and when and why you take them. Bring the list or the pill bottles to follow-up visits. Carry your medicine list with you in case of an emergency.    Manage IBS:   Eat a variety of healthy foods.  You may need to avoid certain foods to decrease your symptoms. Ask your provider about diets that might help your symptoms, such as the low FODMAP diet.         Drink liquids as directed.  Ask how much liquid to drink each day and which liquids are best for you. For most people, good liquids to drink are water, juice, and milk.    Exercise regularly.  Ask about the best exercise plan for you. Exercise can decrease your blood pressure and improve your health.         Keep a record for 3 weeks.  Include everything you eat and drink and your symptoms. Bring this  record with you to your follow-up visits.    Follow up with your doctor as directed:  Write down your questions so you remember to ask them during your visits.  © Copyright [employer] 2023 Information is for End User's use only and may not be sold, redistributed or otherwise used for commercial purposes.  The above information is an  only. It is not intended as medical advice for individual conditions or treatments. Talk to your doctor, nurse or pharmacist before following any medical regimen to see if it is safe and effective for you.

## 2024-04-03 NOTE — PROGRESS NOTES
Answers submitted by the patient for this visit:  Abdominal Pain Questionnaire (Submitted on 4/2/2024)  Chief Complaint: Abdominal pain  Chronicity: chronic  Onset: 1 to 4 weeks ago  Onset quality: gradual  Frequency: every several days  Progression since onset: unchanged  Pain location: LLQ  Pain - numeric: 7/10  Pain quality: aching, a sensation of fullness  Radiates to: LLQ, left flank  anorexia: No  arthralgias: No  belching: No  constipation: No  diarrhea: No  dysuria: No  fever: No  flatus: No  frequency: No  headaches: No  hematochezia: No  hematuria: No  melena: No  myalgias: No  nausea: No  weight loss: No  vomiting: No  Relieved by: nothing  [facility] - Outpatient Follow-up Note  Maria M Linda 78 y.o. female MRN: [id number]  Encounter: [id number]          ASSESSMENT AND PLAN:      1. Abdominal bloating  2. LLQ pain  3. Irritable bowel syndrome, unspecified type  -Will plan CT AP with contrast.     -Will begin Ibgard daily.    -Strict dietary modifications    -Will continue Bentyl, Gas-ex, probiotics daily.     -Repeat colonoscopy due in 2026.  ______________________________________________________________________    SUBJECTIVE:    78-year-old female with a past medical history significant for atrial fibrillation, coronary artery disease on Coumadin, IBS, hypertension, migraines, IBS who presents to the GI clinic today for follow-up.    Patient reports that for the past several months her IBS is really been acting up on her.  She reports that she is eating 3 small meals throughout the course of the day.  She reports that she is drinking plenty of water.  She reports that she is maintained on Bentyl, Gas-X, and probiotics.  She reports that yesterday she actually had localized left lower quadrant abdominal pain.  Patient does have a history of diverticular disease.  Patient denies any nausea or vomiting.  Patient reports that her bowel movements seem to be fairly regular with  no melena or rectal bleeding.    3/11/2024 CBC, CMP, TSH all within normal limits.  EGD from 11/30/2021 was normal   Colonoscopy from 11/30/2021 showed a tubular adenoma that was removed.    REVIEW OF SYSTEMS IS OTHERWISE NEGATIVE.      Historical Information   Past Medical History:   Diagnosis Date    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    Atrial fibrillation (HCC)     Coronary artery disease 1996    Foot pain 12/02/2021    Hand pain 03/14/2024 01/2019 - no injury    Hashimoto's thyroiditis     Hyperlipidemia     Hypertension     IBS (irritable bowel syndrome)     Kidney stone     Microhematuria     Migraine     Mitral valve disorder     Nasal congestion     Non-toxic uninodular goiter     Nosebleed     Raynaud disease     Shoulder pain 03/14/2024 2014-NO INJURY     Past Surgical History:   Procedure Laterality Date    APPENDECTOMY      BREAST CYST EXCISION Right 01/01/1977    CARDIAC SURGERY      CARDIAC VALVE REPLACEMENT  1996    COLONOSCOPY  08/08/2011    TONSILLECTOMY      VALVE REPLACEMENT  01/04/2017    mitral valve replacement, 6/5/14     Social History   Social History     Substance and Sexual Activity   Alcohol Use Yes    Comment: Occassionally--wine     Social History     Substance and Sexual Activity   Drug Use No     Social History     Tobacco Use   Smoking Status Never    Passive exposure: Past   Smokeless Tobacco Never     Family History   Problem Relation Age of Onset    Hypertension Mother     Arthritis Mother     Coronary artery disease Father     Migraines Father     Leukemia Brother     Migraines Brother     Hypertension Brother     Coronary artery disease Paternal Grandfather     Leukemia Maternal Aunt     Multiple myeloma Maternal Aunt     Thyroid disease Other     Hypertension Maternal Grandmother     No Known Problems Maternal Aunt     No Known Problems Maternal Aunt     No Known Problems Paternal Aunt     No Known Problems Paternal Aunt     No Known Problems Paternal Aunt     No Known  "Problems Paternal Aunt     Breast cancer Neg Hx        Meds/Allergies       Current Outpatient Medications:     amLODIPine (NORVASC) 5 mg tablet    aspirin 81 mg chewable tablet    butalbital-acetaminophen-caffeine (FIORICET,ESGIC) -40 mg per tablet    Calcium Carbonate 1500 (600 Ca) MG TABS    dicyclomine (BENTYL) 10 mg capsule    estrogens, conjugated (Premarin) vaginal cream    gabapentin (NEURONTIN) 100 mg capsule    gabapentin (NEURONTIN) 300 mg capsule    loratadine (CLARITIN) 10 mg tablet    losartan (COZAAR) 100 MG tablet    Misc Natural Products (OSTEO BI-FLEX TRIPLE STRENGTH) TABS    propranolol (INDERAL) 20 mg tablet    simvastatin (ZOCOR) 10 mg tablet    solifenacin (VESICARE) 10 MG tablet    warfarin (COUMADIN) 1 mg tablet    warfarin (COUMADIN) 2 mg tablet    warfarin (COUMADIN) 3 mg tablet    Current Facility-Administered Medications:     cyanocobalamin injection 1,000 mcg, 1,000 mcg, Intramuscular, Q30 Days, 1,000 mcg at 05/17/23 1305    cyanocobalamin injection 1,000 mcg, 1,000 mcg, Intramuscular, Q30 Days, 1,000 mcg at 05/17/23 1303    cyanocobalamin injection 1,000 mcg, 1,000 mcg, Intramuscular, Q30 Days, 1,000 mcg at 10/24/23 1059    Allergies   Allergen Reactions    Enablex [Darifenacin] Other (See Comments)     Sweating, HA, urinary hesitancy, flushing of face    Fentanyl Nausea Only and Vomiting    Hyoscyamine Palpitations    Dicyclomine Other (See Comments)     Jittery, disorientation, light HAs    Hydromorphone Other (See Comments)     Per pt does not remember the type or severity level    Midazolam Other (See Comments)     Per pt does not remember the type or severity level    Sulfamethoxazole-Trimethoprim Nausea Only    Venlafaxine Other (See Comments)     Urinary urgency, polyuria    Codeine Polt-Chlorphen Polt Er Headache    Ultracet [Tramadol-Acetaminophen] Nausea Only and Vomiting           Objective     Blood pressure 118/78, pulse 78, temperature 97.8 °F (36.6 °C), height 5' 9\" " (1.753 m), weight 81.2 kg (179 lb), SpO2 90%, not currently breastfeeding. Body mass index is 26.43 kg/m².      PHYSICAL EXAM:      General Appearance:   Alert, cooperative, no distress   HEENT:   Normocephalic, atraumatic, anicteric.     Neck:  Supple, symmetrical, trachea midline   Lungs:   Clear to auscultation bilaterally; no rales, rhonchi or wheezing; respirations unlabored    Heart::   Regular rate and rhythm; no murmur, rub, or gallop.   Abdomen:   Soft, non-tender, non-distended; normal bowel sounds; no masses, no organomegaly    Genitalia:   Deferred    Rectal:   Deferred    Extremities:  No cyanosis, clubbing or edema    Pulses:  2+ and symmetric    Skin:  No jaundice, rashes, or lesions    Lymph nodes:  No palpable cervical lymphadenopathy        Lab Results:   No visits with results within 1 Day(s) from this visit.   Latest known visit with results is:   Lab on 04/02/2024   Component Date Value    Protime 04/02/2024 34.7 (H)     INR 04/02/2024 3.32 (H)          Radiology Results:   No results found.

## 2024-04-15 ENCOUNTER — HOSPITAL ENCOUNTER (OUTPATIENT)
Dept: CT IMAGING | Facility: HOSPITAL | Age: 79
Discharge: HOME | End: 2024-04-15
Payer: COMMERCIAL

## 2024-04-15 DIAGNOSIS — R10.32 LLQ PAIN: ICD-10-CM

## 2024-04-15 DIAGNOSIS — G62.9 NEUROPATHY: ICD-10-CM

## 2024-04-15 DIAGNOSIS — R14.0 ABDOMINAL BLOATING: ICD-10-CM

## 2024-04-15 PROCEDURE — 74177 CT ABD & PELVIS W/CONTRAST: CPT

## 2024-04-15 RX ORDER — GABAPENTIN 100 MG/1
CAPSULE ORAL
Qty: 180 CAPSULE | Refills: 3 | Status: SHIPPED | OUTPATIENT
Start: 2024-04-15

## 2024-04-15 RX ADMIN — IOHEXOL 100 ML: 350 INJECTION, SOLUTION INTRAVENOUS at 15:01

## 2024-04-16 ENCOUNTER — OFFICE VISIT (OUTPATIENT)
Dept: GASTROENTEROLOGY | Facility: CLINIC | Age: 79
End: 2024-04-16
Payer: COMMERCIAL

## 2024-04-16 VITALS
HEART RATE: 78 BPM | TEMPERATURE: 98 F | DIASTOLIC BLOOD PRESSURE: 68 MMHG | BODY MASS INDEX: 26.51 KG/M2 | OXYGEN SATURATION: 98 % | HEIGHT: 69 IN | WEIGHT: 179 LBS | SYSTOLIC BLOOD PRESSURE: 122 MMHG

## 2024-04-16 DIAGNOSIS — R10.32 LLQ PAIN: ICD-10-CM

## 2024-04-16 DIAGNOSIS — R14.0 ABDOMINAL BLOATING: Primary | ICD-10-CM

## 2024-04-16 PROCEDURE — 99213 OFFICE O/P EST LOW 20 MIN: CPT | Performed by: PHYSICIAN ASSISTANT

## 2024-04-16 NOTE — PROGRESS NOTES
Answers submitted by the patient for this visit:  Abdominal Pain Questionnaire (Submitted on 4/16/2024)  Chief Complaint: Abdominal pain  Chronicity: recurrent  Onset: 1 to 4 weeks ago  Onset quality: gradual  Frequency: every several days  Episode duration: 4 Days  Pain location: LLQ  Pain - numeric: 7/10  Pain quality: a sensation of fullness  Radiates to: LLQ  anorexia: No  arthralgias: No  belching: No  constipation: No  diarrhea: No  dysuria: No  fever: No  flatus: No  frequency: No  headaches: No  hematochezia: No  hematuria: No  melena: No  myalgias: No  nausea: No  weight loss: No  vomiting: No  Relieved by: bowel movements  Diagnostic workup: CT scan  [facility] - Outpatient Follow-up Note  Maria M Linda 78 y.o. female MRN: [id number]  Encounter: [id number]          ASSESSMENT AND PLAN:      1. Abdominal bloating  2. LLQ pain  Continue IBgard.    Colonoscopy in 2026.    Continue high-fiber diet and plenty of water intake.    Continue dicyclomine as needed.  ______________________________________________________________________    SUBJECTIVE:    78-year-old female with an extensive past medical history presents to the GI clinic today for follow-up.  Patient reports that she most recently her CAT scan done yesterday.  There was no acute intra-abdominal abnormality noted.  I did send a copy of this CT scan to her cardiologist for review.  Overall patient is actually feeling well.  She reports that the IBgard really did help her abdominal bloating.  She reports that her left lower quadrant abdominal pain is gone.  She denies any changes in her bowels at this point in time.  Her colonoscopy from 2021 was reviewed.  She is due for colonoscopy in 2026.  She denies any alarm symptoms.  REVIEW OF SYSTEMS IS OTHERWISE NEGATIVE.      Historical Information   Past Medical History:   Diagnosis Date    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    Atrial fibrillation (HCC)     Coronary artery  disease 1996    Foot pain 12/02/2021    Hand pain 03/14/2024 01/2019 - no injury    Hashimoto's thyroiditis     Hyperlipidemia     Hypertension     IBS (irritable bowel syndrome)     Kidney stone     Microhematuria     Migraine     Mitral valve disorder     Nasal congestion     Non-toxic uninodular goiter     Nosebleed     Raynaud disease     Shoulder pain 03/14/2024 2014-NO INJURY     Past Surgical History:   Procedure Laterality Date    APPENDECTOMY      BREAST CYST EXCISION Right 01/01/1977    CARDIAC SURGERY      CARDIAC VALVE REPLACEMENT  1996    COLONOSCOPY  08/08/2011    TONSILLECTOMY      VALVE REPLACEMENT  01/04/2017    mitral valve replacement, 6/5/14     Social History   Social History     Substance and Sexual Activity   Alcohol Use Yes    Comment: Occassionally--wine     Social History     Substance and Sexual Activity   Drug Use No     Social History     Tobacco Use   Smoking Status Never    Passive exposure: Past   Smokeless Tobacco Never     Family History   Problem Relation Age of Onset    Hypertension Mother     Arthritis Mother     Coronary artery disease Father     Migraines Father     Leukemia Brother     Migraines Brother     Hypertension Brother     Coronary artery disease Paternal Grandfather     Leukemia Maternal Aunt     Multiple myeloma Maternal Aunt     Thyroid disease Other     Hypertension Maternal Grandmother     No Known Problems Maternal Aunt     No Known Problems Maternal Aunt     No Known Problems Paternal Aunt     No Known Problems Paternal Aunt     No Known Problems Paternal Aunt     No Known Problems Paternal Aunt     Breast cancer Neg Hx        Meds/Allergies       Current Outpatient Medications:     amLODIPine (NORVASC) 5 mg tablet    aspirin 81 mg chewable tablet    butalbital-acetaminophen-caffeine (FIORICET,ESGIC) -40 mg per tablet    Calcium Carbonate 1500 (600 Ca) MG TABS    dicyclomine (BENTYL) 10 mg capsule    estrogens, conjugated (Premarin) vaginal cream     "gabapentin (NEURONTIN) 100 mg capsule    gabapentin (NEURONTIN) 300 mg capsule    loratadine (CLARITIN) 10 mg tablet    losartan (COZAAR) 100 MG tablet    Misc Natural Products (OSTEO BI-FLEX TRIPLE STRENGTH) TABS    propranolol (INDERAL) 20 mg tablet    simvastatin (ZOCOR) 10 mg tablet    solifenacin (VESICARE) 10 MG tablet    warfarin (COUMADIN) 1 mg tablet    warfarin (COUMADIN) 2 mg tablet    warfarin (COUMADIN) 3 mg tablet    Current Facility-Administered Medications:     cyanocobalamin injection 1,000 mcg, 1,000 mcg, Intramuscular, Q30 Days, 1,000 mcg at 05/17/23 1305    cyanocobalamin injection 1,000 mcg, 1,000 mcg, Intramuscular, Q30 Days, 1,000 mcg at 05/17/23 1303    cyanocobalamin injection 1,000 mcg, 1,000 mcg, Intramuscular, Q30 Days, 1,000 mcg at 10/24/23 1059    Allergies   Allergen Reactions    Enablex [Darifenacin] Other (See Comments)     Sweating, HA, urinary hesitancy, flushing of face    Fentanyl Nausea Only and Vomiting    Hyoscyamine Palpitations    Dicyclomine Other (See Comments)     Jittery, disorientation, light HAs    Hydromorphone Other (See Comments)     Per pt does not remember the type or severity level    Midazolam Other (See Comments)     Per pt does not remember the type or severity level    Sulfamethoxazole-Trimethoprim Nausea Only    Venlafaxine Other (See Comments)     Urinary urgency, polyuria    Codeine Polt-Chlorphen Polt Er Headache    Ultracet [Tramadol-Acetaminophen] Nausea Only and Vomiting           Objective     Blood pressure 122/68, pulse 78, temperature 98 °F (36.7 °C), temperature source Temporal, height 5' 9\" (1.753 m), weight 81.2 kg (179 lb), SpO2 98%, not currently breastfeeding. Body mass index is 26.43 kg/m².      PHYSICAL EXAM:      General Appearance:   Alert, cooperative, no distress   HEENT:   Normocephalic, atraumatic, anicteric.     Neck:  Supple, symmetrical, trachea midline   Lungs:   Clear to auscultation bilaterally; no rales, rhonchi or wheezing; " respirations unlabored    Heart::   Regular rate and rhythm; no murmur, rub, or gallop.   Abdomen:   Soft, non-tender, non-distended; normal bowel sounds; no masses, no organomegaly    Genitalia:   Deferred    Rectal:   Deferred    Extremities:  No cyanosis, clubbing or edema    Pulses:  2+ and symmetric    Skin:  No jaundice, rashes, or lesions    Lymph nodes:  No palpable cervical lymphadenopathy        Lab Results:   No visits with results within 1 Day(s) from this visit.   Latest known visit with results is:   Lab on 04/02/2024   Component Date Value    Protime 04/02/2024 34.7 (H)     INR 04/02/2024 3.32 (H)          Radiology Results:   CT abdomen pelvis w contrast    Result Date: 4/16/2024  Narrative: CT ABDOMEN AND PELVIS WITH IV CONTRAST INDICATION: R14.0: Abdominal distension (gaseous) R10.32: Left lower quadrant pain. History of irritable bowel syndrome. COMPARISON: Kidney and bladder ultrasound 2/16/2022 TECHNIQUE: CT examination of the abdomen and pelvis was performed. Multiplanar 2D reformatted images were created from the source data. This examination, like all CT scans performed in the [facility], was performed utilizing techniques to minimize radiation dose exposure, including the use of iterative reconstruction and automated exposure control. Radiation dose length product (DLP) for this visit: 622.26 mGy-cm IV Contrast: 100 mL of iohexol (OMNIPAQUE) Enteric Contrast: Administered. FINDINGS: ABDOMEN LOWER CHEST: Marked cardiomegaly with severe biatrial dilation. Partially visualized mitral valve prosthesis. Small hiatal hernia. Oral contrast within the esophagus may be due to gastroesophageal reflux or hypomotility. Mild bibasilar linear atelectasis  or scarring. LIVER/BILIARY TREE: Prominence of the IVC and hepatic veins, which can be seen with elevated right heart pressures. Suggestion of minimal background hepatic parenchymal heterogeneity, which can be seen with mild passive  congestion. No suspicious hepatic mass. No biliary ductal dilation. GALLBLADDER: No calcified gallstones. No pericholecystic inflammatory change. SPLEEN: Unremarkable. PANCREAS: Unremarkable. ADRENAL GLANDS: Unremarkable. KIDNEYS/URETERS: Left upper pole simple renal cyst measuring 5 cm, stable. In the left lower pole is a 1.3 cm cystic lesion measuring greater than simple fluid, previously characterized as a simple renal cyst on prior ultrasound from 2/16/2022, likely a hemorrhagic/proteinaceous cyst. Additional subcentimeter hypodense renal lesions, too small to characterize but statistically likely benign. No hydronephrosis. STOMACH AND BOWEL: Unremarkable. APPENDIX: Surgically absent. ABDOMINOPELVIC CAVITY: No ascites. No pneumoperitoneum. No lymphadenopathy. VESSELS: Atherosclerosis without abdominal aortic aneurysm. PELVIS REPRODUCTIVE ORGANS: Unremarkable for patient's age. URINARY BLADDER: Unremarkable. ABDOMINAL WALL/INGUINAL REGIONS: Unremarkable. BONES: No acute fracture or suspicious osseous lesion. Spinal degenerative changes.     Impression: 1.  No acute findings in the abdomen or pelvis to account for the patient's symptoms. Resident: GENIE Xavier I, the attending radiologist, have reviewed the images and agree with the final report above. Workstation performed: [id number]

## 2024-04-17 DIAGNOSIS — I48.20 CHRONIC ATRIAL FIBRILLATION (HCC): ICD-10-CM

## 2024-04-17 NOTE — TELEPHONE ENCOUNTER
Pt contacted Call Center requested refill of their medication.      Patient previously was prescribed propranolol 10 mg tablets to be taken in the evening as opposed to cutting a 20 mg tablet in half.  Per patient, she is unable to split the 20 mg tablets; therefore is requesting that 10 mg tablets be ordered for evening dosing.       Doctor Name: Dr. Montiel      Medication Name: propranolol      Dosage of Med: 10 mg      Frequency of Med: 1 tablet p.m.      Remaining Medication: 2 or 3      Pharmacy and Location: [facility]        Pt. Preferred Callback Phone Number: [phone]    Please contact patient if unable to fulfill her request.

## 2024-04-18 DIAGNOSIS — I48.20 CHRONIC ATRIAL FIBRILLATION (HCC): ICD-10-CM

## 2024-04-18 DIAGNOSIS — R00.1 BRADYCARDIA: Primary | ICD-10-CM

## 2024-04-18 RX ORDER — PROPRANOLOL HYDROCHLORIDE 10 MG/1
10 TABLET ORAL DAILY
Qty: 90 TABLET | Refills: 1 | Status: SHIPPED | OUTPATIENT
Start: 2024-04-18 | End: 2024-04-23 | Stop reason: SDUPTHER

## 2024-04-18 RX ORDER — PROPRANOLOL HYDROCHLORIDE 20 MG/1
TABLET ORAL
Qty: 135 TABLET | Refills: 3 | Status: SHIPPED | OUTPATIENT
Start: 2024-04-18

## 2024-04-18 NOTE — TELEPHONE ENCOUNTER
Patient called requesting refill for propranolol Patient made aware medication was refilled on 04/18/2024 for 135  with 3 refills to [facility] pharmacy. Patient instructed to contact the pharmacy to obtain refills of medication. Patient verbalized understanding.

## 2024-04-22 NOTE — PROGRESS NOTES
4/23/2024      Chief Complaint   Patient presents with    Follow-up         Assessment and Plan    78 y.o. female     1. Nocturia  2. Atrophic vaginitis  - UA today negative for infection, positive for blood. Will send for micro and call with results. Workup if positive for 3 or more RBC/hpf  - PVR today demonstrating adequate bladder emptying   - Reviewed conservative measures  - Continue topical estrogen   - Switch to Trospium   - Follow up in 6 months for reassessment  - Call with any questions or concerns in the meantime  - All questions answered; patient understands and agrees with plan       History of Present Illness  Maria M Linda is a 78 y.o. female patient with history of nocturia and atrophic vaginitis here for follow up.      Doing well since trial of VESIcare and topical estrogen cream. Denies any new or worsening symptoms. States she does have nocturia x1-2 and would like to trial a new medication.     Review of Systems   Constitutional:  Negative for activity change, appetite change, chills and fever.   HENT:  Negative for congestion and trouble swallowing.    Respiratory:  Negative for cough and shortness of breath.    Cardiovascular:  Negative for chest pain, palpitations and leg swelling.   Gastrointestinal:  Negative for abdominal pain, constipation, diarrhea, nausea and vomiting.   Genitourinary:  Negative for difficulty urinating, dysuria, flank pain, frequency, hematuria and urgency.   Musculoskeletal:  Negative for back pain and gait problem.   Skin:  Negative for wound.   Allergic/Immunologic: Negative for immunocompromised state.   Neurological:  Negative for dizziness and syncope.   Hematological:  Does not bruise/bleed easily.   Psychiatric/Behavioral:  Negative for confusion.    All other systems reviewed and are negative.      Vitals  Vitals:    04/23/24 0956   BP: 112/58   Pulse: (!) 54   Temp: (!) 97.4 °F (36.3 °C)   TempSrc: Temporal   SpO2: 97%   Weight: 80.7 kg (178 lb)   Height:  "5' 9\" (1.753 m)       Physical Exam  Constitutional:       General: She is not in acute distress.     Appearance: Normal appearance. She is not ill-appearing, toxic-appearing or diaphoretic.   HENT:      Head: Normocephalic.      Nose: No congestion.   Eyes:      General: No scleral icterus.        Right eye: No discharge.         Left eye: No discharge.      Conjunctiva/sclera: Conjunctivae normal.      Pupils: Pupils are equal, round, and reactive to light.   Pulmonary:      Effort: Pulmonary effort is normal.   Musculoskeletal:      Cervical back: Normal range of motion.   Skin:     General: Skin is warm and dry.      Coloration: Skin is not jaundiced or pale.      Findings: No bruising, erythema, lesion or rash.   Neurological:      General: No focal deficit present.      Mental Status: She is alert and oriented to person, place, and time. Mental status is at baseline.      Gait: Gait normal.   Psychiatric:         Mood and Affect: Mood normal.         Behavior: Behavior normal.         Thought Content: Thought content normal.         Judgment: Judgment normal.           Past History  Past Medical History:   Diagnosis Date    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    Atrial fibrillation (HCC)     Coronary artery disease 1996    Foot pain 12/02/2021    Hand pain 03/14/2024 01/2019 - no injury    Hashimoto's thyroiditis     Hyperlipidemia     Hypertension     IBS (irritable bowel syndrome)     Kidney stone     Microhematuria     Migraine     Mitral valve disorder     Nasal congestion     Non-toxic uninodular goiter     Nosebleed     Raynaud disease     Shoulder pain 03/14/2024 2014-NO INJURY     Social History     Socioeconomic History    Marital status: Single     Spouse name: None    Number of children: None    Years of education: None    Highest education level: None   Occupational History    Occupation: Retired   Tobacco Use    Smoking status: Never     Passive exposure: Past    Smokeless tobacco: Never "   Vaping Use    Vaping status: Never Used   Substance and Sexual Activity    Alcohol use: Yes     Comment: Occassionally--wine    Drug use: No    Sexual activity: Not Currently   Other Topics Concern    None   Social History Narrative    None     Social Determinants of Health     Financial Resource Strain: Medium Risk (3/11/2024)    Overall Financial Resource Strain (CARDIA)     Difficulty of Paying Living Expenses: Somewhat hard   Food Insecurity: Not on file   Transportation Needs: No Transportation Needs (3/11/2024)    PRAPARE - Transportation     Lack of Transportation (Medical): No     Lack of Transportation (Non-Medical): No   Physical Activity: Insufficiently Active (12/31/2020)    Exercise Vital Sign     Days of Exercise per Week: 4 days     Minutes of Exercise per Session: 30 min   Stress: No Stress Concern Present (3/20/2023)    [employer] - Occupational Stress Questionnaire     Feeling of Stress : Not at all   Social Connections: Not on file   Intimate Partner Violence: Not on file   Housing Stability: Not on file     Social History     Tobacco Use   Smoking Status Never    Passive exposure: Past   Smokeless Tobacco Never     Family History   Problem Relation Age of Onset    Hypertension Mother     Arthritis Mother     Coronary artery disease Father     Migraines Father     Leukemia Brother     Migraines Brother     Hypertension Brother     Coronary artery disease Paternal Grandfather     Leukemia Maternal Aunt     Multiple myeloma Maternal Aunt     Thyroid disease Other     Hypertension Maternal Grandmother     No Known Problems Maternal Aunt     No Known Problems Maternal Aunt     No Known Problems Paternal Aunt     No Known Problems Paternal Aunt     No Known Problems Paternal Aunt     No Known Problems Paternal Aunt     Breast cancer Neg Hx        The following portions of the patient's history were reviewed and updated as appropriate: allergies, current medications, past  "medical history, past social history, past surgical history and problem list.    Results  Recent Results (from the past 1 hour(s))   POCT urine dip    Collection Time: 04/23/24  9:56 AM   Result Value Ref Range    LEUKOCYTE ESTERASE,UA -     NITRITE,UA -     SL AMB POCT UROBILINOGEN 0.2     POCT URINE PROTEIN -      PH,UA 6.5     BLOOD,UA ++     SPECIFIC GRAVITY,UA 1.000     KETONES,UA -     BILIRUBIN,UA -     GLUCOSE, UA -      COLOR,UA Yellow     CLARITY,UA Clear    POCT Measure PVR    Collection Time: 04/23/24 10:04 AM   Result Value Ref Range    POST-VOID RESIDUAL VOLUME, ML POC 15 mL   ]  No results found for: \"PSA\"  Lab Results   Component Value Date    CALCIUM 9.3 03/11/2024    K 4.2 03/11/2024    CO2 31 03/11/2024     03/11/2024    BUN 23 03/11/2024    CREATININE 0.90 03/11/2024     Lab Results   Component Value Date    WBC 5.83 03/11/2024    HGB 13.4 03/11/2024    HCT 40.5 03/11/2024     (H) 03/11/2024     03/11/2024       Clare Harry PA-C  "

## 2024-04-23 ENCOUNTER — OFFICE VISIT (OUTPATIENT)
Dept: UROLOGY | Facility: CLINIC | Age: 79
End: 2024-04-23
Payer: COMMERCIAL

## 2024-04-23 ENCOUNTER — NURSE TRIAGE (OUTPATIENT)
Age: 79
End: 2024-04-23

## 2024-04-23 VITALS
TEMPERATURE: 97.4 F | OXYGEN SATURATION: 97 % | SYSTOLIC BLOOD PRESSURE: 112 MMHG | BODY MASS INDEX: 26.36 KG/M2 | DIASTOLIC BLOOD PRESSURE: 58 MMHG | HEART RATE: 54 BPM | WEIGHT: 178 LBS | HEIGHT: 69 IN

## 2024-04-23 DIAGNOSIS — R00.1 BRADYCARDIA: ICD-10-CM

## 2024-04-23 DIAGNOSIS — R35.1 NOCTURIA: Primary | ICD-10-CM

## 2024-04-23 DIAGNOSIS — N95.2 ATROPHIC VAGINITIS: ICD-10-CM

## 2024-04-23 LAB
BACTERIA UR QL AUTO: ABNORMAL /HPF
BILIRUB UR QL STRIP: NEGATIVE
CLARITY UR: CLEAR
COLOR UR: ABNORMAL
GLUCOSE UR STRIP-MCNC: NEGATIVE MG/DL
HGB UR QL STRIP.AUTO: ABNORMAL
KETONES UR STRIP-MCNC: NEGATIVE MG/DL
LEUKOCYTE ESTERASE UR QL STRIP: NEGATIVE
NITRITE UR QL STRIP: NEGATIVE
NON-SQ EPI CELLS URNS QL MICRO: ABNORMAL /HPF
PH UR STRIP.AUTO: 7 [PH]
POST-VOID RESIDUAL VOLUME, ML POC: 15 ML
PROT UR STRIP-MCNC: NEGATIVE MG/DL
RBC #/AREA URNS AUTO: ABNORMAL /HPF
SL AMB  POCT GLUCOSE, UA: NORMAL
SL AMB LEUKOCYTE ESTERASE,UA: NORMAL
SL AMB POCT BILIRUBIN,UA: NORMAL
SL AMB POCT BLOOD,UA: NORMAL
SL AMB POCT CLARITY,UA: CLEAR
SL AMB POCT COLOR,UA: YELLOW
SL AMB POCT KETONES,UA: NORMAL
SL AMB POCT NITRITE,UA: NORMAL
SL AMB POCT PH,UA: 6.5
SL AMB POCT SPECIFIC GRAVITY,UA: 1
SL AMB POCT URINE PROTEIN: NORMAL
SL AMB POCT UROBILINOGEN: 0.2
SP GR UR STRIP.AUTO: 1.01 (ref 1–1.03)
UROBILINOGEN UR STRIP-ACNC: <2 MG/DL
WBC #/AREA URNS AUTO: ABNORMAL /HPF

## 2024-04-23 PROCEDURE — 51798 US URINE CAPACITY MEASURE: CPT | Performed by: PHYSICIAN ASSISTANT

## 2024-04-23 PROCEDURE — 99213 OFFICE O/P EST LOW 20 MIN: CPT | Performed by: PHYSICIAN ASSISTANT

## 2024-04-23 PROCEDURE — 81001 URINALYSIS AUTO W/SCOPE: CPT | Performed by: PHYSICIAN ASSISTANT

## 2024-04-23 PROCEDURE — 81002 URINALYSIS NONAUTO W/O SCOPE: CPT | Performed by: PHYSICIAN ASSISTANT

## 2024-04-23 RX ORDER — TROSPIUM CHLORIDE 20 MG/1
20 TABLET, FILM COATED ORAL
Qty: 90 TABLET | Refills: 3 | Status: SHIPPED | OUTPATIENT
Start: 2024-04-23

## 2024-04-23 RX ORDER — PROPRANOLOL HYDROCHLORIDE 10 MG/1
10 TABLET ORAL DAILY
Qty: 90 TABLET | Refills: 1 | Status: SHIPPED | OUTPATIENT
Start: 2024-04-23

## 2024-04-23 RX ORDER — CONJUGATED ESTROGENS 0.62 MG/G
1 CREAM VAGINAL 3 TIMES WEEKLY
Qty: 30 G | Refills: 3 | Status: SHIPPED | OUTPATIENT
Start: 2024-04-24

## 2024-04-23 NOTE — TELEPHONE ENCOUNTER
"Answer Assessment - Initial Assessment Questions  1. REASON FOR CALL or QUESTION: \"What is your reason for calling today?\" or \"How can I best help you?\" or \"What question do you have that I can help answer?\"           Patient saw Clare today and forgot to mention she has benign familiar  microscopic hematuria.    Protocols used: Information Only Call - No Triage-ADULT-[place]    "

## 2024-04-23 NOTE — TELEPHONE ENCOUNTER
Sounds good. If hematuria noted then should still have workup repeated if positive urine today. Thanks

## 2024-04-23 NOTE — TELEPHONE ENCOUNTER
NOT A DUPLICATE! PHARMACY DID NOT RECEIVED PROPANOLOL 10 MG.     Reason for call:   [x] Refill   [] Prior Auth  [] Other:     Office:   [x] PCP/Provider -   [] Specialty/Provider -     Medication: propranolol (INDERAL) 10 mg tablet     Dose/Frequency: Take 1 tablet (10 mg total) by mouth in the morning     Quantity: 90    Pharmacy: [facility]/pharmacy #[id number] [facility] [place] - [facility] [address]     Does the patient have enough for 3 days?   [] Yes   [x] No - Send as HP to POD

## 2024-04-24 ENCOUNTER — TELEPHONE (OUTPATIENT)
Dept: UROLOGY | Facility: CLINIC | Age: 79
End: 2024-04-24

## 2024-04-24 NOTE — TELEPHONE ENCOUNTER
Called and spoke to the PT with results. PT verbalized understanding and was thankful for the call.     PT stated PT called yesterday to give a Message for Clare CORONADO. PT stated if Clare CORONADO needed to look even further if there if Blood in PT Urine to look at  notes Family Doctor. PT stated having       ----- Message from Clare Harry PA-C sent at 4/24/2024  7:16 AM EDT -----  No RBC seen please let patient know

## 2024-04-25 ENCOUNTER — ANTICOAG VISIT (OUTPATIENT)
Dept: CARDIOLOGY CLINIC | Facility: CLINIC | Age: 79
End: 2024-04-25

## 2024-04-25 ENCOUNTER — APPOINTMENT (OUTPATIENT)
Dept: LAB | Facility: HOSPITAL | Age: 79
End: 2024-04-25
Attending: INTERNAL MEDICINE
Payer: COMMERCIAL

## 2024-04-25 DIAGNOSIS — Z95.2 HISTORY OF HEART VALVE REPLACEMENT: Primary | ICD-10-CM

## 2024-04-25 DIAGNOSIS — I48.20 CHRONIC A-FIB (HCC): ICD-10-CM

## 2024-05-02 ENCOUNTER — TELEPHONE (OUTPATIENT)
Age: 79
End: 2024-05-02

## 2024-05-02 NOTE — TELEPHONE ENCOUNTER
Patient is asking if she could get a B12 if that would be ok. Please call her and let her know at [phone]

## 2024-05-02 NOTE — TELEPHONE ENCOUNTER
Her recent B12 was over 1000, which is really good, so she does not need B12 injections.  We can recheck her B12 at her next appointment in September.

## 2024-05-09 ENCOUNTER — APPOINTMENT (OUTPATIENT)
Dept: LAB | Facility: HOSPITAL | Age: 79
End: 2024-05-09
Payer: COMMERCIAL

## 2024-05-09 ENCOUNTER — ANTICOAG VISIT (OUTPATIENT)
Dept: CARDIOLOGY CLINIC | Facility: CLINIC | Age: 79
End: 2024-05-09

## 2024-05-09 DIAGNOSIS — Z95.2 HISTORY OF HEART VALVE REPLACEMENT: Primary | ICD-10-CM

## 2024-05-09 DIAGNOSIS — I48.20 CHRONIC A-FIB (HCC): ICD-10-CM

## 2024-05-14 DIAGNOSIS — Z95.2 HISTORY OF HEART VALVE REPLACEMENT: ICD-10-CM

## 2024-05-15 NOTE — TELEPHONE ENCOUNTER
Refill must be reviewed and completed by the office or provider. The refill is unable to be approved or denied by the medication management team.

## 2024-05-16 RX ORDER — WARFARIN SODIUM 3 MG/1
TABLET ORAL
Qty: 90 TABLET | Refills: 3 | Status: SHIPPED | OUTPATIENT
Start: 2024-05-16

## 2024-05-22 ENCOUNTER — APPOINTMENT (OUTPATIENT)
Dept: LAB | Facility: HOSPITAL | Age: 79
End: 2024-05-22
Attending: INTERNAL MEDICINE
Payer: COMMERCIAL

## 2024-05-22 ENCOUNTER — ANTICOAG VISIT (OUTPATIENT)
Dept: CARDIOLOGY CLINIC | Facility: CLINIC | Age: 79
End: 2024-05-22

## 2024-05-22 DIAGNOSIS — I48.20 CHRONIC A-FIB (HCC): ICD-10-CM

## 2024-05-22 DIAGNOSIS — Z95.2 HISTORY OF HEART VALVE REPLACEMENT: Primary | ICD-10-CM

## 2024-05-31 DIAGNOSIS — R14.0 ABDOMINAL BLOATING: ICD-10-CM

## 2024-05-31 DIAGNOSIS — K58.1 IRRITABLE BOWEL SYNDROME WITH CONSTIPATION: ICD-10-CM

## 2024-06-03 RX ORDER — DICYCLOMINE HYDROCHLORIDE 10 MG/1
CAPSULE ORAL
Qty: 60 CAPSULE | Refills: 3 | Status: SHIPPED | OUTPATIENT
Start: 2024-06-03

## 2024-06-06 ENCOUNTER — ANTICOAG VISIT (OUTPATIENT)
Dept: CARDIOLOGY CLINIC | Facility: CLINIC | Age: 79
End: 2024-06-06

## 2024-06-06 ENCOUNTER — APPOINTMENT (OUTPATIENT)
Dept: LAB | Facility: HOSPITAL | Age: 79
End: 2024-06-06
Attending: INTERNAL MEDICINE
Payer: COMMERCIAL

## 2024-06-06 DIAGNOSIS — Z95.2 HISTORY OF HEART VALVE REPLACEMENT: Primary | ICD-10-CM

## 2024-06-06 DIAGNOSIS — I48.20 CHRONIC A-FIB (HCC): ICD-10-CM

## 2024-06-06 NOTE — PROGRESS NOTES
S/w pt. Advised to take 4mg daily and retest in 2 weeks, (last week she used 2mg tablets, this week she will use 3 and 1)

## 2024-06-09 DIAGNOSIS — R51.9 NONINTRACTABLE HEADACHE, UNSPECIFIED CHRONICITY PATTERN, UNSPECIFIED HEADACHE TYPE: ICD-10-CM

## 2024-06-10 RX ORDER — BUTALBITAL, ACETAMINOPHEN AND CAFFEINE 50; 325; 40 MG/1; MG/1; MG/1
TABLET ORAL
Qty: 30 TABLET | Refills: 0 | Status: SHIPPED | OUTPATIENT
Start: 2024-06-10

## 2024-06-14 ENCOUNTER — HOSPITAL ENCOUNTER (OUTPATIENT)
Dept: MAMMOGRAPHY | Facility: CLINIC | Age: 79
End: 2024-06-14
Payer: COMMERCIAL

## 2024-06-14 VITALS — BODY MASS INDEX: 26.36 KG/M2 | HEIGHT: 69 IN | WEIGHT: 178 LBS

## 2024-06-14 DIAGNOSIS — Z12.31 OTHER SCREENING MAMMOGRAM: ICD-10-CM

## 2024-06-14 PROCEDURE — 77067 SCR MAMMO BI INCL CAD: CPT

## 2024-06-14 PROCEDURE — 77063 BREAST TOMOSYNTHESIS BI: CPT

## 2024-06-21 ENCOUNTER — APPOINTMENT (OUTPATIENT)
Dept: LAB | Facility: HOSPITAL | Age: 79
End: 2024-06-21
Payer: COMMERCIAL

## 2024-06-21 ENCOUNTER — ANTICOAG VISIT (OUTPATIENT)
Dept: CARDIOLOGY CLINIC | Facility: CLINIC | Age: 79
End: 2024-06-21

## 2024-06-21 DIAGNOSIS — E53.8 B12 DEFICIENCY: Primary | ICD-10-CM

## 2024-06-21 DIAGNOSIS — I48.20 CHRONIC A-FIB (HCC): ICD-10-CM

## 2024-06-21 DIAGNOSIS — Z95.2 HISTORY OF HEART VALVE REPLACEMENT: Primary | ICD-10-CM

## 2024-06-24 ENCOUNTER — TELEPHONE (OUTPATIENT)
Dept: CARDIOLOGY CLINIC | Facility: CLINIC | Age: 79
End: 2024-06-24

## 2024-06-24 ENCOUNTER — HOSPITAL ENCOUNTER (OUTPATIENT)
Dept: NON INVASIVE DIAGNOSTICS | Facility: CLINIC | Age: 79
Discharge: HOME | End: 2024-06-24
Payer: COMMERCIAL

## 2024-06-24 VITALS
HEIGHT: 69 IN | DIASTOLIC BLOOD PRESSURE: 58 MMHG | HEART RATE: 54 BPM | WEIGHT: 178 LBS | SYSTOLIC BLOOD PRESSURE: 112 MMHG | BODY MASS INDEX: 26.36 KG/M2

## 2024-06-24 DIAGNOSIS — I05.9 MITRAL VALVE DISORDER: ICD-10-CM

## 2024-06-24 LAB
AORTIC ROOT: 3.3 CM
APICAL FOUR CHAMBER EJECTION FRACTION: 53 %
AV AREA PEAK VELOCITY: 2.2 CM2
AV LVOT MEAN GRADIENT: 1 MMHG
AV LVOT PEAK GRADIENT: 2 MMHG
AV PEAK GRADIENT: 6 MMHG
BSA FOR ECHO PROCEDURE: 1.97 M2
DOP CALC LVOT AREA: 3.8 CM2
DOP CALC LVOT CARDIAC INDEX: 1.79 L/MIN/M2
DOP CALC LVOT CARDIAC OUTPUT: 3.52 L/MIN
DOP CALC LVOT DIAMETER: 2.2 CM
DOP CALC LVOT PEAK VEL VTI: 12.86 CM
DOP CALC LVOT PEAK VEL: 0.72 M/S
DOP CALC LVOT STROKE INDEX: 25.9 ML/M2
DOP CALC LVOT STROKE VOLUME: 48.86
DOP CALC MV VTI: 38.55 CM
FRACTIONAL SHORTENING: 20 (ref 28–44)
INTERVENTRICULAR SEPTUM IN DIASTOLE (PARASTERNAL SHORT AXIS VIEW): 1.1 CM
INTERVENTRICULAR SEPTUM: 1.1 CM (ref 0.6–1.1)
LAAS-AP2: 75.8 CM2
LAAS-AP4: 56.2 CM2
LEFT ATRIUM SIZE: 7.6 CM
LEFT ATRIUM VOLUME (MOD BIPLANE): 343 ML
LEFT ATRIUM VOLUME INDEX (MOD BIPLANE): 174.1 ML/M2
LEFT INTERNAL DIMENSION IN SYSTOLE: 5.1 CM (ref 2.1–4)
LEFT VENTRICULAR INTERNAL DIMENSION IN DIASTOLE: 6.4 CM (ref 3.5–6)
LEFT VENTRICULAR POSTERIOR WALL IN END DIASTOLE: 1.4 CM
LEFT VENTRICULAR STROKE VOLUME: 87 ML
LVSV (TEICH): 87 ML
MV E'TISSUE VEL-SEP: 4 CM/S
MV MEAN GRADIENT: 3 MMHG
MV PEAK GRADIENT: 9 MMHG
MV VALVE AREA BY CONTINUITY EQUATION: 1.27 CM2
RIGHT ATRIUM AREA SYSTOLE A4C: 51.7 CM2
RIGHT VENTRICLE ID DIMENSION: 5.4 CM
SL CV LEFT ATRIUM LENGTH A2C: 9.6 CM
SL CV LV EF: 50
SL CV PED ECHO LEFT VENTRICLE DIASTOLIC VOLUME (MOD BIPLANE) 2D: 212 ML
SL CV PED ECHO LEFT VENTRICLE SYSTOLIC VOLUME (MOD BIPLANE) 2D: 125 ML
TR MAX PG: 43 MMHG
TR PEAK VELOCITY: 3.3 M/S
TRICUSPID ANNULAR PLANE SYSTOLIC EXCURSION: 1.5 CM
TRICUSPID VALVE PEAK REGURGITATION VELOCITY: 3.27 M/S

## 2024-06-24 PROCEDURE — 93306 TTE W/DOPPLER COMPLETE: CPT

## 2024-06-24 PROCEDURE — 93306 TTE W/DOPPLER COMPLETE: CPT | Performed by: INTERNAL MEDICINE

## 2024-06-24 NOTE — TELEPHONE ENCOUNTER
Pt called, gave provider's full message. Pt verbalized understanding and had no further questions.

## 2024-06-24 NOTE — TELEPHONE ENCOUNTER
----- Message from Minda Montiel MD sent at 6/24/2024  3:56 PM EDT -----  Please call the patient.  Echocardiogram shows low normal ejection fraction at 50%.  Normally functioning prosthetic mechanical mitral valve.  Mild pulmonary hypertension.  Overall no major change from previous echocardiogram.

## 2024-07-08 ENCOUNTER — ANTICOAG VISIT (OUTPATIENT)
Dept: CARDIOLOGY CLINIC | Facility: CLINIC | Age: 79
End: 2024-07-08

## 2024-07-08 ENCOUNTER — APPOINTMENT (OUTPATIENT)
Dept: LAB | Facility: HOSPITAL | Age: 79
End: 2024-07-08
Payer: COMMERCIAL

## 2024-07-08 DIAGNOSIS — I48.20 CHRONIC A-FIB (HCC): ICD-10-CM

## 2024-07-08 DIAGNOSIS — Z95.2 HISTORY OF HEART VALVE REPLACEMENT: Primary | ICD-10-CM

## 2024-07-25 ENCOUNTER — ANTICOAG VISIT (OUTPATIENT)
Dept: CARDIOLOGY CLINIC | Facility: CLINIC | Age: 79
End: 2024-07-25

## 2024-07-25 ENCOUNTER — APPOINTMENT (OUTPATIENT)
Dept: LAB | Facility: HOSPITAL | Age: 79
End: 2024-07-25
Payer: COMMERCIAL

## 2024-07-25 DIAGNOSIS — I48.20 CHRONIC A-FIB (HCC): ICD-10-CM

## 2024-07-25 DIAGNOSIS — Z95.2 HISTORY OF HEART VALVE REPLACEMENT: Primary | ICD-10-CM

## 2024-07-25 NOTE — PROGRESS NOTES
S/w pt. States she did not do anything different, diet is the same, no new meds. Advised 2mg M-W-F, 4mg the rest and retest in 1 week

## 2024-08-06 ENCOUNTER — OFFICE VISIT (OUTPATIENT)
Dept: CARDIOLOGY CLINIC | Facility: CLINIC | Age: 79
End: 2024-08-06
Payer: COMMERCIAL

## 2024-08-06 VITALS
WEIGHT: 179 LBS | HEART RATE: 70 BPM | SYSTOLIC BLOOD PRESSURE: 112 MMHG | OXYGEN SATURATION: 96 % | DIASTOLIC BLOOD PRESSURE: 60 MMHG | HEIGHT: 69 IN | RESPIRATION RATE: 16 BRPM | BODY MASS INDEX: 26.51 KG/M2

## 2024-08-06 DIAGNOSIS — I48.20 CHRONIC A-FIB (HCC): ICD-10-CM

## 2024-08-06 DIAGNOSIS — I05.9 MITRAL VALVE DISORDER: ICD-10-CM

## 2024-08-06 DIAGNOSIS — Z79.01 CHRONIC ANTICOAGULATION: Primary | ICD-10-CM

## 2024-08-06 DIAGNOSIS — I10 ESSENTIAL HYPERTENSION: ICD-10-CM

## 2024-08-06 PROCEDURE — 99214 OFFICE O/P EST MOD 30 MIN: CPT | Performed by: INTERNAL MEDICINE

## 2024-08-06 NOTE — PROGRESS NOTES
[facility] [place]  [address]  [place] [address]  Cardiology Follow Up    Maria M Linda  1945  [id number]      1. Chronic anticoagulation        2. Chronic a-fib (HCC)        3. Essential hypertension        4. Mitral valve disorder            Chief Complaint   Patient presents with    Follow-up       Interval History: This patient presents for follow-up visit.  Patient does have history of prosthetic mechanical mitral valve replacement in 1996.  Patient does have chronic atrial fibrillation.  Patient is on anticoagulation.  Patient also has history of tremors and is on low-dose propranolol.  Patient denies any chest pain.  Patient does have some shortness of breath.  No history of leg edema orthopnea PND.  She denies any bleeding issues.  She states that she has been compliant all her present medications.    Patient Active Problem List   Diagnosis    Chronic a-fib (HCC)    Hyperlipidemia    History of heart valve replacement    Benign hypertension    Bradycardia    Essential tremor    Chronic anticoagulation    Amaurosis fugax, both eyes    Mitral valve disorder    Osteoporosis    Peripheral neuropathy    Asymptomatic stenosis of right vertebral artery    Subclinical hypothyroidism    Inflammatory polyneuropathy, unspecified (HCC)    Cardiomyopathy, unspecified type (HCC)    Venous insufficiency of both lower extremities    Venous stasis dermatitis of both lower extremities    B12 deficiency    Seborrheic keratosis    Arthritis of carpometacarpal (CMC) joint of both thumbs    Osteoarthritis of carpometacarpal (CMC) joint of thumb     Past Medical History:   Diagnosis Date    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    Atrial fibrillation (HCC)     Coronary artery disease 1996    Foot pain 12/02/2021    Hand pain 03/14/2024 01/2019 - no injury    Hashimoto's thyroiditis     Hyperlipidemia     Hypertension     IBS (irritable bowel syndrome)     Kidney stone      Microhematuria     Migraine     Mitral valve disorder     Nasal congestion     Non-toxic uninodular goiter     Nosebleed     Raynaud disease     Shoulder pain 03/14/2024    2014-NO INJURY     Social History     Socioeconomic History    Marital status: Single     Spouse name: Not on file    Number of children: Not on file    Years of education: Not on file    Highest education level: Not on file   Occupational History    Occupation: Retired   Tobacco Use    Smoking status: Never     Passive exposure: Past    Smokeless tobacco: Never   Vaping Use    Vaping status: Never Used   Substance and Sexual Activity    Alcohol use: Yes     Comment: Occassionally--wine    Drug use: No    Sexual activity: Not Currently   Other Topics Concern    Not on file   Social History Narrative    Not on file     Social Determinants of Health     Financial Resource Strain: Medium Risk (3/11/2024)    Overall Financial Resource Strain (CARDIA)     Difficulty of Paying Living Expenses: Somewhat hard   Food Insecurity: Not on file   Transportation Needs: No Transportation Needs (3/11/2024)    PRAPARE - Transportation     Lack of Transportation (Medical): No     Lack of Transportation (Non-Medical): No   Physical Activity: Insufficiently Active (12/31/2020)    Exercise Vital Sign     Days of Exercise per Week: 4 days     Minutes of Exercise per Session: 30 min   Stress: No Stress Concern Present (3/20/2023)    [employer] - Occupational Stress Questionnaire     Feeling of Stress : Not at all   Social Connections: Unknown (6/18/2024)    Received from [email or web address]    Social [employer]     How often do you feel lonely or isolated from those around you? (Adult - for ages 18 years and over): Not on file   Intimate Partner Violence: Not on file   Housing Stability: Not on file      Family History   Problem Relation Age of Onset    Hypertension Mother     Arthritis Mother     Coronary artery disease Father     Migraines Father      Leukemia Brother     Migraines Brother     Hypertension Brother     Coronary artery disease Paternal Grandfather     Leukemia Maternal Aunt     Multiple myeloma Maternal Aunt     Thyroid disease Other     Hypertension Maternal Grandmother     No Known Problems Maternal Aunt     No Known Problems Maternal Aunt     No Known Problems Paternal Aunt     No Known Problems Paternal Aunt     No Known Problems Paternal Aunt     No Known Problems Paternal Aunt     Breast cancer Neg Hx      Past Surgical History:   Procedure Laterality Date    APPENDECTOMY      BREAST CYST EXCISION Right 01/01/1977    CARDIAC SURGERY      CARDIAC VALVE REPLACEMENT  1996    COLONOSCOPY  08/08/2011    TONSILLECTOMY      VALVE REPLACEMENT  01/04/2017    mitral valve replacement, 6/5/14       Current Outpatient Medications:     amLODIPine (NORVASC) 5 mg tablet, TAKE 1 TABLET BY MOUTH EVERY DAY IN THE MORNING, Disp: 90 tablet, Rfl: 3    aspirin 81 mg chewable tablet, Chew daily, Disp: , Rfl:     butalbital-acetaminophen-caffeine (FIORICET,ESGIC) -40 mg per tablet, TAKE 1 TABLET BY MOUTH EVERY 4 HOURS AS NEEDED FOR HEADACHES., Disp: 30 tablet, Rfl: 0    Calcium Carbonate 1500 (600 Ca) MG TABS, Take by mouth daily , Disp: , Rfl:     dicyclomine (BENTYL) 10 mg capsule, TAKE 1 CAPSULE BY MOUTH 3 TIMES A DAY BEFORE MEALS, Disp: 60 capsule, Rfl: 3    estrogens, conjugated (Premarin) vaginal cream, Insert 1 g into the vagina 3 (three) times a week, Disp: 30 g, Rfl: 3    gabapentin (NEURONTIN) 100 mg capsule, TAKE 1 CAPSULE BY MOUTH TWICE A DAY, Disp: 180 capsule, Rfl: 3    gabapentin (NEURONTIN) 300 mg capsule, Take 300 mg capsule +100 mg capsule to equal 400 mg at bedtime by mouth, Disp: 90 capsule, Rfl: 3    loratadine (CLARITIN) 10 mg tablet, Take 10 mg by mouth as needed  , Disp: , Rfl:     losartan (COZAAR) 100 MG tablet, TAKE 1 TABLET BY MOUTH EVERY DAY, Disp: 90 tablet, Rfl: 3    Misc Natural Products (OSTEO BI-FLEX TRIPLE STRENGTH) TABS,  Take by mouth daily , Disp: , Rfl:     propranolol (INDERAL) 10 mg tablet, Take 1 tablet (10 mg total) by mouth in the morning, Disp: 90 tablet, Rfl: 1    propranolol (INDERAL) 20 mg tablet, 1 tab AM and 1/2 tab PM, Disp: 135 tablet, Rfl: 3    simvastatin (ZOCOR) 10 mg tablet, TAKE 1 TABLET BY MOUTH EVERY DAY, Disp: 90 tablet, Rfl: 3    trospium chloride (SANCTURA) 20 mg tablet, Take 1 tablet (20 mg total) by mouth daily at bedtime, Disp: 90 tablet, Rfl: 3    warfarin (COUMADIN) 1 mg tablet, TAKE 1 TABLET BY MOUTH EVERY DAY, Disp: 90 tablet, Rfl: 3    warfarin (COUMADIN) 2 mg tablet, Take 1 tablet daily as directed, Disp: 90 tablet, Rfl: 3    warfarin (COUMADIN) 3 mg tablet, TAKE 1 TABLET BY MOUTH EVERY DAY, Disp: 90 tablet, Rfl: 3    Current Facility-Administered Medications:     cyanocobalamin injection 1,000 mcg, 1,000 mcg, Intramuscular, Q30 Days, HANNY Gonzalez, 1,000 mcg at 05/17/23 1305    cyanocobalamin injection 1,000 mcg, 1,000 mcg, Intramuscular, Q30 Days, HANNY Gonzalez, 1,000 mcg at 05/17/23 1303    cyanocobalamin injection 1,000 mcg, 1,000 mcg, Intramuscular, Q30 Days, Chase Deshpande MD, 1,000 mcg at 10/24/23 1059  Allergies   Allergen Reactions    Enablex [Darifenacin] Other (See Comments)     Sweating, HA, urinary hesitancy, flushing of face    Fentanyl Nausea Only and Vomiting    Hyoscyamine Palpitations    Dicyclomine Other (See Comments)     Jittery, disorientation, light HAs    Hydromorphone Other (See Comments)     Per pt does not remember the type or severity level    Midazolam Other (See Comments)     Per pt does not remember the type or severity level    Sulfamethoxazole-Trimethoprim Nausea Only    Venlafaxine Other (See Comments)     Urinary urgency, polyuria    Codeine Polt-Chlorphen Polt Er Headache    Ultracet [Tramadol-Acetaminophen] Nausea Only and Vomiting       Labs:  Ancillary Orders on 07/12/2024   Component Date Value    Protime 07/25/2024 36.7 (H)     INR 07/25/2024  3.57 (H)    Ancillary Orders on 06/28/2024   Component Date Value    Protime 07/08/2024 28.9 (H)     INR 07/08/2024 2.62 (H)    Hospital Outpatient Visit on 06/24/2024   Component Date Value    BSA 06/24/2024 1.97     A4C EF 06/24/2024 53     LVOT stroke volume 06/24/2024 48.86     LVOT stroke volume index 06/24/2024 25.90     LVOT Cardiac Output 06/24/2024 3.52     LVOT Cardiac Index 06/24/2024 1.79     LVIDd 06/24/2024 6.40     LVIDS 06/24/2024 5.10     IVSd 06/24/2024 1.10     LVPWd 06/24/2024 1.40     LVOT diameter 06/24/2024 2.2     LVOT peak VTI 06/24/2024 12.86     FS 06/24/2024 20     MV E' Tissue Velocity Se* 06/24/2024 4     LA Volume Index (BP) 06/24/2024 174.1     AV LVOT peak gradient 06/24/2024 2     LVOT peak darion 06/24/2024 0.72     RVID d 06/24/2024 5.4     Tricuspid annular plane * 06/24/2024 1.50     LA size 06/24/2024 7.6     LA length (A2C) 06/24/2024 9.60     LA volume (BP) 06/24/2024 343     RAA A4C 06/24/2024 51.7     LVOT mn grad 06/24/2024 1.0     AV peak gradient 06/24/2024 6     AV area peak darion 06/24/2024 2.2     MV mean gradient antegra* 06/24/2024 3     MV peak gradient antegra* 06/24/2024 9     MV VTI 06/24/2024 38.55     TR Peak Darion 06/24/2024 3.3     Triscuspid Valve Regurgi* 06/24/2024 43.0     Ao root 06/24/2024 3.30     Tricuspid valve peak reg* 06/24/2024 3.27     Left ventricular stroke * 06/24/2024 87.00     IVS 06/24/2024 1.1     LEFT VENTRICLE SYSTOLIC * 06/24/2024 125     LV DIASTOLIC VOLUME (MOD* 06/24/2024 212     Left Atrium Area-systoli* 06/24/2024 56.2     Left Atrium Area-systoli* 06/24/2024 75.8     LVSV, 2D 06/24/2024 87     LVOT area 06/24/2024 3.80     MV valve area by continu* 06/24/2024 1.27     LV EF 06/24/2024 50    Ancillary Orders on 06/14/2024   Component Date Value    Protime 06/21/2024 36.6 (H)     INR 06/21/2024 3.55 (H)      Imaging: No results found.    Review of Systems:  Review of Systems  REVIEW OF SYSTEMS:  Constitutional:  Denies fever or chills  "  Eyes:  Denies change in visual acuity   HENT:  Denies nasal congestion or sore throat   Respiratory:   shortness of breath   Cardiovascular:  Denies chest pain or edema   GI:  Denies abdominal pain, nausea, vomiting, bloody stools or diarrhea   :  Denies dysuria, frequency, difficulty in micturition and nocturia  Musculoskeletal:  Denies back pain or joint pain   Neurologic: History of tremors  Endocrine:  Denies polyuria or polydipsia   Lymphatic:  Denies swollen glands   Psychiatric:  Denies depression or anxiety    Physical Exam:    /60 (BP Location: Left arm, Patient Position: Sitting, Cuff Size: Standard)   Pulse 70   Resp 16   Ht 5' 9\" (1.753 m)   Wt 81.2 kg (179 lb)   LMP  (LMP Unknown)   SpO2 96%   BMI 26.43 kg/m²     Physical Exam  PHYSICAL EXAM:  General:  Patient is not in acute distress   Head: Normocephalic, Atraumatic.  HEENT:  Both pupils normal-size atraumatic, normocephalic, nonicteric  Neck:  JVP not raised. Trachea central. No carotid bruit  Respiratory:  normal breath sounds no crackles. no rhonchi  Cardiovascular: Irregularly irregular heart sounds consistent with prosthetic mechanical valve  GI:  Abdomen soft nontender. No organomegaly.   Lymphatic:  No cervical or inguinal lymphadenopathy  Neurologic:  Patient is awake alert, oriented . Grossly nonfocal  Extremities no edema    Echocardiogram done recently showed ejection fraction 50%.  Normally functioning prosthetic mechanical mitral valve.    Previous Holter monitor results reviewed.  Patient does have tendency for bradycardia and in the past we have decreased the dosage of heart rate control medication which is propranolol also used for tremors.        Discussion/Summary:    Patient with multiple medical problems who seems to be doing reasonably well from cardiac standpoint. Previous studies reviewed with patient. Medications reviewed and possible side effects discussed. concepts of cardiovascular disease , signs and " symptoms of heart disease. Dietary and risk factor modification reinforced. All questions answered.  Safety measures reviewed. Patient advised to report any problems prompting medical attention.    Previous cardiovascular studies reviewed.  Patient will have a repeat Holter monitor prior to next visit.  If patient does have significant bradycardia in the future or tacky bradycardia syndrome, she understands the need for possibility of pacemaker.    Antibiotic prophylaxis to continue.     patient understands risks and benefits of anticoagulation to prevent thrombotic risk because of prosthetic mechanical mitral valve.  Target INR 2.5-3.5.    Follow-up with primary care physician.  Patient is agreeable with the plan of care.  Follow-up in 6 months.

## 2024-08-15 ENCOUNTER — LAB (OUTPATIENT)
Dept: LAB | Facility: HOSPITAL | Age: 79
End: 2024-08-15
Attending: INTERNAL MEDICINE
Payer: COMMERCIAL

## 2024-08-15 ENCOUNTER — ANTICOAG VISIT (OUTPATIENT)
Dept: CARDIOLOGY CLINIC | Facility: CLINIC | Age: 79
End: 2024-08-15

## 2024-08-15 DIAGNOSIS — Z95.2 HISTORY OF HEART VALVE REPLACEMENT: ICD-10-CM

## 2024-08-15 DIAGNOSIS — I48.20 CHRONIC A-FIB (HCC): ICD-10-CM

## 2024-08-15 DIAGNOSIS — Z95.2 HISTORY OF HEART VALVE REPLACEMENT: Primary | ICD-10-CM

## 2024-08-15 LAB
INR PPP: 2.13 (ref 0.85–1.19)
PROTHROMBIN TIME: 24.5 SECONDS (ref 12.3–15)
VIT B12 SERPL-MCNC: 1059 PG/ML (ref 180–914)

## 2024-08-15 PROCEDURE — 36415 COLL VENOUS BLD VENIPUNCTURE: CPT

## 2024-08-15 PROCEDURE — 85610 PROTHROMBIN TIME: CPT

## 2024-08-16 ENCOUNTER — TELEPHONE (OUTPATIENT)
Age: 79
End: 2024-08-16

## 2024-08-16 NOTE — TELEPHONE ENCOUNTER
----- Message from Marisela Liz PA-C sent at 8/16/2024  7:34 AM EDT -----  Her vitamin B12 is stable, we can discontinue B12 injections at this time.

## 2024-08-19 DIAGNOSIS — Z95.2 HISTORY OF HEART VALVE REPLACEMENT: ICD-10-CM

## 2024-08-20 RX ORDER — WARFARIN SODIUM 1 MG/1
TABLET ORAL
Qty: 90 TABLET | Refills: 3 | Status: SHIPPED | OUTPATIENT
Start: 2024-08-20

## 2024-08-26 DIAGNOSIS — E78.2 COMBINED HYPERLIPIDEMIA: ICD-10-CM

## 2024-08-27 RX ORDER — SIMVASTATIN 10 MG
TABLET ORAL
Qty: 90 TABLET | Refills: 1 | Status: SHIPPED | OUTPATIENT
Start: 2024-08-27

## 2024-09-03 ENCOUNTER — APPOINTMENT (OUTPATIENT)
Dept: LAB | Facility: HOSPITAL | Age: 79
End: 2024-09-03
Payer: COMMERCIAL

## 2024-09-03 ENCOUNTER — ANTICOAG VISIT (OUTPATIENT)
Dept: CARDIOLOGY CLINIC | Facility: CLINIC | Age: 79
End: 2024-09-03

## 2024-09-03 DIAGNOSIS — Z95.2 HISTORY OF HEART VALVE REPLACEMENT: ICD-10-CM

## 2024-09-03 DIAGNOSIS — I48.20 CHRONIC A-FIB (HCC): ICD-10-CM

## 2024-09-03 DIAGNOSIS — Z95.2 HISTORY OF HEART VALVE REPLACEMENT: Primary | ICD-10-CM

## 2024-09-03 LAB
INR PPP: 2.41 (ref 0.85–1.19)
PROTHROMBIN TIME: 26.9 SECONDS (ref 12.3–15)

## 2024-09-03 PROCEDURE — 36415 COLL VENOUS BLD VENIPUNCTURE: CPT

## 2024-09-03 PROCEDURE — 85610 PROTHROMBIN TIME: CPT

## 2024-09-16 ENCOUNTER — APPOINTMENT (OUTPATIENT)
Dept: LAB | Facility: HOSPITAL | Age: 79
End: 2024-09-16
Payer: COMMERCIAL

## 2024-09-16 ENCOUNTER — ANTICOAG VISIT (OUTPATIENT)
Dept: CARDIOLOGY CLINIC | Facility: CLINIC | Age: 79
End: 2024-09-16

## 2024-09-16 DIAGNOSIS — I48.20 CHRONIC A-FIB (HCC): ICD-10-CM

## 2024-09-16 DIAGNOSIS — Z95.2 HISTORY OF HEART VALVE REPLACEMENT: ICD-10-CM

## 2024-09-16 DIAGNOSIS — E78.2 MIXED HYPERLIPIDEMIA: ICD-10-CM

## 2024-09-16 DIAGNOSIS — R53.83 OTHER FATIGUE: ICD-10-CM

## 2024-09-16 DIAGNOSIS — R73.01 IMPAIRED FASTING GLUCOSE: ICD-10-CM

## 2024-09-16 DIAGNOSIS — E03.8 SUBCLINICAL HYPOTHYROIDISM: ICD-10-CM

## 2024-09-16 DIAGNOSIS — Z95.2 HISTORY OF HEART VALVE REPLACEMENT: Primary | ICD-10-CM

## 2024-09-16 LAB
ALBUMIN SERPL BCG-MCNC: 4 G/DL (ref 3.5–5)
ALP SERPL-CCNC: 58 U/L (ref 34–104)
ALT SERPL W P-5'-P-CCNC: 20 U/L (ref 7–52)
ANION GAP SERPL CALCULATED.3IONS-SCNC: 4 MMOL/L (ref 4–13)
AST SERPL W P-5'-P-CCNC: 30 U/L (ref 13–39)
BASOPHILS # BLD AUTO: 0.06 THOUSANDS/ΜL (ref 0–0.1)
BASOPHILS NFR BLD AUTO: 1 % (ref 0–1)
BILIRUB SERPL-MCNC: 0.6 MG/DL (ref 0.2–1)
BUN SERPL-MCNC: 19 MG/DL (ref 5–25)
CALCIUM SERPL-MCNC: 8.8 MG/DL (ref 8.4–10.2)
CHLORIDE SERPL-SCNC: 105 MMOL/L (ref 96–108)
CHOLEST SERPL-MCNC: 145 MG/DL
CO2 SERPL-SCNC: 31 MMOL/L (ref 21–32)
CREAT SERPL-MCNC: 0.78 MG/DL (ref 0.6–1.3)
EOSINOPHIL # BLD AUTO: 0.29 THOUSAND/ΜL (ref 0–0.61)
EOSINOPHIL NFR BLD AUTO: 7 % (ref 0–6)
ERYTHROCYTE [DISTWIDTH] IN BLOOD BY AUTOMATED COUNT: 12.7 % (ref 11.6–15.1)
EST. AVERAGE GLUCOSE BLD GHB EST-MCNC: 111 MG/DL
GFR SERPL CREATININE-BSD FRML MDRD: 74 ML/MIN/1.73SQ M
GLUCOSE P FAST SERPL-MCNC: 90 MG/DL (ref 65–99)
HBA1C MFR BLD: 5.5 %
HCT VFR BLD AUTO: 38.1 % (ref 34.8–46.1)
HDLC SERPL-MCNC: 61 MG/DL
HGB BLD-MCNC: 12 G/DL (ref 11.5–15.4)
IMM GRANULOCYTES # BLD AUTO: 0.02 THOUSAND/UL (ref 0–0.2)
IMM GRANULOCYTES NFR BLD AUTO: 0 % (ref 0–2)
INR PPP: 2.79 (ref 0.85–1.19)
LDLC SERPL CALC-MCNC: 70 MG/DL (ref 0–100)
LYMPHOCYTES # BLD AUTO: 0.96 THOUSANDS/ΜL (ref 0.6–4.47)
LYMPHOCYTES NFR BLD AUTO: 21 % (ref 14–44)
MCH RBC QN AUTO: 32.1 PG (ref 26.8–34.3)
MCHC RBC AUTO-ENTMCNC: 31.5 G/DL (ref 31.4–37.4)
MCV RBC AUTO: 102 FL (ref 82–98)
MONOCYTES # BLD AUTO: 0.52 THOUSAND/ΜL (ref 0.17–1.22)
MONOCYTES NFR BLD AUTO: 12 % (ref 4–12)
NEUTROPHILS # BLD AUTO: 2.63 THOUSANDS/ΜL (ref 1.85–7.62)
NEUTS SEG NFR BLD AUTO: 59 % (ref 43–75)
NONHDLC SERPL-MCNC: 84 MG/DL
NRBC BLD AUTO-RTO: 0 /100 WBCS
PLATELET # BLD AUTO: 185 THOUSANDS/UL (ref 149–390)
PMV BLD AUTO: 9.5 FL (ref 8.9–12.7)
POTASSIUM SERPL-SCNC: 4.3 MMOL/L (ref 3.5–5.3)
PROT SERPL-MCNC: 6.8 G/DL (ref 6.4–8.4)
PROTHROMBIN TIME: 30 SECONDS (ref 12.3–15)
RBC # BLD AUTO: 3.74 MILLION/UL (ref 3.81–5.12)
SODIUM SERPL-SCNC: 140 MMOL/L (ref 135–147)
TRIGL SERPL-MCNC: 72 MG/DL
TSH SERPL DL<=0.05 MIU/L-ACNC: 2.65 UIU/ML (ref 0.45–4.5)
WBC # BLD AUTO: 4.48 THOUSAND/UL (ref 4.31–10.16)

## 2024-09-16 PROCEDURE — 83036 HEMOGLOBIN GLYCOSYLATED A1C: CPT

## 2024-09-16 PROCEDURE — 85025 COMPLETE CBC W/AUTO DIFF WBC: CPT

## 2024-09-16 PROCEDURE — 80053 COMPREHEN METABOLIC PANEL: CPT

## 2024-09-16 PROCEDURE — 84443 ASSAY THYROID STIM HORMONE: CPT

## 2024-09-16 PROCEDURE — 80061 LIPID PANEL: CPT

## 2024-09-16 PROCEDURE — 85610 PROTHROMBIN TIME: CPT

## 2024-09-16 PROCEDURE — 36415 COLL VENOUS BLD VENIPUNCTURE: CPT

## 2024-09-19 PROBLEM — G45.3 AMAUROSIS FUGAX, BOTH EYES: Status: RESOLVED | Noted: 2017-08-03 | Resolved: 2024-09-19

## 2024-09-19 PROBLEM — M18.0 ARTHRITIS OF CARPOMETACARPAL (CMC) JOINT OF BOTH THUMBS: Status: RESOLVED | Noted: 2024-03-04 | Resolved: 2024-09-19

## 2024-09-20 ENCOUNTER — OFFICE VISIT (OUTPATIENT)
Age: 79
End: 2024-09-20
Payer: COMMERCIAL

## 2024-09-20 VITALS
BODY MASS INDEX: 27.25 KG/M2 | SYSTOLIC BLOOD PRESSURE: 136 MMHG | WEIGHT: 184 LBS | HEIGHT: 69 IN | RESPIRATION RATE: 18 BRPM | HEART RATE: 60 BPM | OXYGEN SATURATION: 98 % | DIASTOLIC BLOOD PRESSURE: 88 MMHG

## 2024-09-20 DIAGNOSIS — G61.9 INFLAMMATORY NEUROPATHY (HCC): ICD-10-CM

## 2024-09-20 DIAGNOSIS — Z00.00 MEDICARE ANNUAL WELLNESS VISIT, SUBSEQUENT: Primary | ICD-10-CM

## 2024-09-20 DIAGNOSIS — I10 BENIGN HYPERTENSION: ICD-10-CM

## 2024-09-20 DIAGNOSIS — Z78.0 POSTMENOPAUSAL: ICD-10-CM

## 2024-09-20 DIAGNOSIS — E53.8 B12 DEFICIENCY: ICD-10-CM

## 2024-09-20 DIAGNOSIS — I48.20 CHRONIC A-FIB (HCC): ICD-10-CM

## 2024-09-20 DIAGNOSIS — E78.2 MIXED HYPERLIPIDEMIA: ICD-10-CM

## 2024-09-20 DIAGNOSIS — R73.01 IMPAIRED FASTING GLUCOSE: ICD-10-CM

## 2024-09-20 DIAGNOSIS — Z23 NEED FOR COVID-19 VACCINE: ICD-10-CM

## 2024-09-20 DIAGNOSIS — M81.0 OSTEOPOROSIS, UNSPECIFIED OSTEOPOROSIS TYPE, UNSPECIFIED PATHOLOGICAL FRACTURE PRESENCE: ICD-10-CM

## 2024-09-20 DIAGNOSIS — E03.8 SUBCLINICAL HYPOTHYROIDISM: ICD-10-CM

## 2024-09-20 PROCEDURE — G0439 PPPS, SUBSEQ VISIT: HCPCS

## 2024-09-20 PROCEDURE — 90480 ADMN SARSCOV2 VAC 1/ONLY CMP: CPT

## 2024-09-20 PROCEDURE — 99214 OFFICE O/P EST MOD 30 MIN: CPT

## 2024-09-20 PROCEDURE — 91320 SARSCV2 VAC 30MCG TRS-SUC IM: CPT

## 2024-09-20 NOTE — ASSESSMENT & PLAN NOTE
Currently on a daily vitamin D and calcium supplement.  She exercises regularly.  Due for DEXA scan.

## 2024-09-20 NOTE — ASSESSMENT & PLAN NOTE
BP in office is 136/88.  Home BPs typically run around 120s/80s.  Currently on propranolol, losartan, and amlodipine daily.

## 2024-09-20 NOTE — PROGRESS NOTES
Ambulatory Visit  Name: Maria M Linda      : 1945      MRN: [id number]  Encounter Provider: Marisela Liz PA-C  Encounter Date: 2024   Encounter department: [facility] [address]    Assessment & Plan  Medicare annual wellness visit, subsequent  She eats a well-balanced diet of fruits, veggies, and lean meats. She drinks an adequate amount of water. She is active walking around a park.  She sleeps okay.  She denies any tobacco or illicit drug use.  She drinks alcohol socially.  She is up-to-date with a dentist and eye doctor.  She lives at home alone.         Benign hypertension  BP in office is 136/88.  Home BPs typically run around 120s/80s.  Currently on propranolol, losartan, and amlodipine daily.       Chronic a-fib (HCC)  She is currently managed on propranolol and warfarin.       Subclinical hypothyroidism  TSH is stable.  Orders:  •  TSH, 3rd generation with Free T4 reflex; Future    Inflammatory neuropathy (HCC)  Currently on gabapentin daily.       Osteoporosis, unspecified osteoporosis type, unspecified pathological fracture presence  Currently on a daily vitamin D and calcium supplement.  She exercises regularly.  Due for DEXA scan.       Mixed hyperlipidemia  Lipid panel is stable.  Currently on simvastatin 10 mg daily.  Orders:  •  Lipid panel; Future    Postmenopausal    Orders:  •  DXA bone density spine hip and pelvis; Future    B12 deficiency  Due for vitamin B12.  Orders:  •  CBC and differential; Future  •  Vitamin B12; Future    Need for COVID-19 vaccine  COVID-vaccine given in office today.  Orders:  •  COVID-19 Pfizer mRNA vaccine 12 yr and older (Comirnaty pre-filled syringe)         Preventive health issues were discussed with patient, and age appropriate screening tests were ordered as noted in patient's After Visit Summary. Personalized health advice and appropriate referrals for health education or preventive services given if needed, as  noted in patient's After Visit Summary.    History of Present Illness     Patient is a 78-year-old female that presents today for annual Medicare wellness visit.  She has no new complaints today.    Health maintenance:  Up-to-date on labs, due for DEXA scan.     Patient Care Team:  Chase Deshpande MD as PCP - General (Internal Medicine)  Chase Deshpande MD as PCP - PCP-[facility] ([facility])  Chase Deshpande MD as PCP - [facility]  Charisma Pitts PA-C as Physician Assistant (Physician Assistant)    Review of Systems   Constitutional:  Negative for chills, fatigue and fever.   HENT:  Negative for ear discharge, ear pain, postnasal drip, rhinorrhea, sinus pressure, sinus pain, sore throat, tinnitus and trouble swallowing.    Eyes:  Negative for pain, discharge and itching.   Respiratory:  Negative for cough, shortness of breath and wheezing.    Cardiovascular:  Negative for chest pain, palpitations and leg swelling.   Gastrointestinal:  Negative for abdominal pain, constipation, diarrhea, nausea and vomiting.   Endocrine: Negative for polydipsia, polyphagia and polyuria.   Genitourinary:  Negative for difficulty urinating, frequency, hematuria and urgency.   Musculoskeletal:  Negative for arthralgias, joint swelling and myalgias.   Skin:  Negative for color change.   Allergic/Immunologic: Negative for environmental allergies.   Neurological:  Negative for dizziness, weakness, light-headedness, numbness and headaches.   Hematological:  Negative for adenopathy.   Psychiatric/Behavioral:  Negative for decreased concentration and sleep disturbance. The patient is not nervous/anxious.      Medical History Reviewed by provider this encounter:       Annual Wellness Visit Questionnaire   Maria M is here for her Subsequent Wellness visit. Last Medicare Wellness visit information reviewed, patient interviewed and updates made to the record today.      Health Risk Assessment:   Patient rates overall health as good.  Patient feels that their physical health rating is same. Patient is satisfied with their life. Eyesight was rated as same. Hearing was rated as same. Patient feels that their emotional and mental health rating is same. Patients states they are never, rarely angry. Patient states they are sometimes unusually tired/fatigued. Pain experienced in the last 7 days has been some. Patient's pain rating has been 3/10. Patient states that she has experienced no weight loss or gain in last 6 months.     Fall Risk Screening:   In the past year, patient has experienced: no history of falling in past year      Urinary Incontinence Screening:   Patient has not leaked urine accidently in the last six months.     Home Safety:  Patient has trouble with stairs inside or outside of their home. Patient has working smoke alarms and has working carbon monoxide detector. Home safety hazards include: none.     Nutrition:   Current diet is Regular.     Medications:   Patient is currently taking over-the-counter supplements. OTC medications include: see medication list. Patient is able to manage medications.     Activities of Daily Living (ADLs)/Instrumental Activities of Daily Living (IADLs):   Walk and transfer into and out of bed and chair?: Yes  Dress and groom yourself?: Yes    Bathe or shower yourself?: Yes    Feed yourself? Yes  Do your laundry/housekeeping?: Yes  Manage your money, pay your bills and track your expenses?: Yes  Make your own meals?: Yes    Do your own shopping?: Yes    Advance Care Planning:   Living will: Yes    Durable POA for healthcare: Yes    Advanced directive: Yes      PREVENTIVE SCREENINGS      Cardiovascular Screening:    General: Screening Not Indicated, History Lipid Disorder and Screening Current      Diabetes Screening:     General: Screening Current      Colorectal Cancer Screening:     General: Screening Not Indicated      Breast Cancer Screening:     General: Screening Current      Cervical Cancer  "Screening:    General: Screening Not Indicated      Osteoporosis Screening:    General: History Osteoporosis and Risks and Benefits Discussed    Due for: Bone Density Ultrasound      Abdominal Aortic Aneurysm (AAA) Screening:        General: Screening Not Indicated      Lung Cancer Screening:     General: Screening Not Indicated      Hepatitis C Screening:    General: Screening Current    Screening, Brief Intervention, and Referral to Treatment (SBIRT)    Screening  Typical number of drinks in a day: 0  Typical number of drinks in a week: 1  Interpretation: Low risk drinking behavior.    Single Item Drug Screening:  How often have you used an illegal drug (including marijuana) or a prescription medication for non-medical reasons in the past year? never    Single Item Drug Screen Score: 0  Interpretation: Negative screen for possible drug use disorder    Social Determinants of Health     Financial Resource Strain: Medium Risk (3/11/2024)    Overall Financial Resource Strain (CARDIA)    • Difficulty of Paying Living Expenses: Somewhat hard   Transportation Needs: No Transportation Needs (3/11/2024)    PRAPARE - Transportation    • Lack of Transportation (Medical): No    • Lack of Transportation (Non-Medical): No    Received from [facility]     No results found.    Objective     /88 (BP Location: Left arm, Patient Position: Sitting, Cuff Size: Standard)   Pulse 60   Resp 18   Ht 5' 9\" (1.753 m)   Wt 83.5 kg (184 lb)   LMP  (LMP Unknown)   SpO2 98%   BMI 27.17 kg/m²     Physical Exam  Vitals and nursing note reviewed.   Constitutional:       General: She is awake. She is not in acute distress.     Appearance: Normal appearance. She is well-developed, well-groomed and overweight.   HENT:      Head: Normocephalic and atraumatic.      Right Ear: Hearing and external ear normal.      Left Ear: Hearing and external ear normal.      Nose: Nose normal.      Mouth/Throat:      Lips: Pink.      Mouth: " Mucous membranes are moist.   Eyes:      General: Lids are normal. Vision grossly intact. Gaze aligned appropriately.      Conjunctiva/sclera: Conjunctivae normal.   Neck:      Vascular: No carotid bruit.      Trachea: Trachea and phonation normal.   Cardiovascular:      Rate and Rhythm: Normal rate. Rhythm irregularly irregular.      Heart sounds: Normal heart sounds, S1 normal and S2 normal. No murmur heard.     No friction rub. No gallop.   Pulmonary:      Effort: Pulmonary effort is normal. No respiratory distress.      Breath sounds: Normal breath sounds and air entry. No decreased breath sounds, wheezing, rhonchi or rales.   Abdominal:      General: Abdomen is protuberant.   Musculoskeletal:         General: No swelling.      Cervical back: Neck supple.      Right lower leg: No edema.      Left lower leg: No edema.   Skin:     General: Skin is warm.      Capillary Refill: Capillary refill takes less than 2 seconds.   Neurological:      Mental Status: She is alert.   Psychiatric:         Attention and Perception: Attention and perception normal.         Mood and Affect: Mood and affect normal.         Speech: Speech normal.         Behavior: Behavior normal. Behavior is cooperative.         Thought Content: Thought content normal.         Cognition and Memory: Cognition and memory normal.         Judgment: Judgment normal.

## 2024-09-24 ENCOUNTER — HOSPITAL ENCOUNTER (OUTPATIENT)
Age: 79
Discharge: HOME | End: 2024-09-24
Payer: COMMERCIAL

## 2024-09-24 ENCOUNTER — TELEPHONE (OUTPATIENT)
Age: 79
End: 2024-09-24

## 2024-09-24 VITALS — BODY MASS INDEX: 26.66 KG/M2 | HEIGHT: 69 IN | WEIGHT: 180 LBS

## 2024-09-24 DIAGNOSIS — Z78.0 POSTMENOPAUSAL: ICD-10-CM

## 2024-09-24 PROCEDURE — 77080 DXA BONE DENSITY AXIAL: CPT

## 2024-09-24 NOTE — TELEPHONE ENCOUNTER
Spoke with pt and she stated that she is currently taking propanolol 20 mg in the AM and 10 mg in the PM.    Pt would like to know if Dr. CINTRON can recommend  an increase in her AM dosage and what would he recommend it to be.    Pt thinks that she needs an increase in her AM dosage of propanolol but needs Dr. CINTRON's recommendations.    Please advise.    Thanks!

## 2024-09-24 NOTE — TELEPHONE ENCOUNTER
Pt called stating she wants to increase the am dose of propanolol 20 mg and keep the evening 10 mg dose the same due to increased tremors in the morning.   Pt does not monitor BP's at home.  9/20/24 PCP appt at home was 136/88, HR 60

## 2024-09-24 NOTE — TELEPHONE ENCOUNTER
She can try propranolol 30 mg in a.m. for 2 weeks and see if that makes a difference.  If it does not make any difference with the tremors, she should talk to her neurologist regarding other possibilities for treatment.

## 2024-09-25 ENCOUNTER — TELEPHONE (OUTPATIENT)
Age: 79
End: 2024-09-25

## 2024-09-25 DIAGNOSIS — I48.20 CHRONIC ATRIAL FIBRILLATION (HCC): ICD-10-CM

## 2024-09-25 RX ORDER — PROPRANOLOL HCL 10 MG
TABLET ORAL
Qty: 360 TABLET | Refills: 3 | Status: SHIPPED | OUTPATIENT
Start: 2024-09-25

## 2024-09-25 NOTE — TELEPHONE ENCOUNTER
Medication: Propranolol 20 mg tablet      Dose/Frequency: 30 mg in Am and 10 mg (1/2 tablet) in PM    Quantity: 180    Pharmacy: [facility] [address]    Office:   [] PCP/Provider -   [x] Speciality/Provider -     Does the patient have enough for 3 days?   [x] Yes   [] No - Send as HP to POD

## 2024-09-25 NOTE — TELEPHONE ENCOUNTER
Please call the patient to let her know that I sent a new prescription for 10 mg of propranolol as 30 mg tablet is not available    She she should start taking 3 tablets a.m. of 10 mg which is 30 mg and 1 tablet in the evening which is 10 mg when she gets the new prescription.    To please call us if there are any questions or clarifications.

## 2024-09-25 NOTE — TELEPHONE ENCOUNTER
Patient called back stating she did not get the message.    She will increase the Propranolol to 30 mg in the AM and continue 10 mg (1/2 tablet ) in the PM.    Patient then asked for a refill to be sent.  She still has 20 mg pills left but due to the increase is requesting script to be filled sooner.    I believe Dr CINTRON will have to put in a brand new script since the dose is changed, as the pod would not be able to do the refill if the dose is different.

## 2024-09-25 NOTE — TELEPHONE ENCOUNTER
----- Message from Marisela Liz PA-C sent at 9/25/2024 10:52 AM EDT -----  Bone density scan reveals osteopenia, which is the precursor to osteoporosis.  There has been no significant change since her DEXA scan in 2021.  Continue exercising and taking daily vitamin D and calcium supplement!

## 2024-10-09 ENCOUNTER — RA CDI HCC (OUTPATIENT)
Dept: OTHER | Facility: HOSPITAL | Age: 79
End: 2024-10-09

## 2024-10-10 ENCOUNTER — TELEPHONE (OUTPATIENT)
Age: 79
End: 2024-10-10

## 2024-10-10 NOTE — TELEPHONE ENCOUNTER
Caller: Maria M Linda    Doctor: Dinesh    Reason for call: Pt told to call back with medication update for Propanolol.  She is taking 30mg in AM & 10mg in PM & improvement is noted.  The shakiness has improved & increase in medication is working    Call back#: [phone]

## 2024-10-14 ENCOUNTER — TELEPHONE (OUTPATIENT)
Age: 79
End: 2024-10-14

## 2024-10-14 DIAGNOSIS — Z79.01 CHRONIC ANTICOAGULATION: ICD-10-CM

## 2024-10-14 DIAGNOSIS — I48.20 CHRONIC ATRIAL FIBRILLATION (HCC): Primary | ICD-10-CM

## 2024-10-14 DIAGNOSIS — I48.20 CHRONIC A-FIB (HCC): ICD-10-CM

## 2024-10-14 DIAGNOSIS — Z95.2 HISTORY OF HEART VALVE REPLACEMENT: ICD-10-CM

## 2024-10-14 NOTE — TELEPHONE ENCOUNTER
Pt called in wanting to make sure the PT/INR lab order was placed so she could go for blood work in the AM.     Advised that order is placed. Pt verbalized understanding.

## 2024-10-14 NOTE — TELEPHONE ENCOUNTER
Doctor: Dinesh    Reason for call: Please put an updated INR order in the patients chart.  The patient is at the lab now.      Thank you

## 2024-10-15 ENCOUNTER — APPOINTMENT (OUTPATIENT)
Dept: LAB | Facility: HOSPITAL | Age: 79
End: 2024-10-15
Payer: COMMERCIAL

## 2024-10-15 ENCOUNTER — ANTICOAG VISIT (OUTPATIENT)
Dept: CARDIOLOGY CLINIC | Facility: CLINIC | Age: 79
End: 2024-10-15

## 2024-10-15 DIAGNOSIS — Z95.2 HISTORY OF HEART VALVE REPLACEMENT: ICD-10-CM

## 2024-10-15 DIAGNOSIS — Z79.01 CHRONIC ANTICOAGULATION: ICD-10-CM

## 2024-10-15 DIAGNOSIS — I48.20 CHRONIC A-FIB (HCC): ICD-10-CM

## 2024-10-15 DIAGNOSIS — I48.20 CHRONIC ATRIAL FIBRILLATION (HCC): ICD-10-CM

## 2024-10-15 DIAGNOSIS — Z95.2 HISTORY OF HEART VALVE REPLACEMENT: Primary | ICD-10-CM

## 2024-10-18 ENCOUNTER — IMMUNIZATIONS (OUTPATIENT)
Age: 79
End: 2024-10-18
Payer: COMMERCIAL

## 2024-10-18 DIAGNOSIS — Z23 ENCOUNTER FOR IMMUNIZATION: Primary | ICD-10-CM

## 2024-10-18 PROCEDURE — 90662 IIV NO PRSV INCREASED AG IM: CPT

## 2024-10-18 PROCEDURE — G0008 ADMIN INFLUENZA VIRUS VAC: HCPCS

## 2024-10-25 DIAGNOSIS — I10 HYPERTENSION, ESSENTIAL: ICD-10-CM

## 2024-10-25 RX ORDER — AMLODIPINE BESYLATE 5 MG/1
5 TABLET ORAL EVERY MORNING
Qty: 90 TABLET | Refills: 1 | Status: SHIPPED | OUTPATIENT
Start: 2024-10-25

## 2024-11-20 DIAGNOSIS — G62.9 NEUROPATHY: ICD-10-CM

## 2024-11-20 RX ORDER — GABAPENTIN 300 MG/1
CAPSULE ORAL
Qty: 90 CAPSULE | Refills: 1 | Status: SHIPPED | OUTPATIENT
Start: 2024-11-20

## 2024-11-20 NOTE — TELEPHONE ENCOUNTER
Reason for call:   [x] Refill-patient states she needs to be able to  this medication today as weather is supposed to get bad and she will not be able to get to pharmacy tomorrow. Requesting HP request.   [] Prior Auth  [] Other:     Office:   [x] PCP/Provider - [facility] / Chase Dotson MD  [] Specialty/Provider -     Medication: gabapentin (NEURONTIN) 300 mg capsule     Dose/Frequency: Take 300 mg capsule +100 mg capsule to equal 400 mg at bedtime by mouth     Quantity: 90    [id number] [address] [place] [address]     Does the patient have enough for 3 days?   [] Yes   [x] No - Send as HP to POD

## 2024-11-26 ENCOUNTER — ANTICOAG VISIT (OUTPATIENT)
Dept: CARDIOLOGY CLINIC | Facility: CLINIC | Age: 79
End: 2024-11-26

## 2024-11-26 ENCOUNTER — APPOINTMENT (OUTPATIENT)
Dept: LAB | Facility: HOSPITAL | Age: 79
End: 2024-11-26
Payer: COMMERCIAL

## 2024-11-26 ENCOUNTER — RESULTS FOLLOW-UP (OUTPATIENT)
Dept: CARDIOLOGY CLINIC | Facility: CLINIC | Age: 79
End: 2024-11-26

## 2024-11-26 DIAGNOSIS — I48.20 CHRONIC ATRIAL FIBRILLATION (HCC): ICD-10-CM

## 2024-11-26 DIAGNOSIS — I48.20 CHRONIC A-FIB (HCC): ICD-10-CM

## 2024-11-26 DIAGNOSIS — Z79.01 CHRONIC ANTICOAGULATION: ICD-10-CM

## 2024-11-26 DIAGNOSIS — Z95.2 HISTORY OF HEART VALVE REPLACEMENT: Primary | ICD-10-CM

## 2024-11-26 DIAGNOSIS — Z95.2 HISTORY OF HEART VALVE REPLACEMENT: ICD-10-CM

## 2024-11-26 LAB
INR PPP: 3.51 (ref 0.85–1.19)
PROTHROMBIN TIME: 35.7 SECONDS (ref 12.3–15)

## 2024-11-26 PROCEDURE — 36415 COLL VENOUS BLD VENIPUNCTURE: CPT

## 2024-11-26 PROCEDURE — 85610 PROTHROMBIN TIME: CPT

## 2024-12-09 ENCOUNTER — HOSPITAL ENCOUNTER (OUTPATIENT)
Dept: NON INVASIVE DIAGNOSTICS | Facility: CLINIC | Age: 79
Discharge: HOME | End: 2024-12-09
Payer: COMMERCIAL

## 2024-12-09 DIAGNOSIS — I48.20 CHRONIC A-FIB (HCC): ICD-10-CM

## 2024-12-09 PROCEDURE — 93225 XTRNL ECG REC<48 HRS REC: CPT

## 2024-12-09 PROCEDURE — 93226 XTRNL ECG REC<48 HR SCAN A/R: CPT

## 2024-12-16 ENCOUNTER — RESULTS FOLLOW-UP (OUTPATIENT)
Dept: CARDIOLOGY CLINIC | Facility: CLINIC | Age: 79
End: 2024-12-16

## 2024-12-16 DIAGNOSIS — I48.20 CHRONIC ATRIAL FIBRILLATION (HCC): ICD-10-CM

## 2024-12-16 PROCEDURE — 93227 XTRNL ECG REC<48 HR R&I: CPT | Performed by: INTERNAL MEDICINE

## 2024-12-19 NOTE — TELEPHONE ENCOUNTER
Patient made aware of results and Dr. Montiel's recommendations.  Aware to decrease propanolol 10 mg to 2 tablets in the am and 1 tablet pm.  Patient states she does not want to drive to [place].  Does not want to wait too long for an appointment in [place].  She will see someone through [facility] if the wait for EP in [place] will be long.

## 2024-12-23 ENCOUNTER — APPOINTMENT (OUTPATIENT)
Dept: LAB | Facility: HOSPITAL | Age: 79
End: 2024-12-23
Payer: COMMERCIAL

## 2024-12-23 ENCOUNTER — ANTICOAG VISIT (OUTPATIENT)
Dept: CARDIOLOGY CLINIC | Facility: CLINIC | Age: 79
End: 2024-12-23

## 2024-12-23 DIAGNOSIS — I48.20 CHRONIC ATRIAL FIBRILLATION (HCC): ICD-10-CM

## 2024-12-23 DIAGNOSIS — Z95.2 HISTORY OF HEART VALVE REPLACEMENT: ICD-10-CM

## 2024-12-23 DIAGNOSIS — I48.20 CHRONIC A-FIB (HCC): ICD-10-CM

## 2024-12-23 DIAGNOSIS — Z79.01 CHRONIC ANTICOAGULATION: ICD-10-CM

## 2024-12-23 DIAGNOSIS — Z95.2 HISTORY OF HEART VALVE REPLACEMENT: Primary | ICD-10-CM

## 2024-12-23 LAB
INR PPP: 2.92 (ref 0.85–1.19)
PROTHROMBIN TIME: 31.1 SECONDS (ref 12.3–15)

## 2024-12-23 PROCEDURE — 85610 PROTHROMBIN TIME: CPT

## 2024-12-23 PROCEDURE — 36415 COLL VENOUS BLD VENIPUNCTURE: CPT

## 2024-12-31 DIAGNOSIS — Z95.2 HISTORY OF HEART VALVE REPLACEMENT: ICD-10-CM

## 2024-12-31 RX ORDER — WARFARIN SODIUM 2 MG/1
TABLET ORAL
Qty: 180 TABLET | Refills: 3 | Status: SHIPPED | OUTPATIENT
Start: 2024-12-31

## 2025-01-27 ENCOUNTER — APPOINTMENT (OUTPATIENT)
Dept: LAB | Facility: HOSPITAL | Age: 80
End: 2025-01-27
Attending: INTERNAL MEDICINE
Payer: COMMERCIAL

## 2025-01-27 ENCOUNTER — ANTICOAG VISIT (OUTPATIENT)
Dept: CARDIOLOGY CLINIC | Facility: CLINIC | Age: 80
End: 2025-01-27

## 2025-01-27 DIAGNOSIS — Z95.2 HISTORY OF HEART VALVE REPLACEMENT: Primary | ICD-10-CM

## 2025-01-27 DIAGNOSIS — Z95.2 HISTORY OF HEART VALVE REPLACEMENT: ICD-10-CM

## 2025-01-27 DIAGNOSIS — I48.20 CHRONIC A-FIB (HCC): ICD-10-CM

## 2025-01-27 DIAGNOSIS — I48.20 CHRONIC ATRIAL FIBRILLATION (HCC): ICD-10-CM

## 2025-01-27 DIAGNOSIS — Z79.01 CHRONIC ANTICOAGULATION: ICD-10-CM

## 2025-01-27 LAB
INR PPP: 4.42 (ref 0.85–1.19)
PROTHROMBIN TIME: 42.5 SECONDS (ref 12.3–15)

## 2025-01-27 PROCEDURE — 85610 PROTHROMBIN TIME: CPT

## 2025-01-27 PROCEDURE — 36415 COLL VENOUS BLD VENIPUNCTURE: CPT

## 2025-01-27 NOTE — PROGRESS NOTES
S/w pt. States she did not change her diet or on any new meds. Advised to hold x 2 days, resume reg dose and retest in 2 weeks

## 2025-02-10 DIAGNOSIS — R14.0 ABDOMINAL BLOATING: ICD-10-CM

## 2025-02-10 DIAGNOSIS — K58.1 IRRITABLE BOWEL SYNDROME WITH CONSTIPATION: ICD-10-CM

## 2025-02-11 RX ORDER — DICYCLOMINE HYDROCHLORIDE 10 MG/1
CAPSULE ORAL
Qty: 60 CAPSULE | Refills: 3 | Status: SHIPPED | OUTPATIENT
Start: 2025-02-11

## 2025-02-14 ENCOUNTER — ANTICOAG VISIT (OUTPATIENT)
Dept: CARDIOLOGY CLINIC | Facility: CLINIC | Age: 80
End: 2025-02-14

## 2025-02-14 ENCOUNTER — APPOINTMENT (OUTPATIENT)
Dept: LAB | Facility: HOSPITAL | Age: 80
End: 2025-02-14
Attending: INTERNAL MEDICINE
Payer: COMMERCIAL

## 2025-02-14 DIAGNOSIS — Z79.01 CHRONIC ANTICOAGULATION: ICD-10-CM

## 2025-02-14 DIAGNOSIS — I48.20 CHRONIC A-FIB (HCC): ICD-10-CM

## 2025-02-14 DIAGNOSIS — I10 ESSENTIAL HYPERTENSION: ICD-10-CM

## 2025-02-14 DIAGNOSIS — Z95.2 HISTORY OF HEART VALVE REPLACEMENT: Primary | ICD-10-CM

## 2025-02-14 DIAGNOSIS — I48.20 CHRONIC ATRIAL FIBRILLATION (HCC): ICD-10-CM

## 2025-02-14 DIAGNOSIS — Z95.2 HISTORY OF HEART VALVE REPLACEMENT: ICD-10-CM

## 2025-02-14 LAB
INR PPP: 3.55 (ref 0.85–1.19)
PROTHROMBIN TIME: 36.1 SECONDS (ref 12.3–15)

## 2025-02-14 PROCEDURE — 36415 COLL VENOUS BLD VENIPUNCTURE: CPT

## 2025-02-14 PROCEDURE — 85610 PROTHROMBIN TIME: CPT

## 2025-02-14 RX ORDER — LOSARTAN POTASSIUM 100 MG/1
100 TABLET ORAL DAILY
Qty: 30 TABLET | Refills: 0 | Status: SHIPPED | OUTPATIENT
Start: 2025-02-14

## 2025-02-18 ENCOUNTER — HOSPITAL ENCOUNTER (OUTPATIENT)
Dept: MRI IMAGING | Facility: CLINIC | Age: 80
Discharge: HOME | End: 2025-02-18
Payer: COMMERCIAL

## 2025-02-18 DIAGNOSIS — M19.072 PRIMARY OSTEOARTHRITIS, LEFT ANKLE AND FOOT: ICD-10-CM

## 2025-02-18 DIAGNOSIS — M84.375A STRESS FRACTURE, LEFT FOOT, INITIAL ENCOUNTER FOR FRACTURE: ICD-10-CM

## 2025-02-18 DIAGNOSIS — M77.42 METATARSALGIA, LEFT FOOT: ICD-10-CM

## 2025-02-18 DIAGNOSIS — L97.521 NON-PRESSURE CHRONIC ULCER OF OTHER PART OF LEFT FOOT LIMITED TO BREAKDOWN OF SKIN (HCC): ICD-10-CM

## 2025-02-18 PROCEDURE — 73718 MRI LOWER EXTREMITY W/O DYE: CPT

## 2025-03-02 DIAGNOSIS — I48.20 CHRONIC ATRIAL FIBRILLATION (HCC): ICD-10-CM

## 2025-03-02 DIAGNOSIS — G62.9 NEUROPATHY: ICD-10-CM

## 2025-03-03 RX ORDER — GABAPENTIN 300 MG/1
CAPSULE ORAL
Qty: 90 CAPSULE | Refills: 1 | Status: SHIPPED | OUTPATIENT
Start: 2025-03-03

## 2025-03-04 RX ORDER — PROPRANOLOL HCL 20 MG
20 TABLET ORAL DAILY
Qty: 90 TABLET | Refills: 0 | Status: SHIPPED | OUTPATIENT
Start: 2025-03-04

## 2025-03-06 ENCOUNTER — APPOINTMENT (OUTPATIENT)
Dept: LAB | Facility: HOSPITAL | Age: 80
End: 2025-03-06
Payer: COMMERCIAL

## 2025-03-06 ENCOUNTER — ANTICOAG VISIT (OUTPATIENT)
Dept: CARDIOLOGY CLINIC | Facility: CLINIC | Age: 80
End: 2025-03-06

## 2025-03-06 DIAGNOSIS — I48.20 CHRONIC A-FIB (HCC): ICD-10-CM

## 2025-03-06 DIAGNOSIS — Z79.01 CHRONIC ANTICOAGULATION: ICD-10-CM

## 2025-03-06 DIAGNOSIS — I48.20 CHRONIC ATRIAL FIBRILLATION (HCC): ICD-10-CM

## 2025-03-06 DIAGNOSIS — Z95.2 HISTORY OF HEART VALVE REPLACEMENT: Primary | ICD-10-CM

## 2025-03-06 DIAGNOSIS — Z95.2 HISTORY OF HEART VALVE REPLACEMENT: ICD-10-CM

## 2025-03-06 LAB
INR PPP: 3.19 (ref 0.85–1.19)
PROTHROMBIN TIME: 33.3 SECONDS (ref 12.3–15)

## 2025-03-06 PROCEDURE — 85610 PROTHROMBIN TIME: CPT

## 2025-03-06 PROCEDURE — 36415 COLL VENOUS BLD VENIPUNCTURE: CPT

## 2025-03-08 DIAGNOSIS — I10 ESSENTIAL HYPERTENSION: ICD-10-CM

## 2025-03-12 RX ORDER — LOSARTAN POTASSIUM 100 MG/1
100 TABLET ORAL DAILY
Qty: 90 TABLET | Refills: 1 | Status: SHIPPED | OUTPATIENT
Start: 2025-03-12

## 2025-03-13 ENCOUNTER — APPOINTMENT (OUTPATIENT)
Dept: LAB | Facility: HOSPITAL | Age: 80
End: 2025-03-13
Payer: COMMERCIAL

## 2025-03-13 DIAGNOSIS — E53.8 B12 DEFICIENCY: ICD-10-CM

## 2025-03-13 DIAGNOSIS — E03.8 SUBCLINICAL HYPOTHYROIDISM: ICD-10-CM

## 2025-03-13 DIAGNOSIS — R73.01 IMPAIRED FASTING GLUCOSE: ICD-10-CM

## 2025-03-13 DIAGNOSIS — E78.2 MIXED HYPERLIPIDEMIA: ICD-10-CM

## 2025-03-13 LAB
ALBUMIN SERPL BCG-MCNC: 4.2 G/DL (ref 3.5–5)
ALP SERPL-CCNC: 55 U/L (ref 34–104)
ALT SERPL W P-5'-P-CCNC: 12 U/L (ref 7–52)
ANION GAP SERPL CALCULATED.3IONS-SCNC: 6 MMOL/L (ref 4–13)
AST SERPL W P-5'-P-CCNC: 21 U/L (ref 13–39)
BASOPHILS # BLD AUTO: 0.03 THOUSANDS/ÂΜL (ref 0–0.1)
BASOPHILS NFR BLD AUTO: 1 % (ref 0–1)
BILIRUB SERPL-MCNC: 0.61 MG/DL (ref 0.2–1)
BUN SERPL-MCNC: 19 MG/DL (ref 5–25)
CALCIUM SERPL-MCNC: 9.2 MG/DL (ref 8.4–10.2)
CHLORIDE SERPL-SCNC: 104 MMOL/L (ref 96–108)
CHOLEST SERPL-MCNC: 135 MG/DL (ref ?–200)
CO2 SERPL-SCNC: 29 MMOL/L (ref 21–32)
CREAT SERPL-MCNC: 0.79 MG/DL (ref 0.6–1.3)
EOSINOPHIL # BLD AUTO: 0.21 THOUSAND/ÂΜL (ref 0–0.61)
EOSINOPHIL NFR BLD AUTO: 4 % (ref 0–6)
ERYTHROCYTE [DISTWIDTH] IN BLOOD BY AUTOMATED COUNT: 12.3 % (ref 11.6–15.1)
EST. AVERAGE GLUCOSE BLD GHB EST-MCNC: 111 MG/DL
GFR SERPL CREATININE-BSD FRML MDRD: 72 ML/MIN/1.73SQ M
GLUCOSE P FAST SERPL-MCNC: 94 MG/DL (ref 65–99)
HBA1C MFR BLD: 5.5 %
HCT VFR BLD AUTO: 36.6 % (ref 34.8–46.1)
HDLC SERPL-MCNC: 62 MG/DL
HGB BLD-MCNC: 11.9 G/DL (ref 11.5–15.4)
IMM GRANULOCYTES # BLD AUTO: 0.01 THOUSAND/UL (ref 0–0.2)
IMM GRANULOCYTES NFR BLD AUTO: 0 % (ref 0–2)
LDLC SERPL CALC-MCNC: 58 MG/DL (ref 0–100)
LYMPHOCYTES # BLD AUTO: 0.93 THOUSANDS/ÂΜL (ref 0.6–4.47)
LYMPHOCYTES NFR BLD AUTO: 20 % (ref 14–44)
MCH RBC QN AUTO: 32.3 PG (ref 26.8–34.3)
MCHC RBC AUTO-ENTMCNC: 32.5 G/DL (ref 31.4–37.4)
MCV RBC AUTO: 100 FL (ref 82–98)
MONOCYTES # BLD AUTO: 0.53 THOUSAND/ÂΜL (ref 0.17–1.22)
MONOCYTES NFR BLD AUTO: 11 % (ref 4–12)
NEUTROPHILS # BLD AUTO: 3.04 THOUSANDS/ÂΜL (ref 1.85–7.62)
NEUTS SEG NFR BLD AUTO: 64 % (ref 43–75)
NONHDLC SERPL-MCNC: 73 MG/DL
NRBC BLD AUTO-RTO: 0 /100 WBCS
PLATELET # BLD AUTO: 196 THOUSANDS/UL (ref 149–390)
PMV BLD AUTO: 9.3 FL (ref 8.9–12.7)
POTASSIUM SERPL-SCNC: 4.7 MMOL/L (ref 3.5–5.3)
PROT SERPL-MCNC: 7 G/DL (ref 6.4–8.4)
RBC # BLD AUTO: 3.68 MILLION/UL (ref 3.81–5.12)
SODIUM SERPL-SCNC: 139 MMOL/L (ref 135–147)
T4 FREE SERPL-MCNC: 1 NG/DL (ref 0.61–1.12)
TRIGL SERPL-MCNC: 74 MG/DL (ref ?–150)
TSH SERPL DL<=0.05 MIU/L-ACNC: 4.53 UIU/ML (ref 0.45–4.5)
VIT B12 SERPL-MCNC: 744 PG/ML (ref 180–914)
WBC # BLD AUTO: 4.75 THOUSAND/UL (ref 4.31–10.16)

## 2025-03-13 PROCEDURE — 84439 ASSAY OF FREE THYROXINE: CPT

## 2025-03-13 PROCEDURE — 36415 COLL VENOUS BLD VENIPUNCTURE: CPT

## 2025-03-13 PROCEDURE — 85025 COMPLETE CBC W/AUTO DIFF WBC: CPT

## 2025-03-13 PROCEDURE — 82607 VITAMIN B-12: CPT

## 2025-03-13 PROCEDURE — 83036 HEMOGLOBIN GLYCOSYLATED A1C: CPT

## 2025-03-13 PROCEDURE — 80061 LIPID PANEL: CPT

## 2025-03-13 PROCEDURE — 80053 COMPREHEN METABOLIC PANEL: CPT

## 2025-03-13 PROCEDURE — 84443 ASSAY THYROID STIM HORMONE: CPT

## 2025-03-14 ENCOUNTER — RA CDI HCC (OUTPATIENT)
Dept: OTHER | Facility: HOSPITAL | Age: 80
End: 2025-03-14

## 2025-03-19 PROBLEM — G61.9: Status: RESOLVED | Noted: 2021-04-19 | Resolved: 2025-03-19

## 2025-03-21 ENCOUNTER — APPOINTMENT (OUTPATIENT)
Dept: LAB | Facility: HOSPITAL | Age: 80
End: 2025-03-21
Attending: INTERNAL MEDICINE
Payer: COMMERCIAL

## 2025-03-21 ENCOUNTER — ANTICOAG VISIT (OUTPATIENT)
Dept: CARDIOLOGY CLINIC | Facility: CLINIC | Age: 80
End: 2025-03-21

## 2025-03-21 DIAGNOSIS — Z79.01 CHRONIC ANTICOAGULATION: ICD-10-CM

## 2025-03-21 DIAGNOSIS — I48.20 CHRONIC A-FIB (HCC): ICD-10-CM

## 2025-03-21 DIAGNOSIS — Z95.2 HISTORY OF HEART VALVE REPLACEMENT: Primary | ICD-10-CM

## 2025-03-21 DIAGNOSIS — I48.20 CHRONIC ATRIAL FIBRILLATION (HCC): ICD-10-CM

## 2025-03-21 DIAGNOSIS — Z95.2 HISTORY OF HEART VALVE REPLACEMENT: ICD-10-CM

## 2025-03-21 LAB
INR PPP: 2.9 (ref 0.85–1.19)
PROTHROMBIN TIME: 31 SECONDS (ref 12.3–15)

## 2025-03-21 PROCEDURE — 85610 PROTHROMBIN TIME: CPT

## 2025-03-21 PROCEDURE — 36415 COLL VENOUS BLD VENIPUNCTURE: CPT

## 2025-03-24 ENCOUNTER — OFFICE VISIT (OUTPATIENT)
Age: 80
End: 2025-03-24
Payer: COMMERCIAL

## 2025-03-24 VITALS
HEIGHT: 69 IN | OXYGEN SATURATION: 98 % | HEART RATE: 94 BPM | SYSTOLIC BLOOD PRESSURE: 128 MMHG | DIASTOLIC BLOOD PRESSURE: 78 MMHG | WEIGHT: 180 LBS | BODY MASS INDEX: 26.66 KG/M2

## 2025-03-24 DIAGNOSIS — I87.2 VENOUS INSUFFICIENCY OF BOTH LOWER EXTREMITIES: ICD-10-CM

## 2025-03-24 DIAGNOSIS — E53.8 B12 DEFICIENCY: ICD-10-CM

## 2025-03-24 DIAGNOSIS — I42.9 CARDIOMYOPATHY, UNSPECIFIED TYPE (HCC): ICD-10-CM

## 2025-03-24 DIAGNOSIS — R00.1 BRADYCARDIA: ICD-10-CM

## 2025-03-24 DIAGNOSIS — I48.20 CHRONIC A-FIB (HCC): ICD-10-CM

## 2025-03-24 DIAGNOSIS — R53.83 OTHER FATIGUE: ICD-10-CM

## 2025-03-24 DIAGNOSIS — E03.8 SUBCLINICAL HYPOTHYROIDISM: ICD-10-CM

## 2025-03-24 DIAGNOSIS — E78.2 MIXED HYPERLIPIDEMIA: ICD-10-CM

## 2025-03-24 DIAGNOSIS — M81.0 OSTEOPOROSIS, UNSPECIFIED OSTEOPOROSIS TYPE, UNSPECIFIED PATHOLOGICAL FRACTURE PRESENCE: ICD-10-CM

## 2025-03-24 DIAGNOSIS — I10 BENIGN HYPERTENSION: Primary | ICD-10-CM

## 2025-03-24 DIAGNOSIS — R73.01 IMPAIRED FASTING GLUCOSE: ICD-10-CM

## 2025-03-24 DIAGNOSIS — G25.0 ESSENTIAL TREMOR: ICD-10-CM

## 2025-03-24 PROCEDURE — 99214 OFFICE O/P EST MOD 30 MIN: CPT

## 2025-03-24 PROCEDURE — G2211 COMPLEX E/M VISIT ADD ON: HCPCS

## 2025-03-24 NOTE — ASSESSMENT & PLAN NOTE
Continue to wear compression socks, elevate feet, and limit salt intake to less than 2000 mg daily.

## 2025-03-24 NOTE — ASSESSMENT & PLAN NOTE
Heart rate is 94 in office.  She was evaluated by EP who recommended pacemaker placement, but she is currently asymptomatic so she declines at this time.

## 2025-03-24 NOTE — ASSESSMENT & PLAN NOTE
BP in office is 128/78. No home BPs. Currently on amlodipine 5 mg, losartan 100 mg, and propranolol 30 mg daily.

## 2025-03-24 NOTE — PROGRESS NOTES
Name: Maria M Linda      : 1945      MRN: [id number]  Encounter Provider: Marisela Liz PA-C  Encounter Date: 3/24/2025   Encounter department: [facility]  :  Assessment & Plan  Benign hypertension  BP in office is 128/78. No home BPs. Currently on amlodipine 5 mg, losartan 100 mg, and propranolol 30 mg daily.       Chronic a-fib (HCC)  She is currently on propranolol 30 mg and Coumadin daily.  Followed by cardiology.       Venous insufficiency of both lower extremities  Continue to wear compression socks, elevate feet, and limit salt intake to less than 2000 mg daily.       Subclinical hypothyroidism  TSH is elevated, but T4 is normal.  Will continue to monitor.  Orders:    TSH, 3rd generation with Free T4 reflex; Future    Essential tremor  Currently stable on propranolol 30 mg daily.       Osteoporosis, unspecified osteoporosis type, unspecified pathological fracture presence  DEXA from 2024 revealed osteopenia. Continue daily vitamin D and calcium supplement.        Mixed hyperlipidemia  Lipid panel is stable.  Currently on simvastatin 10 mg daily.  Orders:    Lipid panel; Future    Bradycardia  Heart rate is 94 in office.  She was evaluated by EP who recommended pacemaker placement, but she is currently asymptomatic so she declines at this time.       B12 deficiency  B12 is within normal range.  Orders:    Vitamin B12; Future    Impaired fasting glucose    Orders:    Hemoglobin A1C; Future    Comprehensive metabolic panel; Future    Other fatigue    Orders:    CBC and differential; Future    TSH, 3rd generation with Free T4 reflex; Future    Cardiomyopathy, unspecified type (HCC)  Followed by cardiology.             Depression Screening and Follow-up Plan: Patient was screened for depression during today's encounter. They screened negative with a PHQ-2 score of 0.        History of Present Illness   Patient is a 79-year-old female that presents  "today for 6-month follow-up.  She has no new complaints today.    She eats a well-balanced diet of fruits, veggies, and lean meats. She drinks an adequate amount of water. She is active walking around a park.  She sleeps okay.  She denies any tobacco or illicit drug use.  She drinks alcohol socially.  She is up-to-date with a dentist and eye doctor.  She lives at home alone.    Health maintenance:   Due for mammogram, up-to-date on labs.  Immunizations: Due for RSV vaccine.      Review of Systems   Constitutional:  Negative for chills, fatigue and fever.   HENT:  Negative for ear discharge, ear pain, postnasal drip, rhinorrhea, sinus pressure, sinus pain, sore throat, tinnitus and trouble swallowing.    Eyes:  Negative for pain, discharge and itching.   Respiratory:  Negative for cough, shortness of breath and wheezing.    Cardiovascular:  Negative for chest pain, palpitations and leg swelling.   Gastrointestinal:  Negative for abdominal pain, constipation, diarrhea, nausea and vomiting.   Endocrine: Negative for polydipsia, polyphagia and polyuria.   Genitourinary:  Negative for difficulty urinating, frequency, hematuria and urgency.   Musculoskeletal:  Positive for arthralgias. Negative for joint swelling and myalgias.   Skin:  Negative for color change.   Allergic/Immunologic: Negative for environmental allergies.   Neurological:  Negative for dizziness, weakness, light-headedness, numbness and headaches.   Hematological:  Negative for adenopathy.   Psychiatric/Behavioral:  Negative for decreased concentration and sleep disturbance. The patient is not nervous/anxious.        Objective   /78   Pulse 94   Ht 5' 9\" (1.753 m)   Wt 81.6 kg (180 lb)   LMP  (LMP Unknown)   SpO2 98%   BMI 26.58 kg/m²      Physical Exam  Vitals and nursing note reviewed.   Constitutional:       General: She is awake. She is not in acute distress.     Appearance: Normal appearance. She is well-developed, well-groomed and normal " weight.   HENT:      Head: Normocephalic and atraumatic.      Right Ear: Hearing and external ear normal.      Left Ear: Hearing and external ear normal.      Nose: Nose normal.      Mouth/Throat:      Lips: Pink.      Mouth: Mucous membranes are moist.   Eyes:      General: Lids are normal. Vision grossly intact. Gaze aligned appropriately.      Conjunctiva/sclera: Conjunctivae normal.   Neck:      Vascular: No carotid bruit.      Trachea: Trachea and phonation normal.   Cardiovascular:      Rate and Rhythm: Normal rate and regular rhythm.      Heart sounds: Normal heart sounds, S1 normal and S2 normal. No murmur heard.     No friction rub. No gallop.   Pulmonary:      Effort: Pulmonary effort is normal. No respiratory distress.      Breath sounds: Normal breath sounds and air entry. No decreased breath sounds, wheezing, rhonchi or rales.   Abdominal:      General: Abdomen is protuberant.   Musculoskeletal:         General: No swelling.      Cervical back: Neck supple.      Right lower leg: No edema.      Left lower leg: No edema.   Skin:     General: Skin is warm.      Capillary Refill: Capillary refill takes less than 2 seconds.   Neurological:      Mental Status: She is alert.   Psychiatric:         Attention and Perception: Attention and perception normal.         Mood and Affect: Mood and affect normal.         Speech: Speech normal.         Behavior: Behavior normal. Behavior is cooperative.         Thought Content: Thought content normal.         Cognition and Memory: Cognition and memory normal.         Judgment: Judgment normal.

## 2025-03-24 NOTE — ASSESSMENT & PLAN NOTE
DEXA from September 2024 revealed osteopenia. Continue daily vitamin D and calcium supplement.

## 2025-03-24 NOTE — ASSESSMENT & PLAN NOTE
TSH is elevated, but T4 is normal.  Will continue to monitor.  Orders:    TSH, 3rd generation with Free T4 reflex; Future

## 2025-03-28 DIAGNOSIS — I10 HYPERTENSION, ESSENTIAL: ICD-10-CM

## 2025-03-28 RX ORDER — AMLODIPINE BESYLATE 5 MG/1
5 TABLET ORAL EVERY MORNING
Qty: 90 TABLET | Refills: 1 | Status: SHIPPED | OUTPATIENT
Start: 2025-03-28

## 2025-04-10 DIAGNOSIS — R35.1 NOCTURIA: ICD-10-CM

## 2025-04-10 RX ORDER — TROSPIUM CHLORIDE 20 MG/1
20 TABLET, FILM COATED ORAL
Qty: 90 TABLET | Refills: 3 | Status: SHIPPED | OUTPATIENT
Start: 2025-04-10

## 2025-04-21 ENCOUNTER — RESULTS FOLLOW-UP (OUTPATIENT)
Dept: CARDIOLOGY CLINIC | Facility: CLINIC | Age: 80
End: 2025-04-21

## 2025-04-21 ENCOUNTER — ANTICOAG VISIT (OUTPATIENT)
Dept: CARDIOLOGY CLINIC | Facility: CLINIC | Age: 80
End: 2025-04-21

## 2025-04-21 ENCOUNTER — APPOINTMENT (OUTPATIENT)
Dept: LAB | Facility: HOSPITAL | Age: 80
End: 2025-04-21
Payer: COMMERCIAL

## 2025-04-21 DIAGNOSIS — Z95.2 HISTORY OF HEART VALVE REPLACEMENT: Primary | ICD-10-CM

## 2025-04-21 DIAGNOSIS — I48.20 CHRONIC A-FIB (HCC): ICD-10-CM

## 2025-04-21 DIAGNOSIS — I48.20 CHRONIC ATRIAL FIBRILLATION (HCC): ICD-10-CM

## 2025-04-21 DIAGNOSIS — Z95.2 HISTORY OF HEART VALVE REPLACEMENT: ICD-10-CM

## 2025-04-21 DIAGNOSIS — Z79.01 CHRONIC ANTICOAGULATION: ICD-10-CM

## 2025-04-21 LAB
INR PPP: 2.65 (ref 0.85–1.19)
PROTHROMBIN TIME: 28.9 SECONDS (ref 12.3–15)

## 2025-04-21 PROCEDURE — 36415 COLL VENOUS BLD VENIPUNCTURE: CPT

## 2025-04-21 PROCEDURE — 85610 PROTHROMBIN TIME: CPT

## 2025-04-24 ENCOUNTER — TELEPHONE (OUTPATIENT)
Age: 80
End: 2025-04-24

## 2025-04-24 NOTE — TELEPHONE ENCOUNTER
Patient called and  is requesting for a new script for a Bone Density Exam to be placed.    Please contact patient when the order is placed if possible.

## 2025-04-28 NOTE — TELEPHONE ENCOUNTER
Phone call from patient and I read her Marisela Liz's message regarding Bone Density Testing. Patient verbally aware not due until 9/26/2026 and order placed. Patient verbally understands and has no further questions.

## 2025-05-15 DIAGNOSIS — E78.2 COMBINED HYPERLIPIDEMIA: ICD-10-CM

## 2025-05-15 RX ORDER — SIMVASTATIN 10 MG
10 TABLET ORAL DAILY
Qty: 90 TABLET | Refills: 3 | Status: SHIPPED | OUTPATIENT
Start: 2025-05-15

## 2025-05-19 ENCOUNTER — ANTICOAG VISIT (OUTPATIENT)
Dept: CARDIOLOGY CLINIC | Facility: CLINIC | Age: 80
End: 2025-05-19

## 2025-05-19 ENCOUNTER — RESULTS FOLLOW-UP (OUTPATIENT)
Dept: CARDIOLOGY CLINIC | Facility: CLINIC | Age: 80
End: 2025-05-19

## 2025-05-19 ENCOUNTER — APPOINTMENT (OUTPATIENT)
Dept: LAB | Facility: HOSPITAL | Age: 80
End: 2025-05-19
Payer: COMMERCIAL

## 2025-05-19 DIAGNOSIS — I48.20 CHRONIC ATRIAL FIBRILLATION (HCC): ICD-10-CM

## 2025-05-19 DIAGNOSIS — I48.20 CHRONIC A-FIB (HCC): ICD-10-CM

## 2025-05-19 DIAGNOSIS — Z95.2 HISTORY OF HEART VALVE REPLACEMENT: ICD-10-CM

## 2025-05-19 DIAGNOSIS — Z95.2 HISTORY OF HEART VALVE REPLACEMENT: Primary | ICD-10-CM

## 2025-05-19 DIAGNOSIS — Z79.01 CHRONIC ANTICOAGULATION: ICD-10-CM

## 2025-05-19 LAB
INR PPP: 2.97 (ref 0.85–1.19)
PROTHROMBIN TIME: 31.5 SECONDS (ref 12.3–15)

## 2025-05-19 PROCEDURE — 36415 COLL VENOUS BLD VENIPUNCTURE: CPT

## 2025-05-19 PROCEDURE — 85610 PROTHROMBIN TIME: CPT

## 2025-05-28 DIAGNOSIS — I48.20 CHRONIC ATRIAL FIBRILLATION (HCC): ICD-10-CM

## 2025-05-28 DIAGNOSIS — Z95.2 HISTORY OF HEART VALVE REPLACEMENT: ICD-10-CM

## 2025-05-29 DIAGNOSIS — G62.9 NEUROPATHY: ICD-10-CM

## 2025-05-29 RX ORDER — WARFARIN SODIUM 3 MG/1
3 TABLET ORAL DAILY
Qty: 90 TABLET | Refills: 3 | Status: SHIPPED | OUTPATIENT
Start: 2025-05-29

## 2025-05-29 RX ORDER — PROPRANOLOL HCL 20 MG
20 TABLET ORAL DAILY
Qty: 90 TABLET | Refills: 1 | Status: SHIPPED | OUTPATIENT
Start: 2025-05-29

## 2025-05-30 RX ORDER — GABAPENTIN 100 MG/1
100 CAPSULE ORAL 2 TIMES DAILY
Qty: 180 CAPSULE | Refills: 3 | Status: SHIPPED | OUTPATIENT
Start: 2025-05-30

## 2025-06-12 ENCOUNTER — ANTICOAG VISIT (OUTPATIENT)
Dept: CARDIOLOGY CLINIC | Facility: CLINIC | Age: 80
End: 2025-06-12

## 2025-06-12 ENCOUNTER — RESULTS FOLLOW-UP (OUTPATIENT)
Dept: CARDIOLOGY CLINIC | Facility: CLINIC | Age: 80
End: 2025-06-12

## 2025-06-12 ENCOUNTER — APPOINTMENT (OUTPATIENT)
Dept: LAB | Facility: HOSPITAL | Age: 80
End: 2025-06-12
Payer: COMMERCIAL

## 2025-06-12 DIAGNOSIS — Z95.2 HISTORY OF HEART VALVE REPLACEMENT: Primary | ICD-10-CM

## 2025-06-12 DIAGNOSIS — I48.20 CHRONIC A-FIB (HCC): ICD-10-CM

## 2025-06-12 DIAGNOSIS — Z79.01 CHRONIC ANTICOAGULATION: ICD-10-CM

## 2025-06-12 DIAGNOSIS — Z95.2 HISTORY OF HEART VALVE REPLACEMENT: ICD-10-CM

## 2025-06-12 DIAGNOSIS — I48.20 CHRONIC ATRIAL FIBRILLATION (HCC): ICD-10-CM

## 2025-06-12 LAB
INR PPP: 2.36 (ref 0.85–1.19)
PROTHROMBIN TIME: 26.5 SECONDS (ref 12.3–15)

## 2025-06-12 PROCEDURE — 85610 PROTHROMBIN TIME: CPT

## 2025-06-12 PROCEDURE — 36415 COLL VENOUS BLD VENIPUNCTURE: CPT

## 2025-06-18 ENCOUNTER — HOSPITAL ENCOUNTER (OUTPATIENT)
Dept: MAMMOGRAPHY | Facility: CLINIC | Age: 80
Discharge: HOME | End: 2025-06-18
Payer: COMMERCIAL

## 2025-06-18 VITALS — HEIGHT: 69 IN | BODY MASS INDEX: 26.66 KG/M2 | WEIGHT: 180 LBS

## 2025-06-18 DIAGNOSIS — Z12.31 VISIT FOR SCREENING MAMMOGRAM: ICD-10-CM

## 2025-06-18 PROCEDURE — 77063 BREAST TOMOSYNTHESIS BI: CPT

## 2025-06-18 PROCEDURE — 77067 SCR MAMMO BI INCL CAD: CPT

## 2025-06-30 ENCOUNTER — ANTICOAG VISIT (OUTPATIENT)
Dept: CARDIOLOGY CLINIC | Facility: CLINIC | Age: 80
End: 2025-06-30

## 2025-06-30 ENCOUNTER — APPOINTMENT (OUTPATIENT)
Dept: LAB | Facility: HOSPITAL | Age: 80
End: 2025-06-30
Payer: COMMERCIAL

## 2025-06-30 DIAGNOSIS — Z79.01 CHRONIC ANTICOAGULATION: ICD-10-CM

## 2025-06-30 DIAGNOSIS — I48.20 CHRONIC A-FIB (HCC): ICD-10-CM

## 2025-06-30 DIAGNOSIS — Z95.2 HISTORY OF HEART VALVE REPLACEMENT: ICD-10-CM

## 2025-06-30 DIAGNOSIS — I48.20 CHRONIC ATRIAL FIBRILLATION (HCC): ICD-10-CM

## 2025-06-30 DIAGNOSIS — Z95.2 HISTORY OF HEART VALVE REPLACEMENT: Primary | ICD-10-CM

## 2025-06-30 LAB
INR PPP: 2.83 (ref 0.85–1.19)
PROTHROMBIN TIME: 30.4 SECONDS (ref 12.3–15)

## 2025-06-30 PROCEDURE — 36415 COLL VENOUS BLD VENIPUNCTURE: CPT

## 2025-06-30 PROCEDURE — 85610 PROTHROMBIN TIME: CPT

## 2025-07-14 DIAGNOSIS — R51.9 NONINTRACTABLE HEADACHE, UNSPECIFIED CHRONICITY PATTERN, UNSPECIFIED HEADACHE TYPE: ICD-10-CM

## 2025-07-15 RX ORDER — BUTALBITAL, ACETAMINOPHEN AND CAFFEINE 50; 325; 40 MG/1; MG/1; MG/1
TABLET ORAL
Qty: 30 TABLET | Refills: 0 | Status: SHIPPED | OUTPATIENT
Start: 2025-07-15

## 2025-08-01 ENCOUNTER — APPOINTMENT (OUTPATIENT)
Dept: LAB | Facility: HOSPITAL | Age: 80
End: 2025-08-01
Payer: COMMERCIAL

## 2025-08-01 ENCOUNTER — ANTICOAG VISIT (OUTPATIENT)
Dept: CARDIOLOGY CLINIC | Facility: CLINIC | Age: 80
End: 2025-08-01

## 2025-08-01 DIAGNOSIS — Z95.2 HISTORY OF HEART VALVE REPLACEMENT: Primary | ICD-10-CM

## 2025-08-01 DIAGNOSIS — I48.20 CHRONIC ATRIAL FIBRILLATION (HCC): ICD-10-CM

## 2025-08-01 DIAGNOSIS — Z95.2 HISTORY OF HEART VALVE REPLACEMENT: ICD-10-CM

## 2025-08-01 DIAGNOSIS — I48.20 CHRONIC A-FIB (HCC): ICD-10-CM

## 2025-08-01 DIAGNOSIS — Z79.01 CHRONIC ANTICOAGULATION: ICD-10-CM

## 2025-08-01 LAB
INR PPP: 2.66 (ref 0.85–1.19)
PROTHROMBIN TIME: 29.7 SECONDS (ref 12.3–15)

## 2025-08-01 PROCEDURE — 85610 PROTHROMBIN TIME: CPT

## 2025-08-01 PROCEDURE — 36415 COLL VENOUS BLD VENIPUNCTURE: CPT
